# Patient Record
Sex: MALE | Race: WHITE | NOT HISPANIC OR LATINO | Employment: OTHER | URBAN - METROPOLITAN AREA
[De-identification: names, ages, dates, MRNs, and addresses within clinical notes are randomized per-mention and may not be internally consistent; named-entity substitution may affect disease eponyms.]

---

## 2019-07-31 DIAGNOSIS — N18.6 END STAGE RENAL DISEASE: Primary | ICD-10-CM

## 2019-08-07 ENCOUNTER — HOSPITAL ENCOUNTER (EMERGENCY)
Facility: HOSPITAL | Age: 54
Discharge: HOME | End: 2019-08-07
Attending: EMERGENCY MEDICINE
Payer: MEDICARE

## 2019-08-07 VITALS
BODY MASS INDEX: 32.24 KG/M2 | SYSTOLIC BLOOD PRESSURE: 107 MMHG | TEMPERATURE: 98 F | OXYGEN SATURATION: 95 % | HEART RATE: 92 BPM | HEIGHT: 66 IN | DIASTOLIC BLOOD PRESSURE: 72 MMHG | RESPIRATION RATE: 20 BRPM | WEIGHT: 200.63 LBS
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
## 2019-08-07 LAB
ALBUMIN SERPL BCP-MCNC: 3.1 G/DL (ref 3.5–5.2)
ALP SERPL-CCNC: 107 U/L (ref 55–135)
ALT SERPL W/O P-5'-P-CCNC: 9 U/L (ref 10–44)
ANION GAP SERPL CALC-SCNC: 17 MMOL/L (ref 8–16)
AST SERPL-CCNC: 11 U/L (ref 10–40)
BASOPHILS # BLD AUTO: 0.12 K/UL (ref 0–0.2)
BASOPHILS NFR BLD: 2 % (ref 0–1.9)
BILIRUB SERPL-MCNC: 2.4 MG/DL (ref 0.1–1)
BUN SERPL-MCNC: 51 MG/DL (ref 6–20)
CALCIUM SERPL-MCNC: 7.8 MG/DL (ref 8.7–10.5)
CHLORIDE SERPL-SCNC: 93 MMOL/L (ref 95–110)
CO2 SERPL-SCNC: 26 MMOL/L (ref 23–29)
CREAT SERPL-MCNC: 8.7 MG/DL (ref 0.5–1.4)
DIFFERENTIAL METHOD: ABNORMAL
EOSINOPHIL # BLD AUTO: 0.1 K/UL (ref 0–0.5)
EOSINOPHIL NFR BLD: 2 % (ref 0–8)
ERYTHROCYTE [DISTWIDTH] IN BLOOD BY AUTOMATED COUNT: 15.8 % (ref 11.5–14.5)
EST. GFR  (AFRICAN AMERICAN): 7.1 ML/MIN/1.73 M^2
EST. GFR  (NON AFRICAN AMERICAN): 6.1 ML/MIN/1.73 M^2
GLUCOSE SERPL-MCNC: 113 MG/DL (ref 70–110)
HCT VFR BLD AUTO: 36.8 % (ref 40–54)
HGB BLD-MCNC: 11.6 G/DL (ref 14–18)
IMM GRANULOCYTES # BLD AUTO: 0.02 K/UL (ref 0–0.04)
IMM GRANULOCYTES NFR BLD AUTO: 0.3 % (ref 0–0.5)
INR PPP: 1.8
LYMPHOCYTES # BLD AUTO: 0.7 K/UL (ref 1–4.8)
LYMPHOCYTES NFR BLD: 11.4 % (ref 18–48)
MCH RBC QN AUTO: 30 PG (ref 27–31)
MCHC RBC AUTO-ENTMCNC: 31.5 G/DL (ref 32–36)
MCV RBC AUTO: 95 FL (ref 82–98)
MONOCYTES # BLD AUTO: 0.7 K/UL (ref 0.3–1)
MONOCYTES NFR BLD: 12.4 % (ref 4–15)
NEUTROPHILS # BLD AUTO: 4.3 K/UL (ref 1.8–7.7)
NEUTROPHILS NFR BLD: 71.9 % (ref 38–73)
NRBC BLD-RTO: 0 /100 WBC
PLATELET # BLD AUTO: 108 K/UL (ref 150–350)
PMV BLD AUTO: 12.1 FL (ref 9.2–12.9)
POTASSIUM SERPL-SCNC: 3.6 MMOL/L (ref 3.5–5.1)
PROT SERPL-MCNC: 7.3 G/DL (ref 6–8.4)
PROTHROMBIN TIME: 19.8 SEC (ref 11.7–14)
RBC # BLD AUTO: 3.87 M/UL (ref 4.6–6.2)
SODIUM SERPL-SCNC: 136 MMOL/L (ref 136–145)
WBC # BLD AUTO: 5.97 K/UL (ref 3.9–12.7)

## 2019-08-07 PROCEDURE — 99284 EMERGENCY DEPT VISIT MOD MDM: CPT | Mod: 25

## 2019-08-07 PROCEDURE — 99285 EMERGENCY DEPT VISIT HI MDM: CPT

## 2019-08-07 PROCEDURE — 85610 PROTHROMBIN TIME: CPT | Mod: GZ

## 2019-08-07 PROCEDURE — 85025 COMPLETE CBC W/AUTO DIFF WBC: CPT

## 2019-08-07 PROCEDURE — 80053 COMPREHEN METABOLIC PANEL: CPT

## 2019-08-07 NOTE — ED PROVIDER NOTES
"Encounter Date: 8/7/2019       History     Chief Complaint   Patient presents with    Fall     GETTING UP FROM CHAIR AND KNEES "GAVE OUT" AND FELL DOWN TO THE GROUND C/O LEFT HIP PAIN     54-year-old male with a history of cirrhosis, COPD, DM, ESRD (T/Th/Sat) presents to the ER after fall.  Patient states that he was attempting to transfer from a chair to his bed using a walker.  States that when he stood up, he felt like his legs just gave out from under him.  Denies preceding weakness, numbness, tingling, vision changes, lightheadedness, chest pain, shortness of breath. Fell onto his left hip, and hit his left head on the ground.  Denies LOC.  Complaining of left hip pain and bilateral knee pain. Denies fever, nausea vomiting, chest pain, shortness of breath, abdominal pain, or changes in bowel or bladder function.    The history is provided by the patient.     Review of patient's allergies indicates:   Allergen Reactions    Sulfa (sulfonamide antibiotics)      Past Medical History:   Diagnosis Date    Cirrhosis     COPD (chronic obstructive pulmonary disease)     Diabetes mellitus     Renal disorder      Past Surgical History:   Procedure Laterality Date    AV FISTULA PLACEMENT Right     CARDIAC DEFIBRILLATOR PLACEMENT Left      No family history on file.  Social History     Tobacco Use    Smoking status: Not on file   Substance Use Topics    Alcohol use: Not on file    Drug use: Not on file     Review of Systems   Constitutional: Negative for fever.   HENT: Negative for sore throat.    Respiratory: Negative for shortness of breath.    Cardiovascular: Positive for leg swelling. Negative for chest pain.   Gastrointestinal: Negative for abdominal pain, nausea and vomiting.   Genitourinary: Negative for dysuria.   Musculoskeletal: Negative for back pain.   Skin: Negative for rash.   Neurological: Negative for dizziness, syncope, weakness, light-headedness and numbness.   Hematological: Does not " bruise/bleed easily.   All other systems reviewed and are negative.      Physical Exam     Initial Vitals [08/07/19 0307]   BP Pulse Resp Temp SpO2   102/65 84 20 97.8 °F (36.6 °C) 95 %      MAP       --         Physical Exam    Constitutional: He appears well-developed and well-nourished. No distress.   HENT:   Head: Normocephalic and atraumatic.   Mouth/Throat: Oropharynx is clear and moist.   Eyes: Conjunctivae and EOM are normal. Pupils are equal, round, and reactive to light.   Neck: Normal range of motion. No spinous process tenderness and no muscular tenderness present.   Cardiovascular: Normal rate, regular rhythm, normal heart sounds and intact distal pulses.   No murmur heard.  Pulmonary/Chest: Breath sounds normal. No respiratory distress. He has no wheezes. He has no rhonchi. He has no rales. He exhibits no tenderness.   Abdominal: Soft. He exhibits fluid wave and ascites. He exhibits no distension. There is no tenderness. There is no rebound and no guarding.   Musculoskeletal: Normal range of motion. He exhibits edema. He exhibits no tenderness (2+ up to the shins).   Left hip tender to palpation laterally.  Bilateral knees tender to palpation diffusely.  No crepitus or deformity.  Full range of motion.   Neurological: He is alert. He has normal strength. No cranial nerve deficit or sensory deficit. GCS eye subscore is 4. GCS verbal subscore is 5. GCS motor subscore is 6.   AAO. EOMI, PERRL. CN II-XII intact. BUE and BLE 5/5 strength. Normal sensation.    Skin: Skin is warm and dry. No rash noted. No erythema.   Psychiatric: He has a normal mood and affect. Thought content normal.         ED Course   Procedures  Labs Reviewed - No data to display       Imaging Results    None          Medical Decision Making:   Initial Assessment:   54-year-old male with a history of cirrhosis, COPD, DM, ESRD (T/Th/Sat) presents to the ER after fall.  Plan:  Afebrile, vital signs stable. Labs show H&H 11.6/36.8.  BUN  51, creatinine 8.7.  Potassium 3.6.  INR 1.8.  CT head shows no acute abnormality.  Chest x-ray, pelvis and hip x-ray, bilateral knee x-rays show no evidence of fracture or dislocation.  Unclear cause of this patient's fall.  He has no focal deficits.  No symptoms of syncope or cardiopulmonary cause. Orthostatics negative. Plan to discharge. Recommend f/u with PCP. Given return precautions.                      Clinical Impression:       ICD-10-CM ICD-9-CM   1. Fall W19.XXXA E888.9                                Josh Boss MD  08/08/19 0527

## 2019-08-07 NOTE — ED NOTES
Pt presented to er with c/o left hip and left side pain after falling while getting out of chair tonight. No acute distress noted. Pt is a dialysis patient. Had dialysis yesterday on Tuesday. multiple sores noted to patient's body. Laced on cardiac monitor. Will continue to monitor.

## 2019-08-08 ENCOUNTER — HOSPITAL ENCOUNTER (EMERGENCY)
Facility: HOSPITAL | Age: 54
Discharge: HOME | End: 2019-08-08
Attending: EMERGENCY MEDICINE
Payer: MEDICARE

## 2019-08-08 VITALS
TEMPERATURE: 98 F | HEIGHT: 66 IN | OXYGEN SATURATION: 100 % | BODY MASS INDEX: 32.38 KG/M2 | HEART RATE: 86 BPM | SYSTOLIC BLOOD PRESSURE: 103 MMHG | RESPIRATION RATE: 20 BRPM | WEIGHT: 201.5 LBS | DIASTOLIC BLOOD PRESSURE: 61 MMHG

## 2019-08-08 DIAGNOSIS — S00.83XA TRAUMATIC HEMATOMA OF OCCIPUT, INITIAL ENCOUNTER: ICD-10-CM

## 2019-08-08 DIAGNOSIS — S80.212A KNEE ABRASION, LEFT, INITIAL ENCOUNTER: ICD-10-CM
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
## 2019-08-08 DIAGNOSIS — M25.552 LEFT HIP PAIN: ICD-10-CM

## 2019-08-08 LAB
ANION GAP SERPL CALC-SCNC: 17 MMOL/L (ref 8–16)
BUN SERPL-MCNC: 54 MG/DL (ref 6–20)
CALCIUM SERPL-MCNC: 8 MG/DL (ref 8.7–10.5)
CHLORIDE SERPL-SCNC: 96 MMOL/L (ref 95–110)
CO2 SERPL-SCNC: 26 MMOL/L (ref 23–29)
CREAT SERPL-MCNC: 9.5 MG/DL (ref 0.5–1.4)
EST. GFR  (AFRICAN AMERICAN): 6.4 ML/MIN/1.73 M^2
EST. GFR  (NON AFRICAN AMERICAN): 5.5 ML/MIN/1.73 M^2
GLUCOSE SERPL-MCNC: 135 MG/DL (ref 70–110)
POTASSIUM SERPL-SCNC: 4 MMOL/L (ref 3.5–5.1)
SODIUM SERPL-SCNC: 139 MMOL/L (ref 136–145)

## 2019-08-08 PROCEDURE — 80048 BASIC METABOLIC PNL TOTAL CA: CPT

## 2019-08-08 PROCEDURE — 93005 ELECTROCARDIOGRAM TRACING: CPT

## 2019-08-08 PROCEDURE — 25000003 PHARM REV CODE 250: Performed by: STUDENT IN AN ORGANIZED HEALTH CARE EDUCATION/TRAINING PROGRAM

## 2019-08-08 RX ORDER — ACETAMINOPHEN 325 MG/1
650 TABLET ORAL
Status: COMPLETED | OUTPATIENT
Start: 2019-08-08 | End: 2019-08-08

## 2019-08-08 RX ADMIN — ACETAMINOPHEN 650 MG: 325 TABLET ORAL at 03:08

## 2019-08-08 NOTE — ED NOTES
Patient discharge has been delayed due to waiting for ride. Sister (Skye) at [phone] notified, states she will be here to get him shortly.

## 2019-08-08 NOTE — ED PROVIDER NOTES
Encounter Date: 8/7/2019       History     Chief Complaint   Patient presents with    Knee Pain     ROLLED OUT OF BED, HAS LEFT HIP PAIN, KNEES ARE HURTING.          HPI   54-year-old male with past medical history of cirrhosis, COPD, diabetes, ESRD on dialysis TTS, CAD, prior CVA who presents with fall occurred just prior to arrival.  He was seen at this facility yesterday also for fall, at which time his knees gave out.  Today he presents for a fall that occurred while he was in bed.  He reports being asleep, during which he frequently rolls around to get comfortable, and he rolled out of bed falling to the floor.  Does report head trauma, awoke on impact but otherwise denies loss of consciousness.  Notes an abrasion to his nose and a lump to the back of his head.  Also complains of pain in his left hip, left lower back, and bilateral knees.  He uses a walker at home.      Review of patient's allergies indicates:   Allergen Reactions    Sulfa (sulfonamide antibiotics) Rash     Past Medical History:   Diagnosis Date    Cirrhosis     COPD (chronic obstructive pulmonary disease)     Diabetes mellitus     Renal disorder      Past Surgical History:   Procedure Laterality Date    AV FISTULA PLACEMENT Right     CARDIAC DEFIBRILLATOR PLACEMENT Left      No family history on file.  Social History     Tobacco Use    Smoking status: Not on file   Substance Use Topics    Alcohol use: Not on file    Drug use: Not on file     Review of Systems   Constitutional: Negative for chills and fever.   HENT: Negative for congestion and sore throat.    Eyes: Negative for discharge and redness.   Respiratory: Negative for cough, shortness of breath and wheezing.    Cardiovascular: Negative for chest pain and leg swelling.   Gastrointestinal: Positive for abdominal distention. Negative for abdominal pain, constipation, diarrhea, nausea and vomiting.   Genitourinary: Negative for dysuria and hematuria.   Musculoskeletal: Positive  for arthralgias and back pain. Negative for neck pain.   Skin: Positive for wound. Negative for rash.   Neurological: Positive for headaches. Negative for dizziness, syncope, weakness, light-headedness and numbness.   Psychiatric/Behavioral: Negative for behavioral problems and confusion.       Physical Exam     Initial Vitals [08/08/19 0055]   BP Pulse Resp Temp SpO2   101/67 87 16 97.7 °F (36.5 °C) 99 %      MAP       --         Physical Exam    Nursing note and vitals reviewed.  Constitutional: He appears well-developed and well-nourished. He is not diaphoretic.    male who appears stated age, sitting upright in bed.  Appears chronically ill.  Was able to transfer from wheelchair to bed with minimal assistance   HENT:   Head: Normocephalic.   Nose: Nose normal.   Mouth/Throat: Oropharynx is clear and moist. No oropharyngeal exudate.   Small hematoma to the occiput  Small abrasion to the nasal bridge   Eyes: Conjunctivae and EOM are normal. Pupils are equal, round, and reactive to light.   Neck: No tracheal deviation present. No JVD present.   Cardiovascular: Normal rate, regular rhythm, normal heart sounds and intact distal pulses. Exam reveals no gallop and no friction rub.    No murmur heard.  Pulmonary/Chest: Breath sounds normal. No stridor. No respiratory distress. He has no wheezes.   Abdominal: Soft. Bowel sounds are normal. He exhibits distension. There is no tenderness. There is no rebound and no guarding.   Musculoskeletal:   Mildly tender to palpation in the left paraspinal region, no midline lumbar tenderness  Mildly tender to palpation in left hip  Mildly tender to palpation in left distal femur  Mildly tender to palpation in bilateral knees, superficial abrasion left knee  Range of motion full throughout bilateral lower extremities but did recreate mild pain    Right upper extremity fistula with palpable thrill    2+ pitting edema to bilateral lower extremities up to shins   Neurological:  He is alert and oriented to person, place, and time.   Cranial nerves 2-12 intact  Strength 4+ / 5 in all extremities  Sensation to light touch intact in all extremities   Skin: Skin is warm and dry.   Psychiatric: He has a normal mood and affect. His behavior is normal. Thought content normal.         ED Course   Procedures  Labs Reviewed - No data to display       Imaging Results    None                APC / Resident Notes:   MEG Cali is a 54 y.o. male with extensive PMH, who presents with fall from rolling out of bed.  Vital signs notable for soft blood pressure but overall stable. Physical exam remarkable for hematoma occiput, abrasion to the nasal bridge, and tenderness over the left hip/ left distal femur/ bilateral knees.  DDx - contusion, abrasion, fracture, intracranial hemorrhage, electrolyte abnormality.  Will evaluate with workup including BMP, CT head, x-rays, EKG. Initiating treatment with tylenol.  NILA Lang MD  EM - PGY3  2:51 AM      CT head with no acute processes.  X-rays showed multi joint arthritis and bilateral lower extremity atherosclerosis, but were negative for acute fracture per my read.  BMP at baseline.  Patient was able to stand at bedside with minimal assistance, and reports feeling at his baseline.  No indication for further emergency department workup or treatment at this time, nor for admission.  Instructed to use OTC Tylenol for pain, and counseled on presenting falls.  Discussed results and plan with patient, and patient understands and is agreeable with discharge. Will follow up with his gastroenterologist Dr. Rm to set up out patient paracentesis.  Gave return precautions; patient is stable for discharge.  5:20 AM                     Clinical Impression:       ICD-10-CM ICD-9-CM   1. Fall W19.XXXA E888.9   2. Knee abrasion, left, initial encounter S80.212A 916.0   3. Left hip pain M25.552 719.45   4. Traumatic hematoma of occiput, initial encounter  S00.83XA 920                                Stevo Lang MD  Resident  08/08/19 0522

## 2019-08-08 NOTE — ED NOTES
"Pt here with c/o fell out of bed onto floor and "hurt both knees and my L hip and hit the back of my head." states he doesn't remember falling out of bed, "just woke up in a large pool of blood." small abrasions noted to bilat knees and healing abrasions noted to generalized body. abd noted to protuberant and very rounded.  Dialysis fistula noted to R upper arm + bruit and thrill. States he is supposed to get dialysis today. Of note that pt seen here for same thing yesterday. Placed pt on monitors and in gown. Bed locked and in low position. Side rails up x2. Pending further ED workup. Will continue monitor.   "

## 2019-08-13 ENCOUNTER — TELEPHONE (OUTPATIENT)
Dept: FAMILY MEDICINE | Facility: CLINIC | Age: 54
End: 2019-08-13

## 2019-08-13 ENCOUNTER — HOSPITAL ENCOUNTER (INPATIENT)
Facility: HOSPITAL | Age: 54
LOS: 15 days | Discharge: HOME | DRG: 432 | End: 2019-08-30
Attending: EMERGENCY MEDICINE | Admitting: INTERNAL MEDICINE
Payer: MEDICARE

## 2019-08-13 DIAGNOSIS — N18.6 ESRD (END STAGE RENAL DISEASE): Chronic | ICD-10-CM

## 2019-08-13 DIAGNOSIS — I25.10 CAD S/P PERCUTANEOUS CORONARY ANGIOPLASTY: Chronic | ICD-10-CM

## 2019-08-13 DIAGNOSIS — N18.6 END STAGE RENAL DISEASE: Primary | ICD-10-CM

## 2019-08-13 DIAGNOSIS — E87.70 VOLUME OVERLOAD: ICD-10-CM

## 2019-08-13 DIAGNOSIS — R18.8 ASCITES: ICD-10-CM

## 2019-08-13 DIAGNOSIS — Z98.61 CAD S/P PERCUTANEOUS CORONARY ANGIOPLASTY: Chronic | ICD-10-CM

## 2019-08-13 DIAGNOSIS — K70.31 ALCOHOLIC CIRRHOSIS OF LIVER WITH ASCITES: ICD-10-CM

## 2019-08-13 PROBLEM — I69.359 HEMIPLEGIA AS LATE EFFECT OF CEREBROVASCULAR ACCIDENT (CVA): Chronic | Status: ACTIVE | Noted: 2019-08-13

## 2019-08-13 PROBLEM — Z95.810 AICD (AUTOMATIC CARDIOVERTER/DEFIBRILLATOR) PRESENT: Chronic | Status: ACTIVE | Noted: 2019-08-13

## 2019-08-13 PROBLEM — R19.7 DIARRHEA OF PRESUMED INFECTIOUS ORIGIN: Status: ACTIVE | Noted: 2019-08-13

## 2019-08-13 PROBLEM — G93.41 ENCEPHALOPATHY, METABOLIC: Status: ACTIVE | Noted: 2019-08-13

## 2019-08-13 LAB — AMMONIA PLAS-SCNC: 37 UMOL/L (ref 10–50)

## 2019-08-13 PROCEDURE — G0378 HOSPITAL OBSERVATION PER HR: HCPCS

## 2019-08-13 PROCEDURE — 63600175 PHARM REV CODE 636 W HCPCS: Performed by: INTERNAL MEDICINE

## 2019-08-13 PROCEDURE — 90935 HEMODIALYSIS ONE EVALUATION: CPT

## 2019-08-13 PROCEDURE — S0077 INJECTION, CLINDAMYCIN PHOSP: HCPCS | Performed by: INTERNAL MEDICINE

## 2019-08-13 PROCEDURE — 99900035 HC TECH TIME PER 15 MIN (STAT)

## 2019-08-13 PROCEDURE — 82140 ASSAY OF AMMONIA: CPT

## 2019-08-13 PROCEDURE — 25000003 PHARM REV CODE 250: Performed by: INTERNAL MEDICINE

## 2019-08-13 PROCEDURE — 94760 N-INVAS EAR/PLS OXIMETRY 1: CPT

## 2019-08-13 RX ORDER — ALBUTEROL SULFATE 0.83 MG/ML
2.5 SOLUTION RESPIRATORY (INHALATION) EVERY 4 HOURS PRN
Status: DISCONTINUED | OUTPATIENT
Start: 2019-08-13 | End: 2019-08-30 | Stop reason: HOSPADM

## 2019-08-13 RX ORDER — GLUCAGON 1 MG
1 KIT INJECTION
Status: DISCONTINUED | OUTPATIENT
Start: 2019-08-13 | End: 2019-08-30 | Stop reason: HOSPADM

## 2019-08-13 RX ORDER — AMIODARONE HYDROCHLORIDE 200 MG/1
200 TABLET ORAL DAILY
Status: DISCONTINUED | OUTPATIENT
Start: 2019-08-14 | End: 2019-08-30 | Stop reason: HOSPADM

## 2019-08-13 RX ORDER — IBUPROFEN 200 MG
16 TABLET ORAL
Status: DISCONTINUED | OUTPATIENT
Start: 2019-08-13 | End: 2019-08-30 | Stop reason: HOSPADM

## 2019-08-13 RX ORDER — MIDODRINE HYDROCHLORIDE 2.5 MG/1
5 TABLET ORAL 3 TIMES DAILY
Status: DISCONTINUED | OUTPATIENT
Start: 2019-08-14 | End: 2019-08-30 | Stop reason: HOSPADM

## 2019-08-13 RX ORDER — TORSEMIDE 20 MG/1
20 TABLET ORAL DAILY
Status: DISCONTINUED | OUTPATIENT
Start: 2019-08-14 | End: 2019-08-14

## 2019-08-13 RX ORDER — FLUTICASONE PROPIONATE 110 UG/1
1 AEROSOL, METERED RESPIRATORY (INHALATION) EVERY 12 HOURS
Status: DISCONTINUED | OUTPATIENT
Start: 2019-08-14 | End: 2019-08-13

## 2019-08-13 RX ORDER — SPIRONOLACTONE 50 MG/1
100 TABLET, FILM COATED ORAL DAILY
Status: DISCONTINUED | OUTPATIENT
Start: 2019-08-14 | End: 2019-08-14

## 2019-08-13 RX ORDER — ALBUTEROL SULFATE 90 UG/1
2 AEROSOL, METERED RESPIRATORY (INHALATION) EVERY 6 HOURS PRN
Status: DISCONTINUED | OUTPATIENT
Start: 2019-08-13 | End: 2019-08-13

## 2019-08-13 RX ORDER — CLINDAMYCIN PHOSPHATE 600 MG/50ML
600 INJECTION, SOLUTION INTRAVENOUS
Status: DISCONTINUED | OUTPATIENT
Start: 2019-08-13 | End: 2019-08-14

## 2019-08-13 RX ORDER — ALLOPURINOL 100 MG/1
100 TABLET ORAL DAILY
Status: DISCONTINUED | OUTPATIENT
Start: 2019-08-14 | End: 2019-08-30 | Stop reason: HOSPADM

## 2019-08-13 RX ORDER — CALCIUM ACETATE 667 MG/1
667 CAPSULE ORAL
Status: DISCONTINUED | OUTPATIENT
Start: 2019-08-14 | End: 2019-08-30 | Stop reason: HOSPADM

## 2019-08-13 RX ORDER — SODIUM CHLORIDE 0.9 % (FLUSH) 0.9 %
10 SYRINGE (ML) INJECTION
Status: DISCONTINUED | OUTPATIENT
Start: 2019-08-13 | End: 2019-08-30 | Stop reason: HOSPADM

## 2019-08-13 RX ORDER — ASPIRIN 81 MG/1
81 TABLET ORAL DAILY
Status: DISCONTINUED | OUTPATIENT
Start: 2019-08-14 | End: 2019-08-30 | Stop reason: HOSPADM

## 2019-08-13 RX ORDER — IBUPROFEN 200 MG
24 TABLET ORAL
Status: DISCONTINUED | OUTPATIENT
Start: 2019-08-13 | End: 2019-08-30 | Stop reason: HOSPADM

## 2019-08-13 RX ADMIN — RIFAXIMIN 550 MG: 550 TABLET ORAL at 10:08

## 2019-08-13 RX ADMIN — CLINDAMYCIN IN 5 PERCENT DEXTROSE 600 MG: 12 INJECTION, SOLUTION INTRAVENOUS at 10:08

## 2019-08-13 RX ADMIN — CEFTRIAXONE SODIUM 1 G: 1 INJECTION, POWDER, FOR SOLUTION INTRAMUSCULAR; INTRAVENOUS at 10:08

## 2019-08-13 NOTE — ED NOTES
Attempted to call [facility] for clarification on why patient was sent back to ED, answering service was unable to get ahold of the nurse and then reported that the clinic was closed. There is no way to access the chart.

## 2019-08-13 NOTE — ED PROVIDER NOTES
Encounter Date: 8/13/2019       History     Chief Complaint   Patient presents with    Shortness of Breath     Dialysis Facility Problem     54-year-old male with history of cirrhotic liver disease secondary to alcoholism, end-stage renal disease followed by Dr. Howell patient was seen in the emergency department earlier today after he was found to have suffered a mechanical fall at home.  Patient was discharged to dialysis unit after workup in emergency department proved to be nondiagnostic.  Upon arrival to the dialysis unit this afternoon patient was unable to undergo dialysis secondary to mechanical problems with the dialysis machine.  Patient was transferred back to the emergency department secondary to volume overload, ascites, and generalized anasarca.  Current the patient is without new complaints.        Review of patient's allergies indicates:   Allergen Reactions    Sulfa (sulfonamide antibiotics) Rash     Past Medical History:   Diagnosis Date    Cirrhosis     COPD (chronic obstructive pulmonary disease)     Diabetes mellitus     Renal disorder      Past Surgical History:   Procedure Laterality Date    AV FISTULA PLACEMENT Right     CARDIAC DEFIBRILLATOR PLACEMENT Left      No family history on file.  Social History     Tobacco Use    Smoking status: Former Smoker   Substance Use Topics    Alcohol use: Not Currently     Comment: no longer drinking    Drug use: Not on file     Review of Systems   Constitutional: Negative for fever.   HENT: Negative for congestion, rhinorrhea and sore throat.    Eyes: Negative for visual disturbance.   Respiratory: Positive for shortness of breath. Negative for cough and chest tightness.    Cardiovascular: Positive for leg swelling. Negative for chest pain and palpitations.   Gastrointestinal: Positive for abdominal distention. Negative for abdominal pain, diarrhea, nausea and vomiting.   Genitourinary: Negative for difficulty urinating, discharge, flank pain,  penile pain and testicular pain.   Musculoskeletal: Positive for arthralgias. Negative for myalgias.   Skin: Negative for rash.   Neurological: Negative for dizziness, seizures and headaches.   Hematological: Negative for adenopathy. Does not bruise/bleed easily.   Psychiatric/Behavioral: Negative for behavioral problems and confusion. The patient is not nervous/anxious.        Physical Exam     Initial Vitals [08/13/19 1418]   BP Pulse Resp Temp SpO2   102/70 82 18 97.6 °F (36.4 °C) 100 %      MAP       --         Physical Exam    Nursing note and vitals reviewed.  Constitutional: He appears well-developed and well-nourished.   HENT:   Head: Normocephalic and atraumatic.   Nose: Nose normal.   Mouth/Throat: Oropharynx is clear and moist.   Eyes: Conjunctivae and EOM are normal. Pupils are equal, round, and reactive to light. No scleral icterus.   Neck: Normal range of motion. Neck supple.   Cardiovascular: Normal rate, regular rhythm, normal heart sounds and intact distal pulses. Exam reveals no gallop and no friction rub.    No murmur heard.  Pulmonary/Chest: Breath sounds normal. No stridor. No respiratory distress.   Mild conversational dyspnea no acute respiratory distress   Abdominal: Soft. Bowel sounds are normal. He exhibits distension and ascites. He exhibits no mass. There is hepatomegaly. There is no tenderness. There is no rebound and no guarding.   Musculoskeletal: Normal range of motion. He exhibits no edema.   Lymphadenopathy:     He has no cervical adenopathy.   Neurological: He is alert and oriented to person, place, and time. He has normal strength and normal reflexes. No cranial nerve deficit or sensory deficit. GCS score is 15. GCS eye subscore is 4. GCS verbal subscore is 5. GCS motor subscore is 6.   Skin: Skin is warm and dry. Capillary refill takes less than 2 seconds. No rash noted.   Psychiatric: He has a normal mood and affect. His behavior is normal. Judgment and thought content normal.          ED Course   Procedures  Labs Reviewed - No data to display       Imaging Results          US Abdomen Limited (Final result)  Result time 08/13/19 15:32:51    Final result by Law Arriaga MD (08/13/19 15:32:51)                 Impression:      Moderate to large volume ascites.      Electronically signed by: Law Arriaga MD  Date:    08/13/2019  Time:    15:32             Narrative:    EXAMINATION:  US ABDOMEN LIMITED    CLINICAL HISTORY:  Abdominal ascites;    COMPARISON:  None    FINDINGS:  Targeted sonography of the abdomen was performed to assess for ascites.  Ascitic fluid is present within all 4 quadrants, overall moderate to large volume.                                 Medical Decision Making:   Initial Assessment:   54-year-old male with alcoholic cirrhotic liver disease, end-stage renal disease, transferred from dialysis unit secondary to increasing shortness of breath dyspnea on exertion and generalized anasarca.  Differential Diagnosis:   Volume overload, ascites, pulmonary edema  Clinical Tests:   Radiological Study: Ordered and Reviewed  ED Management:  Patient emergency department was seen and evaluated earlier today within the emergency department.  At this time patient has had difficulty with obtaining dialysis as outpatient earlier today and now has worsening symptoms consistent with volume overload, ascites, anasarca.  At this time patient will be admitted to hospitalist service and will undergo evaluations by both Gastroenterology and Nephrology.  Patient did have a abdominal ultrasound performed in emergency department which showed moderate ascites.  Gastroenterology this consult to evaluate for possible paracentesis during this admission to help relieve with volume overload status.  Patient also will be dialyzed as well. Patient case was discussed with the hospitalist service will be admitted further evaluation and treatment.  Patient remained hemodynamically stable in the  emergency department.  Patient's chances for SBP was considered however was low suspicion secondary to no abdominal pain and a benign abdominal exam.                      Clinical Impression:       ICD-10-CM ICD-9-CM   1. End stage renal disease N18.6 585.6   2. Ascites R18.8 789.59   3. Alcoholic cirrhosis of liver with ascites K70.31 571.2                                Paul Walden MD  08/13/19 1603       Paul Walden MD  08/13/19 1717

## 2019-08-13 NOTE — H&P
[facility]  History & Physical    Patient Name: Tre Cali  MRN: [id number]  Admission Date: 8/13/2019  Attending Physician: Marisel Waldron MD   Primary Care Provider: No primary care provider on file.         Patient information was obtained from patient and ER records.     Subjective:     Principal Problem:Volume overload    Chief Complaint:   Chief Complaint   Patient presents with    Shortness of Breath     Dialysis Facility Problem        HPI:   Mr. Cali is a 54-year-old male with past medical history of CAD status post stent several years ago, COPD, liver cirrhosis, ESRD on hemodialysis presents with generalized weakness.  The patient presented emergency room earlier today in the morning for generalized weakness and anasarca, patient was discharged to dialysis center for hemodialysis.  Patient came back from hemodialysis for unclear reasons.  Patient reports history of generalized weakness, reports knees gave out and fell.  History of shortness of breath since last few days.  History of worsening abdominal swelling and abdominal pain since 2-3 weeks.  History of diarrhea since 3 weeks, about 4 episodes every day, nonbloody.  Patient reports sleepiness and intermittent confusion since 1-2 days.  Patient moved from [place] to [place] a week ago and yet to set up doctors.  Patient reports last hemodialysis was last Saturday.  Patient denies any history of fevers, chills or nausea, vomiting.  Patient reports history of stents several years ago.  Reports history of stroke several years ago with resultant right upper extremity and lower extremity weakness.    Patient quit drinking about 20 years ago.  Patient has longstanding history of smoking, reports no smoking intermittently few cigarettes per day.  Denies history of drug abuse.  Past surgical history - right arm AV fistula, AICD placement, cardiac stents    Past Medical History:   Diagnosis Date    Cirrhosis      COPD (chronic obstructive pulmonary disease)     Diabetes mellitus     Renal disorder        Past Surgical History:   Procedure Laterality Date    AV FISTULA PLACEMENT Right     CARDIAC DEFIBRILLATOR PLACEMENT Left        Review of patient's allergies indicates:   Allergen Reactions    Sulfa (sulfonamide antibiotics) Rash       No current facility-administered medications on file prior to encounter.      Current Outpatient Medications on File Prior to Encounter   Medication Sig    albuterol (PROVENTIL HFA) 90 mcg/actuation inhaler Proventil HFA 90 mcg/actuation aerosol inhaler   Inhale 2 puffs every 4 hours by inhalation route.    allopurinol (ZYLOPRIM) 100 MG tablet 100 mg.    amiodarone (PACERONE) 200 MG Tab Take 1 tablet by mouth.    aspirin (ECOTRIN) 81 MG EC tablet Take 1 tablet by mouth.    bumetanide (BUMEX) 2 MG tablet bumetanide 2 mg tablet   Take 1 tablet every day by oral route.    calcium acetate (PHOSLO) 667 mg tablet calcium acetate 667 mg tablet   Take 4 tablets 3 times a day with each meal    fluticasone propionate (FLOVENT HFA) 110 mcg/actuation inhaler Flovent  mcg/actuation aerosol inhaler   Inhale 1 puff twice a day by inhalation route.    gabapentin (NEURONTIN) 100 MG capsule gabapentin 100 mg capsule   Take 1 capsule 3 times a day by oral route.    insulin aspart U-100 (NOVOLOG FLEXPEN U-100 INSULIN) 100 unit/mL (3 mL) InPn pen Novolog Flexpen U-100 Insulin    midodrine (PROAMATINE) 5 MG Tab midodrine 5 mg tablet   Take 2 tablets 3 times a day by oral route.    rifAXIMin (XIFAXAN) 550 mg Tab Xifaxan 550 mg tablet   Take 1 tablet 3 times a day by oral route for 14 days.    spironolactone (ALDACTONE) 100 MG tablet spironolactone 100 mg tablet   Take 1 tablet every day by oral route.    torsemide (DEMADEX) 20 MG Tab Demadex 20 mg tablet   Take 1 tablet every day by oral route.     Family History     None        Tobacco Use    Smoking status: Former Smoker   Substance and  Sexual Activity    Alcohol use: Not Currently     Comment: no longer drinking    Drug use: Not on file    Sexual activity: Not on file     Review of Systems  Objective:     Vital Signs (Most Recent):  Temp: 97.6 °F (36.4 °C) (08/13/19 1418)  Pulse: 85 (08/13/19 1900)  Resp: 20 (08/13/19 1900)  BP: 104/72 (08/13/19 1900)  SpO2: 100 % (08/13/19 1900) Vital Signs (24h Range):  Temp:  [97.6 °F (36.4 °C)-98 °F (36.7 °C)] 97.6 °F (36.4 °C)  Pulse:  [82-90] 85  Resp:  [12-32] 20  SpO2:  [90 %-100 %] 100 %  BP: ()/(52-78) 104/72     Weight: 91 kg (200 lb 9.9 oz)  Body mass index is 32.38 kg/m².    Physical Exam       General: Patient is drowsy. Dishelved appearance. Obese buitl  Eyes: PERRLA. No conjunctivae pallor. No scleral icterus.  ENT: OMM. No pharyngeal erythema. Thick neck  Neck: Supple. No adenopathy.  Lungs:  Clear to auscultation without wheezing or crackles anteriorly  Cor: Regular rate and rhythm. No murmurs. No pedal edema.  Abd: Soft.  Distended.  Dullness to percussion.  Mild generalized tenderness without signs of acute abdomen.  Musculoskeletal: No arthropathy, deformity.  Skin:  Bruises noted on chest and upper extremities.  Neuro: A&O x3.  Right-sided wendy paresis noted  Ext:  4+ bilateral pitting pedal edema.     Significant Labs:   CBC:   Recent Labs   Lab 08/13/19  0810   WBC 6.55   HGB 11.5*   HCT 37.0*   PLT 99*     CMP:   Recent Labs   Lab 08/13/19  0810      K 3.6   CL 93*   CO2 26   *   BUN 55*   CREATININE 8.0*   CALCIUM 8.4*   PROT 7.9   ALBUMIN 3.2*   BILITOT 1.8*   ALKPHOS 108   AST 13   ALT 9*   ANIONGAP 18*   EGFRNONAA 6.8*       Significant Imaging:   Abdominal US - moderate to large ascites    Assessment/Plan:     Generalized weakness     Acute mild encephalopathy - r/o hepatic encephalopathy    Suspect right lower lung pneumonia?  Aspiration    Anasarca    Large ascites - r/o SBP    Sub-acute diarrhea - r/o infectious, inflammatory etc    Liver cirrhosis    ESRD on  HD via right arm AVF on TTS    Chronic hypertension from liver cirrhosis on midodrine at home    CAD s/p stents    AICD in situ    H/o CVAs with right sided hemiparesis    COPD - stable    Poor long-term prognosis    PLAN:  Check serum ammonia levels  Consult GI for paracentesis in the morning  Consult Nephro for hemodialysis  Start empiric IV ceftriaxone (for sbp) and IV clindamycin (for aspiration) after blood cultures  Check stool studies including C diff  NPO from midnight  SCDs for DVT prophylaxis, no anticoagulation given liver cirrhosis  PT OT    Active Diagnoses:    Diagnosis Date Noted POA    PRINCIPAL PROBLEM:  Volume overload [E87.70] 08/13/2019 Yes    Ascites due to alcoholic cirrhosis [K70.31] 08/13/2019 Yes     Chronic    Encephalopathy, metabolic [G93.41] 08/13/2019 Yes    Diarrhea of presumed infectious origin [R19.7] 08/13/2019 Yes    Alcoholic cirrhosis of liver with ascites [K70.31] 08/13/2019 Unknown     Chronic    ESRD (end stage renal disease) [N18.6] 08/13/2019 Yes     Chronic    AICD (automatic cardioverter/defibrillator) present [Z95.810] 08/13/2019 Yes     Chronic    CAD S/P percutaneous coronary angioplasty [I25.10, Z98.61] 08/13/2019 Not Applicable     Chronic    Hemiplegia as late effect of cerebrovascular accident (CVA) [I69.359] 08/13/2019 Not Applicable     Chronic    End stage renal disease [N18.6] 08/13/2019 Yes      Problems Resolved During this Admission:     VTE Risk Mitigation (From admission, onward)        Ordered     IP VTE HIGH RISK PATIENT  Once      08/13/19 1909            Marisel Waldron MD  Department of Hospital Medicine   [facility]

## 2019-08-13 NOTE — TELEPHONE ENCOUNTER
----- Message from Angeles Saba sent at 8/13/2019  9:32 AM CDT -----  FU er, needs new patient appt for follow up  # [phone]

## 2019-08-14 LAB
ALBUMIN SERPL BCP-MCNC: 3.1 G/DL (ref 3.5–5.2)
ALP SERPL-CCNC: 99 U/L (ref 55–135)
ALT SERPL W/O P-5'-P-CCNC: 8 U/L (ref 10–44)
ANION GAP SERPL CALC-SCNC: 20 MMOL/L (ref 8–16)
AST SERPL-CCNC: 11 U/L (ref 10–40)
BASOPHILS # BLD AUTO: 0.07 K/UL (ref 0–0.2)
BASOPHILS NFR BLD: 1.4 % (ref 0–1.9)
BILIRUB SERPL-MCNC: 2.1 MG/DL (ref 0.1–1)
BUN SERPL-MCNC: 57 MG/DL (ref 6–20)
CALCIUM SERPL-MCNC: 8.4 MG/DL (ref 8.7–10.5)
CHLORIDE SERPL-SCNC: 98 MMOL/L (ref 95–110)
CO2 SERPL-SCNC: 23 MMOL/L (ref 23–29)
CREAT SERPL-MCNC: 8.9 MG/DL (ref 0.5–1.4)
DIFFERENTIAL METHOD: ABNORMAL
EOSINOPHIL # BLD AUTO: 0.2 K/UL (ref 0–0.5)
EOSINOPHIL NFR BLD: 3.6 % (ref 0–8)
ERYTHROCYTE [DISTWIDTH] IN BLOOD BY AUTOMATED COUNT: 16 % (ref 11.5–14.5)
EST. GFR  (AFRICAN AMERICAN): 6.9 ML/MIN/1.73 M^2
EST. GFR  (NON AFRICAN AMERICAN): 6 ML/MIN/1.73 M^2
GLUCOSE SERPL-MCNC: 108 MG/DL (ref 70–110)
GLUCOSE SERPL-MCNC: 151 MG/DL (ref 70–110)
GLUCOSE SERPL-MCNC: 94 MG/DL (ref 70–110)
GLUCOSE SERPL-MCNC: 97 MG/DL (ref 70–110)
HCT VFR BLD AUTO: 34.5 % (ref 40–54)
HGB BLD-MCNC: 10.9 G/DL (ref 14–18)
IMM GRANULOCYTES # BLD AUTO: 0.03 K/UL (ref 0–0.04)
IMM GRANULOCYTES NFR BLD AUTO: 0.6 % (ref 0–0.5)
LYMPHOCYTES # BLD AUTO: 0.5 K/UL (ref 1–4.8)
LYMPHOCYTES NFR BLD: 10.1 % (ref 18–48)
MCH RBC QN AUTO: 30.4 PG (ref 27–31)
MCHC RBC AUTO-ENTMCNC: 31.6 G/DL (ref 32–36)
MCV RBC AUTO: 96 FL (ref 82–98)
MONOCYTES # BLD AUTO: 0.6 K/UL (ref 0.3–1)
MONOCYTES NFR BLD: 11.5 % (ref 4–15)
NEUTROPHILS # BLD AUTO: 3.7 K/UL (ref 1.8–7.7)
NEUTROPHILS NFR BLD: 72.8 % (ref 38–73)
NRBC BLD-RTO: 0 /100 WBC
PLATELET # BLD AUTO: 95 K/UL (ref 150–350)
PMV BLD AUTO: 11.5 FL (ref 9.2–12.9)
POTASSIUM SERPL-SCNC: 4 MMOL/L (ref 3.5–5.1)
PROT SERPL-MCNC: 7 G/DL (ref 6–8.4)
RBC # BLD AUTO: 3.58 M/UL (ref 4.6–6.2)
SODIUM SERPL-SCNC: 141 MMOL/L (ref 136–145)
WBC # BLD AUTO: 5.03 K/UL (ref 3.9–12.7)

## 2019-08-14 PROCEDURE — P9047 ALBUMIN (HUMAN), 25%, 50ML: HCPCS | Mod: JG

## 2019-08-14 PROCEDURE — 99900035 HC TECH TIME PER 15 MIN (STAT)

## 2019-08-14 PROCEDURE — 97163 PT EVAL HIGH COMPLEX 45 MIN: CPT

## 2019-08-14 PROCEDURE — 25000003 PHARM REV CODE 250: Performed by: INTERNAL MEDICINE

## 2019-08-14 PROCEDURE — 87040 BLOOD CULTURE FOR BACTERIA: CPT | Mod: 59

## 2019-08-14 PROCEDURE — 97530 THERAPEUTIC ACTIVITIES: CPT

## 2019-08-14 PROCEDURE — 94761 N-INVAS EAR/PLS OXIMETRY MLT: CPT

## 2019-08-14 PROCEDURE — 63600175 PHARM REV CODE 636 W HCPCS: Mod: JG | Performed by: INTERNAL MEDICINE

## 2019-08-14 PROCEDURE — 80053 COMPREHEN METABOLIC PANEL: CPT

## 2019-08-14 PROCEDURE — P9047 ALBUMIN (HUMAN), 25%, 50ML: HCPCS | Mod: JG | Performed by: INTERNAL MEDICINE

## 2019-08-14 PROCEDURE — 85025 COMPLETE CBC W/AUTO DIFF WBC: CPT

## 2019-08-14 PROCEDURE — 36415 COLL VENOUS BLD VENIPUNCTURE: CPT

## 2019-08-14 PROCEDURE — 63600175 PHARM REV CODE 636 W HCPCS: Mod: JG

## 2019-08-14 PROCEDURE — 90935 HEMODIALYSIS ONE EVALUATION: CPT

## 2019-08-14 PROCEDURE — G0378 HOSPITAL OBSERVATION PER HR: HCPCS

## 2019-08-14 PROCEDURE — 63600175 PHARM REV CODE 636 W HCPCS: Performed by: INTERNAL MEDICINE

## 2019-08-14 PROCEDURE — S0077 INJECTION, CLINDAMYCIN PHOSP: HCPCS | Performed by: INTERNAL MEDICINE

## 2019-08-14 RX ORDER — ALBUMIN HUMAN 250 G/1000ML
25 SOLUTION INTRAVENOUS ONCE
Status: COMPLETED | OUTPATIENT
Start: 2019-08-14 | End: 2019-08-14

## 2019-08-14 RX ORDER — ALBUMIN HUMAN 250 G/1000ML
50 SOLUTION INTRAVENOUS ONCE
Status: DISCONTINUED | OUTPATIENT
Start: 2019-08-14 | End: 2019-08-14

## 2019-08-14 RX ORDER — ALBUMIN HUMAN 250 G/1000ML
50 SOLUTION INTRAVENOUS ONCE
Status: DISCONTINUED | OUTPATIENT
Start: 2019-08-15 | End: 2019-08-30 | Stop reason: HOSPADM

## 2019-08-14 RX ORDER — ALBUMIN HUMAN 250 G/1000ML
SOLUTION INTRAVENOUS
Status: COMPLETED
Start: 2019-08-14 | End: 2019-08-14

## 2019-08-14 RX ADMIN — ALBUMIN (HUMAN): 0.25 INJECTION, SOLUTION INTRAVENOUS at 01:08

## 2019-08-14 RX ADMIN — CEFTRIAXONE SODIUM 1 G: 1 INJECTION, POWDER, FOR SOLUTION INTRAMUSCULAR; INTRAVENOUS at 08:08

## 2019-08-14 RX ADMIN — RIFAXIMIN 550 MG: 550 TABLET ORAL at 08:08

## 2019-08-14 RX ADMIN — CLINDAMYCIN IN 5 PERCENT DEXTROSE 600 MG: 12 INJECTION, SOLUTION INTRAVENOUS at 11:08

## 2019-08-14 RX ADMIN — CLINDAMYCIN IN 5 PERCENT DEXTROSE 600 MG: 12 INJECTION, SOLUTION INTRAVENOUS at 04:08

## 2019-08-14 RX ADMIN — ALBUMIN (HUMAN) 25 G: 12.5 SOLUTION INTRAVENOUS at 01:08

## 2019-08-14 RX ADMIN — CALCIUM ACETATE 667 MG: 667 CAPSULE ORAL at 05:08

## 2019-08-14 NOTE — PLAN OF CARE
08/14/19 1528   Discharge Assessment   Assessment Type Discharge Planning Assessment   Confirmed/corrected address and phone number on facesheet? Yes   Assessment information obtained from? Patient   Prior to hospitilization cognitive status: Alert/Oriented   Prior to hospitalization functional status: Independent   Current cognitive status: Alert/Oriented   Current Functional Status: Independent   Facility Arrived From: HOME   Lives With sibling(s);other (see comments)  (NIECE)   Able to Return to Prior Arrangements yes   Is patient able to care for self after discharge? Yes   Patient's perception of discharge disposition home or selfcare   Readmission Within the Last 30 Days other (see comments)  (YES, AT [facility])   Patient currently being followed by outpatient case management? No   Patient currently receives any other outside agency services? No   Equipment Currently Used at Home cane, straight;walker, rolling   Do you have any problems affording any of your prescribed medications? No   Is the patient taking medications as prescribed? yes   Does the patient have transportation home? Yes   Transportation Anticipated family or friend will provide  ( - RUBEN RUSS)   Dialysis Name and Scheduled days [facility]   Does the patient receive services at the [facility]mad[facility] Clinic? No   Discharge Plan A Home   DME Needed Upon Discharge  other (see comments)  (PATIENT STATED THAT [facility] THERAPY IS GOING TO ASSESS IF HE NEEDS ANY ADDITIONAL DME)   Patient/Family in Agreement with Plan yes   THE PATIENT DOES NOT HAVE A PRIMARY CARE PHYSICIAN; JUST MOVED TO [place] FROM [place] IN THE PAST COUPLE OF WEEKS.  PATIENT WAS SEEING HIS DOCTOR'S WHILE IN [place]; NEEDS TO GET ESTABLISHED WITH A PRIMARY CARE MD.  WILL PROVIDE THE PATIENT WITH [facility] PCP LISTING.

## 2019-08-14 NOTE — PLAN OF CARE
Problem: Fall Injury Risk  Goal: Absence of Fall and Fall-Related Injury    Intervention: Identify and Manage Contributors to Fall Injury Risk  Assess pt's needs and use bed alarm.

## 2019-08-14 NOTE — PT/OT/SLP PROGRESS
Occupational Therapy      Patient Name:  Tre Cali   MRN:  [id number]    Patient not seen today secondary to Patient unwilling to participate in 1st attempt @ 1120; 2nd attempt made to find pt receiving dialysis @ 3972.    Evie Arita OT  8/14/2019

## 2019-08-14 NOTE — PT/OT/SLP EVAL
"Physical Therapy Evaluation    Patient Name:  Tre Cali   MRN:  [id number]    Recommendations:     Discharge Recommendations:  nursing facility, skilled   Discharge Equipment Recommendations: walker, rolling   Barriers to discharge: Decreased caregiver support and frequent falls at home and need for increased physical assistance    Assessment:     Tre Cali is a 54 y.o. male admitted with a medical diagnosis of Volume overload.  He presents with the following impairments/functional limitations:  weakness, impaired functional mobilty, decreased safety awareness, impaired coordination, gait instability, pain. Pt to benefit from PT in order to address aforementioned impairments, decrease fall risk, and return to PLOF.    Rehab Prognosis: Fair; patient would benefit from acute skilled PT services to address these deficits and reach maximum level of function.    Recent Surgery: * No surgery found *      Plan:     During this hospitalization, patient to be seen 5 x/week to address the identified rehab impairments via gait training, therapeutic activities, therapeutic exercises and progress toward the following goals:    · Plan of Care Expires:  09/14/19    Subjective     Chief Complaint: Pt states he feels like he slept on barbwire and motivated for OOB to chair with PT; states frequent falls at home (4 in past week)  Patient/Family Comments/goals: unsure  Pain/Comfort:  · Pain Rating 1: 5/10  · Location - Side 1: Bilateral  · Location 1: leg  · Pain Addressed 1: Reposition, Distraction, Cessation of Activity  · Pain Rating Post-Intervention 1: 5/10    Patients cultural, spiritual, Sikh conflicts given the current situation:      Living Environment:  Pt living with sister in one story home. States one "substantial" step to enter home.  Prior to admission, patients level of function was ambulatory with rollator but states multiple falls (4 in past week) States his legs "give out on him without warning." "  Equipment used at home: wheelchair, rollator, bedside commode.  DME owned (not currently used): none.  Upon discharge, patient will have assistance from sister?.    Objective:     Communicated with RN prior to session.  Patient found supine with bed alarm  upon PT entry to room.    General Precautions: Standard, fall   Orthopedic Precautions:    Braces:       Exams:  · Cognitive Exam:  Patient is oriented to Person, Place, Time and Situation  · RUE ROM: Deficits: Pt states fell a while back and broke his arm; decreased elbow extension, decreased supination, and dec finger/hand extension; pt guards R UE with mobilization  · LUE ROM: WFL  · RLE ROM: WFL except tight HS  · RLE Strength: WFL except tight HS  · LLE ROM: 3-/5  · LLE Strength: 3-/5    Functional Mobility:  · Bed Mobility:     · Supine to Sit: moderate assistance 2 trials  · Sit to Supine: moderate assistance  · Transfers:     · Sit to Stand:  moderate assistance with RW  · Bed to Chair: moderate assistance with  rolling walker  using  Stand Pivot  · Balance: sitting at EOB in static and dynamic situations: good; standing is poor      Therapeutic Activities and Exercises:   bed mobility, transfer training; edu on role of PT and discharge planning; edu pt on possible SNF placement    AM-PAC 6 CLICK MOBILITY  Total Score:15     Patient left up in chair with call button in reach, chair alarm on and RN notified.    GOALS:   Multidisciplinary Problems     Physical Therapy Goals        Problem: Physical Therapy Goal    Goal Priority Disciplines Outcome Goal Variances Interventions   Physical Therapy Goal     PT, PT/OT      Description:  Goals to be met by: discharge     Patient will increase functional independence with mobility by performin. Supine to sit with Stand-by Assistance  2. Sit to stand transfer with Stand-by Assistance  3. Bed to chair transfer with Stand-by Assistance using Rolling Walker  4. Gait  x 150 feet with Stand-by Assistance using  Juventino Hillman.                       History:     Past Medical History:   Diagnosis Date    Cirrhosis     COPD (chronic obstructive pulmonary disease)     Diabetes mellitus     Renal disorder        Past Surgical History:   Procedure Laterality Date    AV FISTULA PLACEMENT Right     CARDIAC DEFIBRILLATOR PLACEMENT Left        Time Tracking:     PT Received On: 08/14/19  PT Start Time: 1109     PT Stop Time: 1140  PT Total Time (min): 31 min     Billable Minutes: Evaluation 8 and Therapeutic Activity 23      Marlin Kramer, PT  08/14/2019

## 2019-08-14 NOTE — PLAN OF CARE
08/14/19 1250   HUMPHREY Message   Medicare Outpatient and Observation Notification regarding financial responsibility Given to patient/caregiver;Explained to patient/caregiver;Signed/date by patient/caregiver   Date HUMPHREY was signed 08/14/19   Time HUMPHREY was signed 3003

## 2019-08-14 NOTE — CARE UPDATE
08/13/19 5025   Patient Assessment/Suction   Level of Consciousness (AVPU) alert   All Lung Fields Breath Sounds clear   Rhythm/Pattern, Respiratory pattern regular;unlabored   PRE-TX-O2   O2 Device (Oxygen Therapy) nasal cannula   Flow (L/min) 2   SpO2 99 %   Pulse Oximetry Type Intermittent   $ Pulse Oximetry - Single Charge Pulse Oximetry - Single   Pulse 77   Resp 16   Aerosol Therapy   $ Aerosol Therapy Charges PRN treatment not required   Pt will achieve and maintain optimal cardiopulmonary status.

## 2019-08-14 NOTE — CARE UPDATE
08/14/19 1028   Patient Assessment/Suction   Level of Consciousness (AVPU) alert   All Lung Fields Breath Sounds clear   Cough Type good;nonproductive   PRE-TX-O2   O2 Device (Oxygen Therapy) nasal cannula   $ Is the patient on Low Flow Oxygen? Yes   Flow (L/min) 2   SpO2 98 %   Pulse Oximetry Type Intermittent   $ Pulse Oximetry - Multiple Charge Pulse Oximetry - Multiple   Aerosol Therapy   $ Aerosol Therapy Charges PRN treatment not required

## 2019-08-14 NOTE — PROGRESS NOTES
[facility]  Progress Note    Patient Name: Tre Cali  MRN: [id number]  Patient Class: OP- Observation   Admission Date: 8/13/2019  Length of Stay: 0 days  Attending Physician: Marisel Waldron MD  Primary Care Provider: Primary Doctor No        Subjective:       Interval History:    Patient denies any new complaints since admission.  Anasarca, ascites, lower abdominal pain unchanged.  Patient is waiting to have hemodialysis and possible paracentesis later today.    Review of Systems  Objective:     Vital Signs (Most Recent):  Temp: 98 °F (36.7 °C) (08/14/19 1325)  Pulse: 77 (08/14/19 1325)  Resp: 20 (08/14/19 1325)  BP: 99/68 (08/14/19 1325)  SpO2: 98 % (08/14/19 1206) Vital Signs (24h Range):  Temp:  [97.4 °F (36.3 °C)-98.2 °F (36.8 °C)] 98 °F (36.7 °C)  Pulse:  [77-87] 77  Resp:  [16-20] 20  SpO2:  [98 %-100 %] 98 %  BP: ()/(50-80) 99/68     Weight: 91 kg (200 lb 9.9 oz)  Body mass index is 32.38 kg/m².    Intake/Output Summary (Last 24 hours) at 8/14/2019 1434  Last data filed at 8/14/2019 0500  Gross per 24 hour   Intake 390 ml   Output --   Net 390 ml        Physical Exam:  General: Patient is drowsy. Dishelved appearance. Obese buitl  Eyes: PERRLA. No conjunctivae pallor. No scleral icterus.  ENT: OMM. No pharyngeal erythema. Thick neck  Neck: Supple. No adenopathy.  Lungs:  Clear to auscultation without wheezing or crackles anteriorly  Cor: Regular rate and rhythm. No murmurs. No pedal edema.  Abd: Soft.  Distended.  Dullness to percussion.  Mild generalized tenderness without signs of acute abdomen.  Musculoskeletal: No arthropathy, deformity.  Skin:  Bruises noted on chest and upper extremities.  Neuro: A&O x3.  Right-sided wendy paresis noted  Ext:  4+ bilateral pitting pedal edema.     Significant Labs:   CBC:   Recent Labs   Lab 08/13/19  0810 08/14/19  0521   WBC 6.55 5.03   HGB 11.5* 10.9*   HCT 37.0* 34.5*   PLT 99* 95*     CMP:   Recent Labs   Lab  08/13/19  0810 08/14/19  0521    141   K 3.6 4.0   CL 93* 98   CO2 26 23   * 108   BUN 55* 57*   CREATININE 8.0* 8.9*   CALCIUM 8.4* 8.4*   PROT 7.9 7.0   ALBUMIN 3.2* 3.1*   BILITOT 1.8* 2.1*   ALKPHOS 108 99   AST 13 11   ALT 9* 8*   ANIONGAP 18* 20*   EGFRNONAA 6.8* 6.0*       Significant Imaging: None today    Assessment/Plan:      Generalized weakness/debility. No evidence of encephalopathy today.     Suspect right lower lung pneumonia?  Aspiration     Anasarca from combination of ESRD and liver cirrhosis     Large ascites - r/o SBP     Sub-acute diarrhea - r/o infectious, inflammatory etc     Alcoholic liver cirrhosis     ESRD on HD via right arm AVF on TTS     Chronic hypotension from liver cirrhosis on midodrine at home     CAD s/p stents    AICD in situ     H/o CVAs with right sided hemiparesis     COPD - stable     Poor long-term prognosis    PLAN:  HD per renal  GI ordered paracentesis per radiologist  Albumin after paracentesis as ordered by GI  D/c diuretics  D/c clindamycin as clinically no evidence of pneumonia  Continue IV rocephin until SBP is ruled out  F/u stool studies, c.diff  Increase midodrine if needed  Anticipate d.c home tomorrow      Active Diagnoses:    Diagnosis Date Noted POA    PRINCIPAL PROBLEM:  Volume overload [E87.70] 08/13/2019 Yes    Ascites due to alcoholic cirrhosis [K70.31] 08/13/2019 Yes     Chronic    Encephalopathy, metabolic [G93.41] 08/13/2019 Yes    Diarrhea of presumed infectious origin [R19.7] 08/13/2019 Yes    Alcoholic cirrhosis of liver with ascites [K70.31] 08/13/2019 Unknown     Chronic    ESRD (end stage renal disease) [N18.6] 08/13/2019 Yes     Chronic    AICD (automatic cardioverter/defibrillator) present [Z95.810] 08/13/2019 Yes     Chronic    CAD S/P percutaneous coronary angioplasty [I25.10, Z98.61] 08/13/2019 Not Applicable     Chronic    Hemiplegia as late effect of cerebrovascular accident (CVA) [I69.359] 08/13/2019 Not Applicable      Chronic    End stage renal disease [N18.6] 08/13/2019 Yes      Problems Resolved During this Admission:     VTE Risk Mitigation (From admission, onward)        Ordered     IP VTE HIGH RISK PATIENT  Once      08/13/19 1909             Marisel Waldron MD  Department of Hospital Medicine   [facility]

## 2019-08-14 NOTE — PLAN OF CARE
Problem: Adult Inpatient Plan of Care  Goal: Plan of Care Review  Outcome: Ongoing (interventions implemented as appropriate)  Pt's free of accidental injury during shift. No s/s of distress noted. Currently receiving dialysis at bedside. Diet resumed. NPO at midnight for paracentesis. Remains on contact isolation to r/o cdiff. Pending stool sample. Safety measures maintained. Call bell within reach. Frequent rounds made. Will continue to monitor

## 2019-08-14 NOTE — CONSULTS
GASTROENTEROLOGY INPATIENT CONSULT NOTE  Patient Name: Tre Cali  Patient MRN: [id number]  Patient : 1965    Admit Date: 2019  Service date: 2019    Reason for Consult: ascites    PCP: Primary Doctor No    Chief Complaint   Patient presents with    Shortness of Breath     Dialysis Facility Problem       HPI: Patient is a 54 y.o. male with PMHx cirrhosis (Sujey), ESRD on HD, COPD, DM, chronic anemia, defibrillator presents for abd distension. Acute / chronic, intermittent, progressive over past weeks. Has had paracentesis in recent  Past weeks for ascites and recently established care w/ Dr. Rm in [place]. Has been on HD x 1 year and not making any urine. No signs of bleeding. Did not fill rifaximin as prescribed in clinic by Dr. Rm. Yecenias 2-3 semiformed to loose BM daily    CHART REVIEW:   Hg 11; Plts 100; TB 2.1  RUQ u/s  - large volume ascites    Past Medical History:  Past Medical History:   Diagnosis Date    Cirrhosis     COPD (chronic obstructive pulmonary disease)     Diabetes mellitus     Renal disorder         Past Surgical History:  Past Surgical History:   Procedure Laterality Date    AV FISTULA PLACEMENT Right     CARDIAC DEFIBRILLATOR PLACEMENT Left         Home Medications:  Medications Prior to Admission   Medication Sig Dispense Refill Last Dose    albuterol (PROVENTIL HFA) 90 mcg/actuation inhaler Proventil HFA 90 mcg/actuation aerosol inhaler   Inhale 2 puffs every 4 hours by inhalation route.   Unknown at Unknown time    allopurinol (ZYLOPRIM) 100 MG tablet 100 mg.   Unknown at Unknown time    amiodarone (PACERONE) 200 MG Tab Take 1 tablet by mouth.   Unknown at Unknown time    aspirin (ECOTRIN) 81 MG EC tablet Take 1 tablet by mouth.   2019 at 07:00    bumetanide (BUMEX) 2 MG tablet bumetanide 2 mg tablet   Take 1 tablet every day by oral route.   Unknown at Unknown time    calcium acetate (PHOSLO) 667 mg tablet calcium acetate 667 mg  tablet   Take 4 tablets 3 times a day with each meal   8/12/2019 at 21:00    fluticasone propionate (FLOVENT HFA) 110 mcg/actuation inhaler Flovent  mcg/actuation aerosol inhaler   Inhale 1 puff twice a day by inhalation route.   Unknown at Unknown time    gabapentin (NEURONTIN) 100 MG capsule gabapentin 100 mg capsule   Take 1 capsule 3 times a day by oral route.   Unknown at Unknown time    insulin aspart U-100 (NOVOLOG FLEXPEN U-100 INSULIN) 100 unit/mL (3 mL) InPn pen Novolog Flexpen U-100 Insulin   Unknown at Unknown time    midodrine (PROAMATINE) 5 MG Tab midodrine 5 mg tablet   Take 2 tablets 3 times a day by oral route.   Unknown at Unknown time    rifAXIMin (XIFAXAN) 550 mg Tab Xifaxan 550 mg tablet   Take 1 tablet 3 times a day by oral route for 14 days.   Unknown at Unknown time    spironolactone (ALDACTONE) 100 MG tablet spironolactone 100 mg tablet   Take 1 tablet every day by oral route.   Unknown at Unknown time    torsemide (DEMADEX) 20 MG Tab Demadex 20 mg tablet   Take 1 tablet every day by oral route.   Unknown at Unknown time       Inpatient Medications:   albumin human 25%        allopurinol  100 mg Oral Daily    amiodarone  200 mg Oral Daily    aspirin  81 mg Oral Daily    calcium acetate  667 mg Oral TID WM    cefTRIAXone (ROCEPHIN) IVPB  1 g Intravenous Q24H    clindamycin (CLEOCIN) IVPB  600 mg Intravenous Q8H    midodrine  5 mg Oral TID    rifAXIMin  550 mg Oral BID    spironolactone  100 mg Oral Daily    torsemide  20 mg Oral Daily     albuterol sulfate, dextrose 50%, dextrose 50%, glucagon (human recombinant), glucose, glucose, sodium chloride 0.9%    Review of patient's allergies indicates:   Allergen Reactions    Sulfa (sulfonamide antibiotics) Rash       Social History:   Social History     Occupational History    Not on file   Tobacco Use    Smoking status: Former Smoker   Substance and Sexual Activity    Alcohol use: Not Currently     Comment: no longer  "drinking    Drug use: Not on file    Sexual activity: Not on file       Family History:   History reviewed. No pertinent family history.    Review of Systems:  A 10 point review of systems was performed and was normal, except as mentioned in the HPI, including constitutional, HEENT, heme, lymph, cardiovascular, respiratory, gastrointestinal, genitourinary, neurologic, endocrine, psychiatric and musculoskeletal.      OBJECTIVE:    Physical Exam:  24 Hour Vital Sign Ranges: Temp:  [97.4 °F (36.3 °C)-98.2 °F (36.8 °C)] 97.6 °F (36.4 °C)  Pulse:  [77-87] 85  Resp:  [16-20] 18  SpO2:  [98 %-100 %] 98 %  BP: ()/(50-80) 95/68  Most recent vitals: BP 95/68   Pulse 85   Temp 97.6 °F (36.4 °C) (Oral)   Resp 18   Ht 5' 6" (1.676 m)   Wt 91 kg (200 lb 9.9 oz)   SpO2 98%   BMI 32.38 kg/m²    GEN: well-developed, well-nourished, awake and alert, non-toxic appearing adult  HEENT: PERRL, sclera anicteric, oral mucosa pink and moist without lesion  NECK: trachea midline; Good ROM  CV: regular rate and rhythm, no murmurs or gallops  RESP: clear to auscultation bilaterally, no wheezes, rhonci or rales  ABD: soft, non-tender, mod-distended, normal bowel sounds  EXT: no swelling or edema, 1+ pulses distally; AVF  SKIN: no rashes or jaundice  PSYCH: normal affect    Labs:   Recent Labs     08/13/19  0810 08/14/19  0521   WBC 6.55 5.03   MCV 97 96   PLT 99* 95*     Recent Labs     08/13/19  0810 08/14/19  0521    141   K 3.6 4.0   CL 93* 98   CO2 26 23   BUN 55* 57*   * 108     No results for input(s): ALB in the last 72 hours.    Invalid input(s): ALKP, SGOT, SGPT, TBIL, DBIL, TPRO  No results for input(s): PT, INR, PTT in the last 72 hours.      Radiology Review:  US Abdomen Limited   Final Result      Moderate to large volume ascites.         Electronically signed by: Law Arriaga MD   Date:    08/13/2019   Time:    15:32            IMPRESSION / RECOMMENDATIONS:  54 y.o. male with PMHx cirrhosis " (Sujey), ESRD on HD, COPD, DM, chronic anemia, defibrillator presents for abd distension w/ ascites on exam / u/s. Likely multifactorial w/ renal / liver disease.     -Therapeutic paracentesis (order placed)  -F/u stool studies and d/c contact once C. Diff negative  -Consider stopping diuretics as on HD and making no urine  -F/u  Sujey 1-2 weeks  -If stool cx negative, recommend to fill Rifaximin as previously prescribed    Thank you for this consult.    Kalyan FRANCO Dauterive III  8/14/2019  1:52 PM

## 2019-08-14 NOTE — CONSULTS
[facility]  Nephrology  Consult Note    Patient Name: Tre Cali  MRN: [id number]  Admission Date: 8/13/2019  Hospital Length of Stay: 0 days  Attending Provider: Marisel Waldron MD   Primary Care Physician: No primary care provider on file.  Principal Problem:Volume overload    Inpatient consult to Nephrology  Consult performed by: Armando Cruz MD  Consult ordered by: Marisel Waldron MD      : hx ESRD (HD-MWF via L UE AV Fistula); requests maintenance RRT    Subjective:     HPI: 53 y/o M w/ ESRD (HD-MWF via R UE AV Fistula), HTN, T2DM, CVD (s/p CVA w/ hemiplegia), ESLD (EtOH-induced Cirrhosis) w/ Ascites, COPD, HFrEF (s/p AICD implantation), CAD (s/p stent placement) admitted from [facility] ED last evening w/ complaints of progressively worsening generalized weakness, abdominal swelling w/ nonbloody, watery diarrhea concerning for C. difficile colitis; formal Nephrology service consultation is requested to provide maintenance RRT as inpatient.    Past Medical History:   Diagnosis Date    Cirrhosis     COPD (chronic obstructive pulmonary disease)     Diabetes mellitus     Renal disorder        Past Surgical History:   Procedure Laterality Date    AV FISTULA PLACEMENT Right     CARDIAC DEFIBRILLATOR PLACEMENT Left        Review of patient's allergies indicates:   Allergen Reactions    Sulfa (sulfonamide antibiotics) Rash     Current Facility-Administered Medications   Medication Frequency    albuterol nebulizer solution 2.5 mg Q4H PRN    allopurinol tablet 100 mg Daily    amiodarone tablet 200 mg Daily    aspirin EC tablet 81 mg Daily    calcium acetate capsule 667 mg TID WM    cefTRIAXone (ROCEPHIN) 1 g in dextrose 5 % 50 mL IVPB Q24H    clindamycin 600 MG/50 ML D5W 600 mg/50 mL IVPB 600 mg Q8H    dextrose 50% injection 12.5 g PRN    dextrose 50% injection 25 g PRN    glucagon (human recombinant) injection 1 mg PRN    glucose chewable tablet 16 g PRN    glucose  chewable tablet 24 g PRN    midodrine tablet 5 mg TID    rifAXIMin tablet 550 mg BID    sodium chloride 0.9% flush 10 mL PRN    spironolactone tablet 100 mg Daily    torsemide tablet 20 mg Daily     Family History     None        Tobacco Use    Smoking status: Former Smoker   Substance and Sexual Activity    Alcohol use: Not Currently     Comment: no longer drinking    Drug use: Not on file    Sexual activity: Not on file     Review of Systems   Constitutional: Positive for activity change, appetite change and fatigue.   HENT: Negative.    Eyes: Negative.    Respiratory: Positive for shortness of breath.    Gastrointestinal: Positive for abdominal distention, abdominal pain, diarrhea and nausea.   Endocrine: Negative.    Genitourinary: Negative.    Musculoskeletal: Negative.    Skin: Negative.    Allergic/Immunologic: Negative.    Neurological: Positive for light-headedness.   Hematological: Negative.    Psychiatric/Behavioral: Negative.      Objective:     Vital Signs (Most Recent):  Temp: 97.7 °F (36.5 °C) (08/14/19 0741)  Pulse: 83 (08/14/19 0741)  Resp: 20 (08/14/19 0741)  BP: 99/69 (08/14/19 0741)  SpO2: 98 % (08/14/19 1028)  O2 Device (Oxygen Therapy): nasal cannula (08/14/19 1028) Vital Signs (24h Range):  Temp:  [97.4 °F (36.3 °C)-98.2 °F (36.8 °C)] 97.7 °F (36.5 °C)  Pulse:  [77-88] 83  Resp:  [12-32] 20  SpO2:  [95 %-100 %] 98 %  BP: ()/(50-80) 99/69     Weight: 91 kg (200 lb 9.9 oz) (08/13/19 2038)  Body mass index is 32.38 kg/m².  Body surface area is 2.06 meters squared.    I/O last 3 completed shifts:  In: 390 [P.O.:240; IV Piggyback:150]  Out: -     Physical Exam   Constitutional: He is oriented to person, place, and time. He appears distressed.   Chronically ill-appearing, obese   HENT:   Head: Normocephalic and atraumatic.   Mouth/Throat: Oropharynx is clear and moist.   Eyes: Conjunctivae are normal. Right eye exhibits no discharge. Left eye exhibits no discharge. No scleral icterus.    Neck: Normal range of motion. Neck supple. No JVD present.   Cardiovascular: Normal rate, regular rhythm, normal heart sounds and intact distal pulses. Exam reveals no gallop and no friction rub.   No murmur heard.  Pulmonary/Chest: Effort normal and breath sounds normal. No respiratory distress. He has no wheezes. He has no rales.   Abdominal: Soft. He exhibits distension. He exhibits no mass. There is tenderness. There is no rebound and no guarding.   Hypoactive BS in all quadrants   Genitourinary:   Genitourinary Comments: Deferred   Musculoskeletal: Normal range of motion. He exhibits edema. He exhibits no tenderness or deformity.   Neurological: He is alert and oriented to person, place, and time. A cranial nerve deficit is present.   Skin: Skin is warm and dry. Rash noted. There is erythema. No pallor.   Psychiatric: He has a normal mood and affect. His behavior is normal.   Nursing note and vitals reviewed.      Significant Labs:  Cardiac Markers: No results for input(s): CKMB, TROPONINT, MYOGLOBIN in the last 168 hours.  CBC:   Recent Labs   Lab 08/14/19 0521   WBC 5.03   RBC 3.58*   HGB 10.9*   HCT 34.5*   PLT 95*   MCV 96   MCH 30.4   MCHC 31.6*     CMP:   Recent Labs   Lab 08/14/19 0521      CALCIUM 8.4*   ALBUMIN 3.1*   PROT 7.0      K 4.0   CO2 23   CL 98   BUN 57*   CREATININE 8.9*   ALKPHOS 99   ALT 8*   AST 11   BILITOT 2.1*     Coagulation: No results for input(s): PT, INR, APTT in the last 168 hours.  LFTs:   Recent Labs   Lab 08/14/19 0521   ALT 8*   AST 11   ALKPHOS 99   BILITOT 2.1*   PROT 7.0   ALBUMIN 3.1*     Microbiology Results (last 7 days)     Procedure Component Value Units Date/Time    Blood culture [[id number]] Collected:  08/14/19 0522    Order Status:  Sent Specimen:  Blood Updated:  08/14/19 0529    Blood culture [[id number]] Collected:  08/14/19 0522    Order Status:  Sent Specimen:  Blood Updated:  08/14/19 0529    Clostridium difficile EIA [[id number]]     Order  Status:  No result Specimen:  Stool     Culture, Respiratory with Gram Stain [[id number]]     Order Status:  Sent Specimen:  Respiratory     Stool culture [[id number]]     Order Status:  No result Specimen:  Stool         No results for input(s): COLORU, CLARITYU, SPECGRAV, PHUR, PROTEINUA, GLUCOSEU, BILIRUBINCON, BLOODU, WBCU, RBCU, BACTERIA, MUCUS, NITRITE, LEUKOCYTESUR, UROBILINOGEN, HYALINECASTS in the last 168 hours.    Significant Imaging:  X-Ray: Reviewed  CT: Reviewed    Assessment/Plan:     Active Diagnoses:    Diagnosis Date Noted POA    PRINCIPAL PROBLEM:  Volume overload [E87.70] 08/13/2019 Yes    Alcoholic cirrhosis of liver with ascites [K70.31] 08/13/2019 Unknown     Chronic    Ascites due to alcoholic cirrhosis [K70.31] 08/13/2019 Yes     Chronic    ESRD (end stage renal disease) [N18.6] 08/13/2019 Yes     Chronic    AICD (automatic cardioverter/defibrillator) present [Z95.810] 08/13/2019 Yes     Chronic    Encephalopathy, metabolic [G93.41] 08/13/2019 Yes    CAD S/P percutaneous coronary angioplasty [I25.10, Z98.61] 08/13/2019 Not Applicable     Chronic    Hemiplegia as late effect of cerebrovascular accident (CVA) [I69.359] 08/13/2019 Not Applicable     Chronic    Diarrhea of presumed infectious origin [R19.7] 08/13/2019 Yes    End stage renal disease [N18.6] 08/13/2019 Yes      Problems Resolved During this Admission:     1. ESRD (HD-MWF via R UE AV Fistula)- symptomatically stable at present; significant abdominal distention on admission (s/p therapeutic paracentesis this am); no overt volume overload, significant electrolyte perturbations nor acid-base disturbance    -maintenance HD (duration: 3h; UF Goal: 1-2)L as tolerated; STANDARD 'Bath'; Access: AVF; Qb: 400 ml/min, Qd: 2x BFR; give 12.5-25 gm 25% Albumin w/ HD for BP support)    -DAILY renal function panel to document sCr trend, optimize HD electrolyte 'bath' & assess response to therapy    -avoid nephrotoxic agents (NSAIDs, IV  contrast dye)    -renally dose all appropriate medications, including antibiotics    2. HTN- clinically stable at present now w/ relative hypotension (99/69); no active issues    -HOLD anti-HTN medications for now & reassess in am    3. ANEMIA- clinically stable at present; H/H AT GOAL (10.9/34.5); no active issues    0trend daily CBC (H/H) to effect optimal blood-loss surveillance    4. SECONDARY HYPERPARATHYROIDISM (sHPT)- clinically stable at present; no active issues    -continue dietary binder (if tolerating 'po')/Vitamin D +/- HD-associated calcimimetic agent (Cinacalcet vs Etelcalcetide)    5. ESLD (EtOH-induced Cirrhosis) w/ Ascites (awaiting therapeutic paracentesis)- appears ill; GI Medicine service on the case; cautious fluid removal to minimize risk of precipitatiing Hepatorenal Syndrome; GI Medicine service on the case    -management per GI Medicine/Primary Team recommendations    6. C.difficile COLITIS- symptomatically improved on IV Clindamycin; no fever/leukocytosis; no active issues    -management per Primary team    Thank you for your consult. I will follow-up with patient. Please contact us if you have any additional questions.    Armando Cruz III, MD  [facility]  [facility]  [phone] (C)

## 2019-08-15 LAB
ALBUMIN SERPL BCP-MCNC: 3.2 G/DL (ref 3.5–5.2)
ALP SERPL-CCNC: 98 U/L (ref 55–135)
ALT SERPL W/O P-5'-P-CCNC: 7 U/L (ref 10–44)
ANION GAP SERPL CALC-SCNC: 19 MMOL/L (ref 8–16)
APTT PPP: 37.7 SEC (ref 26.2–34.7)
AST SERPL-CCNC: 12 U/L (ref 10–40)
BASOPHILS # BLD AUTO: 0.09 K/UL (ref 0–0.2)
BASOPHILS NFR BLD: 1.8 % (ref 0–1.9)
BILIRUB SERPL-MCNC: 1.8 MG/DL (ref 0.1–1)
BUN SERPL-MCNC: 48 MG/DL (ref 6–20)
CALCIUM SERPL-MCNC: 8.7 MG/DL (ref 8.7–10.5)
CHLORIDE SERPL-SCNC: 96 MMOL/L (ref 95–110)
CO2 SERPL-SCNC: 25 MMOL/L (ref 23–29)
CREAT SERPL-MCNC: 7.7 MG/DL (ref 0.5–1.4)
DIFFERENTIAL METHOD: ABNORMAL
EOSINOPHIL # BLD AUTO: 0.2 K/UL (ref 0–0.5)
EOSINOPHIL NFR BLD: 3.1 % (ref 0–8)
ERYTHROCYTE [DISTWIDTH] IN BLOOD BY AUTOMATED COUNT: 15.9 % (ref 11.5–14.5)
EST. GFR  (AFRICAN AMERICAN): 8.2 ML/MIN/1.73 M^2
EST. GFR  (NON AFRICAN AMERICAN): 7.1 ML/MIN/1.73 M^2
GLUCOSE SERPL-MCNC: 102 MG/DL (ref 70–110)
GLUCOSE SERPL-MCNC: 109 MG/DL (ref 70–110)
GLUCOSE SERPL-MCNC: 141 MG/DL (ref 70–110)
GLUCOSE SERPL-MCNC: 166 MG/DL (ref 70–110)
GLUCOSE SERPL-MCNC: 93 MG/DL (ref 70–110)
HCT VFR BLD AUTO: 34.4 % (ref 40–54)
HGB BLD-MCNC: 10.8 G/DL (ref 14–18)
IMM GRANULOCYTES # BLD AUTO: 0.02 K/UL (ref 0–0.04)
IMM GRANULOCYTES NFR BLD AUTO: 0.4 % (ref 0–0.5)
INR PPP: 2
LYMPHOCYTES # BLD AUTO: 0.6 K/UL (ref 1–4.8)
LYMPHOCYTES NFR BLD: 11.6 % (ref 18–48)
MCH RBC QN AUTO: 29.9 PG (ref 27–31)
MCHC RBC AUTO-ENTMCNC: 31.4 G/DL (ref 32–36)
MCV RBC AUTO: 95 FL (ref 82–98)
MONOCYTES # BLD AUTO: 0.7 K/UL (ref 0.3–1)
MONOCYTES NFR BLD: 14.3 % (ref 4–15)
NEUTROPHILS # BLD AUTO: 3.4 K/UL (ref 1.8–7.7)
NEUTROPHILS NFR BLD: 68.8 % (ref 38–73)
NRBC BLD-RTO: 0 /100 WBC
PLATELET # BLD AUTO: 91 K/UL (ref 150–350)
PMV BLD AUTO: 12.3 FL (ref 9.2–12.9)
POTASSIUM SERPL-SCNC: 4 MMOL/L (ref 3.5–5.1)
PROT SERPL-MCNC: 7.3 G/DL (ref 6–8.4)
PROTHROMBIN TIME: 22.3 SEC (ref 11.7–14)
RBC # BLD AUTO: 3.61 M/UL (ref 4.6–6.2)
SODIUM SERPL-SCNC: 140 MMOL/L (ref 136–145)
WBC # BLD AUTO: 4.91 K/UL (ref 3.9–12.7)

## 2019-08-15 PROCEDURE — 97167 OT EVAL HIGH COMPLEX 60 MIN: CPT

## 2019-08-15 PROCEDURE — 12000002 HC ACUTE/MED SURGE SEMI-PRIVATE ROOM

## 2019-08-15 PROCEDURE — 85730 THROMBOPLASTIN TIME PARTIAL: CPT

## 2019-08-15 PROCEDURE — 85025 COMPLETE CBC W/AUTO DIFF WBC: CPT

## 2019-08-15 PROCEDURE — 30200315 PPD INTRADERMAL TEST REV CODE 302: Performed by: FAMILY MEDICINE

## 2019-08-15 PROCEDURE — 85610 PROTHROMBIN TIME: CPT

## 2019-08-15 PROCEDURE — 63600175 PHARM REV CODE 636 W HCPCS: Performed by: INTERNAL MEDICINE

## 2019-08-15 PROCEDURE — 97110 THERAPEUTIC EXERCISES: CPT

## 2019-08-15 PROCEDURE — 99900035 HC TECH TIME PER 15 MIN (STAT)

## 2019-08-15 PROCEDURE — 86580 TB INTRADERMAL TEST: CPT | Performed by: FAMILY MEDICINE

## 2019-08-15 PROCEDURE — 36415 COLL VENOUS BLD VENIPUNCTURE: CPT

## 2019-08-15 PROCEDURE — 94761 N-INVAS EAR/PLS OXIMETRY MLT: CPT

## 2019-08-15 PROCEDURE — 80053 COMPREHEN METABOLIC PANEL: CPT

## 2019-08-15 PROCEDURE — 97116 GAIT TRAINING THERAPY: CPT

## 2019-08-15 PROCEDURE — 97530 THERAPEUTIC ACTIVITIES: CPT

## 2019-08-15 PROCEDURE — 25000003 PHARM REV CODE 250: Performed by: INTERNAL MEDICINE

## 2019-08-15 PROCEDURE — 97535 SELF CARE MNGMENT TRAINING: CPT

## 2019-08-15 RX ORDER — PHYTONADIONE 10 MG/ML
10 INJECTION, EMULSION INTRAMUSCULAR; INTRAVENOUS; SUBCUTANEOUS ONCE
Status: COMPLETED | OUTPATIENT
Start: 2019-08-15 | End: 2019-08-15

## 2019-08-15 RX ADMIN — PHYTONADIONE 10 MG: 10 INJECTION, EMULSION INTRAMUSCULAR; INTRAVENOUS; SUBCUTANEOUS at 12:08

## 2019-08-15 RX ADMIN — TUBERCULIN PURIFIED PROTEIN DERIVATIVE 5 UNITS: 5 INJECTION, SOLUTION INTRADERMAL at 03:08

## 2019-08-15 RX ADMIN — CALCIUM ACETATE 667 MG: 667 CAPSULE ORAL at 04:08

## 2019-08-15 RX ADMIN — MIDODRINE HYDROCHLORIDE 5 MG: 2.5 TABLET ORAL at 04:08

## 2019-08-15 RX ADMIN — MIDODRINE HYDROCHLORIDE 5 MG: 2.5 TABLET ORAL at 12:08

## 2019-08-15 RX ADMIN — CALCIUM ACETATE 667 MG: 667 CAPSULE ORAL at 12:08

## 2019-08-15 RX ADMIN — CEFTRIAXONE SODIUM 1 G: 1 INJECTION, POWDER, FOR SOLUTION INTRAMUSCULAR; INTRAVENOUS at 08:08

## 2019-08-15 RX ADMIN — RIFAXIMIN 550 MG: 550 TABLET ORAL at 08:08

## 2019-08-15 NOTE — PROGRESS NOTES
[facility]  Nephrology  Progress Note    Patient Name: Tre Cali  MRN: [id number]  Admission Date: 8/13/2019  Hospital Length of Stay: 0 days  Attending Provider: Marisel Waldron MD   Primary Care Physician: Primary Doctor No  Principal Problem:Volume overload      Subjective:     Interval History: endorses vague abdominal pain this am; denies any associated nausea/vomiting; diarrhea improved    Review of patient's allergies indicates:   Allergen Reactions    Sulfa (sulfonamide antibiotics) Rash     Current Facility-Administered Medications   Medication Frequency    albumin human 25% bottle 50 g Once    albuterol nebulizer solution 2.5 mg Q4H PRN    allopurinol tablet 100 mg Daily    amiodarone tablet 200 mg Daily    aspirin EC tablet 81 mg Daily    calcium acetate capsule 667 mg TID WM    cefTRIAXone (ROCEPHIN) 1 g in dextrose 5 % 50 mL IVPB Q24H    dextrose 50% injection 12.5 g PRN    dextrose 50% injection 25 g PRN    glucagon (human recombinant) injection 1 mg PRN    glucose chewable tablet 16 g PRN    glucose chewable tablet 24 g PRN    midodrine tablet 5 mg TID    rifAXIMin tablet 550 mg BID    sodium chloride 0.9% flush 10 mL PRN       Objective:     Vital Signs (Most Recent):  Temp: 97.9 °F (36.6 °C) (08/15/19 0430)  Pulse: 84 (08/15/19 0430)  Resp: 18 (08/15/19 0430)  BP: 93/64 (08/15/19 0430)  SpO2: 97 % (08/15/19 0430)  O2 Device (Oxygen Therapy): room air (08/15/19 0430) Vital Signs (24h Range):  Temp:  [97.6 °F (36.4 °C)-98.2 °F (36.8 °C)] 97.9 °F (36.6 °C)  Pulse:  [71-96] 84  Resp:  [16-20] 18  SpO2:  [94 %-100 %] 97 %  BP: ()/(61-76) 93/64     Weight: 91 kg (200 lb 9.9 oz) (08/13/19 2038)  Body mass index is 32.38 kg/m².  Body surface area is 2.06 meters squared.    I/O last 3 completed shifts:  In: 890 [P.O.:240; Other:500; IV Piggyback:150]  Out: 2300 [Other:2300]    Physical Exam   Constitutional: He is oriented to person, place, and time. No distress.    Obese; chronically ill-appearing   HENT:   Head: Normocephalic and atraumatic.   Mouth/Throat: Oropharynx is clear and moist.   Eyes: Conjunctivae are normal. Right eye exhibits no discharge. Left eye exhibits no discharge. No scleral icterus.   Neck: Normal range of motion. Neck supple. No JVD present.   Cardiovascular: Normal rate, regular rhythm and intact distal pulses. Exam reveals no gallop and no friction rub.   Murmur heard.  Pulmonary/Chest: Effort normal and breath sounds normal. No respiratory distress. He has no wheezes. He has no rales.   Abdominal: Soft. He exhibits distension. He exhibits no mass. There is tenderness. There is no rebound and no guarding.   Hypoactive BS in all quadrants   Genitourinary:   Genitourinary Comments: Deferred   Musculoskeletal: Normal range of motion. He exhibits edema and tenderness. He exhibits no deformity.   Neurological: He is alert and oriented to person, place, and time. A cranial nerve deficit is present.   Skin: Skin is warm and dry. Rash noted. No erythema.   Psychiatric: He has a normal mood and affect. His behavior is normal.   Nursing note and vitals reviewed.      Significant Labs:sure  CBC:   Recent Labs   Lab 08/15/19  0458   WBC 4.91   RBC 3.61*   HGB 10.8*   HCT 34.4*   PLT 91*   MCV 95   MCH 29.9   MCHC 31.4*     CMP:   Recent Labs   Lab 08/15/19  0457      CALCIUM 8.7   ALBUMIN 3.2*   PROT 7.3      K 4.0   CO2 25   CL 96   BUN 48*   CREATININE 7.7*   ALKPHOS 98   ALT 7*   AST 12   BILITOT 1.8*     LFTs:   Recent Labs   Lab 08/15/19  0457   ALT 7*   AST 12   ALKPHOS 98   BILITOT 1.8*   PROT 7.3   ALBUMIN 3.2*       Significant Imaging:  X-Ray: Reviewed  CT: Reviewed    Assessment/Plan:     Active Diagnoses:    Diagnosis Date Noted POA    PRINCIPAL PROBLEM:  Volume overload [E87.70] 08/13/2019 Yes    Alcoholic cirrhosis of liver with ascites [K70.31] 08/13/2019 Unknown     Chronic    Ascites due to alcoholic cirrhosis [K70.31] 08/13/2019  Yes     Chronic    ESRD (end stage renal disease) [N18.6] 08/13/2019 Yes     Chronic    AICD (automatic cardioverter/defibrillator) present [Z95.810] 08/13/2019 Yes     Chronic    Encephalopathy, metabolic [G93.41] 08/13/2019 Yes    CAD S/P percutaneous coronary angioplasty [I25.10, Z98.61] 08/13/2019 Not Applicable     Chronic    Hemiplegia as late effect of cerebrovascular accident (CVA) [I69.359] 08/13/2019 Not Applicable     Chronic    Diarrhea of presumed infectious origin [R19.7] 08/13/2019 Yes    End stage renal disease [N18.6] 08/13/2019 Yes      Problems Resolved During this Admission:     1. ESRD (HD-MWF via R UE AV Fistula)- symptomatically stable at present; significant abdominal distention on admission (s/p therapeutic paracentesis this am); no overt volume overload, significant electrolyte perturbations nor acid-base disturbance    -maintenance HD (duration: 3h; UF Goal: 1-2)L as tolerated; STANDARD 'Bath'; Access: AVF; Qb: 400 ml/min, Qd: 2x BFR; give 12.5-25 gm 25% Albumin w/ HD for BP support)    -DAILY renal function panel to document sCr trend, optimize HD electrolyte 'bath' & assess response to therapy    -avoid nephrotoxic agents (NSAIDs, IV contrast dye)    -renally dose all appropriate medications, including antibiotics     2. HTN- clinically stable at present now w/ relative hypotension (99/69); no active issues    -HOLD anti-HTN medications for now & reassess in am     3. ANEMIA- clinically stable at present; H/H AT GOAL (10.8/34.4); no active issues    -trend daily CBC (H/H) to effect optimal blood-loss surveillance     4. SECONDARY HYPERPARATHYROIDISM (sHPT)- clinically stable at present; no active issues    -continue dietary binder (if tolerating 'po')/Vitamin D +/- HD-associated calcimimetic agent (Cinacalcet vs Etelcalcetide)     5. ESLD (EtOH-induced Cirrhosis) w/ Ascites (for therapeutic paracentesis this am)- appears ill; GI Medicine service on the case; cautious fluid removal  to minimize risk of precipitatiing Hepatorenal Syndrome    -management per GI Medicine/Primary Team recommendations     6. C.difficile COLITIS- symptomatically improved on IV Clindamycin; no fever/leukocytosis; no active issues    -management per Primary team    Thank you for your consult. I will follow-up with patient. Please contact us if you have any additional questions.    Armando Cruz III, MD  [facility]  [facility]  [phone] (C)

## 2019-08-15 NOTE — PT/OT/SLP PROGRESS
Physical Therapy Treatment    Patient Name:  Tre Cali   MRN:  [id number]    Recommendations:     Discharge Recommendations:  nursing facility, skilled   Discharge Equipment Recommendations: walker, rolling   Barriers to discharge: Decreased caregiver support    Assessment:     Tre Cali is a 54 y.o. male admitted with a medical diagnosis of Volume overload.  He presents with the following impairments/functional limitations:  weakness, impaired endurance, impaired functional mobilty, gait instability, impaired balance, impaired cognition, decreased safety awareness, pain. Pt limited due to pain during PT session today in B knees. Pt pain worse with PROM movement compared to AROM. Pain also worse with weight bearing. Continue with PT and POC.    Rehab Prognosis: Good; patient would benefit from acute skilled PT services to address these deficits and reach maximum level of function.    Recent Surgery: * No surgery found *      Plan:     During this hospitalization, patient to be seen 5 x/week to address the identified rehab impairments via gait training, therapeutic activities, therapeutic exercises and progress toward the following goals:    · Plan of Care Expires:  09/14/19    Subjective     Chief Complaint: Pt c/o pain in B knees  Patient/Family Comments/goals:   Pain/Comfort:  · Pain Rating 1: 10/10  · Location - Side 1: Bilateral  · Location 1: knee(Pt describes pain as being behind both knee caps)  · Pain Addressed 1: Reposition, Distraction, Cessation of Activity  · Pain Rating Post-Intervention 1: 10/10      Objective:     Communicated with RN prior to session.  Patient found up in chair with (chair alarm) upon PT entry to room.     General Precautions: Standard, fall   Orthopedic Precautions:    Braces:       Functional Mobility:  · Transfers:     · Sit to Stand:  moderate assistance and of 2 persons with rolling walker. Pt able to stand ~2 min with CGA. Pt with decreased weight bearing on LLE  due to pain. Pt unable to take step forward due to pain.      AM-PAC 6 CLICK MOBILITY  Sitting down on and standing up from a chair with arms (e.g., wheelchair, bedside commode, etc.): 2       Therapeutic Activities and Exercises:   bed mobility; sitting EOB for trunk control and midline orientation; sit <> stands    Pt performed ankle pumps, LAQs, and marches with BLEs but was unable to perform more than 5 with BLEs due to pain.     Heel cord and hamstring stretch performed to pt in sitting and pt reported pain in L knee and behind R heel with stretch.    Patient left up in chair with chair alarm on..    GOALS:   Multidisciplinary Problems     Physical Therapy Goals        Problem: Physical Therapy Goal    Goal Priority Disciplines Outcome Goal Variances Interventions   Physical Therapy Goal     PT, PT/OT Ongoing (interventions implemented as appropriate)     Description:  Goals to be met by: discharge     Patient will increase functional independence with mobility by performin. Supine to sit with Stand-by Assistance  2. Sit to stand transfer with Stand-by Assistance  3. Bed to chair transfer with Stand-by Assistance using Rolling Walker  4. Gait  x 150 feet with Stand-by Assistance using Rolling Walker.                       Time Tracking:     PT Received On: 08/15/19  PT Start Time: 1114     PT Stop Time: 1139  PT Total Time (min): 25 min     Billable Minutes: Therapeutic Activity 15 minutes and Therapeutic Exercise 10 minutes    Treatment Type: Treatment  PT/PTA: PTA     PTA Visit Number: Maya Flores PTA  08/15/2019

## 2019-08-15 NOTE — PLAN OF CARE
08/15/19 1554   Discharge Reassessment   Assessment Type Discharge Planning Reassessment   Anticipated Discharge Disposition SNF   THIS CM SPOKE TO THE PATIENT REGARDING THE RECOMMENDATION FOR SNF PLACEMENT.  THE PATIENT IS IN AGREEMENT WITH GOING TO A SNF FOR REHAB.  PROVIDED THE PATIENT WITH THE [facility] SNF PROVIDER LISTING AND EXPLAINED IT TO HIM.  AFTER REVIEW AND SOME DISCUSSION, THE PATIENT CHOSE [facility], [facility], AND [facility].  THE PATIENT CHOICE FORM WAS COMPLETED AND SIGNED.

## 2019-08-15 NOTE — CARE UPDATE
SENT SECURE CHAT TO DR. CEVALLOS AND LAURA WIGGINS LETTING THEM KNOW PT NO LONGER MEETS CRITERIA TO BE IN HOSPITAL

## 2019-08-15 NOTE — NURSING
Spoke to Lisa LIGHT. She will  Notify Dr. Olvera that INR is too high for procedure. Needs to be below 2.0.

## 2019-08-15 NOTE — PT/OT/SLP EVAL
Occupational Therapy   Evaluation    Name: Tre Cali  MRN: [id number]  Admitting Diagnosis:  Volume overload      Recommendations:     Discharge Recommendations: nursing facility, skilled  Discharge Equipment Recommendations:  walker, rolling, tub bench(will continue to assess)  Barriers to discharge:  Decreased caregiver support    Assessment:     Tre Cali is a 54 y.o. male with a medical diagnosis of Volume overload. Patient agreeable to therapy session this AM.  Patient presents with performance deficits affecting function: weakness, impaired endurance, impaired self care skills, impaired balance, gait instability, impaired functional mobilty, decreased upper extremity function.  Pt with history of multiple falls - high fall risk.    Rehab Prognosis: Good; patient would benefit from acute skilled OT services to address these deficits and reach maximum level of function.       Plan:     Patient to be seen 5 x/week to address the above listed problems via self-care/home management, therapeutic activities, therapeutic exercises  · Plan of Care Expires: 09/15/19  · Plan of Care Reviewed with: patient    Subjective     Chief Complaint: Pain in B knees  Patient/Family Comments/goals: to return home    Occupational Profile:  Living Environment: Pt lives in a 1 story house with his sister and niece. Pt states there is one big step to enter home with no railings. Home has a tub/shower combo and a raised toilet seat.   Previous level of function: Min A from sister  Equipment Used at Home:  bedside commode, cane, straight, rollator(BSC over toilet)  Assistance upon Discharge: sister present in home    Pain/Comfort:  · Pain Rating 1: 6/10  · Location - Side 1: Bilateral  · Location 1: knee  · Pain Addressed 1: Reposition, Distraction      Objective:     Communicated with: Nursing prior to session.  Patient found supine with peripheral IV, bed alarm upon OT entry to room.    General Precautions: Standard, fall  "  Orthopedic Precautions:N/A   Braces: N/A     Occupational Performance:    Bed Mobility:    · Patient completed Supine to Sit with contact guard assistance getting up on L side and minimal assist getting up on R side      Functional Mobility/Transfers:  · Patient completed Sit <> Stand Transfer with minimum assistance  with  rolling walker   · Patient completed Bed <> Chair Transfer using Stand Pivot technique with minimum assistance with rolling walker    Activities of Daily Living:  · Grooming: setup assist sitting up in chair with bedside table    Cognitive/Visual Perceptual:  Cognitive/Psychosocial Skills:     -       Oriented to: Person, Place, Time and Situation   -       Follows Commands/attention:Easily distracted and Follows one-step commands  -       Safety awareness/insight to disability: impaired   -       Mood/Affect/Coping skills/emotional control: Cooperative and Guarded    Physical Exam:  Balance:    -       static sitting balance good, static/dynamic standing balance poor  Dominant hand:    -       right  Upper Extremity Range of Motion:     -       Right Upper Extremity: ~95 degrees shoulder flexion; decreasd supination/pronation and elbow extension; Right hand appears to have 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  -       Left Upper Extremity: WFL  Upper Extremity Strength:    -       Right Upper Extremity: 2+/5 shoulder flexion  -       Left Upper Extremity: 4+/5    AMPAC 6 Click ADL:  AMPAC Total Score: 16    Treatment & Education  Patient completed bed mobility with OT education on using RUE to help push self up from a lying position. Patient completed a bed to chair transfer with RW with OT education on proper hand placement on walker and to not pull on walker when rising from a sitting position. Patient also educated on reaching back for surface when sitting and to lower self slowly into sitting, to not let self "plop" down.  Patient " completed grooming activity (brushing teeth/washing face) while sitting in chair with setup assist. Patient reminded of NPO status and to not swallow any water in mouth. Pt agreed and understood.  To assess patient's use of R hand, therapist had patient write on piece of paper with forearm supported, with therapist observing most motion coming from forearm and shoulder, not wrist and fingers. Therapist tried tubigrip on pen and noticed improved movement from patient coming more from wrist and fingers.  Patient given blue tubigrip for use on writing utensils, toothbrush, and eating utensils (when no longer NPO) for use in right hand due to limited . Patient educated on practicing translation movement with utensil (with tubigrip) in R hand to increase ROM in digits.   Education:    Patient left up in chair with all lines intact, call button in reach and chair alarm on    GOALS:   Multidisciplinary Problems     Occupational Therapy Goals        Problem: Occupational Therapy Goal    Goal Priority Disciplines Outcome Interventions   Occupational Therapy Goal     OT, PT/OT     Description:  Goals to be met by: discharge     Patient will increase functional independence with ADLs by performing:    LE Dressing with Minimal Assistance.  Grooming while seated at sink with [place].  Toileting from toilet with Minimal Assistance for hygiene and clothing management.   Toilet transfer to toilet with Stand-by Assistance.                      History:     Past Medical History:   Diagnosis Date    Cirrhosis     COPD (chronic obstructive pulmonary disease)     Diabetes mellitus     Renal disorder        Past Surgical History:   Procedure Laterality Date    AV FISTULA PLACEMENT Right     CARDIAC DEFIBRILLATOR PLACEMENT Left        Time Tracking:     OT Date of Treatment: 08/15/19  OT Start Time: 1005  OT Stop Time: 1100  OT Total Time (min): 55 min    Billable Minutes:Evaluation 20  Self Care/Home Management  25  Therapeutic Activity 10    Rody Goel OT  8/15/2019

## 2019-08-15 NOTE — PLAN OF CARE
Problem: Physical Therapy Goal  Goal: Physical Therapy Goal  Goals to be met by: discharge     Patient will increase functional independence with mobility by performin. Supine to sit with Stand-by Assistance  2. Sit to stand transfer with Stand-by Assistance  3. Bed to chair transfer with Stand-by Assistance using Rolling Walker  4. Gait  x 150 feet with Stand-by Assistance using Rolling Walker.      Outcome: Ongoing (interventions implemented as appropriate)  Pt continues to progress towards goals. Pt limited by pain today.

## 2019-08-15 NOTE — PLAN OF CARE
Problem: Fall Injury Risk  Goal: Absence of Fall and Fall-Related Injury    Intervention: Identify and Manage Contributors to Fall Injury Risk  Assess pt's needs and educate on the use of the call light.

## 2019-08-15 NOTE — PLAN OF CARE
08/14/19 2030   Patient Assessment/Suction   Level of Consciousness (AVPU) alert   Respiratory Effort Unlabored   Expansion/Accessory Muscles/Retractions no retractions   All Lung Fields Breath Sounds clear   Rhythm/Pattern, Respiratory unlabored   Cough Frequency infrequent   Cough Type no productive sputum   PRE-TX-O2   O2 Device (Oxygen Therapy) room air   SpO2 96 %   Pulse 96   Resp 16   Aerosol Therapy   $ Aerosol Therapy Charges PRN treatment not required

## 2019-08-15 NOTE — NURSING
Informed by radiology that they're unable to perform paracentesis today on pt due to his INR being too high. Stated it has to be below 2. Called Dr Olvera's office and left message with . Awaiting return call back

## 2019-08-15 NOTE — NURSING
Pt is complaining of severe pain behind his left knee. He states that this is the reason he reported to the ED. Pt states that he has a history of Baker's Cyst. He would like to speak with the hospital physician on his case tomorrow. I will inform the oncoming nurse of situation and continue to assess pt as the night progresses.

## 2019-08-15 NOTE — PLAN OF CARE
Spoke with Dr. Osorio, by phone, to inform him of patient seen in ED on 08/13/2019 and had order for referral to hepatology clinic.  Informed him that the referral faxed and notification made to hepatology clinic. Also informed him that received response from staff member at Hepatology clinic at [facility] stating that patient has no evidence provided of Hepatitis C and should be referred to Gastroenterology.  Informed Dr. Osorio that the patient is already established with Dr. Rm and also seen by nephrology. Dr. Osorio stated okay to disregard this referral to Hepatology. Informed Jana Ponce RN,  on [address], of this information. Also called [phone] to Maria Isabel Valerio, Hep C navigator at [facility], and left vm on secure voicemail to disregard this referral per CHATO ESPINAL as patient is already established with Gastroenterology on the [facility].

## 2019-08-15 NOTE — PLAN OF CARE
08/15/19 1609   Discharge Reassessment   Assessment Type Discharge Planning Reassessment   Anticipated Discharge Disposition SNF   SNF REFERRAL ELECTRONICALLY SUBMITTED THROUGH [employer] TO [facility], [facility], AND [facility].  AWAIT RESPONSE.

## 2019-08-16 LAB
ALBUMIN SERPL BCP-MCNC: 3.1 G/DL (ref 3.5–5.2)
ALP SERPL-CCNC: 93 U/L (ref 55–135)
ALT SERPL W/O P-5'-P-CCNC: 8 U/L (ref 10–44)
ANION GAP SERPL CALC-SCNC: 17 MMOL/L (ref 8–16)
AST SERPL-CCNC: 11 U/L (ref 10–40)
BASOPHILS # BLD AUTO: 0.09 K/UL (ref 0–0.2)
BASOPHILS NFR BLD: 1.7 % (ref 0–1.9)
BILIRUB SERPL-MCNC: 1.8 MG/DL (ref 0.1–1)
BUN SERPL-MCNC: 55 MG/DL (ref 6–20)
CALCIUM SERPL-MCNC: 8.2 MG/DL (ref 8.7–10.5)
CHLORIDE SERPL-SCNC: 94 MMOL/L (ref 95–110)
CO2 SERPL-SCNC: 26 MMOL/L (ref 23–29)
CREAT SERPL-MCNC: 8.8 MG/DL (ref 0.5–1.4)
DIFFERENTIAL METHOD: ABNORMAL
EOSINOPHIL # BLD AUTO: 0.1 K/UL (ref 0–0.5)
EOSINOPHIL NFR BLD: 2.4 % (ref 0–8)
ERYTHROCYTE [DISTWIDTH] IN BLOOD BY AUTOMATED COUNT: 16 % (ref 11.5–14.5)
EST. GFR  (AFRICAN AMERICAN): 7 ML/MIN/1.73 M^2
EST. GFR  (NON AFRICAN AMERICAN): 6 ML/MIN/1.73 M^2
GLUCOSE SERPL-MCNC: 113 MG/DL (ref 70–110)
GLUCOSE SERPL-MCNC: 98 MG/DL (ref 70–110)
HCT VFR BLD AUTO: 35.5 % (ref 40–54)
HGB BLD-MCNC: 11.3 G/DL (ref 14–18)
IMM GRANULOCYTES # BLD AUTO: 0.01 K/UL (ref 0–0.04)
IMM GRANULOCYTES NFR BLD AUTO: 0.2 % (ref 0–0.5)
INR PPP: 1.7
INR PPP: 1.7
LYMPHOCYTES # BLD AUTO: 0.6 K/UL (ref 1–4.8)
LYMPHOCYTES NFR BLD: 10.4 % (ref 18–48)
MAGNESIUM SERPL-MCNC: 2.3 MG/DL (ref 1.6–2.6)
MCH RBC QN AUTO: 30.6 PG (ref 27–31)
MCHC RBC AUTO-ENTMCNC: 31.8 G/DL (ref 32–36)
MCV RBC AUTO: 96 FL (ref 82–98)
MONOCYTES # BLD AUTO: 0.9 K/UL (ref 0.3–1)
MONOCYTES NFR BLD: 17.1 % (ref 4–15)
NEUTROPHILS # BLD AUTO: 3.7 K/UL (ref 1.8–7.7)
NEUTROPHILS NFR BLD: 68.2 % (ref 38–73)
NRBC BLD-RTO: 0 /100 WBC
PHOSPHATE SERPL-MCNC: 10.7 MG/DL (ref 2.7–4.5)
PLATELET # BLD AUTO: 104 K/UL (ref 150–350)
PMV BLD AUTO: 11.8 FL (ref 9.2–12.9)
POTASSIUM SERPL-SCNC: 4.3 MMOL/L (ref 3.5–5.1)
PROT SERPL-MCNC: 7 G/DL (ref 6–8.4)
PROTHROMBIN TIME: 19.2 SEC (ref 11.7–14)
PROTHROMBIN TIME: 19.2 SEC (ref 11.7–14)
RBC # BLD AUTO: 3.69 M/UL (ref 4.6–6.2)
SODIUM SERPL-SCNC: 137 MMOL/L (ref 136–145)
WBC # BLD AUTO: 5.38 K/UL (ref 3.9–12.7)

## 2019-08-16 PROCEDURE — 25000003 PHARM REV CODE 250: Performed by: INTERNAL MEDICINE

## 2019-08-16 PROCEDURE — 85610 PROTHROMBIN TIME: CPT

## 2019-08-16 PROCEDURE — 90935 HEMODIALYSIS ONE EVALUATION: CPT

## 2019-08-16 PROCEDURE — 86706 HEP B SURFACE ANTIBODY: CPT

## 2019-08-16 PROCEDURE — 80053 COMPREHEN METABOLIC PANEL: CPT

## 2019-08-16 PROCEDURE — 99900035 HC TECH TIME PER 15 MIN (STAT)

## 2019-08-16 PROCEDURE — 36415 COLL VENOUS BLD VENIPUNCTURE: CPT

## 2019-08-16 PROCEDURE — 87340 HEPATITIS B SURFACE AG IA: CPT

## 2019-08-16 PROCEDURE — 82962 GLUCOSE BLOOD TEST: CPT

## 2019-08-16 PROCEDURE — 63600175 PHARM REV CODE 636 W HCPCS: Performed by: INTERNAL MEDICINE

## 2019-08-16 PROCEDURE — 83735 ASSAY OF MAGNESIUM: CPT

## 2019-08-16 PROCEDURE — 86704 HEP B CORE ANTIBODY TOTAL: CPT

## 2019-08-16 PROCEDURE — 27202445

## 2019-08-16 PROCEDURE — 85025 COMPLETE CBC W/AUTO DIFF WBC: CPT

## 2019-08-16 PROCEDURE — 84100 ASSAY OF PHOSPHORUS: CPT

## 2019-08-16 PROCEDURE — 94761 N-INVAS EAR/PLS OXIMETRY MLT: CPT

## 2019-08-16 PROCEDURE — 12000002 HC ACUTE/MED SURGE SEMI-PRIVATE ROOM

## 2019-08-16 RX ADMIN — RIFAXIMIN 550 MG: 550 TABLET ORAL at 08:08

## 2019-08-16 RX ADMIN — MIDODRINE HYDROCHLORIDE 5 MG: 2.5 TABLET ORAL at 06:08

## 2019-08-16 RX ADMIN — CEFTRIAXONE SODIUM 1 G: 1 INJECTION, POWDER, FOR SOLUTION INTRAMUSCULAR; INTRAVENOUS at 08:08

## 2019-08-16 RX ADMIN — AMIODARONE HYDROCHLORIDE 200 MG: 200 TABLET ORAL at 08:08

## 2019-08-16 RX ADMIN — ASPIRIN 81 MG: 81 TABLET, COATED ORAL at 08:08

## 2019-08-16 RX ADMIN — ALLOPURINOL 100 MG: 100 TABLET ORAL at 08:08

## 2019-08-16 RX ADMIN — CALCIUM ACETATE 667 MG: 667 CAPSULE ORAL at 08:08

## 2019-08-16 NOTE — PROGRESS NOTES
[facility] Medicine  Progress Note    Patient Name: Tre Cali  MRN: [id number]  Patient Class: IP- Inpatient   Admission Date: 8/13/2019  Length of Stay: 1 days  Attending Physician: Marisel Waldron MD  Primary Care Provider: Primary Doctor No        Subjective:       Interval History:    Patient denies any new complaints since admission.  Anasarca, ascites, lower abdominal pain unchanged.  Patient  Playing of abdominal distention. This is unchanged.unable to do paracentesis secondary to elevated INR.    Review of Systems  Objective:     Vital Signs (Most Recent):  Temp: 98.3 °F (36.8 °C) (08/16/19 1300)  Pulse: 74 (08/16/19 1300)  Resp: 18 (08/16/19 1300)  BP: 101/70 (08/16/19 1300)  SpO2: 99 % (08/16/19 1300) Vital Signs (24h Range):  Temp:  [97.5 °F (36.4 °C)-98.3 °F (36.8 °C)] 98.3 °F (36.8 °C)  Pulse:  [73-89] 74  Resp:  [16-18] 18  SpO2:  [94 %-99 %] 99 %  BP: ()/(59-80) 101/70     Weight: 91 kg (200 lb 9.9 oz)  Body mass index is 32.38 kg/m².    Intake/Output Summary (Last 24 hours) at 8/16/2019 1514  Last data filed at 8/16/2019 1230  Gross per 24 hour   Intake 1100 ml   Output 5000 ml   Net -3900 ml        Physical Exam:  General:  Sitting active in a chair at bedside.  Eyes: PERRLA. No conjunctivae pallor. No scleral icterus.  ENT: OMM. No pharyngeal erythema. Thick neck  Neck: Supple. No adenopathy.  Lungs:  Clear to auscultation without wheezing or crackles anteriorly  Cor: Regular rate and rhythm. No murmurs. No pedal edema.  Abd: Soft.  Distended.  Dullness to percussion.  Mild generalized tenderness without signs of acute abdomen.  Musculoskeletal: No arthropathy, deformity.  Skin:  Bruises noted on chest and upper extremities.  Neuro: A&O x3.  Right-sided wendy paresis noted  Ext:  4+ bilateral pitting pedal edema.     Significant Labs:   CBC:   Recent Labs   Lab 08/15/19  0458 08/16/19  0425   WBC 4.91 5.38   HGB 10.8* 11.3*   HCT 34.4* 35.5*   PLT 91* 104*      CMP:   Recent Labs   Lab 08/15/19  0457 08/16/19  0424    137   K 4.0 4.3   CL 96 94*   CO2 25 26    113*   BUN 48* 55*   CREATININE 7.7* 8.8*   CALCIUM 8.7 8.2*   PROT 7.3 7.0   ALBUMIN 3.2* 3.1*   BILITOT 1.8* 1.8*   ALKPHOS 98 93   AST 12 11   ALT 7* 8*   ANIONGAP 19* 17*   EGFRNONAA 7.1* 6.0*       Significant Imaging: None today    Assessment/Plan:      Generalized weakness/debility. No evidence of encephalopathy today.     Suspect right lower lung pneumonia?  Aspiration     Anasarca from combination of ESRD and liver cirrhosis     Large ascites - r/o SBP     Sub-acute diarrhea - r/o infectious, inflammatory etc     Alcoholic liver cirrhosis     ESRD on HD via right arm AVF on TTS     Chronic hypotension from liver cirrhosis on midodrine at home     CAD s/p stents    AICD in situ     H/o CVAs with right sided hemiparesis     COPD - stable     Poor long-term prognosis    PLAN:  HD per renal  GI ordered paracentesis per radiologist, unable to perform today secondary to elevated INR. Given vitamin K today.  Albumin after paracentesis as ordered by GI  D/c diuretics  D/c clindamycin as clinically no evidence of pneumonia  Continue IV rocephin until SBP is ruled out  F/u stool studies, c.diff  Increase midodrine if needed  Anticipate d.c home tomorrow      Active Diagnoses:    Diagnosis Date Noted POA    PRINCIPAL PROBLEM:  Volume overload [E87.70] 08/13/2019 Yes    Alcoholic cirrhosis of liver with ascites [K70.31] 08/13/2019 Unknown     Chronic    Ascites due to alcoholic cirrhosis [K70.31] 08/13/2019 Yes     Chronic    ESRD (end stage renal disease) [N18.6] 08/13/2019 Yes     Chronic    AICD (automatic cardioverter/defibrillator) present [Z95.810] 08/13/2019 Yes     Chronic    Encephalopathy, metabolic [G93.41] 08/13/2019 Yes    CAD S/P percutaneous coronary angioplasty [I25.10, Z98.61] 08/13/2019 Not Applicable     Chronic    Hemiplegia as late effect of cerebrovascular accident (CVA)  [I69.359] 08/13/2019 Not Applicable     Chronic    Diarrhea of presumed infectious origin [R19.7] 08/13/2019 Yes    End stage renal disease [N18.6] 08/13/2019 Yes      Problems Resolved During this Admission:     VTE Risk Mitigation (From admission, onward)        Ordered     IP VTE HIGH RISK PATIENT  Once      08/13/19 1909             VICKY Osorio MD  Department of Hospital Medicine   [facility]

## 2019-08-16 NOTE — PLAN OF CARE
Problem: Fall Injury Risk  Goal: Absence of Fall and Fall-Related Injury  Outcome: Ongoing (interventions implemented as appropriate)  Pt remained free from falls or injury throughout the shift.

## 2019-08-16 NOTE — PLAN OF CARE
08/16/19 1648   Discharge Reassessment   Assessment Type Discharge Planning Reassessment   Anticipated Discharge Disposition SNF     SW attempted to call [facility], [facility], and [facility] to follow up on placement status. Sadaf at [facility] confirmed referral was received and will call Monday morning with updates. Admitting staff at  and [facility] had already left for the day.

## 2019-08-16 NOTE — PROGRESS NOTES
Progress Note  Department of Hospital Medicine    SUBJECTIVE:     Follow-up For:  Volume overload    HPI/Interval history: Patient voices no complaints. Had paracentesis. Currently in hemodialysis.    PMH/FH/SH/Review of Systems: See H and P/Consult dated 8/13/2019    OBJECTIVE:     Vital Signs Range (Last 24H):  Temp:  [97.5 °F (36.4 °C)-98.3 °F (36.8 °C)]   Pulse:  [73-89]   Resp:  [16-18]   BP: ()/(59-80)   SpO2:  [94 %-99 %]     I & O (Last 24H):    Intake/Output Summary (Last 24 hours) at 8/16/2019 1518  Last data filed at 8/16/2019 1230  Gross per 24 hour   Intake 1100 ml   Output 5000 ml   Net -3900 ml       Physical Exam:  General- Resting in bed, NAD  HEENT- PERRLA, EOMI, OP clear, MMM  Neck- No JVD, no LAD, no Thyromegaly  CV- Regular rate and rhythm, No Murmur, rubs, or gallops.  Resp- coarse breath sounds bilaterally. No wheeze. No rhonchi.  GI- Soft, minimal abdominal distention post paracentesis. BS normoactive x4 quads, no HSM  Extrem- No cyanosis, clubbing, edema. 2+ DPs bilaterally.  Neuro- CN II-XII grossly intact, no sensory or motor deficits noted.  PSYC: alert and oriented times four.  Euthymic mood      Lab/X-ray Results:  CBC:   Recent Labs   Lab 08/16/19 0425   WBC 5.38   RBC 3.69*   HGB 11.3*   HCT 35.5*   *   MCV 96   MCH 30.6   MCHC 31.8*     BMP:   Recent Labs   Lab 08/16/19 0424   *      K 4.3   CL 94*   CO2 26   BUN 55*   CREATININE 8.8*   CALCIUM 8.2*   MG 2.3     CMP:   Recent Labs   Lab 08/16/19 0424   *   CALCIUM 8.2*   ALBUMIN 3.1*   PROT 7.0      K 4.3   CO2 26   CL 94*   BUN 55*   CREATININE 8.8*   ALKPHOS 93   ALT 8*   AST 11   BILITOT 1.8*     LFTs:   Recent Labs   Lab 08/16/19 0424   ALT 8*   AST 11   ALKPHOS 93   BILITOT 1.8*   PROT 7.0   ALBUMIN 3.1*     Coagulation:   Recent Labs   Lab 08/15/19  0458 08/16/19  0424   INR 2.0 1.7  1.7   APTT 37.7*  --      Cardiac markers: No results for input(s): CKMB, CPKMB, TROPONINT, TROPONINI,  MYOGLOBIN in the last 168 hours.          ASSESSMENT/PLAN:     Active Hospital Problems    Diagnosis  POA    *Volume overload [E87.70]  Yes    Alcoholic cirrhosis of liver with ascites [K70.31]  Unknown     Chronic    Ascites due to alcoholic cirrhosis [K70.31]  Yes     Chronic    ESRD (end stage renal disease) [N18.6]  Yes     Chronic    AICD (automatic cardioverter/defibrillator) present [Z95.810]  Yes     Chronic    Encephalopathy, metabolic [G93.41]  Yes    CAD S/P percutaneous coronary angioplasty [I25.10, Z98.61]  Not Applicable     Chronic    Hemiplegia as late effect of cerebrovascular accident (CVA) [I69.359]  Not Applicable     Chronic    Diarrhea of presumed infectious origin [R19.7]  Yes    End stage renal disease [N18.6]  Yes      Resolved Hospital Problems   No resolved problems to display.       See orders placed today for the complete plan.     End-stage renal disease on hemodialysis-patient due for dialysis today. Orders per nephrology.    Ascites secondary to alcoholic cirrhotic liver disease, chronic-patient for paracentesis today. 5 L taken off. Will receive aliment post paracentesis. Monitor closely during hemodialysis for end drops in blood pressure    Acute volume overload- slowly resolving.    AICD in situ- no discharges of AICD while in hospital. No arrhythmia.    Acute metabolic encephalopathy- encephalopathy resolved.    Hemiplegia as late effect of surgery were vascular accident, chronic- patient not wanting to pertussis lenard with physical therapy today due to abdominal paracentesis.\    C. Difficile colitis-no evidence of C. Difficile colitis.    Diarrhea of infectious origin, ruled out    Severe chronic deconditioning- awaiting skilled nursing facility placement.

## 2019-08-16 NOTE — PLAN OF CARE
08/16/19 1002   Discharge Assessment   Assessment Type Discharge Planning Reassessment   Discharge Plan A Skilled Nursing Facility     SW called in [facility] and received permission from [facility] to follow agency protocol. SW interviewed pt to complete PASRR and faxed to [phone]. Awaiting 142.    12:03 142 was received and copies of PASRR and 142 were placed in pt's chart.

## 2019-08-16 NOTE — PHYSICIAN QUERY
PT Name: Tre Cali  MR #: [id number]     Physician Query Form - Documentation Clarification      CDS/: Paulette Parsons               Contact information:[phone] or visit HIM dept    This form is a permanent document in the medical record.     Query Date: August 16, 2019    By submitting this query, we are merely seeking further clarification of documentation. Please utilize your independent clinical judgment when addressing the question(s) below.    The Medical record reflects the following:   Metabolic Encephalopathy documented throughout the pt's medical record.  Please document the clinical finding in your progress notes/dc summary that will support this diagnosis.  If you believe that the pt DID NOT have metabolic encephalopathy, please document that the condition did not exist and drop it from further progress notes/dc summary.                                                                                                       Provider Use Only    Will document hepatic encephalopathy                                                                                                                             [  ] Clinically Undetermined

## 2019-08-16 NOTE — PLAN OF CARE
08/16/19 0750   Patient Assessment/Suction   Level of Consciousness (AVPU) alert   Respiratory Effort Normal;Unlabored   All Lung Fields Breath Sounds clear;equal bilaterally   PRE-TX-O2   O2 Device (Oxygen Therapy) room air   SpO2 97 %   Pulse Oximetry Type Intermittent   $ Pulse Oximetry - Multiple Charge Pulse Oximetry - Multiple   Pulse 89   Resp 17   Aerosol Therapy   $ Aerosol Therapy Charges PRN treatment not required

## 2019-08-16 NOTE — PLAN OF CARE
Problem: Adult Inpatient Plan of Care  Goal: Plan of Care Review  Outcome: Ongoing (interventions implemented as appropriate)  Pt free of accidental injury during shift. No s/s of distress noted. Denies pain at present. Able to make needs known. VSS. Safety measures maintained. Call bell within reach. Frequent rounds made. Will continue to monitor

## 2019-08-16 NOTE — PLAN OF CARE
5 liters obtained from paracentesis. Pt tolerated well. Bandaid applied to abd para site. Pt tolerated well.

## 2019-08-16 NOTE — PT/OT/SLP PROGRESS
Physical Therapy      Patient Name:  Tre Cali   MRN:  [id number]    Patient not seen today secondary to pt not feeling well due to fluid buildup and does not feel up to participating in PT until fluid has lessened. Will follow-up next scheduled date.    Jaylene Flores, PTA

## 2019-08-16 NOTE — NURSING
Tx complete, with no complications noted throughout tx. UF goal met, with a total of 2L removed. No complications with RUE AVF.    Tx duration: 3 hrs  Total UF: 2500 mL  Net UF: 2000 mL (2L removed). Goal of 2-3L met.    Report given to KULDEEP Alan

## 2019-08-16 NOTE — PLAN OF CARE
Pt return to room via wheelchair. Pt placed in chair and velcro safety belt applied. Pt tolerated well. Pt awake and alert. Call light given to pt.

## 2019-08-16 NOTE — PROGRESS NOTES
[facility]  Nephrology  Progress Note    Patient Name: Tre Cali  MRN: [id number]  Admission Date: 8/13/2019  Hospital Length of Stay: 1 days  Attending Provider: Marisel Waldron MD   Primary Care Physician: Primary Doctor No  Principal Problem:Volume overload      Subjective:     Interval History: endorses worsening abdominal pain this am (paracentesis postponed 2/2 elevated INR; now < 2.0 so ok to proceed); denies any associated nausea/vomiting; diarrhea improved    Review of patient's allergies indicates:   Allergen Reactions    Sulfa (sulfonamide antibiotics) Rash     Current Facility-Administered Medications   Medication Frequency    albumin human 25% bottle 50 g Once    albuterol nebulizer solution 2.5 mg Q4H PRN    allopurinol tablet 100 mg Daily    amiodarone tablet 200 mg Daily    aspirin EC tablet 81 mg Daily    calcium acetate capsule 667 mg TID WM    cefTRIAXone (ROCEPHIN) 1 g in dextrose 5 % 50 mL IVPB Q24H    dextrose 50% injection 12.5 g PRN    dextrose 50% injection 25 g PRN    glucagon (human recombinant) injection 1 mg PRN    glucose chewable tablet 16 g PRN    glucose chewable tablet 24 g PRN    midodrine tablet 5 mg TID    rifAXIMin tablet 550 mg BID    sodium chloride 0.9% flush 10 mL PRN       Objective:     Vital Signs (Most Recent):  Temp: 98 °F (36.7 °C) (08/16/19 0358)  Pulse: 74 (08/16/19 0358)  Resp: 16 (08/16/19 0358)  BP: 109/76 (08/16/19 0358)  SpO2: (!) 94 % (08/16/19 0358)  O2 Device (Oxygen Therapy): room air (08/16/19 0358) Vital Signs (24h Range):  Temp:  [97.5 °F (36.4 °C)-98.3 °F (36.8 °C)] 98 °F (36.7 °C)  Pulse:  [74-98] 74  Resp:  [16-18] 16  SpO2:  [94 %-98 %] 94 %  BP: ()/(59-80) 109/76     Weight: 91 kg (200 lb 9.9 oz) (08/13/19 2038)  Body mass index is 32.38 kg/m².  Body surface area is 2.06 meters squared.    I/O last 3 completed shifts:  In: 1500 [P.O.:1000; Other:500]  Out: 2300 [Other:2300]    Physical Exam    Constitutional: He is oriented to person, place, and time. No distress.   Obese; chronically ill-appearing   HENT:   Head: Normocephalic and atraumatic.   Mouth/Throat: Oropharynx is clear and moist.   Eyes: Conjunctivae are normal. Right eye exhibits no discharge. Left eye exhibits no discharge. No scleral icterus.   Neck: Normal range of motion. Neck supple. No JVD present.   Cardiovascular: Normal rate, regular rhythm and intact distal pulses. Exam reveals no gallop and no friction rub.   Murmur heard.  Pulmonary/Chest: Effort normal and breath sounds normal. No respiratory distress. He has no wheezes. He has no rales.   Abdominal: Soft. He exhibits distension. He exhibits no mass. There is tenderness. There is no rebound and no guarding.   Hypoactive BS in all quadrants   Genitourinary:   Genitourinary Comments: Deferred   Musculoskeletal: Normal range of motion. He exhibits edema and tenderness. He exhibits no deformity.   R UE AV Fistula w/ palpable thrill   Neurological: He is alert and oriented to person, place, and time. A cranial nerve deficit is present.   Skin: Skin is warm and dry. Rash noted. No erythema.   Psychiatric: He has a normal mood and affect. His behavior is normal.   Nursing note and vitals reviewed.      Significant Labs:sure  CBC:   Recent Labs   Lab 08/16/19 0425   WBC 5.38   RBC 3.69*   HGB 11.3*   HCT 35.5*   *   MCV 96   MCH 30.6   MCHC 31.8*     CMP:   Recent Labs   Lab 08/16/19 0424   *   CALCIUM 8.2*   ALBUMIN 3.1*   PROT 7.0      K 4.3   CO2 26   CL 94*   BUN 55*   CREATININE 8.8*   ALKPHOS 93   ALT 8*   AST 11   BILITOT 1.8*     LFTs:   Recent Labs   Lab 08/16/19  0424   ALT 8*   AST 11   ALKPHOS 93   BILITOT 1.8*   PROT 7.0   ALBUMIN 3.1*       Significant Imaging:  X-Ray: Reviewed  CT: Reviewed    Assessment/Plan:     Active Diagnoses:    Diagnosis Date Noted POA    PRINCIPAL PROBLEM:  Volume overload [E87.70] 08/13/2019 Yes    Alcoholic cirrhosis of  liver with ascites [K70.31] 08/13/2019 Unknown     Chronic    Ascites due to alcoholic cirrhosis [K70.31] 08/13/2019 Yes     Chronic    ESRD (end stage renal disease) [N18.6] 08/13/2019 Yes     Chronic    AICD (automatic cardioverter/defibrillator) present [Z95.810] 08/13/2019 Yes     Chronic    Encephalopathy, metabolic [G93.41] 08/13/2019 Yes    CAD S/P percutaneous coronary angioplasty [I25.10, Z98.61] 08/13/2019 Not Applicable     Chronic    Hemiplegia as late effect of cerebrovascular accident (CVA) [I69.359] 08/13/2019 Not Applicable     Chronic    Diarrhea of presumed infectious origin [R19.7] 08/13/2019 Yes    End stage renal disease [N18.6] 08/13/2019 Yes      Problems Resolved During this Admission:     1. ESRD (HD-MWF via R UE AV Fistula)- symptomatically stable at present; significant abdominal distention on admission (s/p therapeutic paracentesis this am); no overt volume overload, significant electrolyte perturbations nor acid-base disturbance    -maintenance HD (duration: 3h; UF Goal: 1-2)L as tolerated; STANDARD 'Bath'; Access: AVF; Qb: 400 ml/min, Qd: 2x BFR; give 12.5-25 gm 25% Albumin w/ HD for BP support)    -DAILY renal function panel to document sCr trend, optimize HD electrolyte 'bath' & assess response to therapy    -avoid nephrotoxic agents (NSAIDs, IV contrast dye)    -renally dose all appropriate medications, including antibiotics     2. HTN- clinically stable at present now w/ relative hypotension (109/76); no active issues    -HOLD anti-HTN medications & diuretics for now in anticipation of abdominal paracentesis w/ fluid removal & HD today & reassess in am     3. ANEMIA- clinically stable at present; H/H AT GOAL (11.3/35.5); no active issues    -trend daily CBC (H/H) to effect optimal blood-loss surveillance     4. SECONDARY HYPERPARATHYROIDISM (sHPT)- clinically stable at present; no active issues    -continue dietary binder (if tolerating 'po')/Vitamin D +/- HD-associated  calcimimetic agent (Cinacalcet vs Etelcalcetide)     5. ESLD (hx of EtOH-induced Cirrhosis) w/ Ascites (for therapeutic paracentesis this am now that INR < 2.0)- appears ill; GI Medicine service on the case; cautious fluid removal to minimize risk of precipitatiing Hepatorenal Syndrome    -management per GI Medicine/Primary Team recommendations    Thank you for your consult. I will follow-up with patient. Please contact us if you have any additional questions.    Armando Cruz III, MD  [facility]  [facility]  [phone] (C)

## 2019-08-16 NOTE — PT/OT/SLP PROGRESS
Occupational Therapy      Patient Name:  Tre Cali   MRN:  [id number]    Patient not seen today secondary to Dialysis. Will follow-up.     Rody Goel OT  8/16/2019

## 2019-08-17 LAB
ALBUMIN SERPL BCP-MCNC: 3.5 G/DL (ref 3.5–5.2)
ALP SERPL-CCNC: 90 U/L (ref 55–135)
ALT SERPL W/O P-5'-P-CCNC: 8 U/L (ref 10–44)
ANION GAP SERPL CALC-SCNC: 18 MMOL/L (ref 8–16)
AST SERPL-CCNC: 11 U/L (ref 10–40)
BASOPHILS # BLD AUTO: 0.09 K/UL (ref 0–0.2)
BASOPHILS NFR BLD: 1.8 % (ref 0–1.9)
BILIRUB SERPL-MCNC: 1.5 MG/DL (ref 0.1–1)
BUN SERPL-MCNC: 41 MG/DL (ref 6–20)
CALCIUM SERPL-MCNC: 8.6 MG/DL (ref 8.7–10.5)
CHLORIDE SERPL-SCNC: 98 MMOL/L (ref 95–110)
CO2 SERPL-SCNC: 24 MMOL/L (ref 23–29)
CREAT SERPL-MCNC: 7.2 MG/DL (ref 0.5–1.4)
DIFFERENTIAL METHOD: ABNORMAL
EOSINOPHIL # BLD AUTO: 0.2 K/UL (ref 0–0.5)
EOSINOPHIL NFR BLD: 3.1 % (ref 0–8)
ERYTHROCYTE [DISTWIDTH] IN BLOOD BY AUTOMATED COUNT: 16.1 % (ref 11.5–14.5)
EST. GFR  (AFRICAN AMERICAN): 8.9 ML/MIN/1.73 M^2
EST. GFR  (NON AFRICAN AMERICAN): 7.7 ML/MIN/1.73 M^2
GLUCOSE SERPL-MCNC: 105 MG/DL (ref 70–110)
GLUCOSE SERPL-MCNC: 106 MG/DL (ref 70–110)
GLUCOSE SERPL-MCNC: 121 MG/DL (ref 70–110)
GLUCOSE SERPL-MCNC: 126 MG/DL (ref 70–110)
GLUCOSE SERPL-MCNC: 151 MG/DL (ref 70–110)
HBV CORE AB SERPL QL IA: NEGATIVE
HBV SURFACE AB SER QL: REACTIVE
HBV SURFACE AG SERPL QL IA: NEGATIVE
HCT VFR BLD AUTO: 35.5 % (ref 40–54)
HGB BLD-MCNC: 11.2 G/DL (ref 14–18)
IMM GRANULOCYTES # BLD AUTO: 0.02 K/UL (ref 0–0.04)
IMM GRANULOCYTES NFR BLD AUTO: 0.4 % (ref 0–0.5)
INR PPP: 1.7
LYMPHOCYTES # BLD AUTO: 0.6 K/UL (ref 1–4.8)
LYMPHOCYTES NFR BLD: 11.7 % (ref 18–48)
MAGNESIUM SERPL-MCNC: 2.4 MG/DL (ref 1.6–2.6)
MCH RBC QN AUTO: 30.3 PG (ref 27–31)
MCHC RBC AUTO-ENTMCNC: 31.5 G/DL (ref 32–36)
MCV RBC AUTO: 96 FL (ref 82–98)
MONOCYTES # BLD AUTO: 0.6 K/UL (ref 0.3–1)
MONOCYTES NFR BLD: 12.9 % (ref 4–15)
NEUTROPHILS # BLD AUTO: 3.4 K/UL (ref 1.8–7.7)
NEUTROPHILS NFR BLD: 70.1 % (ref 38–73)
NRBC BLD-RTO: 0 /100 WBC
PHOSPHATE SERPL-MCNC: 8.5 MG/DL (ref 2.7–4.5)
PLATELET # BLD AUTO: 92 K/UL (ref 150–350)
PMV BLD AUTO: 12 FL (ref 9.2–12.9)
POTASSIUM SERPL-SCNC: 4.2 MMOL/L (ref 3.5–5.1)
PROT SERPL-MCNC: 6.9 G/DL (ref 6–8.4)
PROTHROMBIN TIME: 19.6 SEC (ref 11.7–14)
RBC # BLD AUTO: 3.7 M/UL (ref 4.6–6.2)
SODIUM SERPL-SCNC: 140 MMOL/L (ref 136–145)
WBC # BLD AUTO: 4.87 K/UL (ref 3.9–12.7)

## 2019-08-17 PROCEDURE — 94761 N-INVAS EAR/PLS OXIMETRY MLT: CPT

## 2019-08-17 PROCEDURE — 85025 COMPLETE CBC W/AUTO DIFF WBC: CPT

## 2019-08-17 PROCEDURE — 63600175 PHARM REV CODE 636 W HCPCS: Performed by: INTERNAL MEDICINE

## 2019-08-17 PROCEDURE — P9047 ALBUMIN (HUMAN), 25%, 50ML: HCPCS | Mod: JG | Performed by: INTERNAL MEDICINE

## 2019-08-17 PROCEDURE — 97110 THERAPEUTIC EXERCISES: CPT

## 2019-08-17 PROCEDURE — 99900035 HC TECH TIME PER 15 MIN (STAT)

## 2019-08-17 PROCEDURE — 36415 COLL VENOUS BLD VENIPUNCTURE: CPT

## 2019-08-17 PROCEDURE — 63600175 PHARM REV CODE 636 W HCPCS: Mod: JG | Performed by: INTERNAL MEDICINE

## 2019-08-17 PROCEDURE — 94640 AIRWAY INHALATION TREATMENT: CPT

## 2019-08-17 PROCEDURE — 83735 ASSAY OF MAGNESIUM: CPT

## 2019-08-17 PROCEDURE — 80053 COMPREHEN METABOLIC PANEL: CPT

## 2019-08-17 PROCEDURE — 12000002 HC ACUTE/MED SURGE SEMI-PRIVATE ROOM

## 2019-08-17 PROCEDURE — 85610 PROTHROMBIN TIME: CPT

## 2019-08-17 PROCEDURE — 25000003 PHARM REV CODE 250: Performed by: INTERNAL MEDICINE

## 2019-08-17 PROCEDURE — 84100 ASSAY OF PHOSPHORUS: CPT

## 2019-08-17 PROCEDURE — 97530 THERAPEUTIC ACTIVITIES: CPT

## 2019-08-17 RX ORDER — ALBUMIN HUMAN 250 G/1000ML
50 SOLUTION INTRAVENOUS ONCE
Status: COMPLETED | OUTPATIENT
Start: 2019-08-17 | End: 2019-08-17

## 2019-08-17 RX ORDER — CALCITRIOL 0.25 UG/1
0.25 CAPSULE ORAL DAILY
Status: DISCONTINUED | OUTPATIENT
Start: 2019-08-18 | End: 2019-08-30 | Stop reason: HOSPADM

## 2019-08-17 RX ADMIN — MIDODRINE HYDROCHLORIDE 5 MG: 2.5 TABLET ORAL at 06:08

## 2019-08-17 RX ADMIN — MIDODRINE HYDROCHLORIDE 5 MG: 2.5 TABLET ORAL at 04:08

## 2019-08-17 RX ADMIN — RIFAXIMIN 550 MG: 550 TABLET ORAL at 09:08

## 2019-08-17 RX ADMIN — ASPIRIN 81 MG: 81 TABLET, COATED ORAL at 09:08

## 2019-08-17 RX ADMIN — CALCIUM ACETATE 667 MG: 667 CAPSULE ORAL at 09:08

## 2019-08-17 RX ADMIN — ALLOPURINOL 100 MG: 100 TABLET ORAL at 09:08

## 2019-08-17 RX ADMIN — RIFAXIMIN 550 MG: 550 TABLET ORAL at 08:08

## 2019-08-17 RX ADMIN — MIDODRINE HYDROCHLORIDE 5 MG: 2.5 TABLET ORAL at 12:08

## 2019-08-17 RX ADMIN — CALCIUM ACETATE 667 MG: 667 CAPSULE ORAL at 04:08

## 2019-08-17 RX ADMIN — ALBUMIN (HUMAN) 50 G: 12.5 SOLUTION INTRAVENOUS at 01:08

## 2019-08-17 RX ADMIN — CALCIUM ACETATE 667 MG: 667 CAPSULE ORAL at 12:08

## 2019-08-17 RX ADMIN — AMIODARONE HYDROCHLORIDE 200 MG: 200 TABLET ORAL at 09:08

## 2019-08-17 RX ADMIN — CEFTRIAXONE SODIUM 1 G: 1 INJECTION, POWDER, FOR SOLUTION INTRAMUSCULAR; INTRAVENOUS at 08:08

## 2019-08-17 NOTE — PT/OT/SLP PROGRESS
Physical Therapy Treatment    Patient Name:  Tre Cali   MRN:  [id number]    Recommendations:     Discharge Recommendations:  nursing facility, skilled   Discharge Equipment Recommendations: walker, rolling   Barriers to discharge: None    Assessment:     Tre Cali is a 54 y.o. male admitted with a medical diagnosis of Volume overload.  He presents with the following impairments/functional limitations:  weakness, impaired endurance, gait instability .    Rehab Prognosis: Poor; patient would benefit from acute skilled PT services to address these deficits and reach maximum level of function.    Recent Surgery: * No surgery found *      Plan:     During this hospitalization, patient to be seen 5 x/week to address the identified rehab impairments via gait training, therapeutic activities, therapeutic exercises and progress toward the following goals:    · Plan of Care Expires:  09/14/19    Subjective     Chief Complaint:   Patient/Family Comments/goals:   Pain/Comfort:  · Pain Rating 1: 8/10  · Location - Side 1: Left  · Location - Orientation 1: posterior  · Location 1: knee      Objective:     Communicated with RN prior to session.  Patient found up in chair with   upon PT entry to room.     General Precautions: Standard, fall   Orthopedic Precautions:    Braces:       Functional Mobility:  · Balance: max assist  Pt performed sit to stand max assist. Pt was unable to come to full stand and lockout left knee due to painful bakers cyst to left knee.      AM-PAC 6 CLICK MOBILITY  Moving to and from a bed to a chair (including a wheelchair)?: 2  Need to walk in hospital room?: 1  Climbing 3-5 steps with a railing?: 1       Therapeutic Activities and Exercises:   Pt performed ex in sit 10 x with assist. Pt has difficulty moving LLE due to pain.    Patient left up in chair with call button in reach..    GOALS:   Multidisciplinary Problems     Physical Therapy Goals        Problem: Physical Therapy Goal    Goal  Priority Disciplines Outcome Goal Variances Interventions   Physical Therapy Goal     PT, PT/OT Ongoing (interventions implemented as appropriate)     Description:  Goals to be met by: discharge     Patient will increase functional independence with mobility by performin. Supine to sit with Stand-by Assistance  2. Sit to stand transfer with Stand-by Assistance  3. Bed to chair transfer with Stand-by Assistance using Rolling Walker  4. Gait  x 150 feet with Stand-by Assistance using Rolling Walker.                       Time Tracking:     PT Received On: 19  PT Start Time: 822     PT Stop Time: 845  PT Total Time (min): 23 min     Billable Minutes: Therapeutic Activity 10 min and Therapeutic Exercise 13             PTA Visit Number: 1     Myron Espinoza, DANIS  2019

## 2019-08-17 NOTE — HPI
Mr. Cali is a 54-year-old male with past medical history of CAD status post stent several years ago, COPD, liver cirrhosis, ESRD on hemodialysis presents with generalized weakness.  The patient presented emergency room earlier today in the morning for generalized weakness and anasarca, patient was discharged to dialysis center for hemodialysis.  Patient came back from hemodialysis for unclear reasons.  Patient reports history of generalized weakness, reports knees gave out and fell.  History of shortness of breath since last few days.  History of worsening abdominal swelling and abdominal pain since 2-3 weeks.  History of diarrhea since 3 weeks, about 4 episodes every day, nonbloody.  Patient reports sleepiness and intermittent confusion since 1-2 days.  Patient moved from [place] to [place] a week ago and yet to set up doctors.  Patient reports last hemodialysis was last Saturday.  Patient denies any history of fevers, chills or nausea, vomiting.  Patient reports history of stents several years ago.  Reports history of stroke several years ago with resultant right upper extremity and lower extremity weakness.     Patient quit drinking about 20 years ago.  Patient has longstanding history of smoking, reports no smoking intermittently few cigarettes per day.  Denies history of drug abuse.  Past surgical history - right arm AV fistula, AICD placement, cardiac stents

## 2019-08-17 NOTE — NURSING
Dr Weaver notified of pt 17 beat run of Vtach on telemetry. Pt with no distress or complaints currently. MD states she will order albumin for pt.

## 2019-08-17 NOTE — PROGRESS NOTES
[facility] Medicine  Progress Note    Patient Name: Tre Cali  MRN: [id number]  Patient Class: IP- Inpatient   Admission Date: 8/13/2019  Length of Stay: 2 days  Attending Physician: Yanely Garcia MD  Primary Care Provider: Primary Doctor No        Subjective:     Principal Problem:Volume overload        HPI:  Mr. Cali is a 54-year-old male with past medical history of CAD status post stent several years ago, COPD, liver cirrhosis, ESRD on hemodialysis presents with generalized weakness.  The patient presented emergency room earlier today in the morning for generalized weakness and anasarca, patient was discharged to dialysis center for hemodialysis.  Patient came back from hemodialysis for unclear reasons.  Patient reports history of generalized weakness, reports knees gave out and fell.  History of shortness of breath since last few days.  History of worsening abdominal swelling and abdominal pain since 2-3 weeks.  History of diarrhea since 3 weeks, about 4 episodes every day, nonbloody.  Patient reports sleepiness and intermittent confusion since 1-2 days.  Patient moved from [place] to [place] a week ago and yet to set up doctors.  Patient reports last hemodialysis was last Saturday.  Patient denies any history of fevers, chills or nausea, vomiting.  Patient reports history of stents several years ago.  Reports history of stroke several years ago with resultant right upper extremity and lower extremity weakness.     Patient quit drinking about 20 years ago.  Patient has longstanding history of smoking, reports no smoking intermittently few cigarettes per day.  Denies history of drug abuse.  Past surgical history - right arm AV fistula, AICD placement, cardiac stents    Overview/Hospital Course:  No notes on file    Interval History: Pt is examined at bedside and is stable.Denies any new complaints.    Review of Systems   Constitutional: Negative.    HENT: Negative.     Respiratory: Negative.    Cardiovascular: Negative.    Gastrointestinal: Negative.    Musculoskeletal: Negative.    Neurological: Negative.      Objective:     Vital Signs (Most Recent):  Temp: 97.8 °F (36.6 °C) (08/17/19 1230)  Pulse: 76 (08/17/19 1230)  Resp: 18 (08/17/19 1230)  BP: 99/65 (08/17/19 1230)  SpO2: 96 % (08/17/19 1230) Vital Signs (24h Range):  Temp:  [97.8 °F (36.6 °C)-98 °F (36.7 °C)] 97.8 °F (36.6 °C)  Pulse:  [70-80] 76  Resp:  [18] 18  SpO2:  [96 %-97 %] 96 %  BP: (88-99)/(57-67) 99/65     Weight: 91 kg (200 lb 9.9 oz)  Body mass index is 32.38 kg/m².    Intake/Output Summary (Last 24 hours) at 8/17/2019 1747  Last data filed at 8/17/2019 0500  Gross per 24 hour   Intake 250 ml   Output --   Net 250 ml      Physical Exam   Constitutional: He is oriented to person, place, and time. He appears well-developed and well-nourished.   HENT:   Head: Normocephalic and atraumatic.   Eyes: Pupils are equal, round, and reactive to light. EOM are normal.   Neck: Neck supple.   Cardiovascular: Normal rate, regular rhythm and normal heart sounds.   Pulmonary/Chest: Effort normal and breath sounds normal.   Abdominal: Soft. Bowel sounds are normal.   Musculoskeletal: He exhibits edema.   Neurological: He is alert and oriented to person, place, and time.   Skin: Skin is warm and dry.   Has eczematous changes on legs        Significant Labs:   BMP:   Recent Labs   Lab 08/17/19  0512   *      K 4.2   CL 98   CO2 24   BUN 41*   CREATININE 7.2*   CALCIUM 8.6*   MG 2.4     CBC:   Recent Labs   Lab 08/16/19  0425 08/17/19  0512   WBC 5.38 4.87   HGB 11.3* 11.2*   HCT 35.5* 35.5*   * 92*       Significant Imaging: I have reviewed all pertinent imaging results/findings within the past 24 hours.      Assessment/Plan:      End stage renal disease       End-stage renal disease on hemodialysis-patient due for dialysis today. Orders per nephrology.     Ascites secondary to alcoholic cirrhotic liver  disease, chronic-patient for paracentesis today. 5 L taken off. Will receive aliment post paracentesis. Monitor closely during hemodialysis for end drops in blood pressure     Acute volume overload- slowly resolving.     AICD in situ- no discharges of AICD while in hospital. No arrhythmia.     Acute metabolic encephalopathy- encephalopathy resolved.     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 Difficile colitis-no evidence of C. Difficile colitis.     Diarrhea of infectious origin, ruled out     Severe chronic deconditioning- awaiting skilled nursing facility placement    CAD S/P percutaneous coronary angioplasty        ESRD (end stage renal disease)        Ascites due to alcoholic cirrhosis        Alcoholic cirrhosis of liver with ascites          VTE Risk Mitigation (From admission, onward)        Ordered     IP VTE HIGH RISK PATIENT  Once      08/13/19 1909                Yanely Garcia MD  Department of Hospital Medicine   [facility]

## 2019-08-17 NOTE — PLAN OF CARE
08/16/19 2030   Patient Assessment/Suction   Level of Consciousness (AVPU) alert   Respiratory Effort Unlabored   Expansion/Accessory Muscles/Retractions no use of accessory muscles   All Lung Fields Breath Sounds clear   Rhythm/Pattern, Respiratory unlabored   Cough Frequency infrequent   Cough Type dry   Aerosol Therapy   $ Aerosol Therapy Charges PRN treatment not required

## 2019-08-17 NOTE — ASSESSMENT & PLAN NOTE
End-stage renal disease on hemodialysis-patient due for dialysis today. Orders per nephrology.     Ascites secondary to alcoholic cirrhotic liver disease, chronic-patient for paracentesis today. 5 L taken off. Will receive aliment post paracentesis. Monitor closely during hemodialysis for end drops in blood pressure     Acute volume overload- slowly resolving.     AICD in situ- no discharges of AICD while in hospital. No arrhythmia.     Acute metabolic encephalopathy- encephalopathy resolved.     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 Difficile colitis-no evidence of C. Difficile colitis.     Diarrhea of infectious origin, ruled out     Severe chronic deconditioning- awaiting skilled nursing facility placement

## 2019-08-17 NOTE — PROGRESS NOTES
Overnight had NSVT but BP remained stable  No angina  Tolerated HD             AA  NAD  No JVD upright  RRR, no rub. 4+ LE edema  rales B no overt crackles  abd s/nt but distended with fluid wave            Vital Signs    Report   View Graph     07 0659  0700   1618  Most Recent     Temp (°F) 97.6-98.6 97.8-97.9  97.8 (36.6)   1230   Pulse 67-89 76-80  76   1230   Resp 16-18 18  18   1230   BP 88/57 -114/77 96/67-99/65  99/65   1230   SpO2 (%) 95-99 96-97  96   1230   Intake/Output    Report   View Table   [id number]  7,500  50  500  200  (2.2)  -6,750  --  750  (8.2)  7,500  --  1 x  1 x  1 x  I.V.  Other  IV Piggyback  In  Out  Other  Net  Urine Occurrence  Stool Occurrence  Report   Scheduled     Medication Dose/Rate, Route, Frequency Last Action   albumin human 25% bottle 50 g 0 g, IV, Once Hold: 08/15 1000   allopurinol tablet 100 mg 100 mg, Oral, Daily Given: 917   amiodarone tablet 200 mg 200 mg, Oral, Daily Given: 917   aspirin EC tablet 81 mg 81 mg, Oral, Daily Given: 917   calcium acetate capsule 667 mg 667 mg, Oral, TID WM Given: 1229   cefTRIAXone (ROCEPHIN) 1 g in dextrose 5 % 50 mL IVPB 1 g, IV, Q24H [place]2015   midodrine tablet 5 mg 5 mg, Oral, TID Given: 1229   rifAXIMin tablet 550 mg 550 mg, Oral, BID Given: 917      PRN     Medication Dose/Rate, Route, Frequency Last Action   albuterol nebulizer solution 2.5 mg 2.5 mg, NEBULIZATION, Q4H PRN Ordered   dextrose 50% injection 12.5 g 12.5 g, IV, PRN Ordered   dextrose 50% injection 25 g 25 g, IV, PRN Ordered   glucagon (human recombinant) injection 1 mg 1 mg, IM, PRN Ordered   glucose chewable tablet 16 g 16 g, Oral, PRN Ordered   glucose chewable tablet 24 g 24 g, Oral, PRN Ordered   sodium chloride 0.9% flush 10 mL 10 mL, IV, PRN Order        Selected Labs    Report   (Up to last 2 results from the past 72 hours)   Switch View     0424   0425 08/17 0512   Sodium 137     --     140       Potassium 4.3     --     4.2       Chloride 94Low      --     98       CO2 26     --     24       BUN, Bld 55High      --     41High        Creatinine 8.8High      --     7.2High        Glucose 113High      --     126High        Magnesium 2.3     --     2.4       WBC --     5.38     4.87       Hemoglobin --     11.3Low      11.2Low        Hematocrit --     35.5Low      35.5Low        Platelets --     104Low      92Low        Coumadin Monitoring INR 1.7     --     1.7        1.7     --     --       Phosphorus 10.7High Panic                    A/P:    1. ESRD (HD-MWF via R UE AV Fistula)  Stable on MWF HD  Will add albumin again to next week's HD for increased UF       2. HTN-   None at this time  UF with albumin as noted above     3. ANEMIA- clinically stable at present; H/H AT GOAL (10.8/34.4); no active issues    -trend daily CBC (H/H) to effect optimal blood-loss surveillance     4. SECONDARY HYPERPARATHYROIDISM (sHPT)-   Calcium lowish, albumin OK  On D PO     5. ESLD (EtOH-induced Cirrhosis) w/ Ascites   S/p paracentesis     6. C.difficile COLITIS- symptomatically improved on IV Clindamycin; no fever/leukocytosis; no active issues    -management per Primary team     7. NSVT  Check ECHO  Mg++ OK; f/u iCa++

## 2019-08-17 NOTE — PROGRESS NOTES
I was notified by RN about two things:  17 beat run of Vtach as well as patient with hypotension.  Patient was asymptomatic during Vtach.  Labs reviewed and K and Mg 8/16 are appropriate.  Continuing tele monitoring.  I discussed with RN and it does not appear patient got any albumin today after paracentesis.  I have reviewed MAR and again it does not appear albumin was given. Will order now. Blood pressure monitoring.

## 2019-08-17 NOTE — SUBJECTIVE & OBJECTIVE
Interval History: Pt is examined at bedside and is stable.Denies any new complaints.    Review of Systems   Constitutional: Negative.    HENT: Negative.    Respiratory: Negative.    Cardiovascular: Negative.    Gastrointestinal: Negative.    Musculoskeletal: Negative.    Neurological: Negative.      Objective:     Vital Signs (Most Recent):  Temp: 97.8 °F (36.6 °C) (08/17/19 1230)  Pulse: 76 (08/17/19 1230)  Resp: 18 (08/17/19 1230)  BP: 99/65 (08/17/19 1230)  SpO2: 96 % (08/17/19 1230) Vital Signs (24h Range):  Temp:  [97.8 °F (36.6 °C)-98 °F (36.7 °C)] 97.8 °F (36.6 °C)  Pulse:  [70-80] 76  Resp:  [18] 18  SpO2:  [96 %-97 %] 96 %  BP: (88-99)/(57-67) 99/65     Weight: 91 kg (200 lb 9.9 oz)  Body mass index is 32.38 kg/m².    Intake/Output Summary (Last 24 hours) at 8/17/2019 1747  Last data filed at 8/17/2019 0500  Gross per 24 hour   Intake 250 ml   Output --   Net 250 ml      Physical Exam   Constitutional: He is oriented to person, place, and time. He appears well-developed and well-nourished.   HENT:   Head: Normocephalic and atraumatic.   Eyes: Pupils are equal, round, and reactive to light. EOM are normal.   Neck: Neck supple.   Cardiovascular: Normal rate, regular rhythm and normal heart sounds.   Pulmonary/Chest: Effort normal and breath sounds normal.   Abdominal: Soft. Bowel sounds are normal.   Musculoskeletal: He exhibits edema.   Neurological: He is alert and oriented to person, place, and time.   Skin: Skin is warm and dry.   Has eczematous changes on legs        Significant Labs:   BMP:   Recent Labs   Lab 08/17/19  0512   *      K 4.2   CL 98   CO2 24   BUN 41*   CREATININE 7.2*   CALCIUM 8.6*   MG 2.4     CBC:   Recent Labs   Lab 08/16/19  0425 08/17/19  0512   WBC 5.38 4.87   HGB 11.3* 11.2*   HCT 35.5* 35.5*   * 92*       Significant Imaging: I have reviewed all pertinent imaging results/findings within the past 24 hours.

## 2019-08-18 LAB
ALBUMIN SERPL BCP-MCNC: 3.1 G/DL (ref 3.5–5.2)
ALP SERPL-CCNC: 95 U/L (ref 55–135)
ALT SERPL W/O P-5'-P-CCNC: 9 U/L (ref 10–44)
ANION GAP SERPL CALC-SCNC: 17 MMOL/L (ref 8–16)
AST SERPL-CCNC: 13 U/L (ref 10–40)
BASOPHILS # BLD AUTO: 0.1 K/UL (ref 0–0.2)
BASOPHILS NFR BLD: 1.7 % (ref 0–1.9)
BILIRUB SERPL-MCNC: 1.5 MG/DL (ref 0.1–1)
BUN SERPL-MCNC: 52 MG/DL (ref 6–20)
CA-I BLDV-SCNC: 0.98 MMOL/L (ref 1.06–1.42)
CALCIUM SERPL-MCNC: 8.4 MG/DL (ref 8.7–10.5)
CHLORIDE SERPL-SCNC: 98 MMOL/L (ref 95–110)
CO2 SERPL-SCNC: 23 MMOL/L (ref 23–29)
CREAT SERPL-MCNC: 8.4 MG/DL (ref 0.5–1.4)
DIFFERENTIAL METHOD: ABNORMAL
EOSINOPHIL # BLD AUTO: 0.2 K/UL (ref 0–0.5)
EOSINOPHIL NFR BLD: 2.8 % (ref 0–8)
ERYTHROCYTE [DISTWIDTH] IN BLOOD BY AUTOMATED COUNT: 16.3 % (ref 11.5–14.5)
EST. GFR  (AFRICAN AMERICAN): 7.4 ML/MIN/1.73 M^2
EST. GFR  (NON AFRICAN AMERICAN): 6.4 ML/MIN/1.73 M^2
GLUCOSE SERPL-MCNC: 107 MG/DL (ref 70–110)
GLUCOSE SERPL-MCNC: 117 MG/DL (ref 70–110)
GLUCOSE SERPL-MCNC: 156 MG/DL (ref 70–110)
GLUCOSE SERPL-MCNC: 95 MG/DL (ref 70–110)
GLUCOSE SERPL-MCNC: 96 MG/DL (ref 70–110)
HCT VFR BLD AUTO: 37.2 % (ref 40–54)
HGB BLD-MCNC: 11.8 G/DL (ref 14–18)
IMM GRANULOCYTES # BLD AUTO: 0.03 K/UL (ref 0–0.04)
IMM GRANULOCYTES NFR BLD AUTO: 0.5 % (ref 0–0.5)
INR PPP: 1.7
LYMPHOCYTES # BLD AUTO: 0.6 K/UL (ref 1–4.8)
LYMPHOCYTES NFR BLD: 10.1 % (ref 18–48)
Lab: NEGATIVE
MAGNESIUM SERPL-MCNC: 2.4 MG/DL (ref 1.6–2.6)
MCH RBC QN AUTO: 30.3 PG (ref 27–31)
MCHC RBC AUTO-ENTMCNC: 31.7 G/DL (ref 32–36)
MCV RBC AUTO: 95 FL (ref 82–98)
MONOCYTES # BLD AUTO: 0.8 K/UL (ref 0.3–1)
MONOCYTES NFR BLD: 13.4 % (ref 4–15)
NEUTROPHILS # BLD AUTO: 4.1 K/UL (ref 1.8–7.7)
NEUTROPHILS NFR BLD: 71.5 % (ref 38–73)
NRBC BLD-RTO: 0 /100 WBC
PHOSPHATE SERPL-MCNC: 9.8 MG/DL (ref 2.7–4.5)
PLATELET # BLD AUTO: 103 K/UL (ref 150–350)
PMV BLD AUTO: 12.2 FL (ref 9.2–12.9)
POTASSIUM SERPL-SCNC: 4.7 MMOL/L (ref 3.5–5.1)
PROT SERPL-MCNC: 6.8 G/DL (ref 6–8.4)
PROTHROMBIN TIME: 19.2 SEC (ref 11.7–14)
RBC # BLD AUTO: 3.9 M/UL (ref 4.6–6.2)
SODIUM SERPL-SCNC: 138 MMOL/L (ref 136–145)
TB INDURATION 48 - 72 HR READ: 0 MM
WBC # BLD AUTO: 5.73 K/UL (ref 3.9–12.7)

## 2019-08-18 PROCEDURE — 83735 ASSAY OF MAGNESIUM: CPT

## 2019-08-18 PROCEDURE — 82330 ASSAY OF CALCIUM: CPT

## 2019-08-18 PROCEDURE — 36415 COLL VENOUS BLD VENIPUNCTURE: CPT

## 2019-08-18 PROCEDURE — 25000003 PHARM REV CODE 250: Performed by: INTERNAL MEDICINE

## 2019-08-18 PROCEDURE — 84100 ASSAY OF PHOSPHORUS: CPT

## 2019-08-18 PROCEDURE — 99900035 HC TECH TIME PER 15 MIN (STAT)

## 2019-08-18 PROCEDURE — 94761 N-INVAS EAR/PLS OXIMETRY MLT: CPT

## 2019-08-18 PROCEDURE — 85610 PROTHROMBIN TIME: CPT

## 2019-08-18 PROCEDURE — 12000002 HC ACUTE/MED SURGE SEMI-PRIVATE ROOM

## 2019-08-18 PROCEDURE — 94640 AIRWAY INHALATION TREATMENT: CPT

## 2019-08-18 PROCEDURE — 80053 COMPREHEN METABOLIC PANEL: CPT

## 2019-08-18 PROCEDURE — 85025 COMPLETE CBC W/AUTO DIFF WBC: CPT

## 2019-08-18 RX ADMIN — MIDODRINE HYDROCHLORIDE 5 MG: 2.5 TABLET ORAL at 05:08

## 2019-08-18 RX ADMIN — ALLOPURINOL 100 MG: 100 TABLET ORAL at 09:08

## 2019-08-18 RX ADMIN — CALCIUM ACETATE 667 MG: 667 CAPSULE ORAL at 04:08

## 2019-08-18 RX ADMIN — RIFAXIMIN 550 MG: 550 TABLET ORAL at 09:08

## 2019-08-18 RX ADMIN — ASPIRIN 81 MG: 81 TABLET, COATED ORAL at 09:08

## 2019-08-18 RX ADMIN — MIDODRINE HYDROCHLORIDE 5 MG: 2.5 TABLET ORAL at 12:08

## 2019-08-18 RX ADMIN — CALCIUM ACETATE 667 MG: 667 CAPSULE ORAL at 09:08

## 2019-08-18 RX ADMIN — RIFAXIMIN 550 MG: 550 TABLET ORAL at 08:08

## 2019-08-18 RX ADMIN — CALCITRIOL CAPSULES 0.25 MCG 0.25 MCG: 0.25 CAPSULE ORAL at 09:08

## 2019-08-18 RX ADMIN — AMIODARONE HYDROCHLORIDE 200 MG: 200 TABLET ORAL at 09:08

## 2019-08-18 RX ADMIN — MIDODRINE HYDROCHLORIDE 5 MG: 2.5 TABLET ORAL at 04:08

## 2019-08-18 RX ADMIN — CALCIUM ACETATE 667 MG: 667 CAPSULE ORAL at 12:08

## 2019-08-18 NOTE — PLAN OF CARE
Problem: Adult Inpatient Plan of Care  Goal: Plan of Care Review  Outcome: Ongoing (interventions implemented as appropriate)  Pt free from injury and in no acute distress. Denies pain. Wheels locked, bed in lowest position, call light in reach, side rails up x2. Will continue to monitor.

## 2019-08-18 NOTE — PLAN OF CARE
Problem: Fall Injury Risk  Goal: Absence of Fall and Fall-Related Injury  Pt remains free of injury

## 2019-08-18 NOTE — NURSING
Pt had 6 beat run of vtach. Pt in bed sleeping. MD Owen notified. No new orders at this time. Will continue to monitor.

## 2019-08-18 NOTE — SUBJECTIVE & OBJECTIVE
"Interval History: Feeling "better"    Review of Systems   Constitutional: Negative.    HENT: Negative.    Eyes: Negative.    Respiratory: Negative.    Cardiovascular: Negative.    Gastrointestinal: Negative.    Endocrine: Negative.    Genitourinary: Negative.    Musculoskeletal: Negative.    Skin: Negative.    Allergic/Immunologic: Negative.    Neurological: Negative.    Hematological: Negative.    All other systems reviewed and are negative.    Objective:     Vital Signs (Most Recent):  Temp: 97.5 °F (36.4 °C) (08/18/19 0743)  Pulse: 78 (08/18/19 0842)  Resp: 18 (08/18/19 0842)  BP: 105/73 (08/18/19 0743)  SpO2: 98 % (08/18/19 0842) Vital Signs (24h Range):  Temp:  [97.4 °F (36.3 °C)-98.1 °F (36.7 °C)] 97.5 °F (36.4 °C)  Pulse:  [73-78] 78  Resp:  [18-20] 18  SpO2:  [95 %-99 %] 98 %  BP: ()/(58-73) 105/73     Weight: 91 kg (200 lb 9.9 oz)  Body mass index is 32.38 kg/m².    Intake/Output Summary (Last 24 hours) at 8/18/2019 0927  Last data filed at 8/18/2019 0600  Gross per 24 hour   Intake 170 ml   Output 500 ml   Net -330 ml      Physical Exam   Constitutional: He is oriented to person, place, and time. He appears well-developed and well-nourished.   HENT:   Head: Normocephalic and atraumatic.   Eyes: Pupils are equal, round, and reactive to light. Conjunctivae and EOM are normal.   Neck: Normal range of motion. Neck supple.   Cardiovascular: Normal rate, regular rhythm, normal heart sounds and intact distal pulses.   Pulmonary/Chest: Effort normal and breath sounds normal.   Abdominal: Soft. Bowel sounds are normal. He exhibits distension.   Musculoskeletal: Normal range of motion.   Neurological: He is alert and oriented to person, place, and time.   Skin: Skin is warm and dry. Capillary refill takes less than 2 seconds.   Psychiatric: He has a normal mood and affect. His behavior is normal. Judgment and thought content normal.   Nursing note and vitals reviewed.      Significant Labs:   BMP:   Recent " Labs   Lab 08/18/19  0550         K 4.7   CL 98   CO2 23   BUN 52*   CREATININE 8.4*   CALCIUM 8.4*   MG 2.4     CBC:   Recent Labs   Lab 08/17/19  0512 08/18/19  0550   WBC 4.87 5.73   HGB 11.2* 11.8*   HCT 35.5* 37.2*   PLT 92* 103*       Significant Imaging: I have reviewed all pertinent imaging results/findings within the past 24 hours.

## 2019-08-18 NOTE — HOSPITAL COURSE
"Therapeutic paracentesis performed 8/16  Had HD today. To overnight and follow-up labs in AM. Hopefully discharge in AM. Patient unable to perform ADLs secondary to severe deconditioning. SNF will not take because of need for intermittent paracentesis and HD. Therefore LTAC or inpatient rehabilitation is being considered, and discharge held.  8/21 Awaiting placement on med/surg. Receiving dialysis MWF. Seen today at dialysis unit  8/22: has been tentatively accepted at in-patient rehab  8/23 Awaiting authorization from [facility].  8/24 Awaiting authorization from [facility]  8/25 Awaiting authorization from [facility]  8/26 Denied inpatient rehab. Will try for SNF placement as his ascites and need for intermittent paraceentesis has improved greatly; not needed since his last on 8/16 8/27 Patient vomiting today and reports vomiting some yesterday.  Reports abdominal fullness but no more than yesterday than the day prior.  Reports chronic abdominal pain with no change in this chronic pain. Having BMs with laxatives.   8/28 Abdomen feels "tight". Still nauseous, but no vomiting today. Is for HD today. No CP/SOB  8/29 Waiting placement. Did not have paracentesis yesterday. Abdomen feels "tight". NO CP/SOB  8/30 Feels well today. Not able to place the patient will send home with home health, PT, OT Skilled Nurse follow-up   "

## 2019-08-18 NOTE — PROGRESS NOTES
"[facility]  Progress Note    Patient Name: Tre Cali  MRN: [id number]  Patient Class: IP- Inpatient   Admission Date: 8/13/2019  Length of Stay: 3 days  Attending Physician: Luis Antonio Wyatt MD  Primary Care Provider: Primary Doctor No        Subjective:     Principal Problem:Volume overload        HPI:  Mr. Cali is a 54-year-old male with past medical history of CAD status post stent several years ago, COPD, liver cirrhosis, ESRD on hemodialysis presents with generalized weakness.  The patient presented emergency room earlier today in the morning for generalized weakness and anasarca, patient was discharged to dialysis center for hemodialysis.  Patient came back from hemodialysis for unclear reasons.  Patient reports history of generalized weakness, reports knees gave out and fell.  History of shortness of breath since last few days.  History of worsening abdominal swelling and abdominal pain since 2-3 weeks.  History of diarrhea since 3 weeks, about 4 episodes every day, nonbloody.  Patient reports sleepiness and intermittent confusion since 1-2 days.  Patient moved from [place] to [place] a week ago and yet to set up doctors.  Patient reports last hemodialysis was last Saturday.  Patient denies any history of fevers, chills or nausea, vomiting.  Patient reports history of stents several years ago.  Reports history of stroke several years ago with resultant right upper extremity and lower extremity weakness.     Patient quit drinking about 20 years ago.  Patient has longstanding history of smoking, reports no smoking intermittently few cigarettes per day.  Denies history of drug abuse.  Past surgical history - right arm AV fistula, AICD placement, cardiac stents    Overview/Hospital Course:  Therapeutic paracentesis performed 8/16    Interval History: Feeling "better"    Review of Systems   Constitutional: Negative.    HENT: Negative.    Eyes: Negative.    Respiratory: " Negative.    Cardiovascular: Negative.    Gastrointestinal: Negative.    Endocrine: Negative.    Genitourinary: Negative.    Musculoskeletal: Negative.    Skin: Negative.    Allergic/Immunologic: Negative.    Neurological: Negative.    Hematological: Negative.    All other systems reviewed and are negative.    Objective:     Vital Signs (Most Recent):  Temp: 97.5 °F (36.4 °C) (08/18/19 0743)  Pulse: 78 (08/18/19 0842)  Resp: 18 (08/18/19 0842)  BP: 105/73 (08/18/19 0743)  SpO2: 98 % (08/18/19 0842) Vital Signs (24h Range):  Temp:  [97.4 °F (36.3 °C)-98.1 °F (36.7 °C)] 97.5 °F (36.4 °C)  Pulse:  [73-78] 78  Resp:  [18-20] 18  SpO2:  [95 %-99 %] 98 %  BP: ()/(58-73) 105/73     Weight: 91 kg (200 lb 9.9 oz)  Body mass index is 32.38 kg/m².    Intake/Output Summary (Last 24 hours) at 8/18/2019 0927  Last data filed at 8/18/2019 0600  Gross per 24 hour   Intake 170 ml   Output 500 ml   Net -330 ml      Physical Exam   Constitutional: He is oriented to person, place, and time. He appears well-developed and well-nourished.   HENT:   Head: Normocephalic and atraumatic.   Eyes: Pupils are equal, round, and reactive to light. Conjunctivae and EOM are normal.   Neck: Normal range of motion. Neck supple.   Cardiovascular: Normal rate, regular rhythm, normal heart sounds and intact distal pulses.   Pulmonary/Chest: Effort normal and breath sounds normal.   Abdominal: Soft. Bowel sounds are normal. He exhibits distension.   Musculoskeletal: Normal range of motion.   Neurological: He is alert and oriented to person, place, and time.   Skin: Skin is warm and dry. Capillary refill takes less than 2 seconds.   Psychiatric: He has a normal mood and affect. His behavior is normal. Judgment and thought content normal.   Nursing note and vitals reviewed.      Significant Labs:   BMP:   Recent Labs   Lab 08/18/19  0550         K 4.7   CL 98   CO2 23   BUN 52*   CREATININE 8.4*   CALCIUM 8.4*   MG 2.4     CBC:   Recent  Labs   Lab 08/17/19  0512 08/18/19  0550   WBC 4.87 5.73   HGB 11.2* 11.8*   HCT 35.5* 37.2*   PLT 92* 103*       Significant Imaging: I have reviewed all pertinent imaging results/findings within the past 24 hours.      Assessment/Plan:      End stage renal disease       End-stage renal disease on hemodialysis-patient due for dialysis today. Orders per nephrology.     Ascites secondary to alcoholic cirrhotic liver disease, chronic-patient for paracentesis today. 5 L taken off. Will receive aliment post paracentesis. Monitor closely during hemodialysis for end drops in blood pressure     Acute volume overload- slowly resolving.     AICD in situ- no discharges of AICD while in hospital. No arrhythmia.     Acute metabolic encephalopathy- encephalopathy resolved.     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 Difficile colitis-no evidence of C. Difficile colitis.     Diarrhea of infectious origin, ruled out     Severe chronic deconditioning- awaiting skilled nursing facility placement    CAD S/P percutaneous coronary angioplasty        ESRD (end stage renal disease)  HD on MWF      Ascites due to alcoholic cirrhosis        Alcoholic cirrhosis of liver with ascites  S/p paracenetesis      VTE Risk Mitigation (From admission, onward)        Ordered     IP VTE HIGH RISK PATIENT  Once      08/13/19 1909                Luis Antonio Wyatt MD  Department of Hospital Medicine   [facility]

## 2019-08-18 NOTE — PLAN OF CARE
08/17/19 2100   Patient Assessment/Suction   Level of Consciousness (AVPU) alert   Respiratory Effort Unlabored   Expansion/Accessory Muscles/Retractions no use of accessory muscles   All Lung Fields Breath Sounds clear   Rhythm/Pattern, Respiratory unlabored   Cough Frequency infrequent   Cough Type dry   Positioning   Body Position positioned/repositioned independently   Aerosol Therapy   $ Aerosol Therapy Charges PRN treatment not required   Respiratory Treatment Status (SVN) PRN treatment not required

## 2019-08-19 LAB
ALBUMIN SERPL BCP-MCNC: 3.1 G/DL (ref 3.5–5.2)
ALBUMIN SERPL BCP-MCNC: 3.2 G/DL (ref 3.5–5.2)
ALP SERPL-CCNC: 105 U/L (ref 55–135)
ALT SERPL W/O P-5'-P-CCNC: 10 U/L (ref 10–44)
ANION GAP SERPL CALC-SCNC: 16 MMOL/L (ref 8–16)
ANION GAP SERPL CALC-SCNC: 19 MMOL/L (ref 8–16)
AST SERPL-CCNC: 13 U/L (ref 10–40)
BACTERIA BLD CULT: NORMAL
BACTERIA BLD CULT: NORMAL
BASOPHILS # BLD AUTO: 0.15 K/UL (ref 0–0.2)
BASOPHILS NFR BLD: 2.4 % (ref 0–1.9)
BILIRUB SERPL-MCNC: 1.4 MG/DL (ref 0.1–1)
BUN SERPL-MCNC: 39 MG/DL (ref 6–20)
BUN SERPL-MCNC: 59 MG/DL (ref 6–20)
CALCIUM SERPL-MCNC: 8.3 MG/DL (ref 8.7–10.5)
CALCIUM SERPL-MCNC: 8.6 MG/DL (ref 8.7–10.5)
CHLORIDE SERPL-SCNC: 93 MMOL/L (ref 95–110)
CHLORIDE SERPL-SCNC: 95 MMOL/L (ref 95–110)
CO2 SERPL-SCNC: 20 MMOL/L (ref 23–29)
CO2 SERPL-SCNC: 26 MMOL/L (ref 23–29)
CREAT SERPL-MCNC: 7.3 MG/DL (ref 0.5–1.4)
CREAT SERPL-MCNC: 9.1 MG/DL (ref 0.5–1.4)
DIFFERENTIAL METHOD: ABNORMAL
EOSINOPHIL # BLD AUTO: 0.2 K/UL (ref 0–0.5)
EOSINOPHIL NFR BLD: 2.9 % (ref 0–8)
ERYTHROCYTE [DISTWIDTH] IN BLOOD BY AUTOMATED COUNT: 16.1 % (ref 11.5–14.5)
EST. GFR  (AFRICAN AMERICAN): 6.7 ML/MIN/1.73 M^2
EST. GFR  (AFRICAN AMERICAN): 8.8 ML/MIN/1.73 M^2
EST. GFR  (NON AFRICAN AMERICAN): 5.8 ML/MIN/1.73 M^2
EST. GFR  (NON AFRICAN AMERICAN): 7.6 ML/MIN/1.73 M^2
GLUCOSE SERPL-MCNC: 110 MG/DL (ref 70–110)
GLUCOSE SERPL-MCNC: 117 MG/DL (ref 70–110)
GLUCOSE SERPL-MCNC: 135 MG/DL (ref 70–110)
GLUCOSE SERPL-MCNC: 149 MG/DL (ref 70–110)
GLUCOSE SERPL-MCNC: 167 MG/DL (ref 70–110)
HCT VFR BLD AUTO: 38.5 % (ref 40–54)
HGB BLD-MCNC: 12.2 G/DL (ref 14–18)
IMM GRANULOCYTES # BLD AUTO: 0.02 K/UL (ref 0–0.04)
IMM GRANULOCYTES NFR BLD AUTO: 0.3 % (ref 0–0.5)
INR PPP: 1.6
LYMPHOCYTES # BLD AUTO: 0.7 K/UL (ref 1–4.8)
LYMPHOCYTES NFR BLD: 11 % (ref 18–48)
MAGNESIUM SERPL-MCNC: 2.6 MG/DL (ref 1.6–2.6)
MCH RBC QN AUTO: 30 PG (ref 27–31)
MCHC RBC AUTO-ENTMCNC: 31.7 G/DL (ref 32–36)
MCV RBC AUTO: 95 FL (ref 82–98)
MONOCYTES # BLD AUTO: 0.8 K/UL (ref 0.3–1)
MONOCYTES NFR BLD: 12.8 % (ref 4–15)
NEUTROPHILS # BLD AUTO: 4.4 K/UL (ref 1.8–7.7)
NEUTROPHILS NFR BLD: 70.6 % (ref 38–73)
NRBC BLD-RTO: 0 /100 WBC
PHOSPHATE SERPL-MCNC: 10.1 MG/DL (ref 2.7–4.5)
PHOSPHATE SERPL-MCNC: 7.7 MG/DL (ref 2.7–4.5)
PLATELET # BLD AUTO: 117 K/UL (ref 150–350)
PMV BLD AUTO: 11.9 FL (ref 9.2–12.9)
POTASSIUM SERPL-SCNC: 4 MMOL/L (ref 3.5–5.1)
POTASSIUM SERPL-SCNC: 4.8 MMOL/L (ref 3.5–5.1)
PROT SERPL-MCNC: 7 G/DL (ref 6–8.4)
PROTHROMBIN TIME: 18.8 SEC (ref 11.7–14)
RBC # BLD AUTO: 4.07 M/UL (ref 4.6–6.2)
SODIUM SERPL-SCNC: 134 MMOL/L (ref 136–145)
SODIUM SERPL-SCNC: 135 MMOL/L (ref 136–145)
WBC # BLD AUTO: 6.25 K/UL (ref 3.9–12.7)
WBC #/AREA STL HPF: NORMAL /[HPF]

## 2019-08-19 PROCEDURE — 99900035 HC TECH TIME PER 15 MIN (STAT)

## 2019-08-19 PROCEDURE — 85025 COMPLETE CBC W/AUTO DIFF WBC: CPT

## 2019-08-19 PROCEDURE — 87209 SMEAR COMPLEX STAIN: CPT

## 2019-08-19 PROCEDURE — 97530 THERAPEUTIC ACTIVITIES: CPT

## 2019-08-19 PROCEDURE — 25000003 PHARM REV CODE 250: Performed by: INTERNAL MEDICINE

## 2019-08-19 PROCEDURE — 36415 COLL VENOUS BLD VENIPUNCTURE: CPT

## 2019-08-19 PROCEDURE — 82962 GLUCOSE BLOOD TEST: CPT

## 2019-08-19 PROCEDURE — 87045 FECES CULTURE AEROBIC BACT: CPT

## 2019-08-19 PROCEDURE — 87046 STOOL CULTR AEROBIC BACT EA: CPT

## 2019-08-19 PROCEDURE — 90935 HEMODIALYSIS ONE EVALUATION: CPT

## 2019-08-19 PROCEDURE — 12000002 HC ACUTE/MED SURGE SEMI-PRIVATE ROOM

## 2019-08-19 PROCEDURE — 80069 RENAL FUNCTION PANEL: CPT

## 2019-08-19 PROCEDURE — 83735 ASSAY OF MAGNESIUM: CPT

## 2019-08-19 PROCEDURE — 80053 COMPREHEN METABOLIC PANEL: CPT

## 2019-08-19 PROCEDURE — 84100 ASSAY OF PHOSPHORUS: CPT

## 2019-08-19 PROCEDURE — 87015 SPECIMEN INFECT AGNT CONCNTJ: CPT

## 2019-08-19 PROCEDURE — 94761 N-INVAS EAR/PLS OXIMETRY MLT: CPT

## 2019-08-19 PROCEDURE — 85610 PROTHROMBIN TIME: CPT

## 2019-08-19 PROCEDURE — 89055 LEUKOCYTE ASSESSMENT FECAL: CPT

## 2019-08-19 PROCEDURE — 97110 THERAPEUTIC EXERCISES: CPT

## 2019-08-19 RX ORDER — SODIUM CHLORIDE 9 MG/ML
INJECTION, SOLUTION INTRAVENOUS
Status: DISCONTINUED | OUTPATIENT
Start: 2019-08-19 | End: 2019-08-21

## 2019-08-19 RX ORDER — SODIUM CHLORIDE 9 MG/ML
INJECTION, SOLUTION INTRAVENOUS ONCE
Status: DISCONTINUED | OUTPATIENT
Start: 2019-08-19 | End: 2019-08-21

## 2019-08-19 RX ORDER — MUPIROCIN 20 MG/G
OINTMENT TOPICAL 2 TIMES DAILY
Status: DISPENSED | OUTPATIENT
Start: 2019-08-19 | End: 2019-08-24

## 2019-08-19 RX ADMIN — ALLOPURINOL 100 MG: 100 TABLET ORAL at 03:08

## 2019-08-19 RX ADMIN — MUPIROCIN: 20 OINTMENT TOPICAL at 03:08

## 2019-08-19 RX ADMIN — CALCIUM ACETATE 667 MG: 667 CAPSULE ORAL at 03:08

## 2019-08-19 RX ADMIN — CALCITRIOL CAPSULES 0.25 MCG 0.25 MCG: 0.25 CAPSULE ORAL at 03:08

## 2019-08-19 RX ADMIN — Medication: at 05:08

## 2019-08-19 RX ADMIN — RIFAXIMIN 550 MG: 550 TABLET ORAL at 09:08

## 2019-08-19 RX ADMIN — AMIODARONE HYDROCHLORIDE 200 MG: 200 TABLET ORAL at 03:08

## 2019-08-19 RX ADMIN — MIDODRINE HYDROCHLORIDE 5 MG: 2.5 TABLET ORAL at 03:08

## 2019-08-19 RX ADMIN — RIFAXIMIN 550 MG: 550 TABLET ORAL at 03:08

## 2019-08-19 RX ADMIN — MIDODRINE HYDROCHLORIDE 5 MG: 2.5 TABLET ORAL at 05:08

## 2019-08-19 RX ADMIN — CALCIUM ACETATE 667 MG: 667 CAPSULE ORAL at 05:08

## 2019-08-19 RX ADMIN — ASPIRIN 81 MG: 81 TABLET, COATED ORAL at 03:08

## 2019-08-19 NOTE — PROGRESS NOTES
[facility]  Nephrology  Progress Note    Patient Name: Tre Cali  MRN: [id number]  Admission Date: 8/13/2019  Hospital Length of Stay: 4 days  Attending Provider: Luis Antonio Wyatt MD   Primary Care Physician: Primary Doctor No  Principal Problem:Volume overload      Subjective:     Interval History: endorses improved sense of well-being this am; still has abdominal fullness though improved (may need repeat paracentesis)    Review of patient's allergies indicates:   Allergen Reactions    Sulfa (sulfonamide antibiotics) Rash     Current Facility-Administered Medications   Medication Frequency    albumin human 25% bottle 50 g Once    albuterol nebulizer solution 2.5 mg Q4H PRN    allopurinol tablet 100 mg Daily    amiodarone tablet 200 mg Daily    aspirin EC tablet 81 mg Daily    calcitRIOL capsule 0.25 mcg Daily    calcium acetate capsule 667 mg TID WM    dextrose 50% injection 12.5 g PRN    dextrose 50% injection 25 g PRN    glucagon (human recombinant) injection 1 mg PRN    glucose chewable tablet 16 g PRN    glucose chewable tablet 24 g PRN    midodrine tablet 5 mg TID    rifAXIMin tablet 550 mg BID    sodium chloride 0.9% flush 10 mL PRN       Objective:     Vital Signs (Most Recent):  Temp: 97.5 °F (36.4 °C) (08/19/19 0401)  Pulse: 69 (08/19/19 0401)  Resp: 20 (08/19/19 0401)  BP: (!) 91/59 (08/19/19 0401)  SpO2: 99 % (08/19/19 0401)  O2 Device (Oxygen Therapy): room air (08/19/19 0401) Vital Signs (24h Range):  Temp:  [96.2 °F (35.7 °C)-97.9 °F (36.6 °C)] 97.5 °F (36.4 °C)  Pulse:  [60-78] 69  Resp:  [18-20] 20  SpO2:  [94 %-99 %] 99 %  BP: ()/(59-73) 91/59     Weight: 91 kg (200 lb 9.9 oz) (08/13/19 2038)  Body mass index is 32.38 kg/m².  Body surface area is 2.06 meters squared.    I/O last 3 completed shifts:  In: 170 [P.O.:120; IV Piggyback:50]  Out: 500 [Urine:500]    Physical Exam   Constitutional: He is oriented to person, place, and time. No distress.   Obese;  chronically ill-appearing   HENT:   Head: Normocephalic and atraumatic.   Mouth/Throat: Oropharynx is clear and moist.   Eyes: Conjunctivae are normal. Right eye exhibits no discharge. Left eye exhibits no discharge. No scleral icterus.   Neck: Normal range of motion. Neck supple. No JVD present.   Cardiovascular: Normal rate, regular rhythm and intact distal pulses. Exam reveals no gallop and no friction rub.   Murmur heard.  Pulmonary/Chest: Effort normal and breath sounds normal. No respiratory distress. He has no wheezes. He has no rales.   Abdominal: Soft. He exhibits distension. He exhibits no mass. There is tenderness. There is no rebound and no guarding.   Hypoactive BS in all quadrants   Genitourinary:   Genitourinary Comments: Deferred   Musculoskeletal: Normal range of motion. He exhibits edema and tenderness. He exhibits no deformity.   R UE AV Fistula w/ palpable thrill   Neurological: He is alert and oriented to person, place, and time. A cranial nerve deficit is present.   Skin: Skin is warm and dry. Rash noted. No erythema.   Psychiatric: He has a normal mood and affect. His behavior is normal.   Nursing note and vitals reviewed.      Significant Labs:sure  CBC:   Recent Labs   Lab 08/19/19  0504   WBC 6.25   RBC 4.07*   HGB 12.2*   HCT 38.5*   *   MCV 95   MCH 30.0   MCHC 31.7*     CMP:   Recent Labs   Lab 08/18/19  0550 08/19/19  0504    110   CALCIUM 8.4* 8.6*   ALBUMIN 3.1*  --    PROT 6.8  --     134*   K 4.7 4.8   CO2 23 20*   CL 98 95   BUN 52*  --    CREATININE 8.4*  --    ALKPHOS 95  --    ALT 9*  --    AST 13  --    BILITOT 1.5*  --      LFTs:   Recent Labs   Lab 08/18/19  0550   ALT 9*   AST 13   ALKPHOS 95   BILITOT 1.5*   PROT 6.8   ALBUMIN 3.1*       Significant Imaging:  X-Ray: Reviewed  CT: Reviewed    Assessment/Plan:     Active Diagnoses:    Diagnosis Date Noted POA    PRINCIPAL PROBLEM:  Volume overload [E87.70] 08/13/2019 Yes    Alcoholic cirrhosis of liver  with ascites [K70.31] 08/13/2019 Unknown     Chronic    Ascites due to alcoholic cirrhosis [K70.31] 08/13/2019 Yes     Chronic    ESRD (end stage renal disease) [N18.6] 08/13/2019 Yes     Chronic    AICD (automatic cardioverter/defibrillator) present [Z95.810] 08/13/2019 Yes     Chronic    Encephalopathy, metabolic [G93.41] 08/13/2019 Yes    CAD S/P percutaneous coronary angioplasty [I25.10, Z98.61] 08/13/2019 Not Applicable     Chronic    Hemiplegia as late effect of cerebrovascular accident (CVA) [I69.359] 08/13/2019 Not Applicable     Chronic    Diarrhea of presumed infectious origin [R19.7] 08/13/2019 Yes    End stage renal disease [N18.6] 08/13/2019 Yes      Problems Resolved During this Admission:     1. ESRD (HD-MWF via R UE AV Fistula)- symptomatically stable at present; significant abdominal distention on admission (s/p therapeutic paracentesis this am); no overt volume overload, significant electrolyte perturbations nor acid-base disturbance    -maintenance HD (duration: 3h; UF Goal: 1-2L as tolerated; STANDARD 'Bath'; Access: AVF; Qb: 400 ml/min, Qd: 2x BFR; give 12.5-25 gm 25% Albumin w/ HD for BP support)    -DAILY renal function panel to document sCr trend, optimize HD electrolyte 'bath' & assess response to therapy    -avoid nephrotoxic agents (NSAIDs, IV contrast dye)    -renally dose all appropriate medications, including antibiotics     2. HTN- clinically stable at present now w/ relative hypotension (91/59); no active issues    -HOLD anti-HTN medications & diuretics for now & reassess in am     3. ANEMIA- clinically stable at present; H/H AT GOAL (12.2/38.5); no active issues    -trend daily CBC (H/H) to effect optimal blood-loss surveillance     4. SECONDARY HYPERPARATHYROIDISM (sHPT)- clinically stable at present; no active issues    -continue dietary binder (if tolerating 'po')/Vitamin D +/- HD-associated calcimimetic agent (Cinacalcet vs Etelcalcetide)     5. ESLD (hx of EtOH-induced  Cirrhosis) w/ Ascites (s/p therapeutic paracentesis)- appears ill; GI Medicine service on the case; cautious fluid removal to minimize risk of precipitatiing Hepatorenal Syndrome    -management per GI Medicine/Primary Team recommendations    Thank you for your consult. I will follow-up with patient. Please contact us if you have any additional questions.    Armando Cruz III, MD  [facility]  [facility]  [phone] (C)

## 2019-08-19 NOTE — PLAN OF CARE
Problem: Adult Inpatient Plan of Care  Goal: Plan of Care Review  Outcome: Ongoing (interventions implemented as appropriate)  Patient plan of care reviewed with patient, demonstrates understanding. States he's ready to get out of here. Patient remains safe and free from falls. VSS. No signs of distress or any complaints of pain. Will continue to monitor.

## 2019-08-19 NOTE — SUBJECTIVE & OBJECTIVE
"Interval History: feels "Better".    Review of Systems   Constitutional: Negative.    HENT: Negative.    Eyes: Negative.    Respiratory: Negative.    Cardiovascular: Negative.    Gastrointestinal: Negative.    Endocrine: Negative.    Genitourinary: Negative.    Musculoskeletal: Negative.    Skin: Negative.    Allergic/Immunologic: Negative.    Neurological: Negative.    Hematological: Negative.    All other systems reviewed and are negative.    Objective:     Vital Signs (Most Recent):  Temp: 97.6 °F (36.4 °C) (08/19/19 0832)  Pulse: 69 (08/19/19 1130)  Resp: 16 (08/19/19 0832)  BP: (!) 88/47 (08/19/19 1130)  SpO2: 99 % (08/19/19 0809) Vital Signs (24h Range):  Temp:  [96.2 °F (35.7 °C)-97.9 °F (36.6 °C)] 97.6 °F (36.4 °C)  Pulse:  [60-78] 69  Resp:  [16-20] 16  SpO2:  [95 %-99 %] 99 %  BP: ()/(47-79) 88/47     Weight: 91 kg (200 lb 9.9 oz)  Body mass index is 32.38 kg/m².    Intake/Output Summary (Last 24 hours) at 8/19/2019 1451  Last data filed at 8/19/2019 0500  Gross per 24 hour   Intake 120 ml   Output --   Net 120 ml      Physical Exam   Constitutional: He is oriented to person, place, and time. He appears well-developed and well-nourished.   HENT:   Head: Normocephalic and atraumatic.   Eyes: Pupils are equal, round, and reactive to light. Conjunctivae and EOM are normal.   Neck: Normal range of motion. Neck supple.   Cardiovascular: Normal rate, regular rhythm, normal heart sounds and intact distal pulses.   Pulmonary/Chest:   Very decreased entry bases without adventitious sounds   Abdominal: Soft. Bowel sounds are normal. He exhibits distension.   Musculoskeletal: Normal range of motion.   Neurological: He is alert and oriented to person, place, and time.   Skin: Capillary refill takes less than 2 seconds.   bruising   Psychiatric: He has a normal mood and affect. His behavior is normal. Judgment and thought content normal.   Nursing note and vitals reviewed.      Significant Labs:   BMP:   Recent " Labs   Lab 08/19/19  0504      *   K 4.8   CL 95   CO2 20*   BUN 59*   CREATININE 9.1*   CALCIUM 8.6*   MG 2.6     CBC:   Recent Labs   Lab 08/18/19  0550 08/19/19  0504   WBC 5.73 6.25   HGB 11.8* 12.2*   HCT 37.2* 38.5*   * 117*       Significant Imaging: I have reviewed all pertinent imaging results/findings within the past 24 hours.

## 2019-08-19 NOTE — PROGRESS NOTES
"[facility] Medicine  Progress Note    Patient Name: Tre Cali  MRN: [id number]  Patient Class: IP- Inpatient   Admission Date: 8/13/2019  Length of Stay: 4 days  Attending Physician: Luis Antonio Wyatt MD  Primary Care Provider: Primary Doctor No        Subjective:     Principal Problem:Volume overload        HPI:  Mr. Cali is a 54-year-old male with past medical history of CAD status post stent several years ago, COPD, liver cirrhosis, ESRD on hemodialysis presents with generalized weakness.  The patient presented emergency room earlier today in the morning for generalized weakness and anasarca, patient was discharged to dialysis center for hemodialysis.  Patient came back from hemodialysis for unclear reasons.  Patient reports history of generalized weakness, reports knees gave out and fell.  History of shortness of breath since last few days.  History of worsening abdominal swelling and abdominal pain since 2-3 weeks.  History of diarrhea since 3 weeks, about 4 episodes every day, nonbloody.  Patient reports sleepiness and intermittent confusion since 1-2 days.  Patient moved from [place] to [place] a week ago and yet to set up doctors.  Patient reports last hemodialysis was last Saturday.  Patient denies any history of fevers, chills or nausea, vomiting.  Patient reports history of stents several years ago.  Reports history of stroke several years ago with resultant right upper extremity and lower extremity weakness.     Patient quit drinking about 20 years ago.  Patient has longstanding history of smoking, reports no smoking intermittently few cigarettes per day.  Denies history of drug abuse.  Past surgical history - right arm AV fistula, AICD placement, cardiac stents    Overview/Hospital Course:  Therapeutic paracentesis performed 8/16  Had HD today. To overnight and follow-up labs in AM. Hopefully discharge in AM    Interval History: feels "Better".    Review of Systems "   Constitutional: Negative.    HENT: Negative.    Eyes: Negative.    Respiratory: Negative.    Cardiovascular: Negative.    Gastrointestinal: Negative.    Endocrine: Negative.    Genitourinary: Negative.    Musculoskeletal: Negative.    Skin: Negative.    Allergic/Immunologic: Negative.    Neurological: Negative.    Hematological: Negative.    All other systems reviewed and are negative.    Objective:     Vital Signs (Most Recent):  Temp: 97.6 °F (36.4 °C) (08/19/19 0832)  Pulse: 69 (08/19/19 1130)  Resp: 16 (08/19/19 0832)  BP: (!) 88/47 (08/19/19 1130)  SpO2: 99 % (08/19/19 0809) Vital Signs (24h Range):  Temp:  [96.2 °F (35.7 °C)-97.9 °F (36.6 °C)] 97.6 °F (36.4 °C)  Pulse:  [60-78] 69  Resp:  [16-20] 16  SpO2:  [95 %-99 %] 99 %  BP: ()/(47-79) 88/47     Weight: 91 kg (200 lb 9.9 oz)  Body mass index is 32.38 kg/m².    Intake/Output Summary (Last 24 hours) at 8/19/2019 1451  Last data filed at 8/19/2019 0500  Gross per 24 hour   Intake 120 ml   Output --   Net 120 ml      Physical Exam   Constitutional: He is oriented to person, place, and time. He appears well-developed and well-nourished.   HENT:   Head: Normocephalic and atraumatic.   Eyes: Pupils are equal, round, and reactive to light. Conjunctivae and EOM are normal.   Neck: Normal range of motion. Neck supple.   Cardiovascular: Normal rate, regular rhythm, normal heart sounds and intact distal pulses.   Pulmonary/Chest:   Very decreased entry bases without adventitious sounds   Abdominal: Soft. Bowel sounds are normal. He exhibits distension.   Musculoskeletal: Normal range of motion.   Neurological: He is alert and oriented to person, place, and time.   Skin: Capillary refill takes less than 2 seconds.   bruising   Psychiatric: He has a normal mood and affect. His behavior is normal. Judgment and thought content normal.   Nursing note and vitals reviewed.      Significant Labs:   BMP:   Recent Labs   Lab 08/19/19  0504      *   K 4.8    CL 95   CO2 20*   BUN 59*   CREATININE 9.1*   CALCIUM 8.6*   MG 2.6     CBC:   Recent Labs   Lab 08/18/19  0550 08/19/19  0504   WBC 5.73 6.25   HGB 11.8* 12.2*   HCT 37.2* 38.5*   * 117*       Significant Imaging: I have reviewed all pertinent imaging results/findings within the past 24 hours.      Assessment/Plan:      End stage renal disease       End-stage renal disease on hemodialysis-patient due for dialysis today. Orders per nephrology.     Ascites secondary to alcoholic cirrhotic liver disease, chronic-patient for paracentesis today. 5 L taken off. Will receive aliment post paracentesis. Monitor closely during hemodialysis for end drops in blood pressure     Acute volume overload- slowly resolving.     AICD in situ- no discharges of AICD while in hospital. No arrhythmia.     Acute metabolic encephalopathy- encephalopathy resolved.     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 Difficile colitis-no evidence of C. Difficile colitis.     Diarrhea of infectious origin, ruled out     Severe chronic deconditioning- awaiting skilled nursing facility placement    CAD S/P percutaneous coronary angioplasty        ESRD (end stage renal disease)  HD on MWF      Ascites due to alcoholic cirrhosis        Alcoholic cirrhosis of liver with ascites  S/p paracenetesis      VTE Risk Mitigation (From admission, onward)        Ordered     IP VTE HIGH RISK PATIENT  Once      08/13/19 1909                Luis Antonio Wyatt MD  Department of Hospital Medicine   [facility]

## 2019-08-19 NOTE — PT/OT/SLP PROGRESS
"Occupational Therapy   Treatment    Name: Tre Cali  MRN: [id number]  Admitting Diagnosis:  Volume overload       Recommendations:     Discharge Recommendations: nursing facility, skilled  Discharge Equipment Recommendations:  walker, rolling, tub bench(will continue to assess)  Barriers to discharge:       Assessment:     Tre Cali is a 54 y.o. male with a medical diagnosis of Volume overload.  He presents with continued decline in function and moderate burden of care.    Rehab Prognosis:  Fair; patient would benefit from acute skilled OT services to address these deficits and reach maximum level of function.       Plan:     Patient to be seen 5 x/week to address the above listed problems via self-care/home management, therapeutic activities, therapeutic exercises  · Plan of Care Expires: 09/15/19  · Plan of Care Reviewed with: patient    Subjective     Pain/Comfort:  · Pain Rating 1: 5/10  · Location - Side 1: Right  · Location 1: knee  · Pain Addressed 1: Distraction, Cessation of Activity  · Pain Rating Post-Intervention 1: 5/10  · Pain Rating 2: 8/10  · Location - Side 2: Left  · Location 2: knee  · Pain Addressed 2: Distraction, Cessation of Activity    Objective:     Communicated with: Nursing prior to session.  Patient found up in chair with telemetry, peripheral IV upon OT entry to room.    General Precautions: Standard, fall   Orthopedic Precautions:N/A   Braces:       Occupational Performance:     Functional Mobility/Transfers:  · Patient completed Sit <> Stand Transfer with contact guard assistance  with  rolling walker   · Functional Mobility: standing tolerance trial x 2 min with max fatigue and L LE tolerance met due to pain and feeling the knee was going "to buckle"    Activities of Daily Living:  Lower Body Dressing: began education on use of hip kit tools, with focus on sock aid, dressing stick for socks mgt, reacher to pick sock off the floor; burden of care reduction; card x issued " for tools purchase      Dowel exercises of 3 x 10 reps for shoulder flexion, horz ab/Ad x 10 reps then deferred due to tenderness of upper arm after am dialysis     [facility] 6 Click ADL: 16    Treatment & Education:  ADL training, adaptive equipment, therapeutic activity standing tolerance, falls prevention with use of laz belt with his family     Patient left up in chair with all lines intact, call button in reach and chair alarm onEducation:      GOALS:   Multidisciplinary Problems     Occupational Therapy Goals        Problem: Occupational Therapy Goal    Goal Priority Disciplines Outcome Interventions   Occupational Therapy Goal     OT, PT/OT Ongoing (interventions implemented as appropriate)    Description:  Goals to be met by: discharge     Patient will increase functional independence with ADLs by performing:    LE Dressing with Minimal Assistance.  Grooming while seated at sink with [place].  Toileting from toilet with Minimal Assistance for hygiene and clothing management.   Toilet transfer to toilet with Stand-by Assistance.                      Time Tracking:     OT Date of Treatment: 08/19/19  OT Start Time: 1406  OT Stop Time: 1431  OT Total Time (min): 25 min    Billable Minutes:Therapeutic Activity 15  Therapeutic Exercise 10    Evie Arita OT  8/19/2019

## 2019-08-19 NOTE — CARE UPDATE
08/19/19 0809   Patient Assessment/Suction   Level of Consciousness (AVPU) alert   Respiratory Effort Normal;Unlabored   Expansion/Accessory Muscles/Retractions no use of accessory muscles   All Lung Fields Breath Sounds clear;diminished   PRE-TX-O2   O2 Device (Oxygen Therapy) room air   SpO2 99 %   $ Pulse Oximetry - Multiple Charge Pulse Oximetry - Multiple   Pulse 68   Resp 20   Aerosol Therapy   $ Aerosol Therapy Charges PRN treatment not required

## 2019-08-19 NOTE — PT/OT/SLP PROGRESS
Physical Therapy      Patient Name:  Tre Cali   MRN:  [id number]    Patient not seen today secondary to Dialysis. Will follow-up tomorrow.    Jaylene Flores, PTA

## 2019-08-19 NOTE — PROGRESS NOTES
HD complete, per orders. No complications throughout.   Net UF: 3000 mL  AVF intact, with no complications noted; pressure dsg applied, following tx.        08/19/19 1240   Vital Signs   Temp 98 °F (36.7 °C)   Temp src Oral   Pulse 85   Heart Rate Source Monitor   Resp 16   BP 97/68   Post-Hemodialysis Assessment   Rinseback Volume (mL) 250 mL   Blood Volume Processed (Liters) 49.3 L   Dialyzer Clearance Lightly streaked   Duration of Treatment (minutes) 180 minutes   Hemodialysis Intake (mL) 500 mL   Total UF (mL) 3500 mL   Net Fluid Removal 3000   Patient Response to Treatment Tolerated well   Post-Treatment Weight 88.1 kg (194 lb 3.6 oz)   Treatment Weight Change -2.9   Arterial bleeding stop time (min) 2 min   Venous bleeding stop time (min) 2 min   Post-Hemodialysis Comments Tx complete, per orders. Pt stable, with no further needs at this time.

## 2019-08-19 NOTE — PLAN OF CARE
08/18/19 2300   Patient Assessment/Suction   Level of Consciousness (AVPU) alert   Respiratory Effort Unlabored   Expansion/Accessory Muscles/Retractions no use of accessory muscles   All Lung Fields Breath Sounds clear   PRE-TX-O2   O2 Device (Oxygen Therapy) room air   SpO2 95 %   Pulse 60   Resp 18   Aerosol Therapy   $ Aerosol Therapy Charges PRN treatment not required   Respiratory Treatment Status (SVN) PRN treatment not required

## 2019-08-20 LAB
ALBUMIN SERPL BCP-MCNC: 3.3 G/DL (ref 3.5–5.2)
ANION GAP SERPL CALC-SCNC: 19 MMOL/L (ref 8–16)
BASOPHILS # BLD AUTO: 0.11 K/UL (ref 0–0.2)
BASOPHILS NFR BLD: 1.7 % (ref 0–1.9)
BUN SERPL-MCNC: 50 MG/DL (ref 6–20)
CALCIUM SERPL-MCNC: 8.7 MG/DL (ref 8.7–10.5)
CHLORIDE SERPL-SCNC: 92 MMOL/L (ref 95–110)
CO2 SERPL-SCNC: 25 MMOL/L (ref 23–29)
CREAT SERPL-MCNC: 8.1 MG/DL (ref 0.5–1.4)
DIFFERENTIAL METHOD: ABNORMAL
EOSINOPHIL # BLD AUTO: 0.2 K/UL (ref 0–0.5)
EOSINOPHIL NFR BLD: 3 % (ref 0–8)
ERYTHROCYTE [DISTWIDTH] IN BLOOD BY AUTOMATED COUNT: 16.1 % (ref 11.5–14.5)
ERYTHROCYTE [DISTWIDTH] IN BLOOD BY AUTOMATED COUNT: 16.1 % (ref 11.5–14.5)
EST. GFR  (AFRICAN AMERICAN): 7.7 ML/MIN/1.73 M^2
EST. GFR  (NON AFRICAN AMERICAN): 6.7 ML/MIN/1.73 M^2
GLUCOSE SERPL-MCNC: 111 MG/DL (ref 70–110)
GLUCOSE SERPL-MCNC: 139 MG/DL (ref 70–110)
GLUCOSE SERPL-MCNC: 140 MG/DL (ref 70–110)
GLUCOSE SERPL-MCNC: 149 MG/DL (ref 70–110)
GLUCOSE SERPL-MCNC: 154 MG/DL (ref 70–110)
HCT VFR BLD AUTO: 35.9 % (ref 40–54)
HCT VFR BLD AUTO: 35.9 % (ref 40–54)
HGB BLD-MCNC: 11.4 G/DL (ref 14–18)
HGB BLD-MCNC: 11.4 G/DL (ref 14–18)
IMM GRANULOCYTES # BLD AUTO: 0.02 K/UL (ref 0–0.04)
IMM GRANULOCYTES NFR BLD AUTO: 0.3 % (ref 0–0.5)
INR PPP: 1.6
LYMPHOCYTES # BLD AUTO: 0.7 K/UL (ref 1–4.8)
LYMPHOCYTES NFR BLD: 10.9 % (ref 18–48)
MAGNESIUM SERPL-MCNC: 2.6 MG/DL (ref 1.6–2.6)
MCH RBC QN AUTO: 30.3 PG (ref 27–31)
MCH RBC QN AUTO: 30.3 PG (ref 27–31)
MCHC RBC AUTO-ENTMCNC: 31.8 G/DL (ref 32–36)
MCHC RBC AUTO-ENTMCNC: 31.8 G/DL (ref 32–36)
MCV RBC AUTO: 96 FL (ref 82–98)
MCV RBC AUTO: 96 FL (ref 82–98)
MONOCYTES # BLD AUTO: 0.8 K/UL (ref 0.3–1)
MONOCYTES NFR BLD: 13 % (ref 4–15)
NEUTROPHILS # BLD AUTO: 4.6 K/UL (ref 1.8–7.7)
NEUTROPHILS NFR BLD: 71.1 % (ref 38–73)
NRBC BLD-RTO: 0 /100 WBC
PHOSPHATE SERPL-MCNC: 8.7 MG/DL (ref 2.7–4.5)
PHOSPHATE SERPL-MCNC: 9.1 MG/DL (ref 2.7–4.5)
PLATELET # BLD AUTO: 100 K/UL (ref 150–350)
PLATELET # BLD AUTO: 100 K/UL (ref 150–350)
PMV BLD AUTO: 12.1 FL (ref 9.2–12.9)
PMV BLD AUTO: 12.1 FL (ref 9.2–12.9)
POTASSIUM SERPL-SCNC: 4.6 MMOL/L (ref 3.5–5.1)
PROTHROMBIN TIME: 18.7 SEC (ref 11.7–14)
RBC # BLD AUTO: 3.76 M/UL (ref 4.6–6.2)
RBC # BLD AUTO: 3.76 M/UL (ref 4.6–6.2)
SODIUM SERPL-SCNC: 136 MMOL/L (ref 136–145)
WBC # BLD AUTO: 6.44 K/UL (ref 3.9–12.7)
WBC # BLD AUTO: 6.44 K/UL (ref 3.9–12.7)

## 2019-08-20 PROCEDURE — 36415 COLL VENOUS BLD VENIPUNCTURE: CPT

## 2019-08-20 PROCEDURE — 25000003 PHARM REV CODE 250: Performed by: INTERNAL MEDICINE

## 2019-08-20 PROCEDURE — 85025 COMPLETE CBC W/AUTO DIFF WBC: CPT

## 2019-08-20 PROCEDURE — 83735 ASSAY OF MAGNESIUM: CPT

## 2019-08-20 PROCEDURE — 94761 N-INVAS EAR/PLS OXIMETRY MLT: CPT

## 2019-08-20 PROCEDURE — 85610 PROTHROMBIN TIME: CPT

## 2019-08-20 PROCEDURE — 99900035 HC TECH TIME PER 15 MIN (STAT)

## 2019-08-20 PROCEDURE — 97530 THERAPEUTIC ACTIVITIES: CPT

## 2019-08-20 PROCEDURE — 12000002 HC ACUTE/MED SURGE SEMI-PRIVATE ROOM

## 2019-08-20 PROCEDURE — 82962 GLUCOSE BLOOD TEST: CPT

## 2019-08-20 PROCEDURE — 97110 THERAPEUTIC EXERCISES: CPT

## 2019-08-20 PROCEDURE — 84100 ASSAY OF PHOSPHORUS: CPT

## 2019-08-20 PROCEDURE — 80069 RENAL FUNCTION PANEL: CPT

## 2019-08-20 RX ADMIN — RIFAXIMIN 550 MG: 550 TABLET ORAL at 09:08

## 2019-08-20 RX ADMIN — CALCITRIOL CAPSULES 0.25 MCG 0.25 MCG: 0.25 CAPSULE ORAL at 09:08

## 2019-08-20 RX ADMIN — AMIODARONE HYDROCHLORIDE 200 MG: 200 TABLET ORAL at 09:08

## 2019-08-20 RX ADMIN — CALCIUM ACETATE 667 MG: 667 CAPSULE ORAL at 05:08

## 2019-08-20 RX ADMIN — MIDODRINE HYDROCHLORIDE 5 MG: 2.5 TABLET ORAL at 05:08

## 2019-08-20 RX ADMIN — ASPIRIN 81 MG: 81 TABLET, COATED ORAL at 09:08

## 2019-08-20 RX ADMIN — Medication: at 09:08

## 2019-08-20 RX ADMIN — MIDODRINE HYDROCHLORIDE 5 MG: 2.5 TABLET ORAL at 12:08

## 2019-08-20 RX ADMIN — CALCIUM ACETATE 667 MG: 667 CAPSULE ORAL at 12:08

## 2019-08-20 RX ADMIN — CALCIUM ACETATE 667 MG: 667 CAPSULE ORAL at 07:08

## 2019-08-20 RX ADMIN — ALLOPURINOL 100 MG: 100 TABLET ORAL at 09:08

## 2019-08-20 RX ADMIN — RIFAXIMIN 550 MG: 550 TABLET ORAL at 08:08

## 2019-08-20 RX ADMIN — MUPIROCIN: 20 OINTMENT TOPICAL at 09:08

## 2019-08-20 RX ADMIN — MUPIROCIN: 20 OINTMENT TOPICAL at 08:08

## 2019-08-20 NOTE — PLAN OF CARE
08/20/19 1254   Discharge Reassessment   Assessment Type Discharge Planning Reassessment   Anticipated Discharge Disposition SNF   PER SOCO WITH [facility], PATIENT IS DENIED FOR SNF PLACEMENT.  PER DM LEHMAN [facility], PATIENT IS DENIED FOR SNF PLACEMENT BECAUSE THEY DO NOT ACCEPT DIALYSIS PATIENT'S.

## 2019-08-20 NOTE — SUBJECTIVE & OBJECTIVE
Interval History: debilitated    Review of Systems   Constitutional: Positive for fatigue.   HENT: Negative.    Eyes: Negative.    Respiratory: Negative.    Cardiovascular: Negative.    Gastrointestinal: Negative.    Endocrine: Negative.    Genitourinary: Negative.    Musculoskeletal:        Generalized weakness, Requires assist   Skin: Negative.    Allergic/Immunologic: Negative.    Neurological: Negative.    Hematological: Negative.    All other systems reviewed and are negative.    Objective:     Vital Signs (Most Recent):  Temp: 97.9 °F (36.6 °C) (08/20/19 1214)  Pulse: 71 (08/20/19 1214)  Resp: 18 (08/20/19 1214)  BP: 99/68 (08/20/19 1214)  SpO2: 98 % (08/20/19 1214) Vital Signs (24h Range):  Temp:  [97.7 °F (36.5 °C)-98.2 °F (36.8 °C)] 97.9 °F (36.6 °C)  Pulse:  [69-78] 71  Resp:  [18-20] 18  SpO2:  [91 %-99 %] 98 %  BP: ()/(56-71) 99/68     Weight: 91 kg (200 lb 9.9 oz)  Body mass index is 32.38 kg/m².    Intake/Output Summary (Last 24 hours) at 8/20/2019 1451  Last data filed at 8/20/2019 0500  Gross per 24 hour   Intake 240 ml   Output --   Net 240 ml      Physical Exam    Significant Labs:   BMP:   Recent Labs   Lab 08/19/19  1621 08/20/19  0453   *  --    *  --    K 4.0  --    CL 93*  --    CO2 26  --    BUN 39*  --    CREATININE 7.3*  --    CALCIUM 8.3*  --    MG  --  2.6     CBC:   Recent Labs   Lab 08/19/19  0504 08/20/19  0453   WBC 6.25 6.44  6.44   HGB 12.2* 11.4*  11.4*   HCT 38.5* 35.9*  35.9*   * 100*  100*       Significant Imaging: I have reviewed all pertinent imaging results/findings within the past 24 hours.

## 2019-08-20 NOTE — PLAN OF CARE
Problem: Adult Inpatient Plan of Care  Goal: Patient-Specific Goal (Individualization)  Outcome: Ongoing (interventions implemented as appropriate)     08/20/19 0599   OTHER   Anxieties, Fears or Concerns none   Individualized Care Needs assistance with adls   Patient-Specific Goal (Individualization)   Patient-Specific Goals (Include Timeframe) remain free from falls til discharge

## 2019-08-20 NOTE — PLAN OF CARE
08/20/19 1437   Discharge Reassessment   Assessment Type Discharge Planning Reassessment   THIS CM SPOKE TO THE HOSPITALIST, DR. SHARMA, ABOUT THE STATUS OF THE CURRENT DISCHARGE PLAN.  I EXPLAINED THAT THE PATIENT HAS BEEN DENIED BY TWO NH'S DUE TO HIS NEED FOR AS NEEDED PARACENTESIS; AND THE PATIENT WAS DENIED BY A THIRD NH BECAUSE THEY DO NOT ACCEPT DIALYSIS PATIENT'S.  THIS CM SUGGESTED TO THE HOSPITALIST SHORT TERM REHAB AT [facility] DUE TO THE DENIALS FROM THE NURSING HOMES.  THE HOSPITALIST WAS IN AGREEMENT TO TRY THIS DISCHARGE PLAN; THIS CM AWAITING MD ORDERS FOR LTAC.

## 2019-08-20 NOTE — PLAN OF CARE
Problem: Adult Inpatient Plan of Care  Goal: Plan of Care Review  Outcome: Ongoing (interventions implemented as appropriate)  Plan of care reviewed with patient, demonstrates understanding. No complaints or concerns this shift. VSS. Safety precautions maintained. Continuous cardiac monitoring without arrhythmias. Will continue to monitor.

## 2019-08-20 NOTE — PT/OT/SLP PROGRESS
Physical Therapy Treatment    Patient Name:  Tre Cali   MRN:  [id number]    Recommendations:     Discharge Recommendations:  nursing facility, skilled   Discharge Equipment Recommendations: (will continue to assess needs)   Barriers to discharge: pt with impaired cognition and functional mobility increasing burden of care    Assessment:     Tre Cali is a 54 y.o. male admitted with a medical diagnosis of Volume overload.  He presents with the following impairments/functional limitations:  weakness, impaired endurance, gait instability, impaired functional mobilty, impaired cognition, decreased safety awareness, pain, decreased lower extremity function, impaired balance, impaired self care skills. Pt limited today due to significant B knee pain with mobility. Pt tolerated seated therex well but was unable to perform some exercises with LLE due to pain. Continue with PT and POC.    Rehab Prognosis: Fair; patient would benefit from acute skilled PT services to address these deficits and reach maximum level of function.    Recent Surgery: * No surgery found *      Plan:     During this hospitalization, patient to be seen 5 x/week to address the identified rehab impairments via gait training, therapeutic activities, therapeutic exercises and progress toward the following goals:    · Plan of Care Expires:  09/14/19    Subjective     Chief Complaint: Pt with c/o B knee pain especially with mobility  Patient/Family Comments/goals: decrease pain  Pain/Comfort:  · Pain Rating 1: 7/10  · Location - Side 1: Bilateral  · Location 1: knee  · Pain Addressed 1: Reposition, Distraction, Cessation of Activity  · Pain Rating Post-Intervention 1: 7/10      Objective:     Communicated with RN prior to session.  Patient found up in chair with telemetry upon PT entry to room.     General Precautions: Standard, fall   Orthopedic Precautions:    Braces:         AM-PAC 6 CLICK MOBILITY          Therapeutic Activities and  Exercises:   Pt performed the following exercises with BLEs x 10 reps each:  -ankle pumps  -LAQs  -marches  -hip abduction/adduction  -adductor squeezes    Pt unable to perform LAQs with LLE due to pain. Pt unable to fully extend LLE at the knee.     Patient left up in chair with all lines intact, call button in reach and chair alarm on..    GOALS:   Multidisciplinary Problems     Physical Therapy Goals        Problem: Physical Therapy Goal    Goal Priority Disciplines Outcome Goal Variances Interventions   Physical Therapy Goal     PT, PT/OT Ongoing (interventions implemented as appropriate)     Description:  Goals to be met by: discharge     Patient will increase functional independence with mobility by performin. Supine to sit with Stand-by Assistance  2. Sit to stand transfer with Stand-by Assistance  3. Bed to chair transfer with Stand-by Assistance using Rolling Walker  4. Gait  x 150 feet with Stand-by Assistance using Rolling Walker.                       Time Tracking:     PT Received On: 19  PT Start Time: 1405     PT Stop Time: 1416  PT Total Time (min): 11 min     Billable Minutes: Therapeutic Exercise 11    Treatment Type: Treatment  PT/PTA: PTA     PTA Visit Number: 3     Jaylene Flores PTA  2019

## 2019-08-20 NOTE — PLAN OF CARE
08/20/19 1226   Discharge Reassessment   Assessment Type Discharge Planning Reassessment   Anticipated Discharge Disposition SNF   PER VITALY WEAVER WITH [facility], PATIENT HAS BEEN DENIED FOR SNF SERVICES.

## 2019-08-20 NOTE — PROGRESS NOTES
[facility] Medicine  Progress Note    Patient Name: Tre Cali  MRN: [id number]  Patient Class: IP- Inpatient   Admission Date: 8/13/2019  Length of Stay: 5 days  Attending Physician: Luis Antonio Wyatt MD  Primary Care Provider: Primary Doctor No        Subjective:     Principal Problem:Volume overload        HPI:  Mr. Cali is a 54-year-old male with past medical history of CAD status post stent several years ago, COPD, liver cirrhosis, ESRD on hemodialysis presents with generalized weakness.  The patient presented emergency room earlier today in the morning for generalized weakness and anasarca, patient was discharged to dialysis center for hemodialysis.  Patient came back from hemodialysis for unclear reasons.  Patient reports history of generalized weakness, reports knees gave out and fell.  History of shortness of breath since last few days.  History of worsening abdominal swelling and abdominal pain since 2-3 weeks.  History of diarrhea since 3 weeks, about 4 episodes every day, nonbloody.  Patient reports sleepiness and intermittent confusion since 1-2 days.  Patient moved from [place] to [place] a week ago and yet to set up doctors.  Patient reports last hemodialysis was last Saturday.  Patient denies any history of fevers, chills or nausea, vomiting.  Patient reports history of stents several years ago.  Reports history of stroke several years ago with resultant right upper extremity and lower extremity weakness.     Patient quit drinking about 20 years ago.  Patient has longstanding history of smoking, reports no smoking intermittently few cigarettes per day.  Denies history of drug abuse.  Past surgical history - right arm AV fistula, AICD placement, cardiac stents    Overview/Hospital Course:  Therapeutic paracentesis performed 8/16  Had HD today. To overnight and follow-up labs in AM. Hopefully discharge in AM. Patient unable to perform ADLs secondary to severe  deconditioning. SNF will not take because of need for intermittent paracentesis and HD. Therefore LTAC or inpatient rehabilitation is being considered, and discharge held    Interval History: debilitated    Review of Systems   Constitutional: Positive for fatigue.   HENT: Negative.    Eyes: Negative.    Respiratory: Negative.    Cardiovascular: Negative.    Gastrointestinal: Negative.    Endocrine: Negative.    Genitourinary: Negative.    Musculoskeletal:        Generalized weakness, Requires assist   Skin: Negative.    Allergic/Immunologic: Negative.    Neurological: Negative.    Hematological: Negative.    All other systems reviewed and are negative.    Objective:     Vital Signs (Most Recent):  Temp: 97.9 °F (36.6 °C) (08/20/19 1214)  Pulse: 71 (08/20/19 1214)  Resp: 18 (08/20/19 1214)  BP: 99/68 (08/20/19 1214)  SpO2: 98 % (08/20/19 1214) Vital Signs (24h Range):  Temp:  [97.7 °F (36.5 °C)-98.2 °F (36.8 °C)] 97.9 °F (36.6 °C)  Pulse:  [69-78] 71  Resp:  [18-20] 18  SpO2:  [91 %-99 %] 98 %  BP: ()/(56-71) 99/68     Weight: 91 kg (200 lb 9.9 oz)  Body mass index is 32.38 kg/m².    Intake/Output Summary (Last 24 hours) at 8/20/2019 1451  Last data filed at 8/20/2019 0500  Gross per 24 hour   Intake 240 ml   Output --   Net 240 ml      Physical Exam    Significant Labs:   BMP:   Recent Labs   Lab 08/19/19  1621 08/20/19  0453   *  --    *  --    K 4.0  --    CL 93*  --    CO2 26  --    BUN 39*  --    CREATININE 7.3*  --    CALCIUM 8.3*  --    MG  --  2.6     CBC:   Recent Labs   Lab 08/19/19  0504 08/20/19  0453   WBC 6.25 6.44  6.44   HGB 12.2* 11.4*  11.4*   HCT 38.5* 35.9*  35.9*   * 100*  100*       Significant Imaging: I have reviewed all pertinent imaging results/findings within the past 24 hours.      Assessment/Plan:      End stage renal disease       End-stage renal disease on hemodialysis-patient due for dialysis today. Orders per nephrology.     Ascites secondary to alcoholic  cirrhotic liver disease, chronic-patient for paracentesis today. 5 L taken off. Will receive aliment post paracentesis. Monitor closely during hemodialysis for end drops in blood pressure     Acute volume overload- slowly resolving.     AICD in situ- no discharges of AICD while in hospital. No arrhythmia.     Acute metabolic encephalopathy- encephalopathy resolved.     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 Difficile colitis-no evidence of C. Difficile colitis.     Diarrhea of infectious origin, ruled out     Severe chronic deconditioning- awaiting skilled nursing facility placement    CAD S/P percutaneous coronary angioplasty        ESRD (end stage renal disease)  HD on MWF      Ascites due to alcoholic cirrhosis        Alcoholic cirrhosis of liver with ascites  S/p paracenetesis; will require repeat paracentesis periodically      VTE Risk Mitigation (From admission, onward)        Ordered     IP VTE HIGH RISK PATIENT  Once      08/13/19 1909                Luis Antonio Wyatt MD  Department of Hospital Medicine   [facility]

## 2019-08-20 NOTE — PROGRESS NOTES
[facility]  Nephrology  Progress Note    Patient Name: Tre Cali  MRN: [id number]  Admission Date: 8/13/2019  Hospital Length of Stay: 5 days  Attending Provider: Luis Antonio Wyatt MD   Primary Care Physician: Primary Doctor No  Principal Problem:Volume overload      Subjective:     Interval History: feels 'ok' this am; still has abdominal fullness though improved (may need repeat paracentesis)    Review of patient's allergies indicates:   Allergen Reactions    Sulfa (sulfonamide antibiotics) Rash     Current Facility-Administered Medications   Medication Frequency    0.9%  NaCl infusion PRN    0.9%  NaCl infusion Once    albumin human 25% bottle 50 g Once    albuterol nebulizer solution 2.5 mg Q4H PRN    allopurinol tablet 100 mg Daily    amiodarone tablet 200 mg Daily    aspirin EC tablet 81 mg Daily    calcitRIOL capsule 0.25 mcg Daily    calcium acetate capsule 667 mg TID WM    dextrose 50% injection 12.5 g PRN    dextrose 50% injection 25 g PRN    glucagon (human recombinant) injection 1 mg PRN    glucose chewable tablet 16 g PRN    glucose chewable tablet 24 g PRN    midodrine tablet 5 mg TID    mupirocin 2 % ointment BID    rifAXIMin tablet 550 mg BID    sodium chloride 0.9% flush 10 mL PRN    white petrolatum 41 % ointment Daily       Objective:     Vital Signs (Most Recent):  Temp: 98 °F (36.7 °C) (08/20/19 0741)  Pulse: 72 (08/20/19 0741)  Resp: 18 (08/20/19 0741)  BP: 92/60 (08/20/19 0741)  SpO2: 98 % (08/20/19 0741)  O2 Device (Oxygen Therapy): room air (08/20/19 0741) Vital Signs (24h Range):  Temp:  [97.6 °F (36.4 °C)-98.2 °F (36.8 °C)] 98 °F (36.7 °C)  Pulse:  [66-85] 72  Resp:  [16-20] 18  SpO2:  [91 %-99 %] 98 %  BP: ()/(47-71) 92/60     Weight: 91 kg (200 lb 9.9 oz) (08/13/19 2038)  Body mass index is 32.38 kg/m².  Body surface area is 2.06 meters squared.    I/O last 3 completed shifts:  In: 860 [P.O.:360; Other:500]  Out: 3500 [Other:3500]    Physical  Exam   Constitutional: He is oriented to person, place, and time. No distress.   Obese; chronically ill-appearing   HENT:   Head: Normocephalic and atraumatic.   Mouth/Throat: Oropharynx is clear and moist.   Eyes: Conjunctivae are normal. Right eye exhibits no discharge. Left eye exhibits no discharge. No scleral icterus.   Neck: Normal range of motion. Neck supple. No JVD present.   Cardiovascular: Normal rate, regular rhythm and intact distal pulses. Exam reveals no gallop and no friction rub.   Murmur heard.  Pulmonary/Chest: Effort normal and breath sounds normal. No respiratory distress. He has no wheezes. He has no rales.   Abdominal: Soft. He exhibits distension. He exhibits no mass. There is tenderness. There is no rebound and no guarding.   Hypoactive BS in all quadrants   Genitourinary:   Genitourinary Comments: Deferred   Musculoskeletal: Normal range of motion. He exhibits edema and tenderness. He exhibits no deformity.   R UE AV Fistula w/ palpable thrill   Neurological: He is alert and oriented to person, place, and time. A cranial nerve deficit is present.   Skin: Skin is warm and dry. Rash noted. No erythema.   Psychiatric: He has a normal mood and affect. His behavior is normal.   Nursing note and vitals reviewed.      Significant Labs:sure  CBC:   Recent Labs   Lab 08/20/19  0453   WBC 6.44  6.44   RBC 3.76*  3.76*   HGB 11.4*  11.4*   HCT 35.9*  35.9*   *  100*   MCV 96  96   MCH 30.3  30.3   MCHC 31.8*  31.8*     CMP:   Recent Labs   Lab 08/19/19  0504 08/19/19  1621    135*   CALCIUM 8.6* 8.3*   ALBUMIN 3.1* 3.2*   PROT 7.0  --    * 135*   K 4.8 4.0   CO2 20* 26   CL 95 93*   BUN 59* 39*   CREATININE 9.1* 7.3*   ALKPHOS 105  --    ALT 10  --    AST 13  --    BILITOT 1.4*  --      LFTs:   Recent Labs   Lab 08/19/19  0504 08/19/19  1621   ALT 10  --    AST 13  --    ALKPHOS 105  --    BILITOT 1.4*  --    PROT 7.0  --    ALBUMIN 3.1* 3.2*       Significant  Imaging:  X-Ray: Reviewed  CT: Reviewed    Assessment/Plan:     Active Diagnoses:    Diagnosis Date Noted POA    PRINCIPAL PROBLEM:  Volume overload [E87.70] 08/13/2019 Yes    Alcoholic cirrhosis of liver with ascites [K70.31] 08/13/2019 Unknown     Chronic    Ascites due to alcoholic cirrhosis [K70.31] 08/13/2019 Yes     Chronic    ESRD (end stage renal disease) [N18.6] 08/13/2019 Yes     Chronic    AICD (automatic cardioverter/defibrillator) present [Z95.810] 08/13/2019 Yes     Chronic    Encephalopathy, metabolic [G93.41] 08/13/2019 Yes    CAD S/P percutaneous coronary angioplasty [I25.10, Z98.61] 08/13/2019 Not Applicable     Chronic    Hemiplegia as late effect of cerebrovascular accident (CVA) [I69.359] 08/13/2019 Not Applicable     Chronic    Diarrhea of presumed infectious origin [R19.7] 08/13/2019 Yes    End stage renal disease [N18.6] 08/13/2019 Yes      Problems Resolved During this Admission:     1. ESRD (HD-MWF via R UE AV Fistula)- symptomatically stable at present; significant abdominal distention on admission (s/p therapeutic paracentesis this am); no overt volume overload, significant electrolyte perturbations nor acid-base disturbance    -maintenance HD (duration: 3h; UF Goal: 1-2L as tolerated; STANDARD 'Bath'; Access: AVF; Qb: 400 ml/min, Qd: 2x BFR; give 12.5-25 gm 25% Albumin w/ HD for BP support)    -DAILY renal function panel to document sCr trend, optimize HD electrolyte 'bath' & assess response to therapy    -avoid nephrotoxic agents (NSAIDs, IV contrast dye)    -renally dose all appropriate medications, including antibiotics     2. HTN- clinically stable at present now w/ relative hypotension (92/60); no active issues    -HOLD anti-HTN medications & diuretics for now & reassess in am     3. ANEMIA- clinically stable at present; H/H AT GOAL (11.4/35.9); no active issues    -trend daily CBC (H/H) to effect optimal blood-loss surveillance     4. SECONDARY HYPERPARATHYROIDISM (sHPT)-  clinically stable at present; no active issues    -continue dietary binder (if tolerating 'po')/Vitamin D +/- HD-associated calcimimetic agent (Cinacalcet vs Etelcalcetide)     5. ESLD (hx of EtOH-induced Cirrhosis) w/ Ascites (s/p therapeutic paracentesis)- appears ill; GI Medicine service on the case; cautious fluid removal to minimize risk of precipitatiing Hepatorenal Syndrome    -management per GI Medicine/Primary Team recommendations    Thank you for your consult. I will follow-up with patient. Please contact us if you have any additional questions.    Armando Cruz III, MD  [facility]  [facility]  [phone] (C)

## 2019-08-20 NOTE — PLAN OF CARE
08/19/19 2025   Patient Assessment/Suction   Level of Consciousness (AVPU) alert   Respiratory Effort Normal   All Lung Fields Breath Sounds clear;diminished   Rhythm/Pattern, Respiratory unlabored   Cough Frequency infrequent   Cough Type nonproductive   PRE-TX-O2   O2 Device (Oxygen Therapy) room air   SpO2 99 %   Pulse Oximetry Type Intermittent   Pulse 78   Resp 20   Aerosol Therapy   $ Aerosol Therapy Charges PRN treatment not required   Daily Review of Necessity (SVN) completed   continue tx. As ordered

## 2019-08-20 NOTE — PLAN OF CARE
Problem: Physical Therapy Goal  Goal: Physical Therapy Goal  Goals to be met by: discharge     Patient will increase functional independence with mobility by performin. Supine to sit with Stand-by Assistance  2. Sit to stand transfer with Stand-by Assistance  3. Bed to chair transfer with Stand-by Assistance using Rolling Walker  4. Gait  x 150 feet with Stand-by Assistance using Rolling Walker.      Outcome: Ongoing (interventions implemented as appropriate)  Pt continues to progress towards goals but pt significantly limited due to B knee pain. Pain worse in L knee compared to R.

## 2019-08-21 LAB
ALBUMIN SERPL BCP-MCNC: 3.5 G/DL (ref 3.5–5.2)
ANION GAP SERPL CALC-SCNC: 14 MMOL/L (ref 8–16)
BASOPHILS # BLD AUTO: 0.13 K/UL (ref 0–0.2)
BASOPHILS NFR BLD: 1.7 % (ref 0–1.9)
BUN SERPL-MCNC: 33 MG/DL (ref 6–20)
CALCIUM SERPL-MCNC: 8.7 MG/DL (ref 8.7–10.5)
CHLORIDE SERPL-SCNC: 95 MMOL/L (ref 95–110)
CO2 SERPL-SCNC: 26 MMOL/L (ref 23–29)
CREAT SERPL-MCNC: 6.1 MG/DL (ref 0.5–1.4)
DIFFERENTIAL METHOD: ABNORMAL
EOSINOPHIL # BLD AUTO: 0.1 K/UL (ref 0–0.5)
EOSINOPHIL NFR BLD: 1.7 % (ref 0–8)
ERYTHROCYTE [DISTWIDTH] IN BLOOD BY AUTOMATED COUNT: 16 % (ref 11.5–14.5)
ERYTHROCYTE [DISTWIDTH] IN BLOOD BY AUTOMATED COUNT: 16 % (ref 11.5–14.5)
EST. GFR  (AFRICAN AMERICAN): 10.9 ML/MIN/1.73 M^2
EST. GFR  (NON AFRICAN AMERICAN): 9.4 ML/MIN/1.73 M^2
GLUCOSE SERPL-MCNC: 115 MG/DL (ref 70–110)
GLUCOSE SERPL-MCNC: 128 MG/DL (ref 70–110)
GLUCOSE SERPL-MCNC: 139 MG/DL (ref 70–110)
GLUCOSE SERPL-MCNC: 182 MG/DL (ref 70–110)
GLUCOSE SERPL-MCNC: 183 MG/DL (ref 70–110)
GLUCOSE SERPL-MCNC: 193 MG/DL (ref 70–110)
HCT VFR BLD AUTO: 38.1 % (ref 40–54)
HCT VFR BLD AUTO: 38.1 % (ref 40–54)
HGB BLD-MCNC: 12.2 G/DL (ref 14–18)
HGB BLD-MCNC: 12.2 G/DL (ref 14–18)
IMM GRANULOCYTES # BLD AUTO: 0.02 K/UL (ref 0–0.04)
IMM GRANULOCYTES NFR BLD AUTO: 0.3 % (ref 0–0.5)
INR PPP: 1.6
LYMPHOCYTES # BLD AUTO: 0.7 K/UL (ref 1–4.8)
LYMPHOCYTES NFR BLD: 9.8 % (ref 18–48)
MAGNESIUM SERPL-MCNC: 2.6 MG/DL (ref 1.6–2.6)
MCH RBC QN AUTO: 30.5 PG (ref 27–31)
MCH RBC QN AUTO: 30.5 PG (ref 27–31)
MCHC RBC AUTO-ENTMCNC: 32 G/DL (ref 32–36)
MCHC RBC AUTO-ENTMCNC: 32 G/DL (ref 32–36)
MCV RBC AUTO: 95 FL (ref 82–98)
MCV RBC AUTO: 95 FL (ref 82–98)
MONOCYTES # BLD AUTO: 0.9 K/UL (ref 0.3–1)
MONOCYTES NFR BLD: 12.5 % (ref 4–15)
NEUTROPHILS # BLD AUTO: 5.5 K/UL (ref 1.8–7.7)
NEUTROPHILS NFR BLD: 74 % (ref 38–73)
NRBC BLD-RTO: 0 /100 WBC
PHOSPHATE SERPL-MCNC: 10 MG/DL (ref 2.7–4.5)
PHOSPHATE SERPL-MCNC: 7.1 MG/DL (ref 2.7–4.5)
PLATELET # BLD AUTO: 96 K/UL (ref 150–350)
PLATELET # BLD AUTO: 96 K/UL (ref 150–350)
PMV BLD AUTO: 11.9 FL (ref 9.2–12.9)
PMV BLD AUTO: 11.9 FL (ref 9.2–12.9)
POTASSIUM SERPL-SCNC: 4 MMOL/L (ref 3.5–5.1)
PROTHROMBIN TIME: 18.2 SEC (ref 11.7–14)
RBC # BLD AUTO: 4 M/UL (ref 4.6–6.2)
RBC # BLD AUTO: 4 M/UL (ref 4.6–6.2)
SODIUM SERPL-SCNC: 135 MMOL/L (ref 136–145)
STOOL CULTURE: NORMAL
STOOL CULTURE: NORMAL
WBC # BLD AUTO: 7.44 K/UL (ref 3.9–12.7)
WBC # BLD AUTO: 7.44 K/UL (ref 3.9–12.7)

## 2019-08-21 PROCEDURE — 25000003 PHARM REV CODE 250: Performed by: INTERNAL MEDICINE

## 2019-08-21 PROCEDURE — 97535 SELF CARE MNGMENT TRAINING: CPT

## 2019-08-21 PROCEDURE — 82962 GLUCOSE BLOOD TEST: CPT

## 2019-08-21 PROCEDURE — P9047 ALBUMIN (HUMAN), 25%, 50ML: HCPCS | Mod: JG | Performed by: INTERNAL MEDICINE

## 2019-08-21 PROCEDURE — 83735 ASSAY OF MAGNESIUM: CPT

## 2019-08-21 PROCEDURE — 36415 COLL VENOUS BLD VENIPUNCTURE: CPT

## 2019-08-21 PROCEDURE — 97530 THERAPEUTIC ACTIVITIES: CPT

## 2019-08-21 PROCEDURE — 63600175 PHARM REV CODE 636 W HCPCS: Mod: JG | Performed by: INTERNAL MEDICINE

## 2019-08-21 PROCEDURE — 90935 HEMODIALYSIS ONE EVALUATION: CPT

## 2019-08-21 PROCEDURE — 85025 COMPLETE CBC W/AUTO DIFF WBC: CPT

## 2019-08-21 PROCEDURE — 84100 ASSAY OF PHOSPHORUS: CPT

## 2019-08-21 PROCEDURE — 85610 PROTHROMBIN TIME: CPT

## 2019-08-21 PROCEDURE — 12000002 HC ACUTE/MED SURGE SEMI-PRIVATE ROOM

## 2019-08-21 PROCEDURE — 80069 RENAL FUNCTION PANEL: CPT

## 2019-08-21 PROCEDURE — 99900035 HC TECH TIME PER 15 MIN (STAT)

## 2019-08-21 RX ORDER — HYDROCODONE BITARTRATE AND ACETAMINOPHEN 10; 325 MG/1; MG/1
1 TABLET ORAL EVERY 4 HOURS PRN
Status: DISCONTINUED | OUTPATIENT
Start: 2019-08-21 | End: 2019-08-30 | Stop reason: HOSPADM

## 2019-08-21 RX ORDER — ALBUMIN HUMAN 250 G/1000ML
25 SOLUTION INTRAVENOUS
Status: COMPLETED | OUTPATIENT
Start: 2019-08-21 | End: 2019-08-21

## 2019-08-21 RX ORDER — SODIUM CHLORIDE 9 MG/ML
INJECTION, SOLUTION INTRAVENOUS ONCE
Status: DISCONTINUED | OUTPATIENT
Start: 2019-08-21 | End: 2019-08-30 | Stop reason: HOSPADM

## 2019-08-21 RX ORDER — SODIUM CHLORIDE 9 MG/ML
INJECTION, SOLUTION INTRAVENOUS
Status: DISCONTINUED | OUTPATIENT
Start: 2019-08-21 | End: 2019-08-30 | Stop reason: HOSPADM

## 2019-08-21 RX ADMIN — ASPIRIN 81 MG: 81 TABLET, COATED ORAL at 09:08

## 2019-08-21 RX ADMIN — Medication: at 04:08

## 2019-08-21 RX ADMIN — MIDODRINE HYDROCHLORIDE 5 MG: 2.5 TABLET ORAL at 07:08

## 2019-08-21 RX ADMIN — ALLOPURINOL 100 MG: 100 TABLET ORAL at 01:08

## 2019-08-21 RX ADMIN — HYDROCODONE BITARTRATE AND ACETAMINOPHEN 1 TABLET: 10; 325 TABLET ORAL at 07:08

## 2019-08-21 RX ADMIN — AMIODARONE HYDROCHLORIDE 200 MG: 200 TABLET ORAL at 01:08

## 2019-08-21 RX ADMIN — HYDROCODONE BITARTRATE AND ACETAMINOPHEN 1 TABLET: 10; 325 TABLET ORAL at 11:08

## 2019-08-21 RX ADMIN — CALCITRIOL CAPSULES 0.25 MCG 0.25 MCG: 0.25 CAPSULE ORAL at 01:08

## 2019-08-21 RX ADMIN — RIFAXIMIN 550 MG: 550 TABLET ORAL at 07:08

## 2019-08-21 RX ADMIN — ALBUMIN (HUMAN) 25 G: 12.5 SOLUTION INTRAVENOUS at 10:08

## 2019-08-21 RX ADMIN — MUPIROCIN: 20 OINTMENT TOPICAL at 04:08

## 2019-08-21 RX ADMIN — MIDODRINE HYDROCHLORIDE 5 MG: 2.5 TABLET ORAL at 04:08

## 2019-08-21 NOTE — PROGRESS NOTES
[facility] Medicine  Progress Note    Patient Name: Tre Cali  MRN: [id number]  Patient Class: IP- Inpatient   Admission Date: 8/13/2019  Length of Stay: 6 days  Attending Physician: Luis Antonio Wyatt MD  Primary Care Provider: Primary Doctor No        Subjective:     Principal Problem:Volume overload        HPI:  Mr. Cali is a 54-year-old male with past medical history of CAD status post stent several years ago, COPD, liver cirrhosis, ESRD on hemodialysis presents with generalized weakness.  The patient presented emergency room earlier today in the morning for generalized weakness and anasarca, patient was discharged to dialysis center for hemodialysis.  Patient came back from hemodialysis for unclear reasons.  Patient reports history of generalized weakness, reports knees gave out and fell.  History of shortness of breath since last few days.  History of worsening abdominal swelling and abdominal pain since 2-3 weeks.  History of diarrhea since 3 weeks, about 4 episodes every day, nonbloody.  Patient reports sleepiness and intermittent confusion since 1-2 days.  Patient moved from [place] to [place] a week ago and yet to set up doctors.  Patient reports last hemodialysis was last Saturday.  Patient denies any history of fevers, chills or nausea, vomiting.  Patient reports history of stents several years ago.  Reports history of stroke several years ago with resultant right upper extremity and lower extremity weakness.     Patient quit drinking about 20 years ago.  Patient has longstanding history of smoking, reports no smoking intermittently few cigarettes per day.  Denies history of drug abuse.  Past surgical history - right arm AV fistula, AICD placement, cardiac stents    Overview/Hospital Course:  Therapeutic paracentesis performed 8/16  Had HD today. To overnight and follow-up labs in AM. Hopefully discharge in AM. Patient unable to perform ADLs secondary to severe  deconditioning. SNF will not take because of need for intermittent paracentesis and HD. Therefore LTAC or inpatient rehabilitation is being considered, and discharge held.  8/21 Awaiting placement on med/surg. Receiving dialysis MWF. Seen today at dialysis unit    Interval History: no change    Review of Systems   Constitutional:        Generalized weakness   HENT: Negative.    Eyes: Negative.    Respiratory: Negative.    Cardiovascular: Negative.    Gastrointestinal: Negative.    Endocrine: Negative.    Genitourinary: Negative.    Musculoskeletal:        Baker Cyst pain 7-8/10   Skin: Negative.    Allergic/Immunologic: Negative.    Neurological: Negative.    Hematological: Negative.    All other systems reviewed and are negative.    Objective:     Vital Signs (Most Recent):  Temp: 97.1 °F (36.2 °C) (08/21/19 1240)  Pulse: 61 (08/21/19 1240)  Resp: 18 (08/21/19 1240)  BP: 95/63 (08/21/19 1240)  SpO2: 98 % (08/21/19 0805) Vital Signs (24h Range):  Temp:  [97.1 °F (36.2 °C)-98.9 °F (37.2 °C)] 97.1 °F (36.2 °C)  Pulse:  [61-88] 61  Resp:  [16-20] 18  SpO2:  [97 %-100 %] 98 %  BP: ()/(60-88) 95/63     Weight: 91 kg (200 lb 9.9 oz)  Body mass index is 32.38 kg/m².    Intake/Output Summary (Last 24 hours) at 8/21/2019 1359  Last data filed at 8/21/2019 1234  Gross per 24 hour   Intake 1530 ml   Output 3500 ml   Net -1970 ml      Physical Exam   Constitutional: He is oriented to person, place, and time.   Chronic ill appearing    HENT:   Head: Normocephalic and atraumatic.   Eyes: Pupils are equal, round, and reactive to light. Conjunctivae and EOM are normal.   Neck: Normal range of motion. Neck supple.   Cardiovascular: Normal rate, regular rhythm, normal heart sounds and intact distal pulses.   Pulmonary/Chest:   Decreased entry bases without adventitious sounds   Abdominal: Soft. Bowel sounds are normal.   Musculoskeletal: Normal range of motion.   Neurological: He is alert and oriented to person, place, and  time.   Skin: Skin is warm and dry. Capillary refill takes less than 2 seconds.   Psychiatric: He has a normal mood and affect. His behavior is normal. Judgment and thought content normal.   Nursing note and vitals reviewed.      Significant Labs:   BMP:   Recent Labs   Lab 08/20/19  1629 08/21/19  0446   *  --      --    K 4.6  --    CL 92*  --    CO2 25  --    BUN 50*  --    CREATININE 8.1*  --    CALCIUM 8.7  --    MG  --  2.6     CBC:   Recent Labs   Lab 08/20/19  0453 08/21/19  0446   WBC 6.44  6.44 7.44  7.44   HGB 11.4*  11.4* 12.2*  12.2*   HCT 35.9*  35.9* 38.1*  38.1*   *  100* 96*  96*       Significant Imaging: none      Assessment/Plan:      End stage renal disease       End-stage renal disease on hemodialysis-patient due for dialysis today. Orders per nephrology.     Ascites secondary to alcoholic cirrhotic liver disease, chronic-patient for paracentesis today. 5 L taken off. Will receive aliment post paracentesis. Monitor closely during hemodialysis for end drops in blood pressure     Acute volume overload- slowly resolving.     AICD in situ- no discharges of AICD while in hospital. No arrhythmia.     Acute metabolic encephalopathy- encephalopathy resolved.     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 Difficile colitis-no evidence of C. Difficile colitis.     Diarrhea of infectious origin, ruled out     Severe chronic deconditioning- awaiting skilled nursing facility placement    CAD S/P percutaneous coronary angioplasty        ESRD (end stage renal disease)  HD on MWF      Ascites due to alcoholic cirrhosis        Alcoholic cirrhosis of liver with ascites  S/p paracenetesis; will require repeat paracentesis periodically      VTE Risk Mitigation (From admission, onward)        Ordered     IP VTE HIGH RISK PATIENT  Once      08/13/19 0045                 Luis Antonio Wyatt MD  Department of Hospital Medicine   [facility]

## 2019-08-21 NOTE — SUBJECTIVE & OBJECTIVE
Interval History: no change    Review of Systems   Constitutional:        Generalized weakness   HENT: Negative.    Eyes: Negative.    Respiratory: Negative.    Cardiovascular: Negative.    Gastrointestinal: Negative.    Endocrine: Negative.    Genitourinary: Negative.    Musculoskeletal:        Baker Cyst pain 7-8/10   Skin: Negative.    Allergic/Immunologic: Negative.    Neurological: Negative.    Hematological: Negative.    All other systems reviewed and are negative.    Objective:     Vital Signs (Most Recent):  Temp: 97.1 °F (36.2 °C) (08/21/19 1240)  Pulse: 61 (08/21/19 1240)  Resp: 18 (08/21/19 1240)  BP: 95/63 (08/21/19 1240)  SpO2: 98 % (08/21/19 0805) Vital Signs (24h Range):  Temp:  [97.1 °F (36.2 °C)-98.9 °F (37.2 °C)] 97.1 °F (36.2 °C)  Pulse:  [61-88] 61  Resp:  [16-20] 18  SpO2:  [97 %-100 %] 98 %  BP: ()/(60-88) 95/63     Weight: 91 kg (200 lb 9.9 oz)  Body mass index is 32.38 kg/m².    Intake/Output Summary (Last 24 hours) at 8/21/2019 1359  Last data filed at 8/21/2019 1234  Gross per 24 hour   Intake 1530 ml   Output 3500 ml   Net -1970 ml      Physical Exam   Constitutional: He is oriented to person, place, and time.   Chronic ill appearing    HENT:   Head: Normocephalic and atraumatic.   Eyes: Pupils are equal, round, and reactive to light. Conjunctivae and EOM are normal.   Neck: Normal range of motion. Neck supple.   Cardiovascular: Normal rate, regular rhythm, normal heart sounds and intact distal pulses.   Pulmonary/Chest:   Decreased entry bases without adventitious sounds   Abdominal: Soft. Bowel sounds are normal.   Musculoskeletal: Normal range of motion.   Neurological: He is alert and oriented to person, place, and time.   Skin: Skin is warm and dry. Capillary refill takes less than 2 seconds.   Psychiatric: He has a normal mood and affect. His behavior is normal. Judgment and thought content normal.   Nursing note and vitals reviewed.      Significant Labs:   BMP:   Recent Labs    Lab 08/20/19  1629 08/21/19  0446   *  --      --    K 4.6  --    CL 92*  --    CO2 25  --    BUN 50*  --    CREATININE 8.1*  --    CALCIUM 8.7  --    MG  --  2.6     CBC:   Recent Labs   Lab 08/20/19  0453 08/21/19  0446   WBC 6.44  6.44 7.44  7.44   HGB 11.4*  11.4* 12.2*  12.2*   HCT 35.9*  35.9* 38.1*  38.1*   *  100* 96*  96*       Significant Imaging: none

## 2019-08-21 NOTE — PLAN OF CARE
08/21/19 1726   Discharge Reassessment   Assessment Type Discharge Planning Reassessment   PATIENT EVALUATED BY [facility] TODAY.  AWAIT DETERMINATION.   PATIENT'S MEDICAL RECORD INFORMATION WAS ALSO REVIEWED BY [facility]; AWAITING DETERMINATION.

## 2019-08-21 NOTE — PLAN OF CARE
Problem: Fall Injury Risk  Goal: Absence of Fall and Fall-Related Injury  Outcome: Ongoing (interventions implemented as appropriate)  Remains free from fall injury at this time.

## 2019-08-21 NOTE — PT/OT/SLP PROGRESS
Physical Therapy      Patient Name:  Tre Cali   MRN:  [id number]    Patient not seen today secondary to Dialysis. Will follow-up 08/22/19.    Marlin Kramer, PT

## 2019-08-21 NOTE — PT/OT/SLP PROGRESS
Occupational Therapy      Patient Name:  Tre Cali   MRN:  [id number]    Patient not seen today secondary to Dialysis. Will follow-up in PM if time allows.    Spring Johnson OT  8/21/2019

## 2019-08-21 NOTE — CARE UPDATE
08/20/19 2027   Patient Assessment/Suction   Level of Consciousness (AVPU) alert   Respiratory Effort Normal;Unlabored   Expansion/Accessory Muscles/Retractions no use of accessory muscles   All Lung Fields Breath Sounds clear;equal bilaterally   Rhythm/Pattern, Respiratory unlabored   Cough Frequency infrequent   PRE-TX-O2   O2 Device (Oxygen Therapy) room air   SpO2 97 %   Pulse Oximetry Type Intermittent   $ Pulse Oximetry - Multiple Charge Pulse Oximetry - Multiple   Pulse 70   Resp 16   Aerosol Therapy   $ Aerosol Therapy Charges PRN treatment not required   Respiratory Treatment Status (SVN) PRN treatment not required

## 2019-08-21 NOTE — PT/OT/SLP PROGRESS
Occupational Therapy   Treatment    Name: Tre Cali  MRN: [id number]  Admitting Diagnosis:  Volume overload       Recommendations:     Discharge Recommendations: nursing facility, skilled  Discharge Equipment Recommendations:  walker, rolling, tub bench(will continue to assess)  Barriers to discharge:       Assessment:     Tre Cali is a 54 y.o. male with a medical diagnosis of Volume overload. Pt tolerated session fair, was motivated to participate in therapy. However, mobility limited 2/2 bilateral knee pain. Nurse extender entered room to bathe pt, therapist assisted while pt in standing. Pt progressing, continue POC.    Rehab Prognosis:  Fair; patient would benefit from acute skilled OT services to address these deficits and reach maximum level of function.       Plan:     Patient to be seen 5 x/week to address the above listed problems via self-care/home management, therapeutic activities, therapeutic exercises  · Plan of Care Expires: 09/15/19  · Plan of Care Reviewed with: patient    Subjective     Pain/Comfort:  · Pain Rating 1: 9/10  · Location - Side 1: Bilateral  · Location 1: knee  · Pain Addressed 1: Reposition, Cessation of Activity    Objective:     Communicated with: nurse prior to session.  Patient found up in chair eating lunch with telemetry upon OT entry to room.    General Precautions: Standard, fall   Orthopedic Precautions:N/A   Braces: N/A     Occupational Performance:     Functional Mobility/Transfers:  · Patient completed Sit <> Stand Transfer x2 with moderate assistance with rolling walker   · Functional Mobility: Pt ambulated approx 10 ft with CGA and RW.    Activities of Daily Living:  · Feeding:  independence   · Grooming: supervision seated in chair at bedside  · Bathing: maximal assistance in standing to clean perianal area      AMPAC 6 Click ADL: 14    Treatment & Education:  Role of OT, POC, ADL training, mobility training, RW managment    Patient left up in chair with  call button in reachEducation:      GOALS:   Multidisciplinary Problems     Occupational Therapy Goals        Problem: Occupational Therapy Goal    Goal Priority Disciplines Outcome Interventions   Occupational Therapy Goal     OT, PT/OT Ongoing (interventions implemented as appropriate)    Description:  Goals to be met by: discharge     Patient will increase functional independence with ADLs by performing:    LE Dressing with Minimal Assistance.  Grooming while seated at sink with [place].  Toileting from toilet with Minimal Assistance for hygiene and clothing management.   Toilet transfer to toilet with Stand-by Assistance.                      Time Tracking:     OT Date of Treatment: 08/21/19  OT Start Time: 1400  OT Stop Time: 1430  OT Total Time (min): 30 min    Billable Minutes:Self Care/Home Management 20  Therapeutic Activity 10    Spring Johnson OT  8/21/2019

## 2019-08-21 NOTE — NURSING
HD complete, pt tolerated well  Report given to KULDEEP Uriarte  Net UF 3000mL  Pt back to unit via wheelchair

## 2019-08-21 NOTE — PROGRESS NOTES
[facility]  Nephrology  Progress Note    Patient Name: Tre Cali  MRN: [id number]  Admission Date: 8/13/2019  Hospital Length of Stay: 6 days  Attending Provider: Luis Antonio Wyatt MD   Primary Care Physician: Primary Doctor No  Principal Problem:Volume overload      Subjective:     Interval History: feels 'ok' this am; still has abdominal fullness though improved (may need repeat paracentesis)    Review of patient's allergies indicates:   Allergen Reactions    Sulfa (sulfonamide antibiotics) Rash     Current Facility-Administered Medications   Medication Frequency    0.9%  NaCl infusion PRN    0.9%  NaCl infusion Once    albumin human 25% bottle 50 g Once    albuterol nebulizer solution 2.5 mg Q4H PRN    allopurinol tablet 100 mg Daily    amiodarone tablet 200 mg Daily    aspirin EC tablet 81 mg Daily    calcitRIOL capsule 0.25 mcg Daily    calcium acetate capsule 667 mg TID WM    dextrose 50% injection 12.5 g PRN    dextrose 50% injection 25 g PRN    glucagon (human recombinant) injection 1 mg PRN    glucose chewable tablet 16 g PRN    glucose chewable tablet 24 g PRN    midodrine tablet 5 mg TID    mupirocin 2 % ointment BID    rifAXIMin tablet 550 mg BID    sodium chloride 0.9% flush 10 mL PRN    white petrolatum 41 % ointment Daily       Objective:     Vital Signs (Most Recent):  Temp: 98.8 °F (37.1 °C) (08/21/19 0418)  Pulse: 88 (08/21/19 0418)  Resp: 16 (08/21/19 0418)  BP: 125/88 (08/21/19 0418)  SpO2: 98 % (08/21/19 0418)  O2 Device (Oxygen Therapy): room air (08/20/19 2027) Vital Signs (24h Range):  Temp:  [97.9 °F (36.6 °C)-98.9 °F (37.2 °C)] 98.8 °F (37.1 °C)  Pulse:  [66-88] 88  Resp:  [16-18] 16  SpO2:  [97 %-98 %] 98 %  BP: ()/(60-88) 125/88     Weight: 91 kg (200 lb 9.9 oz) (08/13/19 2038)  Body mass index is 32.38 kg/m².  Body surface area is 2.06 meters squared.    I/O last 3 completed shifts:  In: 1270 [P.O.:1270]  Out: -     Physical Exam    Constitutional: He is oriented to person, place, and time. No distress.   Obese; chronically ill-appearing   HENT:   Head: Normocephalic and atraumatic.   Mouth/Throat: Oropharynx is clear and moist.   Eyes: Conjunctivae are normal. Right eye exhibits no discharge. Left eye exhibits no discharge. No scleral icterus.   Neck: Normal range of motion. Neck supple. No JVD present.   Cardiovascular: Normal rate, regular rhythm and intact distal pulses. Exam reveals no gallop and no friction rub.   Murmur heard.  Pulmonary/Chest: Effort normal and breath sounds normal. No respiratory distress. He has no wheezes. He has no rales.   Abdominal: Soft. He exhibits distension. He exhibits no mass. There is tenderness. There is no rebound and no guarding.   Hypoactive BS in all quadrants   Genitourinary:   Genitourinary Comments: Deferred   Musculoskeletal: Normal range of motion. He exhibits edema and tenderness. He exhibits no deformity.   R UE AV Fistula w/ palpable thrill   Neurological: He is alert and oriented to person, place, and time. A cranial nerve deficit is present.   Skin: Skin is warm and dry. Rash noted. No erythema.   Psychiatric: He has a normal mood and affect. His behavior is normal.   Nursing note and vitals reviewed.      Significant Labs:sure  CBC:   Recent Labs   Lab 08/21/19  0446   WBC 7.44  7.44   RBC 4.00*  4.00*   HGB 12.2*  12.2*   HCT 38.1*  38.1*   PLT 96*  96*   MCV 95  95   MCH 30.5  30.5   MCHC 32.0  32.0     CMP:   Recent Labs   Lab 08/19/19  0504  08/20/19  1629      < > 111*   CALCIUM 8.6*   < > 8.7   ALBUMIN 3.1*   < > 3.3*   PROT 7.0  --   --    *   < > 136   K 4.8   < > 4.6   CO2 20*   < > 25   CL 95   < > 92*   BUN 59*   < > 50*   CREATININE 9.1*   < > 8.1*   ALKPHOS 105  --   --    ALT 10  --   --    AST 13  --   --    BILITOT 1.4*  --   --     < > = values in this interval not displayed.     LFTs:   Recent Labs   Lab 08/19/19  0504  08/20/19  1629   ALT 10  --    --    AST 13  --   --    ALKPHOS 105  --   --    BILITOT 1.4*  --   --    PROT 7.0  --   --    ALBUMIN 3.1*   < > 3.3*    < > = values in this interval not displayed.       Significant Imaging:  X-Ray: Reviewed  CT: Reviewed    Assessment/Plan:     Active Diagnoses:    Diagnosis Date Noted POA    PRINCIPAL PROBLEM:  Volume overload [E87.70] 08/13/2019 Yes    Alcoholic cirrhosis of liver with ascites [K70.31] 08/13/2019 Unknown     Chronic    Ascites due to alcoholic cirrhosis [K70.31] 08/13/2019 Yes     Chronic    ESRD (end stage renal disease) [N18.6] 08/13/2019 Yes     Chronic    AICD (automatic cardioverter/defibrillator) present [Z95.810] 08/13/2019 Yes     Chronic    Encephalopathy, metabolic [G93.41] 08/13/2019 Yes    CAD S/P percutaneous coronary angioplasty [I25.10, Z98.61] 08/13/2019 Not Applicable     Chronic    Hemiplegia as late effect of cerebrovascular accident (CVA) [I69.359] 08/13/2019 Not Applicable     Chronic    Diarrhea of presumed infectious origin [R19.7] 08/13/2019 Yes    End stage renal disease [N18.6] 08/13/2019 Yes      Problems Resolved During this Admission:     1. ESRD (HD-MWF via R UE AV Fistula)- symptomatically stable at present; significant abdominal distention on admission (s/p therapeutic paracentesis this am); no overt volume overload, significant electrolyte perturbations nor acid-base disturbance    -maintenance HD (duration: 3h; UF Goal: 1-2L as tolerated; STANDARD 'Bath'; Access: AVF; Qb: 400 ml/min, Qd: 2x BFR; give 12.5-25 gm 25% Albumin w/ HD for BP support)    -DAILY renal function panel to document sCr trend, optimize HD electrolyte 'bath' & assess response to therapy    -avoid nephrotoxic agents (NSAIDs, IV contrast dye)    -renally dose all appropriate medications, including antibiotics     2. HTN- clinically stable at present now w/ relative hypotension (92/60); no active issues    -HOLD anti-HTN medications & diuretics for now & reassess in am     3. ANEMIA-  clinically stable at present; H/H AT GOAL (12.2/38.1); no active issues    -trend daily CBC (H/H) to effect optimal blood-loss surveillance     4. SECONDARY HYPERPARATHYROIDISM (sHPT)- clinically stable at present; no active issues    -continue dietary binder (if tolerating 'po')/Vitamin D +/- HD-associated calcimimetic agent (Cinacalcet vs Etelcalcetide)     5. ESLD (hx of EtOH-induced Cirrhosis) w/ Ascites (s/p therapeutic paracentesis)- appears ill; GI Medicine service on the case; cautious fluid removal to minimize risk of precipitatiing Hepatorenal Syndrome    -management per GI Medicine/Primary Team recommendations    Thank you for your consult. I will follow-up with patient. Please contact us if you have any additional questions.    Armando Cruz III, MD  [facility]  [facility]  [phone] (C)

## 2019-08-21 NOTE — PLAN OF CARE
08/21/19 0805   Patient Assessment/Suction   Level of Consciousness (AVPU) alert   Respiratory Effort Normal;Unlabored   Expansion/Accessory Muscles/Retractions expansion symmetric;no use of accessory muscles   All Lung Fields Breath Sounds clear;equal bilaterally   Rhythm/Pattern, Respiratory pattern regular   PRE-TX-O2   O2 Device (Oxygen Therapy) room air   SpO2 98 %   Pulse 83   Resp 18   Positioning Sitting in chair   Aerosol Therapy   $ Aerosol Therapy Charges PRN treatment not required

## 2019-08-22 LAB
BASOPHILS # BLD AUTO: 0.15 K/UL (ref 0–0.2)
BASOPHILS NFR BLD: 2.1 % (ref 0–1.9)
DIFFERENTIAL METHOD: ABNORMAL
EOSINOPHIL # BLD AUTO: 0.2 K/UL (ref 0–0.5)
EOSINOPHIL NFR BLD: 2.6 % (ref 0–8)
ERYTHROCYTE [DISTWIDTH] IN BLOOD BY AUTOMATED COUNT: 16.3 % (ref 11.5–14.5)
GLUCOSE SERPL-MCNC: 107 MG/DL (ref 70–110)
GLUCOSE SERPL-MCNC: 118 MG/DL (ref 70–110)
GLUCOSE SERPL-MCNC: 119 MG/DL (ref 70–110)
GLUCOSE SERPL-MCNC: 120 MG/DL (ref 70–110)
HCT VFR BLD AUTO: 38.8 % (ref 40–54)
HGB BLD-MCNC: 11.9 G/DL (ref 14–18)
IMM GRANULOCYTES # BLD AUTO: 0.03 K/UL (ref 0–0.04)
IMM GRANULOCYTES NFR BLD AUTO: 0.4 % (ref 0–0.5)
INR PPP: 1.5
LYMPHOCYTES # BLD AUTO: 0.8 K/UL (ref 1–4.8)
LYMPHOCYTES NFR BLD: 11.6 % (ref 18–48)
MAGNESIUM SERPL-MCNC: 2.5 MG/DL (ref 1.6–2.6)
MCH RBC QN AUTO: 30.4 PG (ref 27–31)
MCHC RBC AUTO-ENTMCNC: 30.7 G/DL (ref 32–36)
MCV RBC AUTO: 99 FL (ref 82–98)
MONOCYTES # BLD AUTO: 0.8 K/UL (ref 0.3–1)
MONOCYTES NFR BLD: 11.8 % (ref 4–15)
NEUTROPHILS # BLD AUTO: 5 K/UL (ref 1.8–7.7)
NEUTROPHILS NFR BLD: 71.5 % (ref 38–73)
NRBC BLD-RTO: 0 /100 WBC
O+P STL TRI STN: NORMAL
PHOSPHATE SERPL-MCNC: 8.5 MG/DL (ref 2.7–4.5)
PLATELET # BLD AUTO: 90 K/UL (ref 150–350)
PMV BLD AUTO: 12.1 FL (ref 9.2–12.9)
PROTHROMBIN TIME: 17.9 SEC (ref 11.7–14)
RBC # BLD AUTO: 3.91 M/UL (ref 4.6–6.2)
WBC # BLD AUTO: 7.05 K/UL (ref 3.9–12.7)

## 2019-08-22 PROCEDURE — 85025 COMPLETE CBC W/AUTO DIFF WBC: CPT

## 2019-08-22 PROCEDURE — 12000002 HC ACUTE/MED SURGE SEMI-PRIVATE ROOM

## 2019-08-22 PROCEDURE — 85610 PROTHROMBIN TIME: CPT

## 2019-08-22 PROCEDURE — 99900035 HC TECH TIME PER 15 MIN (STAT)

## 2019-08-22 PROCEDURE — 36415 COLL VENOUS BLD VENIPUNCTURE: CPT

## 2019-08-22 PROCEDURE — 25000003 PHARM REV CODE 250: Performed by: INTERNAL MEDICINE

## 2019-08-22 PROCEDURE — 97530 THERAPEUTIC ACTIVITIES: CPT

## 2019-08-22 PROCEDURE — 83735 ASSAY OF MAGNESIUM: CPT

## 2019-08-22 PROCEDURE — 84100 ASSAY OF PHOSPHORUS: CPT

## 2019-08-22 PROCEDURE — 97116 GAIT TRAINING THERAPY: CPT

## 2019-08-22 PROCEDURE — 97110 THERAPEUTIC EXERCISES: CPT

## 2019-08-22 RX ADMIN — Medication: at 09:08

## 2019-08-22 RX ADMIN — MUPIROCIN: 20 OINTMENT TOPICAL at 09:08

## 2019-08-22 RX ADMIN — ASPIRIN 81 MG: 81 TABLET, COATED ORAL at 09:08

## 2019-08-22 RX ADMIN — HYDROCODONE BITARTRATE AND ACETAMINOPHEN 1 TABLET: 10; 325 TABLET ORAL at 01:08

## 2019-08-22 RX ADMIN — ALLOPURINOL 100 MG: 100 TABLET ORAL at 09:08

## 2019-08-22 RX ADMIN — RIFAXIMIN 550 MG: 550 TABLET ORAL at 09:08

## 2019-08-22 RX ADMIN — MIDODRINE HYDROCHLORIDE 5 MG: 2.5 TABLET ORAL at 05:08

## 2019-08-22 RX ADMIN — MIDODRINE HYDROCHLORIDE 5 MG: 2.5 TABLET ORAL at 10:08

## 2019-08-22 RX ADMIN — AMIODARONE HYDROCHLORIDE 200 MG: 200 TABLET ORAL at 09:08

## 2019-08-22 RX ADMIN — CALCITRIOL CAPSULES 0.25 MCG 0.25 MCG: 0.25 CAPSULE ORAL at 09:08

## 2019-08-22 RX ADMIN — RIFAXIMIN 550 MG: 550 TABLET ORAL at 08:08

## 2019-08-22 RX ADMIN — HYDROCODONE BITARTRATE AND ACETAMINOPHEN 1 TABLET: 10; 325 TABLET ORAL at 10:08

## 2019-08-22 RX ADMIN — HYDROCODONE BITARTRATE AND ACETAMINOPHEN 1 TABLET: 10; 325 TABLET ORAL at 05:08

## 2019-08-22 RX ADMIN — HYDROCODONE BITARTRATE AND ACETAMINOPHEN 1 TABLET: 10; 325 TABLET ORAL at 08:08

## 2019-08-22 NOTE — PROGRESS NOTES
[facility]  Nephrology  Progress Note    Patient Name: Tre Cali  MRN: [id number]  Admission Date: 8/13/2019  Hospital Length of Stay: 7 days  Attending Provider: Luis Antonio Wyatt MD   Primary Care Physician: Primary Doctor No  Principal Problem:Volume overload      Subjective:     Interval History: feels 'ok' this am; denies any abdominal discomfort or any additional complaints; awaiting outpatient LTAC placement (recommend [place], [place])    Review of patient's allergies indicates:   Allergen Reactions    Sulfa (sulfonamide antibiotics) Rash     Current Facility-Administered Medications   Medication Frequency    0.9%  NaCl infusion PRN    0.9%  NaCl infusion Once    albumin human 25% bottle 50 g Once    albuterol nebulizer solution 2.5 mg Q4H PRN    allopurinol tablet 100 mg Daily    amiodarone tablet 200 mg Daily    aspirin EC tablet 81 mg Daily    calcitRIOL capsule 0.25 mcg Daily    calcium acetate capsule 667 mg TID WM    dextrose 50% injection 12.5 g PRN    dextrose 50% injection 25 g PRN    glucagon (human recombinant) injection 1 mg PRN    glucose chewable tablet 16 g PRN    glucose chewable tablet 24 g PRN    HYDROcodone-acetaminophen  mg per tablet 1 tablet Q4H PRN    midodrine tablet 5 mg TID    mupirocin 2 % ointment BID    rifAXIMin tablet 550 mg BID    sodium chloride 0.9% flush 10 mL PRN    white petrolatum 41 % ointment Daily       Objective:     Vital Signs (Most Recent):  Temp: 97.4 °F (36.3 °C) (08/22/19 0418)  Pulse: 67 (08/22/19 0418)  Resp: 18 (08/22/19 0418)  BP: 108/72 (08/22/19 0418)  SpO2: 98 % (08/22/19 0040)  O2 Device (Oxygen Therapy): room air (08/21/19 0805) Vital Signs (24h Range):  Temp:  [97.1 °F (36.2 °C)-98.1 °F (36.7 °C)] 97.4 °F (36.3 °C)  Pulse:  [61-83] 67  Resp:  [18-20] 18  SpO2:  [98 %-100 %] 98 %  BP: ()/(60-75) 108/72     Weight: 91 kg (200 lb 9.9 oz) (08/13/19 2038)  Body mass  index is 32.38 kg/m².  Body surface area is 2.06 meters squared.    I/O last 3 completed shifts:  In: 1730 [P.O.:1230; Other:500]  Out: 3500 [Other:3500]    Physical Exam   Constitutional: He is oriented to person, place, and time. No distress.   Obese; chronically ill-appearing   HENT:   Head: Normocephalic and atraumatic.   Mouth/Throat: Oropharynx is clear and moist.   Eyes: Conjunctivae are normal. Right eye exhibits no discharge. Left eye exhibits no discharge. No scleral icterus.   Neck: Normal range of motion. Neck supple. No JVD present.   Cardiovascular: Normal rate, regular rhythm and intact distal pulses. Exam reveals no gallop and no friction rub.   Murmur heard.  Pulmonary/Chest: Effort normal and breath sounds normal. No respiratory distress. He has no wheezes. He has no rales.   Abdominal: Soft. He exhibits distension. He exhibits no mass. There is tenderness. There is no rebound and no guarding.   Hypoactive BS in all quadrants   Genitourinary:   Genitourinary Comments: Deferred   Musculoskeletal: Normal range of motion. He exhibits edema and tenderness. He exhibits no deformity.   R UE AV Fistula w/ palpable thrill   Neurological: He is alert and oriented to person, place, and time. A cranial nerve deficit is present.   Skin: Skin is warm and dry. Rash noted. No erythema.   Psychiatric: He has a normal mood and affect. His behavior is normal.   Nursing note and vitals reviewed.      Significant Labs:sure  CBC:   Recent Labs   Lab 08/22/19  0451   WBC 7.05   RBC 3.91*   HGB 11.9*   HCT 38.8*   PLT 90*   MCV 99*   MCH 30.4   MCHC 30.7*     CMP:   Recent Labs   Lab 08/19/19  0504  08/21/19  1610      < > 139*   CALCIUM 8.6*   < > 8.7   ALBUMIN 3.1*   < > 3.5   PROT 7.0  --   --    *   < > 135*   K 4.8   < > 4.0   CO2 20*   < > 26   CL 95   < > 95   BUN 59*   < > 33*   CREATININE 9.1*   < > 6.1*   ALKPHOS 105  --   --    ALT 10  --   --    AST 13  --   --    BILITOT 1.4*  --   --     < > =  values in this interval not displayed.     LFTs:   Recent Labs   Lab 08/19/19  0504  08/21/19  1610   ALT 10  --   --    AST 13  --   --    ALKPHOS 105  --   --    BILITOT 1.4*  --   --    PROT 7.0  --   --    ALBUMIN 3.1*   < > 3.5    < > = values in this interval not displayed.       Significant Imaging:  X-Ray: Reviewed  CT: Reviewed    Assessment/Plan:     Active Diagnoses:    Diagnosis Date Noted POA    PRINCIPAL PROBLEM:  Volume overload [E87.70] 08/13/2019 Yes    Alcoholic cirrhosis of liver with ascites [K70.31] 08/13/2019 Unknown     Chronic    Ascites due to alcoholic cirrhosis [K70.31] 08/13/2019 Yes     Chronic    ESRD (end stage renal disease) [N18.6] 08/13/2019 Yes     Chronic    AICD (automatic cardioverter/defibrillator) present [Z95.810] 08/13/2019 Yes     Chronic    Encephalopathy, metabolic [G93.41] 08/13/2019 Yes    CAD S/P percutaneous coronary angioplasty [I25.10, Z98.61] 08/13/2019 Not Applicable     Chronic    Hemiplegia as late effect of cerebrovascular accident (CVA) [I69.359] 08/13/2019 Not Applicable     Chronic    Diarrhea of presumed infectious origin [R19.7] 08/13/2019 Yes    End stage renal disease [N18.6] 08/13/2019 Yes      Problems Resolved During this Admission:     1. ESRD (HD-MWF via R UE AV Fistula)- symptomatically stable at present; significant abdominal distention on admission (s/p therapeutic paracentesis this am); no overt volume overload, significant electrolyte perturbations nor acid-base disturbance    -maintenance HD (duration: 3h; UF Goal: 1-2L as tolerated; STANDARD 'Bath'; Access: AVF; Qb: 400 ml/min, Qd: 2x BFR; give 12.5-25 gm 25% Albumin w/ HD for BP support)    -DAILY renal function panel to document sCr trend, optimize HD electrolyte 'bath' & assess response to therapy    -avoid nephrotoxic agents (NSAIDs, IV contrast dye)    -renally dose all appropriate medications, including antibiotics     2. HTN- clinically stable at present now w/ relative  hypotension (92/60); no active issues    -HOLD anti-HTN medications & diuretics for now & reassess in am     3. ANEMIA- clinically stable at present; H/H AT GOAL (11.9/38.8); no active issues    -trend daily CBC (H/H) to effect optimal blood-loss surveillance     4. SECONDARY HYPERPARATHYROIDISM (sHPT)- clinically stable at present; no active issues    -continue dietary binder (if tolerating 'po')/Vitamin D +/- HD-associated calcimimetic agent (Cinacalcet vs Etelcalcetide)     5. ESLD (hx of EtOH-induced Cirrhosis) w/ Ascites (s/p therapeutic paracentesis)- appears ill; GI Medicine service on the case; cautious fluid removal to minimize risk of precipitatiing Hepatorenal Syndrome    -management per GI Medicine/Primary Team recommendations    Thank you for your consult. I will follow-up with patient. Please contact us if you have any additional questions.    Armando Cruz III, MD  [facility]  [facility]  [phone] (C)

## 2019-08-22 NOTE — PT/OT/SLP PROGRESS
"Occupational Therapy   Treatment    Name: Tre Cali  MRN: [id number]  Admitting Diagnosis:  Volume overload       Recommendations:     Discharge Recommendations: nursing facility, skilled  Discharge Equipment Recommendations:  walker, rolling, tub bench(will continue to assess)  Barriers to discharge:  Decreased caregiver support    Assessment:     Tre Cali is a 54 y.o. male with a medical diagnosis of Volume overload.  He presents with continued decline in overall function and high burden of care. Performance deficits affecting function are  impiared mobility, impaired ADL's, impaired B LE function, impaired UE function.     Rehab Prognosis:  Fair; patient would benefit from acute skilled OT services to address these deficits and reach maximum level of function.       Plan:     Patient to be seen 5 x/week to address the above listed problems via self-care/home management, therapeutic activities, therapeutic exercises  · Plan of Care Expires: 09/15/19  · Plan of Care Reviewed with: patient    Subjective     Pain/Comfort:  · Pain Rating 1: (pt with extensive pain in B knees with L > R to the point of pt not being able to bear wt on his L LE during standing activity )  · Pain Addressed 1: Pre-medicate for activity, Nurse notified, Reposition, Cessation of Activity(pt's R knee begins to "buckle" in standing due to extreme support only on her R LE in standing )    Objective:     Communicated with: Nursing prior to session.  Patient found up in chair with telemetry, peripheral IV upon OT entry to room.    General Precautions: Standard, fall   Orthopedic Precautions:N/A   Braces:       Occupational Performance is greatly impacted by pt's B Knee pain with L > R; pt unable to stand > 1 1/2 minutes w/ experience of R knee buckling and L LE not able to bear weight in standing      Functional Mobility/Transfers:  · Patient completed Sit <> Stand Transfer with minimum assistance  with  rolling walker " "  · Functional Mobility: NT due to pt's feeling of "knee's giving out"      Treatment & Education: Therapeutic exercises sitting in chair with cues for upright posture and feet flat on the floor needed: dowel ex of horz AB/AD and shoulder flex 2 x 10 reps with good ROM; Red T bar 2 x 10 reps for supination; 8 reps for pronation with assist to complete range ~ 50% for pt to then hold contraction at end range and slowly release; pt with self expression with OT providing a listening ear w/ pt expressing his time in the  as a [employer]; pt did become tearful therefore OT provided pt w/ a gentle hug     Patient left up in chair with all lines intact, chair alarm on and nursing  notifiedEducation:      GOALS:   Multidisciplinary Problems     Occupational Therapy Goals        Problem: Occupational Therapy Goal    Goal Priority Disciplines Outcome Interventions   Occupational Therapy Goal     OT, PT/OT Revised    Description:  Goals to be met by: discharge     Patient will increase functional independence with ADLs by performing:    LE Dressing with Minimal Assistance.  Grooming while seated at sink with [place].  Toileting from toilet with Minimal Assistance for hygiene and clothing management.   Toilet transfer to toilet with Stand-by Assistance.     Adding goal:   Pt to be independent with UE exercises program.   Evie Arita OT  8/22/2019  .                    Time Tracking:     OT Date of Treatment: 08/22/19  OT Start Time: 1503  OT Stop Time: 1541  OT Total Time (min): 38 min    Billable Minutes:Therapeutic Activity 13  Therapeutic Exercise 25    Evie Arita OT  8/22/2019    "

## 2019-08-22 NOTE — PLAN OF CARE
Problem: Occupational Therapy Goal  Goal: Occupational Therapy Goal  Goals to be met by: discharge     Patient will increase functional independence with ADLs by performing:    LE Dressing with Minimal Assistance.  Grooming while seated at sink with [place].  Toileting from toilet with Minimal Assistance for hygiene and clothing management.   Toilet transfer to toilet with Stand-by Assistance.     Outcome: Revised  Adding goal:   Pt to be independent with UE exercises program.   Evie Arita OT  8/22/2019  .

## 2019-08-22 NOTE — PLAN OF CARE
08/22/19 0040   PRE-TX-O2   SpO2 98 %   Pulse Oximetry Type Intermittent   Pulse 73   Resp 18   Aerosol Therapy   $ Aerosol Therapy Charges PRN treatment not required  (pt. resting quietly with nadn)   Daily Review of Necessity (SVN) completed   continue tx.as ordered

## 2019-08-22 NOTE — PROGRESS NOTES
"[facility]  Adult Nutrition  Progress Note    SUMMARY       Recommendations    Recommendation/Intervention: 1.) Add ADA diet restriction to diet order for hx DM 2.)  to assist with meal selections daily  Goals: 1.) Continued intake >75% of meals in order to meet estimated energy and protein needs   Nutrition Goal Status: new  Communication of RD Recs: reviewed with RN    Reason for Assessment    Reason For Assessment: length of stay  Diagnosis: other (see comments)(volume overload)  Relevant Medical History: hx: ESRD on HD, cirrhosis, DM, COPD  Interdisciplinary Rounds: attended    Nutrition Risk Screen    Nutrition Risk Screen: no indicators present    Nutrition/Diet History    Food Preferences: none voiced  Spiritual, Cultural Beliefs, Rastafari Practices, Values that Affect Care: yes  Factors Affecting Nutritional Intake: None identified at this time    Anthropometrics    Temp: 97.6 °F (36.4 °C)  Height Method: Stated  Height: 5' 6" (167.6 cm)  Height (inches): 66 in  Weight Method: Bed Scale  Weight: 91 kg (200 lb 9.9 oz)  Weight (lb): 200.62 lb  Ideal Body Weight (IBW), Male: 142 lb  % Ideal Body Weight, Male (lb): 141.28 lb  BMI (Calculated): 32.4  BMI Grade: 30 - 34.9- obesity - grade I  Weight Loss: (pt denies any wt changes)       Lab/Procedures/Meds    Pertinent Labs Reviewed: reviewed  BMP  Lab Results   Component Value Date     (L) 08/21/2019    K 4.0 08/21/2019    CL 95 08/21/2019    CO2 26 08/21/2019    BUN 33 (H) 08/21/2019    CREATININE 6.1 (H) 08/21/2019    CALCIUM 8.7 08/21/2019    ANIONGAP 14 08/21/2019    ESTGFRAFRICA 10.9 (A) 08/21/2019    EGFRNONAA 9.4 (A) 08/21/2019     Lab Results   Component Value Date    CALCIUM 8.7 08/21/2019    PHOS 8.5 (H) 08/22/2019     Lab Results   Component Value Date    WBC 7.05 08/22/2019    HGB 11.9 (L) 08/22/2019    HCT 38.8 (L) 08/22/2019    MCV 99 (H) 08/22/2019    PLT 90 (L) 08/22/2019       Pertinent Medications Reviewed: " reviewed  Scheduled Meds:   sodium chloride 0.9%   Intravenous Once    albumin human 25%  50 g Intravenous Once    allopurinol  100 mg Oral Daily    amiodarone  200 mg Oral Daily    aspirin  81 mg Oral Daily    calcitRIOL  0.25 mcg Oral Daily    calcium acetate  667 mg Oral TID WM    midodrine  5 mg Oral TID    mupirocin   Nasal BID    rifAXIMin  550 mg Oral BID    white petrolatum   Topical (Top) Daily       Estimated/Assessed Needs    Weight Used For Calorie Calculations: 91 kg (200 lb 9.9 oz)  Energy Calorie Requirements (kcal): 2730 (30 kcal/kg)  Energy Need Method: Kcal/kg  Protein Requirements: 109-136 g (1.2-1.5 g/kg)  Weight Used For Protein Calculations: 91 kg (200 lb 9.9 oz)     Estimated Fluid Requirement Method: other (see comments)(1000 ml + UOP)  RDA Method (mL): 2730         Nutrition Prescription Ordered    Current Diet Order: renal     Evaluation of Received Nutrient/Fluid Intake    Energy Calories Required: meeting needs  Protein Required: meeting needs  Fluid Required: meeting needs  Tolerance: tolerating  % Meal Intake: 75 - 100 %    Nutrition Risk    Level of Risk/Frequency of Follow-up: moderate     Monitor and Evaluation    Food and Nutrient Intake: energy intake, food and beverage intake  Food and Nutrient Adminstration: diet order  Physical Activity and Function: nutrition-related ADLs and IADLs  Anthropometric Measurements: weight change  Biochemical Data, Medical Tests and Procedures: gastrointestinal profile, glucose/endocrine profile, electrolyte and renal panel  Nutrition-Focused Physical Findings: overall appearance       Nutrition Follow-Up    RD Follow-up?: Yes    Lois Flores  08/22/2019  9:36 AM

## 2019-08-22 NOTE — PROGRESS NOTES
[facility]  Progress Note    Patient Name: Tre Cali  MRN: [id number]  Patient Class: IP- Inpatient   Admission Date: 8/13/2019  Length of Stay: 7 days  Attending Physician: Luis Antonio Wyatt MD  Primary Care Provider: Primary Doctor No        Subjective:     Principal Problem:Volume overload        HPI:  Mr. Cali is a 54-year-old male with past medical history of CAD status post stent several years ago, COPD, liver cirrhosis, ESRD on hemodialysis presents with generalized weakness.  The patient presented emergency room earlier today in the morning for generalized weakness and anasarca, patient was discharged to dialysis center for hemodialysis.  Patient came back from hemodialysis for unclear reasons.  Patient reports history of generalized weakness, reports knees gave out and fell.  History of shortness of breath since last few days.  History of worsening abdominal swelling and abdominal pain since 2-3 weeks.  History of diarrhea since 3 weeks, about 4 episodes every day, nonbloody.  Patient reports sleepiness and intermittent confusion since 1-2 days.  Patient moved from [place] to [place] a week ago and yet to set up doctors.  Patient reports last hemodialysis was last Saturday.  Patient denies any history of fevers, chills or nausea, vomiting.  Patient reports history of stents several years ago.  Reports history of stroke several years ago with resultant right upper extremity and lower extremity weakness.     Patient quit drinking about 20 years ago.  Patient has longstanding history of smoking, reports no smoking intermittently few cigarettes per day.  Denies history of drug abuse.  Past surgical history - right arm AV fistula, AICD placement, cardiac stents    Overview/Hospital Course:  Therapeutic paracentesis performed 8/16  Had HD today. To overnight and follow-up labs in AM. Hopefully discharge in AM. Patient unable to perform ADLs secondary to severe  deconditioning. SNF will not take because of need for intermittent paracentesis and HD. Therefore LTAC or inpatient rehabilitation is being considered, and discharge held.  8/21 Awaiting placement on med/surg. Receiving dialysis MWF. Seen today at dialysis unit  8/22: has been tentatively accepted at in-patient rehab    Interval History: stable    Review of Systems   Constitutional: Positive for fatigue.   HENT: Negative.    Eyes: Negative.    Respiratory: Negative.    Cardiovascular: Negative.    Gastrointestinal: Positive for abdominal distention.   Endocrine: Negative.    Genitourinary: Negative.    Musculoskeletal: Negative.    Skin: Negative.    Allergic/Immunologic: Negative.    Neurological: Negative.    Hematological: Negative.    Psychiatric/Behavioral:        Good spirits   All other systems reviewed and are negative.    Objective:     Vital Signs (Most Recent):  Temp: 97.7 °F (36.5 °C) (08/22/19 1200)  Pulse: 68 (08/22/19 1200)  Resp: 18 (08/22/19 1200)  BP: 93/63 (08/22/19 1200)  SpO2: 99 % (08/22/19 1200) Vital Signs (24h Range):  Temp:  [97.4 °F (36.3 °C)-98.1 °F (36.7 °C)] 97.7 °F (36.5 °C)  Pulse:  [67-73] 68  Resp:  [18] 18  SpO2:  [98 %-100 %] 99 %  BP: ()/(61-72) 93/63     Weight: 91 kg (200 lb 9.9 oz)  Body mass index is 32.38 kg/m².    Intake/Output Summary (Last 24 hours) at 8/22/2019 1441  Last data filed at 8/21/2019 1700  Gross per 24 hour   Intake 680 ml   Output --   Net 680 ml      Physical Exam   Constitutional: He is oriented to person, place, and time. He appears well-developed and well-nourished.   HENT:   Head: Normocephalic and atraumatic.   Eyes: Pupils are equal, round, and reactive to light. Conjunctivae and EOM are normal.   Neck: Normal range of motion. Neck supple.   Cardiovascular: Normal rate, regular rhythm, normal heart sounds and intact distal pulses.   Pulmonary/Chest: Effort normal and breath sounds normal.   Very decreased entry bases without adventitious sounds    Abdominal: Soft. Bowel sounds are normal. He exhibits distension.   Musculoskeletal: Normal range of motion.   Neurological: He is alert and oriented to person, place, and time.   Skin: Skin is warm and dry. Capillary refill takes less than 2 seconds.   Psychiatric: He has a normal mood and affect. His behavior is normal. Judgment and thought content normal.   Nursing note and vitals reviewed.      Significant Labs:   BMP:   Recent Labs   Lab 08/21/19  1610 08/22/19  0451   *  --    *  --    K 4.0  --    CL 95  --    CO2 26  --    BUN 33*  --    CREATININE 6.1*  --    CALCIUM 8.7  --    MG  --  2.5     CBC:   Recent Labs   Lab 08/21/19  0446 08/22/19  0451   WBC 7.44  7.44 7.05   HGB 12.2*  12.2* 11.9*   HCT 38.1*  38.1* 38.8*   PLT 96*  96* 90*       Significant Imaging: I have reviewed all pertinent imaging results/findings within the past 24 hours.      Assessment/Plan:      End stage renal disease       End-stage renal disease on hemodialysis-patient due for dialysis today. Orders per nephrology.     Ascites secondary to alcoholic cirrhotic liver disease, chronic-patient for paracentesis today. 5 L taken off. Will receive aliment post paracentesis. Monitor closely during hemodialysis for end drops in blood pressure     Acute volume overload- slowly resolving.     AICD in situ- no discharges of AICD while in hospital. No arrhythmia.     Acute metabolic encephalopathy- encephalopathy resolved.     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 Difficile colitis-no evidence of C. Difficile colitis.     Diarrhea of infectious origin, ruled out     Severe chronic deconditioning- awaiting skilled nursing facility placement    CAD S/P percutaneous coronary angioplasty        ESRD (end stage renal disease)  HD on MWF      Ascites due to alcoholic cirrhosis        Alcoholic  cirrhosis of liver with ascites  S/p paracenetesis; will require repeat paracentesis periodically      VTE Risk Mitigation (From admission, onward)        Ordered     IP VTE HIGH RISK PATIENT  Once      08/13/19 1909                Luis Antonio Wyatt MD  Department of Hospital Medicine   [facility]

## 2019-08-22 NOTE — PT/OT/SLP PROGRESS
Physical Therapy Treatment    Patient Name:  Tre Cali   MRN:  [id number]    Recommendations:     Discharge Recommendations:  nursing facility, skilled   Discharge Equipment Recommendations: (will continue to assess needs)   Barriers to discharge: None    Assessment:     Tre Cali is a 54 y.o. male admitted with a medical diagnosis of Volume overload.  He presents with the following impairments/functional limitations:  weakness, impaired endurance, impaired functional mobilty, gait instability .    Rehab Prognosis: Poor; patient would benefit from acute skilled PT services to address these deficits and reach maximum level of function.    Recent Surgery: * No surgery found *      Plan:     During this hospitalization, patient to be seen 5 x/week to address the identified rehab impairments via gait training, therapeutic activities, therapeutic exercises and progress toward the following goals:    · Plan of Care Expires:  09/14/19    Subjective     Chief Complaint: Pain to posterior side of left knee  Patient/Family Comments/goals: home  Pain/Comfort:  ·        Objective:     Communicated with RN prior to session.  Patient found up in chair with   upon PT entry to room.     General Precautions: Standard, fall   Orthopedic Precautions:    Braces:       Functional Mobility:  · Gait: 8 ftPt performed sit to stand mod assist. Pt amb with r/w unable to lock left knee out due to pain. Painful cyst limiting pt progression.      AM-PAC 6 CLICK MOBILITY          Therapeutic Activities and Exercises:       Patient left up in chair with call button in reach..    GOALS:   Multidisciplinary Problems     Physical Therapy Goals        Problem: Physical Therapy Goal    Goal Priority Disciplines Outcome Goal Variances Interventions   Physical Therapy Goal     PT, PT/OT Ongoing (interventions implemented as appropriate)     Description:  Goals to be met by: discharge     Patient will increase functional independence with  mobility by performin. Supine to sit with Stand-by Assistance  2. Sit to stand transfer with Stand-by Assistance  3. Bed to chair transfer with Stand-by Assistance using Rolling Walker  4. Gait  x 150 feet with Stand-by Assistance using Rolling Walker.                       Time Tracking:     PT Received On: 19  PT Start Time: 1110     PT Stop Time: 1130  PT Total Time (min): 20 min     Billable Minutes: Gait Training 20 min    Treatment Type: Treatment  PT/PTA: PTA     PTA Visit Number: 4     Myron Espinoza PTA  2019

## 2019-08-22 NOTE — SUBJECTIVE & OBJECTIVE
Interval History: stable    Review of Systems   Constitutional: Positive for fatigue.   HENT: Negative.    Eyes: Negative.    Respiratory: Negative.    Cardiovascular: Negative.    Gastrointestinal: Positive for abdominal distention.   Endocrine: Negative.    Genitourinary: Negative.    Musculoskeletal: Negative.    Skin: Negative.    Allergic/Immunologic: Negative.    Neurological: Negative.    Hematological: Negative.    Psychiatric/Behavioral:        Good spirits   All other systems reviewed and are negative.    Objective:     Vital Signs (Most Recent):  Temp: 97.7 °F (36.5 °C) (08/22/19 1200)  Pulse: 68 (08/22/19 1200)  Resp: 18 (08/22/19 1200)  BP: 93/63 (08/22/19 1200)  SpO2: 99 % (08/22/19 1200) Vital Signs (24h Range):  Temp:  [97.4 °F (36.3 °C)-98.1 °F (36.7 °C)] 97.7 °F (36.5 °C)  Pulse:  [67-73] 68  Resp:  [18] 18  SpO2:  [98 %-100 %] 99 %  BP: ()/(61-72) 93/63     Weight: 91 kg (200 lb 9.9 oz)  Body mass index is 32.38 kg/m².    Intake/Output Summary (Last 24 hours) at 8/22/2019 1441  Last data filed at 8/21/2019 1700  Gross per 24 hour   Intake 680 ml   Output --   Net 680 ml      Physical Exam   Constitutional: He is oriented to person, place, and time. He appears well-developed and well-nourished.   HENT:   Head: Normocephalic and atraumatic.   Eyes: Pupils are equal, round, and reactive to light. Conjunctivae and EOM are normal.   Neck: Normal range of motion. Neck supple.   Cardiovascular: Normal rate, regular rhythm, normal heart sounds and intact distal pulses.   Pulmonary/Chest: Effort normal and breath sounds normal.   Very decreased entry bases without adventitious sounds   Abdominal: Soft. Bowel sounds are normal. He exhibits distension.   Musculoskeletal: Normal range of motion.   Neurological: He is alert and oriented to person, place, and time.   Skin: Skin is warm and dry. Capillary refill takes less than 2 seconds.   Psychiatric: He has a normal mood and affect. His behavior is  normal. Judgment and thought content normal.   Nursing note and vitals reviewed.      Significant Labs:   BMP:   Recent Labs   Lab 08/21/19  1610 08/22/19  0451   *  --    *  --    K 4.0  --    CL 95  --    CO2 26  --    BUN 33*  --    CREATININE 6.1*  --    CALCIUM 8.7  --    MG  --  2.5     CBC:   Recent Labs   Lab 08/21/19  0446 08/22/19  0451   WBC 7.44  7.44 7.05   HGB 12.2*  12.2* 11.9*   HCT 38.1*  38.1* 38.8*   PLT 96*  96* 90*       Significant Imaging: I have reviewed all pertinent imaging results/findings within the past 24 hours.

## 2019-08-22 NOTE — PLAN OF CARE
Problem: Physical Therapy Goal  Goal: Physical Therapy Goal  Goals to be met by: discharge     Patient will increase functional independence with mobility by performin. Supine to sit with Stand-by Assistance  2. Sit to stand transfer with Stand-by Assistance  3. Bed to chair transfer with Stand-by Assistance using Rolling Walker  4. Gait  x 150 feet with Stand-by Assistance using Rolling Walker.      Outcome: Ongoing (interventions implemented as appropriate)  Progressing to reach goals

## 2019-08-22 NOTE — PLAN OF CARE
08/22/19 1216   Discharge Reassessment   Assessment Type Discharge Planning Reassessment   Anticipated Discharge Disposition Rehab   RECEIVED A CALL FROM VON ANDERSON WITH [facility]; SHE STATED THAT SHE CAME TO [facility] LATE YESTERDAY EVENING AND ASSESSED THIS PATIENT FOR INPATIENT REHAB; SHE STATED HE LOOKS LIKE A GOOD CANDIDATE FOR [facility]; VON STATED SHE HAS SUBMITTED A REQUEST FOR AUTHORIZATION TO THE PATIENT'S HEALTH INSURANCE COMPANY - [employer].  VON STATED SHE WILL CONTACT ME WHEN SHE RECEIVES A RESPONSE TO HER REQUEST.  THIS CM ACKNOWLEDGED UNDERSTANDING.

## 2019-08-23 LAB
ALBUMIN SERPL BCP-MCNC: 3.3 G/DL (ref 3.5–5.2)
ALP SERPL-CCNC: 129 U/L (ref 55–135)
ALT SERPL W/O P-5'-P-CCNC: 12 U/L (ref 10–44)
ANION GAP SERPL CALC-SCNC: 22 MMOL/L (ref 8–16)
AST SERPL-CCNC: 15 U/L (ref 10–40)
BASOPHILS # BLD AUTO: 0.13 K/UL (ref 0–0.2)
BASOPHILS NFR BLD: 1.9 % (ref 0–1.9)
BILIRUB SERPL-MCNC: 2 MG/DL (ref 0.1–1)
BUN SERPL-MCNC: 49 MG/DL (ref 6–20)
CALCIUM SERPL-MCNC: 8.6 MG/DL (ref 8.7–10.5)
CHLORIDE SERPL-SCNC: 88 MMOL/L (ref 95–110)
CO2 SERPL-SCNC: 20 MMOL/L (ref 23–29)
CREAT SERPL-MCNC: 8.6 MG/DL (ref 0.5–1.4)
DIFFERENTIAL METHOD: ABNORMAL
EOSINOPHIL # BLD AUTO: 0.1 K/UL (ref 0–0.5)
EOSINOPHIL NFR BLD: 1.9 % (ref 0–8)
ERYTHROCYTE [DISTWIDTH] IN BLOOD BY AUTOMATED COUNT: 16.2 % (ref 11.5–14.5)
EST. GFR  (AFRICAN AMERICAN): 7.2 ML/MIN/1.73 M^2
EST. GFR  (NON AFRICAN AMERICAN): 6.2 ML/MIN/1.73 M^2
GLUCOSE SERPL-MCNC: 106 MG/DL (ref 70–110)
GLUCOSE SERPL-MCNC: 114 MG/DL (ref 70–110)
GLUCOSE SERPL-MCNC: 133 MG/DL (ref 70–110)
GLUCOSE SERPL-MCNC: 139 MG/DL (ref 70–110)
HCT VFR BLD AUTO: 42.1 % (ref 40–54)
HGB BLD-MCNC: 12.8 G/DL (ref 14–18)
IMM GRANULOCYTES # BLD AUTO: 0.04 K/UL (ref 0–0.04)
IMM GRANULOCYTES NFR BLD AUTO: 0.6 % (ref 0–0.5)
LYMPHOCYTES # BLD AUTO: 0.8 K/UL (ref 1–4.8)
LYMPHOCYTES NFR BLD: 10.8 % (ref 18–48)
MCH RBC QN AUTO: 29.3 PG (ref 27–31)
MCHC RBC AUTO-ENTMCNC: 30.4 G/DL (ref 32–36)
MCV RBC AUTO: 96 FL (ref 82–98)
MONOCYTES # BLD AUTO: 0.8 K/UL (ref 0.3–1)
MONOCYTES NFR BLD: 12.1 % (ref 4–15)
NEUTROPHILS # BLD AUTO: 5.1 K/UL (ref 1.8–7.7)
NEUTROPHILS NFR BLD: 72.7 % (ref 38–73)
NRBC BLD-RTO: 0 /100 WBC
PLATELET # BLD AUTO: 87 K/UL (ref 150–350)
PMV BLD AUTO: 12.4 FL (ref 9.2–12.9)
POTASSIUM SERPL-SCNC: 5.4 MMOL/L (ref 3.5–5.1)
PROT SERPL-MCNC: 7.6 G/DL (ref 6–8.4)
RBC # BLD AUTO: 4.37 M/UL (ref 4.6–6.2)
SODIUM SERPL-SCNC: 130 MMOL/L (ref 136–145)
WBC # BLD AUTO: 6.95 K/UL (ref 3.9–12.7)

## 2019-08-23 PROCEDURE — 25000003 PHARM REV CODE 250: Performed by: INTERNAL MEDICINE

## 2019-08-23 PROCEDURE — 94761 N-INVAS EAR/PLS OXIMETRY MLT: CPT

## 2019-08-23 PROCEDURE — 90935 HEMODIALYSIS ONE EVALUATION: CPT

## 2019-08-23 PROCEDURE — 85025 COMPLETE CBC W/AUTO DIFF WBC: CPT

## 2019-08-23 PROCEDURE — 12000002 HC ACUTE/MED SURGE SEMI-PRIVATE ROOM

## 2019-08-23 PROCEDURE — 80053 COMPREHEN METABOLIC PANEL: CPT

## 2019-08-23 PROCEDURE — 99900035 HC TECH TIME PER 15 MIN (STAT)

## 2019-08-23 PROCEDURE — 36415 COLL VENOUS BLD VENIPUNCTURE: CPT

## 2019-08-23 PROCEDURE — 97530 THERAPEUTIC ACTIVITIES: CPT

## 2019-08-23 RX ADMIN — AMIODARONE HYDROCHLORIDE 200 MG: 200 TABLET ORAL at 08:08

## 2019-08-23 RX ADMIN — ASPIRIN 81 MG: 81 TABLET, COATED ORAL at 09:08

## 2019-08-23 RX ADMIN — MIDODRINE HYDROCHLORIDE 5 MG: 2.5 TABLET ORAL at 05:08

## 2019-08-23 RX ADMIN — CALCITRIOL CAPSULES 0.25 MCG 0.25 MCG: 0.25 CAPSULE ORAL at 08:08

## 2019-08-23 RX ADMIN — RIFAXIMIN 550 MG: 550 TABLET ORAL at 09:08

## 2019-08-23 RX ADMIN — Medication: at 09:08

## 2019-08-23 RX ADMIN — RIFAXIMIN 550 MG: 550 TABLET ORAL at 08:08

## 2019-08-23 RX ADMIN — CALCIUM ACETATE 667 MG: 667 CAPSULE ORAL at 08:08

## 2019-08-23 RX ADMIN — MIDODRINE HYDROCHLORIDE 5 MG: 2.5 TABLET ORAL at 04:08

## 2019-08-23 RX ADMIN — ALLOPURINOL 100 MG: 100 TABLET ORAL at 08:08

## 2019-08-23 RX ADMIN — CALCIUM ACETATE 667 MG: 667 CAPSULE ORAL at 04:08

## 2019-08-23 NOTE — PLAN OF CARE
08/23/19 1358   Discharge Reassessment   Assessment Type Discharge Planning Reassessment   THIS CM MADE THE HOSPITALIST, DR. SHARMA, AWARE THAT THIS PATIENT HAS BEEN DENIED FOR INPATIENT REHAB.

## 2019-08-23 NOTE — PLAN OF CARE
Problem: Physical Therapy Goal  Goal: Physical Therapy Goal  Goals to be met by: discharge     Patient will increase functional independence with mobility by performin. Supine to sit with Stand-by Assistance  2. Sit to stand transfer with Stand-by Assistance  3. Bed to chair transfer with Stand-by Assistance using Rolling Walker  4. Gait  x 150 feet with Stand-by Assistance using Rolling Walker.      Outcome: Ongoing (interventions implemented as appropriate)  Patient participated in gait training, therapeutic exercise and therapeutic activities to improve and strength functional mobility and improve ADL's to reduce fall risk.

## 2019-08-23 NOTE — PROGRESS NOTES
[facility]  Nephrology  Progress Note    Patient Name: Tre Cali  MRN: [id number]  Admission Date: 8/13/2019  Hospital Length of Stay: 8 days  Attending Provider: Luis Antonio Wyatt MD   Primary Care Physician: Primary Doctor No  Principal Problem:Volume overload      Subjective:     Interval History: endorses some mild nausea this am; denies any abdominal discomfort or any additional complaints but may need repeat paracentesis prior to discharge; awaiting outpatient LTAC placement (recommend [facility] [place], [place] as they can accommodate his HD needs))    Review of patient's allergies indicates:   Allergen Reactions    Sulfa (sulfonamide antibiotics) Rash     Current Facility-Administered Medications   Medication Frequency    0.9%  NaCl infusion PRN    0.9%  NaCl infusion Once    albumin human 25% bottle 50 g Once    albuterol nebulizer solution 2.5 mg Q4H PRN    allopurinol tablet 100 mg Daily    amiodarone tablet 200 mg Daily    aspirin EC tablet 81 mg Daily    calcitRIOL capsule 0.25 mcg Daily    calcium acetate capsule 667 mg TID WM    dextrose 50% injection 12.5 g PRN    dextrose 50% injection 25 g PRN    glucagon (human recombinant) injection 1 mg PRN    glucose chewable tablet 16 g PRN    glucose chewable tablet 24 g PRN    HYDROcodone-acetaminophen  mg per tablet 1 tablet Q4H PRN    midodrine tablet 5 mg TID    mupirocin 2 % ointment BID    rifAXIMin tablet 550 mg BID    sodium chloride 0.9% flush 10 mL PRN    white petrolatum 41 % ointment Daily       Objective:     Vital Signs (Most Recent):  Temp: 98 °F (36.7 °C) (08/23/19 0339)  Pulse: 74 (08/23/19 0339)  Resp: 18 (08/23/19 0339)  BP: (!) 84/59 (08/23/19 0339)  SpO2: 97 % (08/23/19 0339)  O2 Device (Oxygen Therapy): room air (08/23/19 0339) Vital Signs (24h Range):  Temp:  [97.6 °F (36.4 °C)-98.2 °F (36.8 °C)] 98 °F (36.7 °C)  Pulse:  [57-74] 74  Resp:  [12-18] 18  SpO2:  [95  %-100 %] 97 %  BP: ()/(58-67) 84/59     Weight: 91 kg (200 lb 9.9 oz) (08/22/19 0930)  Body mass index is 32.38 kg/m².  Body surface area is 2.06 meters squared.    No intake/output data recorded.    Physical Exam   Constitutional: He is oriented to person, place, and time. No distress.   Obese; chronically ill-appearing   HENT:   Head: Normocephalic and atraumatic.   Mouth/Throat: Oropharynx is clear and moist.   Eyes: Conjunctivae are normal. Right eye exhibits no discharge. Left eye exhibits no discharge. No scleral icterus.   Neck: Normal range of motion. Neck supple. No JVD present.   Cardiovascular: Normal rate, regular rhythm and intact distal pulses. Exam reveals no gallop and no friction rub.   Murmur heard.  Pulmonary/Chest: Effort normal and breath sounds normal. No respiratory distress. He has no wheezes. He has no rales.   Abdominal: Soft. He exhibits distension. He exhibits no mass. There is tenderness. There is no rebound and no guarding.   Hypoactive BS in all quadrants   Genitourinary:   Genitourinary Comments: Deferred   Musculoskeletal: Normal range of motion. He exhibits edema and tenderness. He exhibits no deformity.   R UE AV Fistula w/ palpable thrill   Neurological: He is alert and oriented to person, place, and time. A cranial nerve deficit is present.   Skin: Skin is warm and dry. Rash noted. No erythema.   Psychiatric: He has a normal mood and affect. His behavior is normal.   Nursing note and vitals reviewed.      Significant Labs:sure  CBC:   Recent Labs   Lab 08/22/19  0451   WBC 7.05   RBC 3.91*   HGB 11.9*   HCT 38.8*   PLT 90*   MCV 99*   MCH 30.4   MCHC 30.7*     CMP:   Recent Labs   Lab 08/19/19  0504  08/21/19  1610      < > 139*   CALCIUM 8.6*   < > 8.7   ALBUMIN 3.1*   < > 3.5   PROT 7.0  --   --    *   < > 135*   K 4.8   < > 4.0   CO2 20*   < > 26   CL 95   < > 95   BUN 59*   < > 33*   CREATININE 9.1*   < > 6.1*   ALKPHOS 105  --   --    ALT 10  --   --     AST 13  --   --    BILITOT 1.4*  --   --     < > = values in this interval not displayed.     LFTs:   Recent Labs   Lab 08/19/19  0504  08/21/19  1610   ALT 10  --   --    AST 13  --   --    ALKPHOS 105  --   --    BILITOT 1.4*  --   --    PROT 7.0  --   --    ALBUMIN 3.1*   < > 3.5    < > = values in this interval not displayed.       Significant Imaging:  X-Ray: Reviewed  CT: Reviewed    Assessment/Plan:     Active Diagnoses:    Diagnosis Date Noted POA    PRINCIPAL PROBLEM:  Volume overload [E87.70] 08/13/2019 Yes    Alcoholic cirrhosis of liver with ascites [K70.31] 08/13/2019 Unknown     Chronic    Ascites due to alcoholic cirrhosis [K70.31] 08/13/2019 Yes     Chronic    ESRD (end stage renal disease) [N18.6] 08/13/2019 Yes     Chronic    AICD (automatic cardioverter/defibrillator) present [Z95.810] 08/13/2019 Yes     Chronic    Encephalopathy, metabolic [G93.41] 08/13/2019 Yes    CAD S/P percutaneous coronary angioplasty [I25.10, Z98.61] 08/13/2019 Not Applicable     Chronic    Hemiplegia as late effect of cerebrovascular accident (CVA) [I69.359] 08/13/2019 Not Applicable     Chronic    Diarrhea of presumed infectious origin [R19.7] 08/13/2019 Yes    End stage renal disease [N18.6] 08/13/2019 Yes      Problems Resolved During this Admission:     1. ESRD (HD-MWF via R UE AV Fistula)- symptomatically stable at present; significant abdominal distention on admission (s/p therapeutic paracentesis this am); no overt volume overload, significant electrolyte perturbations nor acid-base disturbance    -maintenance HD (duration: 3h; UF Goal: 1-2L as tolerated; STANDARD 'Bath'; Access: AVF; Qb: 400 ml/min, Qd: 2x BFR; give 12.5-25 gm 25% Albumin w/ HD for BP support)    -DAILY renal function panel to document sCr trend, optimize HD electrolyte 'bath' & assess response to therapy    -avoid nephrotoxic agents (NSAIDs, IV contrast dye)    -renally dose all appropriate medications, including antibiotics     2.  HTN- clinically stable at present now w/ relative hypotension (84/59); no active issues    -HOLD anti-HTN medications & diuretics for now & reassess in am     3. ANEMIA- clinically stable at present; H/H AT GOAL (11.9/38.8 on 8/21/19); no active issues    -trend daily CBC (H/H) to effect optimal blood-loss surveillance     4. SECONDARY HYPERPARATHYROIDISM (sHPT)- clinically stable at present; no active issues    -continue dietary binder (if tolerating 'po')/Vitamin D +/- HD-associated calcimimetic agent (Cinacalcet vs Etelcalcetide)     5. ESLD (hx of EtOH-induced Cirrhosis) w/ Ascites (s/p therapeutic paracentesis)- appears ill; GI Medicine service on the case; cautious fluid removal to minimize risk of precipitatiing Hepatorenal Syndrome    -management per GI Medicine/Primary Team recommendations    Thank you for your consult. I will follow-up with patient. Please contact us if you have any additional questions.    Armando Cruz III, MD  [facility]  [facility]  [phone] (C)

## 2019-08-23 NOTE — PLAN OF CARE
08/22/19 1548   Patient Assessment/Suction   Level of Consciousness (AVPU) alert   Respiratory Effort Normal;Unlabored   Expansion/Accessory Muscles/Retractions expansion symmetric;no use of accessory muscles   All Lung Fields Breath Sounds clear;equal bilaterally   Rhythm/Pattern, Respiratory pattern regular   PRE-TX-O2   O2 Device (Oxygen Therapy) room air   SpO2 99 %   Pulse 68   Resp 18   Positioning Sitting in chair   Aerosol Therapy   $ Aerosol Therapy Charges PRN treatment not required

## 2019-08-23 NOTE — SUBJECTIVE & OBJECTIVE
Interval History: stable    Review of Systems   Constitutional: Positive for fatigue.        Generalized weakness   HENT: Negative.    Eyes: Negative.    Respiratory: Negative.    Cardiovascular: Negative.    Gastrointestinal: Negative.    Endocrine: Negative.    Genitourinary: Negative.    Musculoskeletal: Positive for myalgias.   Skin: Negative.    Allergic/Immunologic: Negative.    Neurological: Negative.    Hematological: Negative.    All other systems reviewed and are negative.    Objective:     Vital Signs (Most Recent):  Temp: 97.6 °F (36.4 °C) (08/23/19 1140)  Pulse: 67 (08/23/19 1145)  Resp: 18 (08/23/19 1140)  BP: 98/67 (08/23/19 1145)  SpO2: 100 % (08/23/19 0727) Vital Signs (24h Range):  Temp:  [97.6 °F (36.4 °C)-98.2 °F (36.8 °C)] 97.6 °F (36.4 °C)  Pulse:  [57-74] 67  Resp:  [12-18] 18  SpO2:  [95 %-100 %] 100 %  BP: ()/(58-67) 98/67     Weight: 91 kg (200 lb 9.9 oz)  Body mass index is 32.38 kg/m².  No intake or output data in the 24 hours ending 08/23/19 1400   Physical Exam   Constitutional: He is oriented to person, place, and time. He appears well-developed and well-nourished.   Chronic ill appearing   HENT:   Head: Normocephalic and atraumatic.   Right Ear: External ear normal.   Left Ear: External ear normal.   Nose: Nose normal.   Mouth/Throat: Oropharynx is clear and moist.   Eyes: Pupils are equal, round, and reactive to light. Conjunctivae and EOM are normal.   Neck: Normal range of motion. Neck supple.   Cardiovascular: Normal rate, regular rhythm, normal heart sounds and intact distal pulses.   Pulmonary/Chest:   Decreased entry bases without adventitious sounds   Abdominal: Soft. Bowel sounds are normal.   Musculoskeletal: Normal range of motion.   Neurological: He is alert and oriented to person, place, and time.   Skin: Skin is warm and dry. Capillary refill takes less than 2 seconds.   Psychiatric: He has a normal mood and affect. His behavior is normal. Judgment and thought  content normal.   Nursing note and vitals reviewed.      Significant Labs:   BMP:   Recent Labs   Lab 08/22/19  0451 08/23/19  0850   GLU  --  133*   NA  --  130*   K  --  5.4*   CL  --  88*   CO2  --  20*   BUN  --  49*   CREATININE  --  8.6*   CALCIUM  --  8.6*   MG 2.5  --      CBC:   Recent Labs   Lab 08/22/19 0451 08/23/19  0850   WBC 7.05 6.95   HGB 11.9* 12.8*   HCT 38.8* 42.1   PLT 90* 87*       Significant Imaging: I have reviewed all pertinent imaging results/findings within the past 24 hours.

## 2019-08-23 NOTE — PLAN OF CARE
08/23/19 1222   Discharge Reassessment   Assessment Type Discharge Planning Reassessment   RECEIVED A CALL FROM VON WITH [facility] STATING SHE HAD RECEIVED A CALL FROM JUDY WITH [facility] WHO STATED THE PATIENT HAD BEEN DENIED FOR IP REHAB; SHE SAID THAT JUDY STATED THAT WE DID NOT COMPLETE A REQUESTED PEER TO PEER; I TOLD VON THAT I HAD NOT RECEIVED ANY RETURN CALLS FROM JUDY WITH [facility] AND NO ONE HAD REQUESTED A PEER TO PEER ARRANGED THROUGH ME.

## 2019-08-23 NOTE — PLAN OF CARE
08/22/19 1547   Discharge Reassessment   Assessment Type Discharge Planning Reassessment   THIS CM ATTEMPTED TO CALL JUDY WITH [facility], [phone], EXTENSION [id number], BUT HAD TO LEAVE A VOICEMAIL MESSAGE.

## 2019-08-23 NOTE — PROGRESS NOTES
[facility] Medicine  Progress Note    Patient Name: Tre Cali  MRN: [id number]  Patient Class: IP- Inpatient   Admission Date: 8/13/2019  Length of Stay: 8 days  Attending Physician: Luis Antonio Wyatt MD  Primary Care Provider: Primary Doctor No        Subjective:     Principal Problem:Volume overload        HPI:  Mr. Cali is a 54-year-old male with past medical history of CAD status post stent several years ago, COPD, liver cirrhosis, ESRD on hemodialysis presents with generalized weakness.  The patient presented emergency room earlier today in the morning for generalized weakness and anasarca, patient was discharged to dialysis center for hemodialysis.  Patient came back from hemodialysis for unclear reasons.  Patient reports history of generalized weakness, reports knees gave out and fell.  History of shortness of breath since last few days.  History of worsening abdominal swelling and abdominal pain since 2-3 weeks.  History of diarrhea since 3 weeks, about 4 episodes every day, nonbloody.  Patient reports sleepiness and intermittent confusion since 1-2 days.  Patient moved from [place] to [place] a week ago and yet to set up doctors.  Patient reports last hemodialysis was last Saturday.  Patient denies any history of fevers, chills or nausea, vomiting.  Patient reports history of stents several years ago.  Reports history of stroke several years ago with resultant right upper extremity and lower extremity weakness.     Patient quit drinking about 20 years ago.  Patient has longstanding history of smoking, reports no smoking intermittently few cigarettes per day.  Denies history of drug abuse.  Past surgical history - right arm AV fistula, AICD placement, cardiac stents    Overview/Hospital Course:  Therapeutic paracentesis performed 8/16  Had HD today. To overnight and follow-up labs in AM. Hopefully discharge in AM. Patient unable to perform ADLs secondary to severe  deconditioning. SNF will not take because of need for intermittent paracentesis and HD. Therefore LTAC or inpatient rehabilitation is being considered, and discharge held.  8/21 Awaiting placement on med/surg. Receiving dialysis MWF. Seen today at dialysis unit  8/22: has been tentatively accepted at in-patient rehab  8/23 Awaiting authorization from [facility].    Interval History: stable    Review of Systems   Constitutional: Positive for fatigue.        Generalized weakness   HENT: Negative.    Eyes: Negative.    Respiratory: Negative.    Cardiovascular: Negative.    Gastrointestinal: Negative.    Endocrine: Negative.    Genitourinary: Negative.    Musculoskeletal: Positive for myalgias.   Skin: Negative.    Allergic/Immunologic: Negative.    Neurological: Negative.    Hematological: Negative.    All other systems reviewed and are negative.    Objective:     Vital Signs (Most Recent):  Temp: 97.6 °F (36.4 °C) (08/23/19 1140)  Pulse: 67 (08/23/19 1145)  Resp: 18 (08/23/19 1140)  BP: 98/67 (08/23/19 1145)  SpO2: 100 % (08/23/19 0727) Vital Signs (24h Range):  Temp:  [97.6 °F (36.4 °C)-98.2 °F (36.8 °C)] 97.6 °F (36.4 °C)  Pulse:  [57-74] 67  Resp:  [12-18] 18  SpO2:  [95 %-100 %] 100 %  BP: ()/(58-67) 98/67     Weight: 91 kg (200 lb 9.9 oz)  Body mass index is 32.38 kg/m².  No intake or output data in the 24 hours ending 08/23/19 1400   Physical Exam   Constitutional: He is oriented to person, place, and time. He appears well-developed and well-nourished.   Chronic ill appearing   HENT:   Head: Normocephalic and atraumatic.   Right Ear: External ear normal.   Left Ear: External ear normal.   Nose: Nose normal.   Mouth/Throat: Oropharynx is clear and moist.   Eyes: Pupils are equal, round, and reactive to light. Conjunctivae and EOM are normal.   Neck: Normal range of motion. Neck supple.   Cardiovascular: Normal rate, regular rhythm, normal heart sounds and intact distal pulses.   Pulmonary/Chest:    Decreased entry bases without adventitious sounds   Abdominal: Soft. Bowel sounds are normal.   Musculoskeletal: Normal range of motion.   Neurological: He is alert and oriented to person, place, and time.   Skin: Skin is warm and dry. Capillary refill takes less than 2 seconds.   Psychiatric: He has a normal mood and affect. His behavior is normal. Judgment and thought content normal.   Nursing note and vitals reviewed.      Significant Labs:   BMP:   Recent Labs   Lab 08/22/19 0451 08/23/19  0850   GLU  --  133*   NA  --  130*   K  --  5.4*   CL  --  88*   CO2  --  20*   BUN  --  49*   CREATININE  --  8.6*   CALCIUM  --  8.6*   MG 2.5  --      CBC:   Recent Labs   Lab 08/22/19 0451 08/23/19  0850   WBC 7.05 6.95   HGB 11.9* 12.8*   HCT 38.8* 42.1   PLT 90* 87*       Significant Imaging: I have reviewed all pertinent imaging results/findings within the past 24 hours.      Assessment/Plan:      End stage renal disease       End-stage renal disease on hemodialysis-patient due for dialysis today. Orders per nephrology.     Ascites secondary to alcoholic cirrhotic liver disease, chronic-patient for paracentesis today. 5 L taken off. Will receive aliment post paracentesis. Monitor closely during hemodialysis for end drops in blood pressure     Acute volume overload- slowly resolving.     AICD in situ- no discharges of AICD while in hospital. No arrhythmia.     Acute metabolic encephalopathy- encephalopathy resolved.     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 Difficile colitis-no evidence of C. Difficile colitis.     Diarrhea of infectious origin, ruled out     Severe chronic deconditioning- awaiting skilled nursing facility placement    CAD S/P percutaneous coronary angioplasty        ESRD (end stage renal disease)  HD on MWF      Ascites due to alcoholic cirrhosis        Alcoholic  cirrhosis of liver with ascites  S/p paracenetesis; will require repeat paracentesis periodically      VTE Risk Mitigation (From admission, onward)        Ordered     IP VTE HIGH RISK PATIENT  Once      08/13/19 1909                Luis Antonio Wyatt MD  Department of Hospital Medicine   [facility]

## 2019-08-23 NOTE — PT/OT/SLP PROGRESS
Physical Therapy Treatment    Patient Name:  Tre Cali   MRN:  [id number]    Recommendations:     Discharge Recommendations:  nursing facility, skilled   Discharge Equipment Recommendations: (will continue to assess needs)   Barriers to discharge: None    Assessment:     Tre Cali is a 54 y.o. male admitted with a medical diagnosis of Volume overload.  He presents with the following impairments/functional limitations:  weakness, impaired endurance, impaired self care skills, impaired functional mobilty, gait instability, impaired balance, other (comment)(pt nauseous and vomited after PT treatment today) Patient was not able to perform well and could not ambulate farther than the 15' due to nausea and eventually vomiting after sitting up in chair.    Rehab Prognosis: Fair; patient would benefit from acute skilled PT services to address these deficits and reach maximum level of function.    Recent Surgery: * No surgery found *      Plan:     During this hospitalization, patient to be seen 5 x/week to address the identified rehab impairments via gait training, therapeutic activities, therapeutic exercises and progress toward the following goals:    · Plan of Care Expires:  09/14/19    Subjective     Chief Complaint: nauseous  Patient/Family Comments/goals: none stated  Pain/Comfort:  · Pain Rating 1: 0/10  · Pain Addressed 1: Nurse notified  · Pain Rating Post-Intervention 1: 0/10      Objective:     Communicated with KULDEEP Najera prior to session.  Patient found HOB elevated with telemetry, peripheral IV upon PT entry to room.     General Precautions: Standard, fall   Orthopedic Precautions:    Braces: N/A     Functional Mobility:  · Bed Mobility:     · Rolling Right: stand by assistance  · Supine to Sit: contact guard assistance  · Transfers:     · Sit to Stand:  contact guard assistance with rolling walker  · Bed to Chair: contact guard assistance with  rolling walker  using  Step Transfer  · Gait: 15' with  RW. Pt fatigued and became naseous      AM-PAC 6 CLICK MOBILITY          Therapeutic Activities and Exercises:   Patient sat on EOB for spinal orientation and to increase proprioception in sitting.    Patient left up in chair with all lines intact, call button in reach, chair alarm on, RN Gene notified and RN on floor present due to pt vomiting and needed assistance.    GOALS:   Multidisciplinary Problems     Physical Therapy Goals        Problem: Physical Therapy Goal    Goal Priority Disciplines Outcome Goal Variances Interventions   Physical Therapy Goal     PT, PT/OT Ongoing (interventions implemented as appropriate)     Description:  Goals to be met by: discharge     Patient will increase functional independence with mobility by performin. Supine to sit with Stand-by Assistance  2. Sit to stand transfer with Stand-by Assistance  3. Bed to chair transfer with Stand-by Assistance using Rolling Walker  4. Gait  x 150 feet with Stand-by Assistance using Rolling Walker.                       Time Tracking:     PT Received On: 19  PT Start Time: 956     PT Stop Time: 1013  PT Total Time (min): 17 min     Billable Minutes: Therapeutic Activity 17    Treatment Type: Treatment  PT/PTA: PTA     PTA Visit Number: 5     Karen Arriaga PTA  2019

## 2019-08-23 NOTE — NURSING
"Patient vomiting on the floor. Offered to call hospitalist for nausea medication, patient denied stating "that it will pass".    "

## 2019-08-23 NOTE — PLAN OF CARE
08/23/19 1214   Discharge Reassessment   Assessment Type Discharge Planning Reassessment   ATTEMPTED TO CALL JUDY WITH [facility], BUT STILL HAD TO LEAVE A VOICEMAIL MESSAGE; I HAVE NOT RECEIVED A RETURN CALL TO ANY OF MY MESSAGES AS YET.

## 2019-08-23 NOTE — PLAN OF CARE
08/23/19 0904   Discharge Reassessment   Assessment Type Discharge Planning Reassessment   THIS CM ATTEMPTED TO CALL JUDY WITH [facility], [phone], EXTENSION [phone], BUT HAD TO LEAVE A VOICEMAIL MESSAGE.

## 2019-08-23 NOTE — PT/OT/SLP PROGRESS
Occupational Therapy      Patient Name:  Tre Cali   MRN:  [id number]    Patient not seen today secondary to Unavailable (Comment)(1st attempt at 1143 w/ pt in dialysis; 2nd attempt 1325 pt in dialysis ). Will follow-up at a later date.    Evie Arita OT  8/23/2019

## 2019-08-23 NOTE — PLAN OF CARE
08/22/19 1433   Discharge Reassessment   Assessment Type Discharge Planning Reassessment   RECEIVED A VOICEMAIL MESSAGE FROM JUDY THOMPSON IN [place] ASKING ME WHICH REFERRAL WAS SHE SUPPOSED TO WORK ON, INPATIENT REHAB OR LTAC; JUDY'S CALL BACK INFORMATION - [phone], EXTENSION [phone].

## 2019-08-23 NOTE — PLAN OF CARE
08/22/19 1436   Discharge Reassessment   Assessment Type Discharge Planning Reassessment   RECEIVED A MESSAGE FROM  NURSE CAT CASTILLO STATING JUDY THOMPSON WANTED ME TO CALL HER, [phone], EXTENSION [phone].

## 2019-08-24 LAB
ALBUMIN SERPL BCP-MCNC: 3.5 G/DL (ref 3.5–5.2)
ANION GAP SERPL CALC-SCNC: 17 MMOL/L (ref 8–16)
BASOPHILS # BLD AUTO: 0.13 K/UL (ref 0–0.2)
BASOPHILS NFR BLD: 1.8 % (ref 0–1.9)
BUN SERPL-MCNC: 39 MG/DL (ref 6–20)
CALCIUM SERPL-MCNC: 8.6 MG/DL (ref 8.7–10.5)
CHLORIDE SERPL-SCNC: 92 MMOL/L (ref 95–110)
CO2 SERPL-SCNC: 24 MMOL/L (ref 23–29)
CREAT SERPL-MCNC: 6.8 MG/DL (ref 0.5–1.4)
DIFFERENTIAL METHOD: ABNORMAL
EOSINOPHIL # BLD AUTO: 0.1 K/UL (ref 0–0.5)
EOSINOPHIL NFR BLD: 2 % (ref 0–8)
ERYTHROCYTE [DISTWIDTH] IN BLOOD BY AUTOMATED COUNT: 16.5 % (ref 11.5–14.5)
EST. GFR  (AFRICAN AMERICAN): 9.5 ML/MIN/1.73 M^2
EST. GFR  (NON AFRICAN AMERICAN): 8.3 ML/MIN/1.73 M^2
GLUCOSE SERPL-MCNC: 121 MG/DL (ref 70–110)
GLUCOSE SERPL-MCNC: 160 MG/DL (ref 70–110)
GLUCOSE SERPL-MCNC: 164 MG/DL (ref 70–110)
GLUCOSE SERPL-MCNC: 165 MG/DL (ref 70–110)
GLUCOSE SERPL-MCNC: 167 MG/DL (ref 70–110)
HCT VFR BLD AUTO: 40.1 % (ref 40–54)
HGB BLD-MCNC: 12.4 G/DL (ref 14–18)
IMM GRANULOCYTES # BLD AUTO: 0.03 K/UL (ref 0–0.04)
IMM GRANULOCYTES NFR BLD AUTO: 0.4 % (ref 0–0.5)
LYMPHOCYTES # BLD AUTO: 0.7 K/UL (ref 1–4.8)
LYMPHOCYTES NFR BLD: 9.4 % (ref 18–48)
MCH RBC QN AUTO: 30 PG (ref 27–31)
MCHC RBC AUTO-ENTMCNC: 30.9 G/DL (ref 32–36)
MCV RBC AUTO: 97 FL (ref 82–98)
MONOCYTES # BLD AUTO: 0.7 K/UL (ref 0.3–1)
MONOCYTES NFR BLD: 10.1 % (ref 4–15)
NEUTROPHILS # BLD AUTO: 5.5 K/UL (ref 1.8–7.7)
NEUTROPHILS NFR BLD: 76.3 % (ref 38–73)
NRBC BLD-RTO: 0 /100 WBC
PHOSPHATE SERPL-MCNC: 8.2 MG/DL (ref 2.7–4.5)
PLATELET # BLD AUTO: 92 K/UL (ref 150–350)
PMV BLD AUTO: 12.1 FL (ref 9.2–12.9)
POTASSIUM SERPL-SCNC: 4.7 MMOL/L (ref 3.5–5.1)
RBC # BLD AUTO: 4.13 M/UL (ref 4.6–6.2)
SODIUM SERPL-SCNC: 133 MMOL/L (ref 136–145)
WBC # BLD AUTO: 7.14 K/UL (ref 3.9–12.7)

## 2019-08-24 PROCEDURE — 80069 RENAL FUNCTION PANEL: CPT

## 2019-08-24 PROCEDURE — 12000002 HC ACUTE/MED SURGE SEMI-PRIVATE ROOM

## 2019-08-24 PROCEDURE — 82962 GLUCOSE BLOOD TEST: CPT

## 2019-08-24 PROCEDURE — 85025 COMPLETE CBC W/AUTO DIFF WBC: CPT

## 2019-08-24 PROCEDURE — 36415 COLL VENOUS BLD VENIPUNCTURE: CPT

## 2019-08-24 PROCEDURE — 25000003 PHARM REV CODE 250: Performed by: INTERNAL MEDICINE

## 2019-08-24 RX ADMIN — ALLOPURINOL 100 MG: 100 TABLET ORAL at 08:08

## 2019-08-24 RX ADMIN — CALCIUM ACETATE 667 MG: 667 CAPSULE ORAL at 12:08

## 2019-08-24 RX ADMIN — CALCITRIOL CAPSULES 0.25 MCG 0.25 MCG: 0.25 CAPSULE ORAL at 08:08

## 2019-08-24 RX ADMIN — AMIODARONE HYDROCHLORIDE 200 MG: 200 TABLET ORAL at 08:08

## 2019-08-24 RX ADMIN — Medication 1 APPLICATION: at 08:08

## 2019-08-24 RX ADMIN — CALCIUM ACETATE 667 MG: 667 CAPSULE ORAL at 08:08

## 2019-08-24 RX ADMIN — ASPIRIN 81 MG: 81 TABLET, COATED ORAL at 09:08

## 2019-08-24 RX ADMIN — RIFAXIMIN 550 MG: 550 TABLET ORAL at 08:08

## 2019-08-24 RX ADMIN — RIFAXIMIN 550 MG: 550 TABLET ORAL at 09:08

## 2019-08-24 RX ADMIN — MIDODRINE HYDROCHLORIDE 5 MG: 2.5 TABLET ORAL at 05:08

## 2019-08-24 RX ADMIN — CALCIUM ACETATE 667 MG: 667 CAPSULE ORAL at 03:08

## 2019-08-24 RX ADMIN — MIDODRINE HYDROCHLORIDE 5 MG: 2.5 TABLET ORAL at 12:08

## 2019-08-24 RX ADMIN — MIDODRINE HYDROCHLORIDE 5 MG: 2.5 TABLET ORAL at 03:08

## 2019-08-24 NOTE — PROGRESS NOTES
[facility] Medicine  Progress Note    Patient Name: Tre Cali  MRN: [id number]  Patient Class: IP- Inpatient   Admission Date: 8/13/2019  Length of Stay: 9 days  Attending Physician: Luis Antonio Wyatt MD  Primary Care Provider: Primary Doctor No        Subjective:     Principal Problem:Volume overload        HPI:  Mr. Cali is a 54-year-old male with past medical history of CAD status post stent several years ago, COPD, liver cirrhosis, ESRD on hemodialysis presents with generalized weakness.  The patient presented emergency room earlier today in the morning for generalized weakness and anasarca, patient was discharged to dialysis center for hemodialysis.  Patient came back from hemodialysis for unclear reasons.  Patient reports history of generalized weakness, reports knees gave out and fell.  History of shortness of breath since last few days.  History of worsening abdominal swelling and abdominal pain since 2-3 weeks.  History of diarrhea since 3 weeks, about 4 episodes every day, nonbloody.  Patient reports sleepiness and intermittent confusion since 1-2 days.  Patient moved from [place] to [place] a week ago and yet to set up doctors.  Patient reports last hemodialysis was last Saturday.  Patient denies any history of fevers, chills or nausea, vomiting.  Patient reports history of stents several years ago.  Reports history of stroke several years ago with resultant right upper extremity and lower extremity weakness.     Patient quit drinking about 20 years ago.  Patient has longstanding history of smoking, reports no smoking intermittently few cigarettes per day.  Denies history of drug abuse.  Past surgical history - right arm AV fistula, AICD placement, cardiac stents    Overview/Hospital Course:  Therapeutic paracentesis performed 8/16  Had HD today. To overnight and follow-up labs in AM. Hopefully discharge in AM. Patient unable to perform ADLs secondary to severe  deconditioning. SNF will not take because of need for intermittent paracentesis and HD. Therefore LTAC or inpatient rehabilitation is being considered, and discharge held.  8/21 Awaiting placement on med/surg. Receiving dialysis MWF. Seen today at dialysis unit  8/22: has been tentatively accepted at in-patient rehab  8/23 Awaiting authorization from [facility].  8/24 Awaiting authorization from [facility]    Interval History: stable    Review of Systems   Constitutional: Positive for fatigue.        Generalized weakness   HENT: Negative.    Eyes: Negative.    Respiratory: Negative.    Cardiovascular: Negative.    Gastrointestinal: Negative.    Endocrine: Negative.    Genitourinary: Negative.    Musculoskeletal: Negative.    Skin: Negative.    Allergic/Immunologic: Negative.    Neurological: Negative.    Hematological: Negative.    All other systems reviewed and are negative.    Objective:     Vital Signs (Most Recent):  Temp: 98 °F (36.7 °C) (08/24/19 1205)  Pulse: 73 (08/24/19 1205)  Resp: 18 (08/24/19 1205)  BP: 93/60 (08/24/19 1205)  SpO2: 96 % (08/24/19 1205) Vital Signs (24h Range):  Temp:  [97.4 °F (36.3 °C)-98.5 °F (36.9 °C)] 98 °F (36.7 °C)  Pulse:  [60-78] 73  Resp:  [16-20] 18  SpO2:  [92 %-98 %] 96 %  BP: ()/(60-74) 93/60     Weight: 91 kg (200 lb 9.9 oz)  Body mass index is 32.38 kg/m².  No intake or output data in the 24 hours ending 08/24/19 1641   Physical Exam   Constitutional: He is oriented to person, place, and time. He appears well-developed and well-nourished.   HENT:   Head: Normocephalic and atraumatic.   Eyes: Pupils are equal, round, and reactive to light. Conjunctivae and EOM are normal.   Neck: Normal range of motion. Neck supple.   Cardiovascular: Normal rate, regular rhythm, normal heart sounds and intact distal pulses.   Pulmonary/Chest:   Decreased entry bases without adventitious sounds   Abdominal: Soft. Bowel sounds are normal.   Musculoskeletal: Normal range of  motion.   Neurological: He is alert and oriented to person, place, and time.   Skin: Skin is warm and dry. Capillary refill takes less than 2 seconds.   Psychiatric: He has a normal mood and affect. His behavior is normal. Judgment and thought content normal.   Nursing note and vitals reviewed.      Significant Labs:   BMP:   Recent Labs   Lab 08/24/19  0556   *   *   K 4.7   CL 92*   CO2 24   BUN 39*   CREATININE 6.8*   CALCIUM 8.6*     CBC:   Recent Labs   Lab 08/23/19  0850 08/24/19  0556   WBC 6.95 7.14   HGB 12.8* 12.4*   HCT 42.1 40.1   PLT 87* 92*     All pertinent labs within the past 24 hours have been reviewed.    Significant Imaging: none      Assessment/Plan:      End stage renal disease       End-stage renal disease on hemodialysis-patient due for dialysis today. Orders per nephrology.     Ascites secondary to alcoholic cirrhotic liver disease, chronic-patient for paracentesis today. 5 L taken off. Will receive aliment post paracentesis. Monitor closely during hemodialysis for end drops in blood pressure     Acute volume overload- slowly resolving.     AICD in situ- no discharges of AICD while in hospital. No arrhythmia.     Acute metabolic encephalopathy- encephalopathy resolved.     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 Difficile colitis-no evidence of C. Difficile colitis.     Diarrhea of infectious origin, ruled out     Severe chronic deconditioning- awaiting skilled nursing facility placement    CAD S/P percutaneous coronary angioplasty        ESRD (end stage renal disease)  HD on MWF      Ascites due to alcoholic cirrhosis        Alcoholic cirrhosis of liver with ascites  S/p paracenetesis; will require repeat paracentesis periodically      VTE Risk Mitigation (From admission, onward)        Ordered     IP VTE HIGH RISK PATIENT  Once      08/13/19  1909                Luis Antonio Wyatt MD  Department of Hospital Medicine   [facility]

## 2019-08-24 NOTE — SUBJECTIVE & OBJECTIVE
Interval History: stable    Review of Systems   Constitutional: Positive for fatigue.        Generalized weakness   HENT: Negative.    Eyes: Negative.    Respiratory: Negative.    Cardiovascular: Negative.    Gastrointestinal: Negative.    Endocrine: Negative.    Genitourinary: Negative.    Musculoskeletal: Negative.    Skin: Negative.    Allergic/Immunologic: Negative.    Neurological: Negative.    Hematological: Negative.    All other systems reviewed and are negative.    Objective:     Vital Signs (Most Recent):  Temp: 98 °F (36.7 °C) (08/24/19 1205)  Pulse: 73 (08/24/19 1205)  Resp: 18 (08/24/19 1205)  BP: 93/60 (08/24/19 1205)  SpO2: 96 % (08/24/19 1205) Vital Signs (24h Range):  Temp:  [97.4 °F (36.3 °C)-98.5 °F (36.9 °C)] 98 °F (36.7 °C)  Pulse:  [60-78] 73  Resp:  [16-20] 18  SpO2:  [92 %-98 %] 96 %  BP: ()/(60-74) 93/60     Weight: 91 kg (200 lb 9.9 oz)  Body mass index is 32.38 kg/m².  No intake or output data in the 24 hours ending 08/24/19 1641   Physical Exam   Constitutional: He is oriented to person, place, and time. He appears well-developed and well-nourished.   HENT:   Head: Normocephalic and atraumatic.   Eyes: Pupils are equal, round, and reactive to light. Conjunctivae and EOM are normal.   Neck: Normal range of motion. Neck supple.   Cardiovascular: Normal rate, regular rhythm, normal heart sounds and intact distal pulses.   Pulmonary/Chest:   Decreased entry bases without adventitious sounds   Abdominal: Soft. Bowel sounds are normal.   Musculoskeletal: Normal range of motion.   Neurological: He is alert and oriented to person, place, and time.   Skin: Skin is warm and dry. Capillary refill takes less than 2 seconds.   Psychiatric: He has a normal mood and affect. His behavior is normal. Judgment and thought content normal.   Nursing note and vitals reviewed.      Significant Labs:   BMP:   Recent Labs   Lab 08/24/19  0556   *   *   K 4.7   CL 92*   CO2 24   BUN 39*    CREATININE 6.8*   CALCIUM 8.6*     CBC:   Recent Labs   Lab 08/23/19  0850 08/24/19  0556   WBC 6.95 7.14   HGB 12.8* 12.4*   HCT 42.1 40.1   PLT 87* 92*     All pertinent labs within the past 24 hours have been reviewed.    Significant Imaging: none

## 2019-08-24 NOTE — CARE UPDATE
08/23/19 1900   Patient Assessment/Suction   Level of Consciousness (AVPU) alert   Respiratory Effort Normal;Unlabored   PRE-TX-O2   O2 Device (Oxygen Therapy) room air   SpO2 98 %   Pulse Oximetry Type Intermittent   $ Pulse Oximetry - Multiple Charge Pulse Oximetry - Multiple   Pulse 66   Resp 16   Aerosol Therapy   $ Aerosol Therapy Charges PRN treatment not required   Respiratory Treatment Status (SVN) PRN treatment not required

## 2019-08-25 LAB
ALBUMIN SERPL BCP-MCNC: 3.2 G/DL (ref 3.5–5.2)
ANION GAP SERPL CALC-SCNC: 15 MMOL/L (ref 8–16)
ANION GAP SERPL CALC-SCNC: 16 MMOL/L (ref 8–16)
BUN SERPL-MCNC: 50 MG/DL (ref 6–20)
BUN SERPL-MCNC: 56 MG/DL (ref 6–20)
CALCIUM SERPL-MCNC: 8 MG/DL (ref 8.7–10.5)
CALCIUM SERPL-MCNC: 8.6 MG/DL (ref 8.7–10.5)
CHLORIDE SERPL-SCNC: 88 MMOL/L (ref 95–110)
CHLORIDE SERPL-SCNC: 93 MMOL/L (ref 95–110)
CO2 SERPL-SCNC: 24 MMOL/L (ref 23–29)
CO2 SERPL-SCNC: 24 MMOL/L (ref 23–29)
CREAT SERPL-MCNC: 7.9 MG/DL (ref 0.5–1.4)
CREAT SERPL-MCNC: 8.7 MG/DL (ref 0.5–1.4)
EST. GFR  (AFRICAN AMERICAN): 7.1 ML/MIN/1.73 M^2
EST. GFR  (AFRICAN AMERICAN): 8 ML/MIN/1.73 M^2
EST. GFR  (NON AFRICAN AMERICAN): 6.1 ML/MIN/1.73 M^2
EST. GFR  (NON AFRICAN AMERICAN): 6.9 ML/MIN/1.73 M^2
GLUCOSE SERPL-MCNC: 110 MG/DL (ref 70–110)
GLUCOSE SERPL-MCNC: 110 MG/DL (ref 70–110)
GLUCOSE SERPL-MCNC: 120 MG/DL (ref 70–110)
GLUCOSE SERPL-MCNC: 126 MG/DL (ref 70–110)
GLUCOSE SERPL-MCNC: 162 MG/DL (ref 70–110)
GLUCOSE SERPL-MCNC: 190 MG/DL (ref 70–110)
GLUCOSE SERPL-MCNC: 99 MG/DL (ref 70–110)
MAGNESIUM SERPL-MCNC: 2.6 MG/DL (ref 1.6–2.6)
PHOSPHATE SERPL-MCNC: 9 MG/DL (ref 2.7–4.5)
POTASSIUM SERPL-SCNC: 4.5 MMOL/L (ref 3.5–5.1)
POTASSIUM SERPL-SCNC: 4.7 MMOL/L (ref 3.5–5.1)
SODIUM SERPL-SCNC: 128 MMOL/L (ref 136–145)
SODIUM SERPL-SCNC: 132 MMOL/L (ref 136–145)

## 2019-08-25 PROCEDURE — 94761 N-INVAS EAR/PLS OXIMETRY MLT: CPT

## 2019-08-25 PROCEDURE — 36415 COLL VENOUS BLD VENIPUNCTURE: CPT

## 2019-08-25 PROCEDURE — 25000003 PHARM REV CODE 250: Performed by: INTERNAL MEDICINE

## 2019-08-25 PROCEDURE — 80048 BASIC METABOLIC PNL TOTAL CA: CPT

## 2019-08-25 PROCEDURE — 83735 ASSAY OF MAGNESIUM: CPT

## 2019-08-25 PROCEDURE — 99900035 HC TECH TIME PER 15 MIN (STAT)

## 2019-08-25 PROCEDURE — 12000002 HC ACUTE/MED SURGE SEMI-PRIVATE ROOM

## 2019-08-25 PROCEDURE — 80069 RENAL FUNCTION PANEL: CPT

## 2019-08-25 RX ADMIN — CALCITRIOL CAPSULES 0.25 MCG 0.25 MCG: 0.25 CAPSULE ORAL at 09:08

## 2019-08-25 RX ADMIN — ASPIRIN 81 MG: 81 TABLET, COATED ORAL at 09:08

## 2019-08-25 RX ADMIN — MIDODRINE HYDROCHLORIDE 5 MG: 2.5 TABLET ORAL at 06:08

## 2019-08-25 RX ADMIN — MIDODRINE HYDROCHLORIDE 5 MG: 2.5 TABLET ORAL at 12:08

## 2019-08-25 RX ADMIN — HYDROCODONE BITARTRATE AND ACETAMINOPHEN 1 TABLET: 10; 325 TABLET ORAL at 11:08

## 2019-08-25 RX ADMIN — ALLOPURINOL 100 MG: 100 TABLET ORAL at 09:08

## 2019-08-25 RX ADMIN — AMIODARONE HYDROCHLORIDE 200 MG: 200 TABLET ORAL at 09:08

## 2019-08-25 RX ADMIN — RIFAXIMIN 550 MG: 550 TABLET ORAL at 09:08

## 2019-08-25 RX ADMIN — CALCIUM ACETATE 667 MG: 667 CAPSULE ORAL at 12:08

## 2019-08-25 RX ADMIN — Medication: at 09:08

## 2019-08-25 RX ADMIN — CALCIUM ACETATE 667 MG: 667 CAPSULE ORAL at 09:08

## 2019-08-25 RX ADMIN — CALCIUM ACETATE 667 MG: 667 CAPSULE ORAL at 04:08

## 2019-08-25 RX ADMIN — RIFAXIMIN 550 MG: 550 TABLET ORAL at 08:08

## 2019-08-25 RX ADMIN — MIDODRINE HYDROCHLORIDE 5 MG: 2.5 TABLET ORAL at 04:08

## 2019-08-25 NOTE — PLAN OF CARE
Problem: Infection  Goal: Infection Symptom Resolution  Patient remains free of infection    Problem: Pain Acute  Goal: Optimal Pain Control  Patients pain controlled with PRN pain medication

## 2019-08-25 NOTE — PLAN OF CARE
08/25/19 1638   Discharge Reassessment   Assessment Type Discharge Planning Reassessment   Anticipated Discharge Disposition SNF     Weekend SW received consult for SNF and reviewed previous RN CM Notes.  Met with Pt at bedside and provided him with SNF list to review and education on Medicare freedom of choice.  Pt requesting that SW send referrals to [facility] and [facility], which will be done via [employer].  Pt Choice form completed with this information and signed by Pt at 1548, and will be scanned to chart.  RN CM / SW to continue to follow.

## 2019-08-25 NOTE — SUBJECTIVE & OBJECTIVE
Interval History: stable    Review of Systems   Constitutional: Positive for fatigue.        Generalized weakness   HENT: Negative.    Eyes: Negative.    Respiratory: Negative.    Cardiovascular: Negative.    Gastrointestinal: Negative.    Endocrine: Negative.    Genitourinary: Negative.    Musculoskeletal: Negative.    Skin: Negative.    Allergic/Immunologic: Negative.    Neurological: Negative.    Hematological: Negative.    All other systems reviewed and are negative.    Objective:     Vital Signs (Most Recent):  Temp: 97.6 °F (36.4 °C) (08/25/19 1200)  Pulse: 75 (08/25/19 1200)  Resp: 20 (08/25/19 1200)  BP: 110/71 (08/25/19 1200)  SpO2: 98 % (08/25/19 1200) Vital Signs (24h Range):  Temp:  [97.4 °F (36.3 °C)-97.9 °F (36.6 °C)] 97.6 °F (36.4 °C)  Pulse:  [69-75] 75  Resp:  [18-20] 20  SpO2:  [96 %-100 %] 98 %  BP: ()/(60-75) 110/71     Weight: 91 kg (200 lb 9.9 oz)  Body mass index is 32.38 kg/m².  No intake or output data in the 24 hours ending 08/25/19 1507   Physical Exam   Constitutional: He is oriented to person, place, and time. He appears well-developed and well-nourished.   HENT:   Head: Normocephalic and atraumatic.   Eyes: Pupils are equal, round, and reactive to light. Conjunctivae and EOM are normal.   Neck: Normal range of motion. Neck supple.   Cardiovascular: Normal rate, regular rhythm, normal heart sounds and intact distal pulses.   Pulmonary/Chest: Effort normal and breath sounds normal.   Abdominal: Soft. Bowel sounds are normal.   Musculoskeletal: Normal range of motion. He exhibits edema.   Neurological: He is alert and oriented to person, place, and time.   Skin: Skin is warm and dry. Capillary refill takes less than 2 seconds.   Psychiatric: He has a normal mood and affect. His behavior is normal. Judgment and thought content normal.   Nursing note and vitals reviewed.      Significant Labs:   BMP:   Recent Labs   Lab 08/25/19  0609      *   K 4.7   CL 88*   CO2 24   BUN  50*   CREATININE 7.9*   CALCIUM 8.6*   MG 2.6     CBC:   Recent Labs   Lab 08/24/19  0556   WBC 7.14   HGB 12.4*   HCT 40.1   PLT 92*       Significant Imaging: none

## 2019-08-25 NOTE — PROGRESS NOTES
[facility] Medicine  Progress Note    Patient Name: Tre Cali  MRN: [id number]  Patient Class: IP- Inpatient   Admission Date: 8/13/2019  Length of Stay: 10 days  Attending Physician: Luis Antonio Wyatt MD  Primary Care Provider: Primary Doctor No        Subjective:     Principal Problem:Volume overload        HPI:  Mr. Cali is a 54-year-old male with past medical history of CAD status post stent several years ago, COPD, liver cirrhosis, ESRD on hemodialysis presents with generalized weakness.  The patient presented emergency room earlier today in the morning for generalized weakness and anasarca, patient was discharged to dialysis center for hemodialysis.  Patient came back from hemodialysis for unclear reasons.  Patient reports history of generalized weakness, reports knees gave out and fell.  History of shortness of breath since last few days.  History of worsening abdominal swelling and abdominal pain since 2-3 weeks.  History of diarrhea since 3 weeks, about 4 episodes every day, nonbloody.  Patient reports sleepiness and intermittent confusion since 1-2 days.  Patient moved from [place] to [place] a week ago and yet to set up doctors.  Patient reports last hemodialysis was last Saturday.  Patient denies any history of fevers, chills or nausea, vomiting.  Patient reports history of stents several years ago.  Reports history of stroke several years ago with resultant right upper extremity and lower extremity weakness.     Patient quit drinking about 20 years ago.  Patient has longstanding history of smoking, reports no smoking intermittently few cigarettes per day.  Denies history of drug abuse.  Past surgical history - right arm AV fistula, AICD placement, cardiac stents    Overview/Hospital Course:  Therapeutic paracentesis performed 8/16  Had HD today. To overnight and follow-up labs in AM. Hopefully discharge in AM. Patient unable to perform ADLs secondary to severe  deconditioning. SNF will not take because of need for intermittent paracentesis and HD. Therefore LTAC or inpatient rehabilitation is being considered, and discharge held.  8/21 Awaiting placement on med/surg. Receiving dialysis MWF. Seen today at dialysis unit  8/22: has been tentatively accepted at in-patient rehab  8/23 Awaiting authorization from [facility].  8/24 Awaiting authorization from [facility]  8/25 Awaiting authorization from [facility]    Interval History: stable    Review of Systems   Constitutional: Positive for fatigue.        Generalized weakness   HENT: Negative.    Eyes: Negative.    Respiratory: Negative.    Cardiovascular: Negative.    Gastrointestinal: Negative.    Endocrine: Negative.    Genitourinary: Negative.    Musculoskeletal: Negative.    Skin: Negative.    Allergic/Immunologic: Negative.    Neurological: Negative.    Hematological: Negative.    All other systems reviewed and are negative.    Objective:     Vital Signs (Most Recent):  Temp: 97.6 °F (36.4 °C) (08/25/19 1200)  Pulse: 75 (08/25/19 1200)  Resp: 20 (08/25/19 1200)  BP: 110/71 (08/25/19 1200)  SpO2: 98 % (08/25/19 1200) Vital Signs (24h Range):  Temp:  [97.4 °F (36.3 °C)-97.9 °F (36.6 °C)] 97.6 °F (36.4 °C)  Pulse:  [69-75] 75  Resp:  [18-20] 20  SpO2:  [96 %-100 %] 98 %  BP: ()/(60-75) 110/71     Weight: 91 kg (200 lb 9.9 oz)  Body mass index is 32.38 kg/m².  No intake or output data in the 24 hours ending 08/25/19 1507   Physical Exam   Constitutional: He is oriented to person, place, and time. He appears well-developed and well-nourished.   HENT:   Head: Normocephalic and atraumatic.   Eyes: Pupils are equal, round, and reactive to light. Conjunctivae and EOM are normal.   Neck: Normal range of motion. Neck supple.   Cardiovascular: Normal rate, regular rhythm, normal heart sounds and intact distal pulses.   Pulmonary/Chest: Effort normal and breath sounds normal.   Abdominal: Soft. Bowel sounds are  normal.   Musculoskeletal: Normal range of motion. He exhibits edema.   Neurological: He is alert and oriented to person, place, and time.   Skin: Skin is warm and dry. Capillary refill takes less than 2 seconds.   Psychiatric: He has a normal mood and affect. His behavior is normal. Judgment and thought content normal.   Nursing note and vitals reviewed.      Significant Labs:   BMP:   Recent Labs   Lab 08/25/19  0609      *   K 4.7   CL 88*   CO2 24   BUN 50*   CREATININE 7.9*   CALCIUM 8.6*   MG 2.6     CBC:   Recent Labs   Lab 08/24/19  0556   WBC 7.14   HGB 12.4*   HCT 40.1   PLT 92*       Significant Imaging: none      Assessment/Plan:      End stage renal disease       End-stage renal disease on hemodialysis-patient due for dialysis today. Orders per nephrology.     Ascites secondary to alcoholic cirrhotic liver disease, chronic-patient for paracentesis today. 5 L taken off. Will receive aliment post paracentesis. Monitor closely during hemodialysis for end drops in blood pressure     Acute volume overload- slowly resolving.     AICD in situ- no discharges of AICD while in hospital. No arrhythmia.     Acute metabolic encephalopathy- encephalopathy resolved.     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 Difficile colitis-no evidence of C. Difficile colitis.     Diarrhea of infectious origin, ruled out     Severe chronic deconditioning- awaiting skilled nursing facility placement    CAD S/P percutaneous coronary angioplasty        ESRD (end stage renal disease)  HD on MWF      Ascites due to alcoholic cirrhosis        Alcoholic cirrhosis of liver with ascites  S/p paracenetesis; will require repeat paracentesis periodically      VTE Risk Mitigation (From admission, onward)        Ordered     IP VTE HIGH RISK PATIENT  Once      08/13/19 1909                Luis Antonio ARREGUIN  MD Edmundo  Department of Hospital Medicine   [facility]

## 2019-08-26 LAB
ALBUMIN SERPL BCP-MCNC: 3.3 G/DL (ref 3.5–5.2)
ANION GAP SERPL CALC-SCNC: 19 MMOL/L (ref 8–16)
BASOPHILS # BLD AUTO: 0.12 K/UL (ref 0–0.2)
BASOPHILS NFR BLD: 1.4 % (ref 0–1.9)
BUN SERPL-MCNC: 59 MG/DL (ref 6–20)
CALCIUM SERPL-MCNC: 8.5 MG/DL (ref 8.7–10.5)
CHLORIDE SERPL-SCNC: 91 MMOL/L (ref 95–110)
CO2 SERPL-SCNC: 22 MMOL/L (ref 23–29)
CREAT SERPL-MCNC: 9.1 MG/DL (ref 0.5–1.4)
DIFFERENTIAL METHOD: ABNORMAL
EOSINOPHIL # BLD AUTO: 0.1 K/UL (ref 0–0.5)
EOSINOPHIL NFR BLD: 1.6 % (ref 0–8)
ERYTHROCYTE [DISTWIDTH] IN BLOOD BY AUTOMATED COUNT: 16.2 % (ref 11.5–14.5)
ERYTHROCYTE [DISTWIDTH] IN BLOOD BY AUTOMATED COUNT: 16.2 % (ref 11.5–14.5)
EST. GFR  (AFRICAN AMERICAN): 6.7 ML/MIN/1.73 M^2
EST. GFR  (NON AFRICAN AMERICAN): 5.8 ML/MIN/1.73 M^2
GLUCOSE SERPL-MCNC: 133 MG/DL (ref 70–110)
GLUCOSE SERPL-MCNC: 134 MG/DL (ref 70–110)
GLUCOSE SERPL-MCNC: 144 MG/DL (ref 70–110)
GLUCOSE SERPL-MCNC: 153 MG/DL (ref 70–110)
HCT VFR BLD AUTO: 37.1 % (ref 40–54)
HCT VFR BLD AUTO: 37.1 % (ref 40–54)
HGB BLD-MCNC: 12 G/DL (ref 14–18)
HGB BLD-MCNC: 12 G/DL (ref 14–18)
IMM GRANULOCYTES # BLD AUTO: 0.05 K/UL (ref 0–0.04)
IMM GRANULOCYTES NFR BLD AUTO: 0.6 % (ref 0–0.5)
LYMPHOCYTES # BLD AUTO: 0.7 K/UL (ref 1–4.8)
LYMPHOCYTES NFR BLD: 8.1 % (ref 18–48)
MCH RBC QN AUTO: 30.4 PG (ref 27–31)
MCH RBC QN AUTO: 30.4 PG (ref 27–31)
MCHC RBC AUTO-ENTMCNC: 32.3 G/DL (ref 32–36)
MCHC RBC AUTO-ENTMCNC: 32.3 G/DL (ref 32–36)
MCV RBC AUTO: 94 FL (ref 82–98)
MCV RBC AUTO: 94 FL (ref 82–98)
MONOCYTES # BLD AUTO: 1.1 K/UL (ref 0.3–1)
MONOCYTES NFR BLD: 12.8 % (ref 4–15)
NEUTROPHILS # BLD AUTO: 6.5 K/UL (ref 1.8–7.7)
NEUTROPHILS NFR BLD: 75.5 % (ref 38–73)
NRBC BLD-RTO: 0 /100 WBC
PHOSPHATE SERPL-MCNC: 9.5 MG/DL (ref 2.7–4.5)
PLATELET # BLD AUTO: 99 K/UL (ref 150–350)
PLATELET # BLD AUTO: 99 K/UL (ref 150–350)
PMV BLD AUTO: 12.7 FL (ref 9.2–12.9)
PMV BLD AUTO: 12.7 FL (ref 9.2–12.9)
POTASSIUM SERPL-SCNC: 5.3 MMOL/L (ref 3.5–5.1)
RBC # BLD AUTO: 3.95 M/UL (ref 4.6–6.2)
RBC # BLD AUTO: 3.95 M/UL (ref 4.6–6.2)
SODIUM SERPL-SCNC: 132 MMOL/L (ref 136–145)
WBC # BLD AUTO: 8.66 K/UL (ref 3.9–12.7)
WBC # BLD AUTO: 8.66 K/UL (ref 3.9–12.7)

## 2019-08-26 PROCEDURE — 94761 N-INVAS EAR/PLS OXIMETRY MLT: CPT

## 2019-08-26 PROCEDURE — 63600175 PHARM REV CODE 636 W HCPCS: Performed by: INTERNAL MEDICINE

## 2019-08-26 PROCEDURE — 25000003 PHARM REV CODE 250: Performed by: INTERNAL MEDICINE

## 2019-08-26 PROCEDURE — 97535 SELF CARE MNGMENT TRAINING: CPT

## 2019-08-26 PROCEDURE — 90935 HEMODIALYSIS ONE EVALUATION: CPT

## 2019-08-26 PROCEDURE — 85025 COMPLETE CBC W/AUTO DIFF WBC: CPT

## 2019-08-26 PROCEDURE — 36415 COLL VENOUS BLD VENIPUNCTURE: CPT

## 2019-08-26 PROCEDURE — 80069 RENAL FUNCTION PANEL: CPT

## 2019-08-26 PROCEDURE — 12000002 HC ACUTE/MED SURGE SEMI-PRIVATE ROOM

## 2019-08-26 RX ORDER — PROCHLORPERAZINE EDISYLATE 5 MG/ML
5 INJECTION INTRAMUSCULAR; INTRAVENOUS EVERY 4 HOURS PRN
Status: DISCONTINUED | OUTPATIENT
Start: 2019-08-26 | End: 2019-08-30 | Stop reason: HOSPADM

## 2019-08-26 RX ORDER — ONDANSETRON 2 MG/ML
4 INJECTION INTRAMUSCULAR; INTRAVENOUS EVERY 6 HOURS PRN
Status: DISCONTINUED | OUTPATIENT
Start: 2019-08-26 | End: 2019-08-30 | Stop reason: HOSPADM

## 2019-08-26 RX ADMIN — ONDANSETRON 4 MG: 2 INJECTION INTRAMUSCULAR; INTRAVENOUS at 07:08

## 2019-08-26 RX ADMIN — CALCIUM ACETATE 667 MG: 667 CAPSULE ORAL at 07:08

## 2019-08-26 RX ADMIN — MIDODRINE HYDROCHLORIDE 5 MG: 2.5 TABLET ORAL at 05:08

## 2019-08-26 RX ADMIN — RIFAXIMIN 550 MG: 550 TABLET ORAL at 09:08

## 2019-08-26 RX ADMIN — PROMETHAZINE HYDROCHLORIDE 12.5 MG: 25 INJECTION INTRAMUSCULAR; INTRAVENOUS at 11:08

## 2019-08-26 RX ADMIN — Medication: at 09:08

## 2019-08-26 NOTE — PROGRESS NOTES
[facility]  Nephrology  Progress Note    Patient Name: Tre Cali  MRN: [id number]  Admission Date: 8/13/2019  Hospital Length of Stay: 11 days  Attending Provider: Luis Antonio Wyatt MD   Primary Care Physician: Primary Doctor No  Principal Problem:Volume overload      Subjective:     Interval History: endorses some mild nausea this am; denies any abdominal discomfort or any additional complaints but may need repeat paracentesis prior to discharge; awaiting outpatient LTAC placement (recommend [facility] [place], [place] as they can accommodate his HD needs))    Review of patient's allergies indicates:   Allergen Reactions    Sulfa (sulfonamide antibiotics) Rash     Current Facility-Administered Medications   Medication Frequency    0.9%  NaCl infusion PRN    0.9%  NaCl infusion Once    albumin human 25% bottle 50 g Once    albuterol nebulizer solution 2.5 mg Q4H PRN    allopurinol tablet 100 mg Daily    amiodarone tablet 200 mg Daily    aspirin EC tablet 81 mg Daily    calcitRIOL capsule 0.25 mcg Daily    calcium acetate capsule 667 mg TID WM    dextrose 50% injection 12.5 g PRN    dextrose 50% injection 25 g PRN    glucagon (human recombinant) injection 1 mg PRN    glucose chewable tablet 16 g PRN    glucose chewable tablet 24 g PRN    HYDROcodone-acetaminophen  mg per tablet 1 tablet Q4H PRN    midodrine tablet 5 mg TID    ondansetron injection 4 mg Q6H PRN    rifAXIMin tablet 550 mg BID    sodium chloride 0.9% flush 10 mL PRN    white petrolatum 41 % ointment Daily       Objective:     Vital Signs (Most Recent):  Temp: 97.7 °F (36.5 °C) (08/26/19 0738)  Pulse: 75 (08/26/19 0738)  Resp: 18 (08/26/19 0738)  BP: 132/76 (08/26/19 0738)  SpO2: 98 % (08/26/19 0738)  O2 Device (Oxygen Therapy): room air (08/26/19 0738) Vital Signs (24h Range):  Temp:  [97.5 °F (36.4 °C)-97.8 °F (36.6 °C)] 97.7 °F (36.5 °C)  Pulse:  [67-80] 75  Resp:  [18-20]  18  SpO2:  [95 %-98 %] 98 %  BP: ()/(56-76) 132/76     Weight: 91 kg (200 lb 9.9 oz) (08/22/19 0930)  Body mass index is 32.38 kg/m².  Body surface area is 2.06 meters squared.    I/O last 3 completed shifts:  In: 740 [P.O.:740]  Out: -     Physical Exam   Constitutional: He is oriented to person, place, and time. No distress.   Obese; chronically ill-appearing   HENT:   Head: Normocephalic and atraumatic.   Mouth/Throat: Oropharynx is clear and moist.   Eyes: Conjunctivae are normal. Right eye exhibits no discharge. Left eye exhibits no discharge. No scleral icterus.   Neck: Normal range of motion. Neck supple. No JVD present.   Cardiovascular: Normal rate, regular rhythm and intact distal pulses. Exam reveals no gallop and no friction rub.   Murmur heard.  Pulmonary/Chest: Effort normal and breath sounds normal. No respiratory distress. He has no wheezes. He has no rales.   Abdominal: Soft. He exhibits distension. He exhibits no mass. There is tenderness. There is no rebound and no guarding.   Hypoactive BS in all quadrants   Genitourinary:   Genitourinary Comments: Deferred   Musculoskeletal: Normal range of motion. He exhibits edema and tenderness. He exhibits no deformity.   R UE AV Fistula w/ palpable thrill   Neurological: He is alert and oriented to person, place, and time. A cranial nerve deficit is present.   Skin: Skin is warm and dry. Rash noted. No erythema.   Psychiatric: He has a normal mood and affect. His behavior is normal.   Nursing note and vitals reviewed.      Significant Labs:sure  CBC:   Recent Labs   Lab 08/26/19  0527   WBC 8.66  8.66   RBC 3.95*  3.95*   HGB 12.0*  12.0*   HCT 37.1*  37.1*   PLT 99*  99*   MCV 94  94   MCH 30.4  30.4   MCHC 32.3  32.3     CMP:   Recent Labs   Lab 08/23/19  0850  08/26/19  0527   *   < > 133*   CALCIUM 8.6*   < > 8.5*   ALBUMIN 3.3*   < > 3.3*   PROT 7.6  --   --    *   < > 132*   K 5.4*   < > 5.3*   CO2 20*   < > 22*   CL 88*   <  > 91*   BUN 49*   < > 59*   CREATININE 8.6*   < > 9.1*   ALKPHOS 129  --   --    ALT 12  --   --    AST 15  --   --    BILITOT 2.0*  --   --     < > = values in this interval not displayed.     LFTs:   Recent Labs   Lab 08/23/19  0850  08/26/19  0527   ALT 12  --   --    AST 15  --   --    ALKPHOS 129  --   --    BILITOT 2.0*  --   --    PROT 7.6  --   --    ALBUMIN 3.3*   < > 3.3*    < > = values in this interval not displayed.       Significant Imaging:  X-Ray: Reviewed  CT: Reviewed    Assessment/Plan:     Active Diagnoses:    Diagnosis Date Noted POA    PRINCIPAL PROBLEM:  Volume overload [E87.70] 08/13/2019 Yes    Alcoholic cirrhosis of liver with ascites [K70.31] 08/13/2019 Unknown     Chronic    Ascites due to alcoholic cirrhosis [K70.31] 08/13/2019 Yes     Chronic    ESRD (end stage renal disease) [N18.6] 08/13/2019 Yes     Chronic    AICD (automatic cardioverter/defibrillator) present [Z95.810] 08/13/2019 Yes     Chronic    Encephalopathy, metabolic [G93.41] 08/13/2019 Yes    CAD S/P percutaneous coronary angioplasty [I25.10, Z98.61] 08/13/2019 Not Applicable     Chronic    Hemiplegia as late effect of cerebrovascular accident (CVA) [I69.359] 08/13/2019 Not Applicable     Chronic    Diarrhea of presumed infectious origin [R19.7] 08/13/2019 Yes    End stage renal disease [N18.6] 08/13/2019 Yes      Problems Resolved During this Admission:     1. ESRD (HD-MWF via R UE AV Fistula)- symptomatically stable at present; significant abdominal distention on admission (s/p therapeutic paracentesis this am); no overt volume overload, significant electrolyte perturbations nor acid-base disturbance    -maintenance HD (duration: 3h; UF Goal: 1-2L as tolerated; STANDARD 'Bath'; Access: AVF; Qb: 400 ml/min, Qd: 2x BFR; give 12.5-25 gm 25% Albumin w/ HD for BP support)    -DAILY renal function panel to document sCr trend, optimize HD electrolyte 'bath' & assess response to therapy    -avoid nephrotoxic agents  (NSAIDs, IV contrast dye)    -renally dose all appropriate medications, including antibiotics     2. HTN- clinically stable at present;  BP AT GOAL (132/76); no active issues    -HOLD anti-HTN medications & diuretics for now & reassess in am     3. ANEMIA- clinically stable at present; H/H AT GOAL (12.0/37.1); no active issues    -trend daily CBC (H/H) to effect optimal blood-loss surveillance     4. SECONDARY HYPERPARATHYROIDISM (sHPT)- clinically stable at present; no active issues    -continue dietary binder (if tolerating 'po')/Vitamin D +/- HD-associated calcimimetic agent (Cinacalcet vs Etelcalcetide)     5. ESLD (hx of EtOH-induced Cirrhosis) w/ Ascites (s/p therapeutic paracentesis)- appears ill; GI Medicine service on the case; cautious fluid removal to minimize risk of precipitatiing Hepatorenal Syndrome    -management per GI Medicine/Primary Team recommendations    Thank you for your consult. I will follow-up with patient. Please contact us if you have any additional questions.    Armando Cruz III, MD  [facility]  [facility]  [phone] (C)

## 2019-08-26 NOTE — PT/OT/SLP PROGRESS
Occupational Therapy   Treatment    Name: Tre Cali  MRN: [id number]  Admitting Diagnosis:  Volume overload       Recommendations:     Discharge Recommendations: nursing facility, skilled  Discharge Equipment Recommendations:  walker, rolling, tub bench(will continue to assess)  Barriers to discharge:  Decreased caregiver support    Assessment:     Tre Cali is a 54 y.o. male with a medical diagnosis of Volume overload.  He presents with dialysis patient with general weakness. Performance deficits affecting function are (nausea and vomiting during session.).     Rehab Prognosis:  Fair; patient would benefit from acute skilled OT services to address these deficits and reach maximum level of function.       Plan:     Patient to be seen 5 x/week to address the above listed problems via self-care/home management, therapeutic activities, therapeutic exercises  · Plan of Care Expires: 09/15/19  · Plan of Care Reviewed with: patient    Subjective     Pain/Comfort:  · Pain Rating 1: 0/10  · Pain Rating Post-Intervention 1: 0/10    Objective:     Communicated with: patient and nurse prior to session.  Patient found sitting upright side of bed with telemetry, peripheral IV upon OT entry to room.    General Precautions: Standard, fall   Orthopedic Precautions:N/A   Braces: N/A     Occupational Performance:     Bed Mobility:    · Patient started and ended session sitting unsupported edge of bed.     Functional Mobility/Transfers:  · Not attempted this session secondary to fatigue, nausea and vomiting.    Activities of Daily Living:  · Grooming: stand by assistance edge of bed.      [facility] 6 Click ADL:      Treatment & Education:  Continued self care independence, UE therex and safety awareness.    Patient left sitting unsupported edge of bed. with all lines intact, call button in reach and bed alarm offEducation:      GOALS:   Multidisciplinary Problems     Occupational Therapy Goals        Problem: Occupational  Therapy Goal    Goal Priority Disciplines Outcome Interventions   Occupational Therapy Goal     OT, PT/OT Ongoing (interventions implemented as appropriate)    Description:  Goals to be met by: discharge     Patient will increase functional independence with ADLs by performing:    LE Dressing with Minimal Assistance.  Grooming while seated at sink with [place].  Toileting from toilet with Minimal Assistance for hygiene and clothing management.   Toilet transfer to toilet with Stand-by Assistance.     Adding goal:   Pt to be independent with UE exercises program.   Evie Arita OT  8/22/2019  .                    Time Tracking:     OT Date of Treatment: 08/26/19  OT Start Time: 1325  OT Stop Time: 1334  OT Total Time (min): 9 min    Billable Minutes:Self Care/Home Management 9    Evie Arita OT  8/26/2019

## 2019-08-26 NOTE — PLAN OF CARE
08/26/19 1221   Discharge Reassessment   Assessment Type Discharge Planning Reassessment   Anticipated Discharge Disposition SNF   [facility] RESUBMITTED SNF REFERRAL'S TO [facility] AND [facility].    THIS PATIENT HAS BEEN DENIED AGAIN FOR SNF SERVICES BY [facility]; RECEIVED THIS INFORMATION FROM [facility] WITH [facility].    [facility] IS STILL RE-REVIEWING THE REFERRAL.  AWAIT RESPONSE.

## 2019-08-26 NOTE — PLAN OF CARE
08/26/19 1607   Discharge Reassessment   Assessment Type Discharge Planning Reassessment   Anticipated Discharge Disposition SNF   AFTER THE SNF REFERRAL WAS RESUBMITTED AND THIS CM SENT UPDATED INFORMATION, I RECEIVED A COMMUNICATION FROM [facility] WITH [facility] STATING THIS PATIENT HAS BEEN CLINICALLY ACCEPTED TO THEIR FACILITY.  NOW AWAITING FINANCIAL CLEARANCE / AUTHORIZATION.

## 2019-08-26 NOTE — CARE UPDATE
TRIED TO CALL SARAH BUCIO AT [facility] FOR AUTH BUT GOT RECORDING AND COULDN'T TALK TO ANYONE. FAXED HER UPDATE ON PT AND ASKED IF INPATIENT IS AUTHORIZED AND FOR WHAT DAYS.

## 2019-08-26 NOTE — PROGRESS NOTES
[facility] Medicine  Progress Note    Patient Name: Tre Cali  MRN: [id number]  Patient Class: IP- Inpatient   Admission Date: 8/13/2019  Length of Stay: 11 days  Attending Physician: Luis Antonio Wyatt MD  Primary Care Provider: Primary Doctor No        Subjective:     Principal Problem:Volume overload        HPI:  Mr. Cali is a 54-year-old male with past medical history of CAD status post stent several years ago, COPD, liver cirrhosis, ESRD on hemodialysis presents with generalized weakness.  The patient presented emergency room earlier today in the morning for generalized weakness and anasarca, patient was discharged to dialysis center for hemodialysis.  Patient came back from hemodialysis for unclear reasons.  Patient reports history of generalized weakness, reports knees gave out and fell.  History of shortness of breath since last few days.  History of worsening abdominal swelling and abdominal pain since 2-3 weeks.  History of diarrhea since 3 weeks, about 4 episodes every day, nonbloody.  Patient reports sleepiness and intermittent confusion since 1-2 days.  Patient moved from [place] to [place] a week ago and yet to set up doctors.  Patient reports last hemodialysis was last Saturday.  Patient denies any history of fevers, chills or nausea, vomiting.  Patient reports history of stents several years ago.  Reports history of stroke several years ago with resultant right upper extremity and lower extremity weakness.     Patient quit drinking about 20 years ago.  Patient has longstanding history of smoking, reports no smoking intermittently few cigarettes per day.  Denies history of drug abuse.  Past surgical history - right arm AV fistula, AICD placement, cardiac stents    Overview/Hospital Course:  Therapeutic paracentesis performed 8/16  Had HD today. To overnight and follow-up labs in AM. Hopefully discharge in AM. Patient unable to perform ADLs secondary to severe  deconditioning. SNF will not take because of need for intermittent paracentesis and HD. Therefore LTAC or inpatient rehabilitation is being considered, and discharge held.  8/21 Awaiting placement on med/surg. Receiving dialysis MWF. Seen today at dialysis unit  8/22: has been tentatively accepted at in-patient rehab  8/23 Awaiting authorization from [facility].  8/24 Awaiting authorization from [facility]  8/25 Awaiting authorization from [facility]  8/26 Denied inpatient rehab. Will try for SNF placement as his ascites and need for intermittent paraceentesis has improved greatly; not needed since his last on 8/16    Interval History: stable    Review of Systems   Constitutional: Positive for fatigue.        Severe weakness, unable to stand and walk   HENT: Negative.    Eyes: Negative.    Respiratory: Negative.    Cardiovascular: Negative.    Gastrointestinal: Positive for nausea.   Endocrine: Negative.    Genitourinary: Negative.    Musculoskeletal: Negative.    Skin: Negative.    Allergic/Immunologic: Negative.    Neurological: Negative.    Hematological: Negative.    All other systems reviewed and are negative.    Objective:     Vital Signs (Most Recent):  Temp: 97.7 °F (36.5 °C) (08/26/19 0738)  Pulse: 75 (08/26/19 0738)  Resp: 18 (08/26/19 0738)  BP: 132/76 (08/26/19 0738)  SpO2: 98 % (08/26/19 0738) Vital Signs (24h Range):  Temp:  [97.5 °F (36.4 °C)-97.8 °F (36.6 °C)] 97.7 °F (36.5 °C)  Pulse:  [67-80] 75  Resp:  [18-20] 18  SpO2:  [95 %-98 %] 98 %  BP: ()/(56-76) 132/76     Weight: 91 kg (200 lb 9.9 oz)  Body mass index is 32.38 kg/m².    Intake/Output Summary (Last 24 hours) at 8/26/2019 0848  Last data filed at 8/25/2019 1658  Gross per 24 hour   Intake 740 ml   Output --   Net 740 ml      Physical Exam   Constitutional: He is oriented to person, place, and time. He appears well-developed and well-nourished.   Chronic ill appearing   HENT:   Head: Normocephalic and atraumatic.   Eyes:  Pupils are equal, round, and reactive to light. Conjunctivae and EOM are normal.   Neck: Normal range of motion. Neck supple.   Cardiovascular: Normal rate, regular rhythm, normal heart sounds and intact distal pulses.   Pulmonary/Chest:   Decreased entry bases without adventitious sounds   Abdominal: Soft. Bowel sounds are normal.   Musculoskeletal: Normal range of motion.   Neurological: He is alert and oriented to person, place, and time.   Skin: Skin is warm and dry. Capillary refill takes less than 2 seconds.   Psychiatric: He has a normal mood and affect. His behavior is normal. Judgment and thought content normal.   Nursing note and vitals reviewed.      Significant Labs:   BMP:   Recent Labs   Lab 08/25/19  0609  08/26/19 0527      < > 133*   *   < > 132*   K 4.7   < > 5.3*   CL 88*   < > 91*   CO2 24   < > 22*   BUN 50*   < > 59*   CREATININE 7.9*   < > 9.1*   CALCIUM 8.6*   < > 8.5*   MG 2.6  --   --     < > = values in this interval not displayed.     CBC:   Recent Labs   Lab 08/26/19 0527   WBC 8.66  8.66   HGB 12.0*  12.0*   HCT 37.1*  37.1*   PLT 99*  99*       Significant Imaging: I have reviewed all pertinent imaging results/findings within the past 24 hours.      Assessment/Plan:      * Volume overload  HD on MWF      End stage renal disease       End-stage renal disease on hemodialysis-patient due for dialysis today. Orders per nephrology.     Ascites secondary to alcoholic cirrhotic liver disease, chronic-patient for paracentesis today. 5 L taken off. Will receive aliment post paracentesis. Monitor closely during hemodialysis for end drops in blood pressure     Acute volume overload- slowly resolving.     AICD in situ- no discharges of AICD while in hospital. No arrhythmia.     Acute metabolic encephalopathy- encephalopathy resolved.     Hemiplegia as late effect of surgery were vascular accident, chronic- patient not wanting to pertussis lenard with physical therapy today due to  abdominal paracentesis.\     C. Difficile colitis-no evidence of C. Difficile colitis.     Diarrhea of infectious origin, ruled out     Severe chronic deconditioning- awaiting skilled nursing facility placement    CAD S/P percutaneous coronary angioplasty        ESRD (end stage renal disease)  HD on MWF      Ascites due to alcoholic cirrhosis    Stable currently; not worsening    Alcoholic cirrhosis of liver with ascites  S/p paracenetesis; stable currently      VTE Risk Mitigation (From admission, onward)        Ordered     IP VTE HIGH RISK PATIENT  Once      08/13/19 1909                Luis Antonio Wyatt MD  Department of Hospital Medicine   [facility]

## 2019-08-26 NOTE — SUBJECTIVE & OBJECTIVE
Interval History: stable    Review of Systems   Constitutional: Positive for fatigue.        Severe weakness, unable to stand and walk   HENT: Negative.    Eyes: Negative.    Respiratory: Negative.    Cardiovascular: Negative.    Gastrointestinal: Positive for nausea.   Endocrine: Negative.    Genitourinary: Negative.    Musculoskeletal: Negative.    Skin: Negative.    Allergic/Immunologic: Negative.    Neurological: Negative.    Hematological: Negative.    All other systems reviewed and are negative.    Objective:     Vital Signs (Most Recent):  Temp: 97.7 °F (36.5 °C) (08/26/19 0738)  Pulse: 75 (08/26/19 0738)  Resp: 18 (08/26/19 0738)  BP: 132/76 (08/26/19 0738)  SpO2: 98 % (08/26/19 0738) Vital Signs (24h Range):  Temp:  [97.5 °F (36.4 °C)-97.8 °F (36.6 °C)] 97.7 °F (36.5 °C)  Pulse:  [67-80] 75  Resp:  [18-20] 18  SpO2:  [95 %-98 %] 98 %  BP: ()/(56-76) 132/76     Weight: 91 kg (200 lb 9.9 oz)  Body mass index is 32.38 kg/m².    Intake/Output Summary (Last 24 hours) at 8/26/2019 0848  Last data filed at 8/25/2019 1658  Gross per 24 hour   Intake 740 ml   Output --   Net 740 ml      Physical Exam   Constitutional: He is oriented to person, place, and time. He appears well-developed and well-nourished.   Chronic ill appearing   HENT:   Head: Normocephalic and atraumatic.   Eyes: Pupils are equal, round, and reactive to light. Conjunctivae and EOM are normal.   Neck: Normal range of motion. Neck supple.   Cardiovascular: Normal rate, regular rhythm, normal heart sounds and intact distal pulses.   Pulmonary/Chest:   Decreased entry bases without adventitious sounds   Abdominal: Soft. Bowel sounds are normal.   Musculoskeletal: Normal range of motion.   Neurological: He is alert and oriented to person, place, and time.   Skin: Skin is warm and dry. Capillary refill takes less than 2 seconds.   Psychiatric: He has a normal mood and affect. His behavior is normal. Judgment and thought content normal.   Nursing  note and vitals reviewed.      Significant Labs:   BMP:   Recent Labs   Lab 08/25/19  0609  08/26/19 0527      < > 133*   *   < > 132*   K 4.7   < > 5.3*   CL 88*   < > 91*   CO2 24   < > 22*   BUN 50*   < > 59*   CREATININE 7.9*   < > 9.1*   CALCIUM 8.6*   < > 8.5*   MG 2.6  --   --     < > = values in this interval not displayed.     CBC:   Recent Labs   Lab 08/26/19 0527   WBC 8.66  8.66   HGB 12.0*  12.0*   HCT 37.1*  37.1*   PLT 99*  99*       Significant Imaging: I have reviewed all pertinent imaging results/findings within the past 24 hours.

## 2019-08-26 NOTE — PT/OT/SLP PROGRESS
Physical Therapy      Patient Name:  Tre Cali   MRN:  [id number]    Patient not seen today secondary to Patient ill (Comment)(Pt nauseated). Will follow-up tomorrow (8/27/19).    Carla Beaulieu, PT

## 2019-08-26 NOTE — PLAN OF CARE
Problem: Infection  Goal: Infection Symptom Resolution  Patient remains free of infection    Problem: Pain Acute  Goal: Optimal Pain Control  Patient has no complaints of pain will continue to monitor and will administer prn pain medication as needed

## 2019-08-26 NOTE — PLAN OF CARE
Problem: Adult Inpatient Plan of Care  Goal: Optimal Comfort and Wellbeing    Intervention: Monitor Pain and Promote Comfort  Patient denies pain, resting comfortably, and remains free of injury.

## 2019-08-27 PROBLEM — R53.81 PHYSICAL DECONDITIONING: Status: ACTIVE | Noted: 2019-08-27

## 2019-08-27 LAB
ANION GAP SERPL CALC-SCNC: 17 MMOL/L (ref 8–16)
BUN SERPL-MCNC: 43 MG/DL (ref 6–20)
CALCIUM SERPL-MCNC: 8.6 MG/DL (ref 8.7–10.5)
CHLORIDE SERPL-SCNC: 95 MMOL/L (ref 95–110)
CO2 SERPL-SCNC: 23 MMOL/L (ref 23–29)
CREAT SERPL-MCNC: 7 MG/DL (ref 0.5–1.4)
ERYTHROCYTE [DISTWIDTH] IN BLOOD BY AUTOMATED COUNT: 16.8 % (ref 11.5–14.5)
EST. GFR  (AFRICAN AMERICAN): 9.2 ML/MIN/1.73 M^2
EST. GFR  (NON AFRICAN AMERICAN): 8 ML/MIN/1.73 M^2
GLUCOSE SERPL-MCNC: 100 MG/DL (ref 70–110)
GLUCOSE SERPL-MCNC: 107 MG/DL (ref 70–110)
GLUCOSE SERPL-MCNC: 107 MG/DL (ref 70–110)
GLUCOSE SERPL-MCNC: 118 MG/DL (ref 70–110)
GLUCOSE SERPL-MCNC: 119 MG/DL (ref 70–110)
HCT VFR BLD AUTO: 36.4 % (ref 40–54)
HGB BLD-MCNC: 11.8 G/DL (ref 14–18)
MCH RBC QN AUTO: 30.3 PG (ref 27–31)
MCHC RBC AUTO-ENTMCNC: 32.4 G/DL (ref 32–36)
MCV RBC AUTO: 94 FL (ref 82–98)
PLATELET # BLD AUTO: 85 K/UL (ref 150–350)
PMV BLD AUTO: 12.1 FL (ref 9.2–12.9)
POTASSIUM SERPL-SCNC: 5.1 MMOL/L (ref 3.5–5.1)
RBC # BLD AUTO: 3.89 M/UL (ref 4.6–6.2)
SODIUM SERPL-SCNC: 135 MMOL/L (ref 136–145)
WBC # BLD AUTO: 7.05 K/UL (ref 3.9–12.7)

## 2019-08-27 PROCEDURE — 99900035 HC TECH TIME PER 15 MIN (STAT)

## 2019-08-27 PROCEDURE — 85027 COMPLETE CBC AUTOMATED: CPT

## 2019-08-27 PROCEDURE — 12000002 HC ACUTE/MED SURGE SEMI-PRIVATE ROOM

## 2019-08-27 PROCEDURE — 80048 BASIC METABOLIC PNL TOTAL CA: CPT

## 2019-08-27 PROCEDURE — 94761 N-INVAS EAR/PLS OXIMETRY MLT: CPT

## 2019-08-27 PROCEDURE — 25000003 PHARM REV CODE 250: Performed by: INTERNAL MEDICINE

## 2019-08-27 PROCEDURE — 36415 COLL VENOUS BLD VENIPUNCTURE: CPT

## 2019-08-27 PROCEDURE — 63600175 PHARM REV CODE 636 W HCPCS: Performed by: INTERNAL MEDICINE

## 2019-08-27 RX ADMIN — ONDANSETRON 4 MG: 2 INJECTION INTRAMUSCULAR; INTRAVENOUS at 11:08

## 2019-08-27 RX ADMIN — ASPIRIN 81 MG: 81 TABLET, COATED ORAL at 09:08

## 2019-08-27 RX ADMIN — CALCIUM ACETATE 667 MG: 667 CAPSULE ORAL at 09:08

## 2019-08-27 RX ADMIN — ONDANSETRON 4 MG: 2 INJECTION INTRAMUSCULAR; INTRAVENOUS at 06:08

## 2019-08-27 RX ADMIN — Medication: at 10:08

## 2019-08-27 RX ADMIN — MIDODRINE HYDROCHLORIDE 5 MG: 2.5 TABLET ORAL at 05:08

## 2019-08-27 RX ADMIN — RIFAXIMIN 550 MG: 550 TABLET ORAL at 08:08

## 2019-08-27 RX ADMIN — AMIODARONE HYDROCHLORIDE 200 MG: 200 TABLET ORAL at 09:08

## 2019-08-27 RX ADMIN — MIDODRINE HYDROCHLORIDE 5 MG: 2.5 TABLET ORAL at 04:08

## 2019-08-27 RX ADMIN — RIFAXIMIN 550 MG: 550 TABLET ORAL at 09:08

## 2019-08-27 RX ADMIN — ALLOPURINOL 100 MG: 100 TABLET ORAL at 09:08

## 2019-08-27 RX ADMIN — MIDODRINE HYDROCHLORIDE 5 MG: 2.5 TABLET ORAL at 10:08

## 2019-08-27 RX ADMIN — CALCITRIOL CAPSULES 0.25 MCG 0.25 MCG: 0.25 CAPSULE ORAL at 09:08

## 2019-08-27 NOTE — PROGRESS NOTES
[facility]  Progress Note    Patient Name: Tre Cali  MRN: [id number]  Patient Class: IP- Inpatient   Admission Date: 8/13/2019  Length of Stay: 12 days  Attending Physician: Luis Antonio Wyatt MD  Primary Care Provider: Primary Doctor No        Subjective:     Principal Problem:Volume overload        HPI:  Mr. Cali is a 54-year-old male with past medical history of CAD status post stent several years ago, COPD, liver cirrhosis, ESRD on hemodialysis presents with generalized weakness.  The patient presented emergency room earlier today in the morning for generalized weakness and anasarca, patient was discharged to dialysis center for hemodialysis.  Patient came back from hemodialysis for unclear reasons.  Patient reports history of generalized weakness, reports knees gave out and fell.  History of shortness of breath since last few days.  History of worsening abdominal swelling and abdominal pain since 2-3 weeks.  History of diarrhea since 3 weeks, about 4 episodes every day, nonbloody.  Patient reports sleepiness and intermittent confusion since 1-2 days.  Patient moved from [place] to [place] a week ago and yet to set up doctors.  Patient reports last hemodialysis was last Saturday.  Patient denies any history of fevers, chills or nausea, vomiting.  Patient reports history of stents several years ago.  Reports history of stroke several years ago with resultant right upper extremity and lower extremity weakness.     Patient quit drinking about 20 years ago.  Patient has longstanding history of smoking, reports no smoking intermittently few cigarettes per day.  Denies history of drug abuse.  Past surgical history - right arm AV fistula, AICD placement, cardiac stents    Overview/Hospital Course:  Therapeutic paracentesis performed 8/16  Had HD today. To overnight and follow-up labs in AM. Hopefully discharge in AM. Patient unable to perform ADLs secondary to severe  deconditioning. SNF will not take because of need for intermittent paracentesis and HD. Therefore LTAC or inpatient rehabilitation is being considered, and discharge held.  8/21 Awaiting placement on med/surg. Receiving dialysis MWF. Seen today at dialysis unit  8/22: has been tentatively accepted at in-patient rehab  8/23 Awaiting authorization from [facility].  8/24 Awaiting authorization from [facility]  8/25 Awaiting authorization from [facility]  8/26 Denied inpatient rehab. Will try for SNF placement as his ascites and need for intermittent paraceentesis has improved greatly; not needed since his last on 8/16 8/27 Patient vomiting today and reports vomiting some yesterday.  Reports abdominal fullness but no more than yesterday than the day prior.  Reports chronic abdominal pain with no change in this chronic pain. Having BMs with laxatives.     Interval History: Patient vomiting today and reports vomiting some yesterday.  Reports abdominal fullness but no more than yesterday than the day prior.  Reports chronic abdominal pain with no change in this chronic pain. Having BMs with laxatives. Symptoms persistent, moderately severe.     Review of Systems   Constitutional: Negative.    HENT: Negative.    Eyes: Negative.    Respiratory: Negative.    Cardiovascular: Negative.    Gastrointestinal: Positive for abdominal pain, nausea and vomiting.   Endocrine: Negative.    Genitourinary: Negative.    Musculoskeletal: Negative.    Skin: Negative.    Allergic/Immunologic: Negative.    Neurological: Negative.    Hematological: Negative.    Psychiatric/Behavioral: Negative.      Objective:     Vital Signs (Most Recent):  Temp: 98.2 °F (36.8 °C) (08/27/19 0801)  Pulse: 78 (08/27/19 0801)  Resp: 18 (08/27/19 0801)  BP: (!) 92/56 (08/27/19 0801)  SpO2: (!) 93 % (08/27/19 0801) Vital Signs (24h Range):  Temp:  [96.8 °F (36 °C)-98.2 °F (36.8 °C)] 98.2 °F (36.8 °C)  Pulse:  [71-87] 78  Resp:  [16-20] 18  SpO2:   [92 %-97 %] 93 %  BP: ()/(56-86) 92/56     Weight: 91 kg (200 lb 9.9 oz)  Body mass index is 32.38 kg/m².    Intake/Output Summary (Last 24 hours) at 8/27/2019 1247  Last data filed at 8/27/2019 0400  Gross per 24 hour   Intake 1140 ml   Output 2508 ml   Net -1368 ml      Physical Exam   Constitutional: He is oriented to person, place, and time. He appears well-developed. No distress.   HENT:   Head: Normocephalic and atraumatic.   Mouth/Throat: Oropharynx is clear and moist.   Eyes: Pupils are equal, round, and reactive to light. EOM are normal.   Neck: Normal range of motion.   Cardiovascular: Regular rhythm.   No murmur heard.  Pulmonary/Chest: Effort normal and breath sounds normal. He has no wheezes.   Abdominal: Soft. There is no rebound and no guarding.   abd is full from ascites. Has mild diffuse TTP.   Musculoskeletal: Normal range of motion.   Neurological: He is alert and oriented to person, place, and time. No cranial nerve deficit or sensory deficit.   Skin: No rash noted.   Psychiatric: He has a normal mood and affect.   Nursing note and vitals reviewed.      Significant Labs:   CBC:   Recent Labs   Lab 08/26/19  0527 08/27/19  0450   WBC 8.66  8.66 7.05   HGB 12.0*  12.0* 11.8*   HCT 37.1*  37.1* 36.4*   PLT 99*  99* 85*     CMP:   Recent Labs   Lab 08/25/19  1521 08/26/19  0527 08/27/19  0450   * 132* 135*   K 4.5 5.3* 5.1   CL 93* 91* 95   CO2 24 22* 23   * 133* 118*   BUN 56* 59* 43*   CREATININE 8.7* 9.1* 7.0*   CALCIUM 8.0* 8.5* 8.6*   ALBUMIN 3.2* 3.3*  --    ANIONGAP 15 19* 17*   EGFRNONAA 6.1* 5.8* 8.0*       Significant Imaging: I have reviewed all pertinent imaging results/findings within the past 24 hours.      Assessment/Plan:      * Volume overload  HD on MWF  Diuretics held at this time due to low normal blood pressure      End stage renal disease           Diarrhea of presumed infectious origin    This has been ruled out as infectious.  Having multiple BMs due to  laxatives due to ESLD.    Hemiplegia as late effect of cerebrovascular accident (CVA)        CAD S/P percutaneous coronary angioplasty        Encephalopathy, metabolic  Currently appears resolved      AICD (automatic cardioverter/defibrillator) present        ESRD (end stage renal disease)  HD on MWF      Ascites due to alcoholic cirrhosis    Stable currently; not worsening    Alcoholic cirrhosis of liver with ascites  S/p paracenetesis; stable currently      VTE Risk Mitigation (From admission, onward)        Ordered     IP VTE HIGH RISK PATIENT  Once      08/13/19 1909                Maxx Weaver MD  Department of Hospital Medicine   [facility]

## 2019-08-27 NOTE — PT/OT/SLP PROGRESS
Occupational Therapy      Patient Name:  Tre Cali   MRN:  [id number]    Patient not seen today secondary to Patient ill (Comment)(nausea;OT/RN colaborated care ). Will follow-up at a later date.    Evie Arita OT  8/27/2019

## 2019-08-27 NOTE — PLAN OF CARE
08/27/19 0744   Patient Assessment/Suction   Level of Consciousness (AVPU) alert   PRE-TX-O2   O2 Device (Oxygen Therapy) room air   SpO2 (!) 94 %   Pulse Oximetry Type Intermittent   $ Pulse Oximetry - Multiple Charge Pulse Oximetry - Multiple   Pulse 71   Resp 18   Aerosol Therapy   $ Aerosol Therapy Charges PRN treatment not required

## 2019-08-27 NOTE — SUBJECTIVE & OBJECTIVE
Interval History: Patient vomiting today and reports vomiting some yesterday.  Reports abdominal fullness but no more than yesterday than the day prior.  Reports chronic abdominal pain with no change in this chronic pain. Having BMs with laxatives. Symptoms persistent, moderately severe.     Review of Systems   Constitutional: Negative.    HENT: Negative.    Eyes: Negative.    Respiratory: Negative.    Cardiovascular: Negative.    Gastrointestinal: Positive for abdominal pain, nausea and vomiting.   Endocrine: Negative.    Genitourinary: Negative.    Musculoskeletal: Negative.    Skin: Negative.    Allergic/Immunologic: Negative.    Neurological: Negative.    Hematological: Negative.    Psychiatric/Behavioral: Negative.      Objective:     Vital Signs (Most Recent):  Temp: 98.2 °F (36.8 °C) (08/27/19 0801)  Pulse: 78 (08/27/19 0801)  Resp: 18 (08/27/19 0801)  BP: (!) 92/56 (08/27/19 0801)  SpO2: (!) 93 % (08/27/19 0801) Vital Signs (24h Range):  Temp:  [96.8 °F (36 °C)-98.2 °F (36.8 °C)] 98.2 °F (36.8 °C)  Pulse:  [71-87] 78  Resp:  [16-20] 18  SpO2:  [92 %-97 %] 93 %  BP: ()/(56-86) 92/56     Weight: 91 kg (200 lb 9.9 oz)  Body mass index is 32.38 kg/m².    Intake/Output Summary (Last 24 hours) at 8/27/2019 1247  Last data filed at 8/27/2019 0400  Gross per 24 hour   Intake 1140 ml   Output 2508 ml   Net -1368 ml      Physical Exam   Constitutional: He is oriented to person, place, and time. He appears well-developed. No distress.   HENT:   Head: Normocephalic and atraumatic.   Mouth/Throat: Oropharynx is clear and moist.   Eyes: Pupils are equal, round, and reactive to light. EOM are normal.   Neck: Normal range of motion.   Cardiovascular: Regular rhythm.   No murmur heard.  Pulmonary/Chest: Effort normal and breath sounds normal. He has no wheezes.   Abdominal: Soft. There is no rebound and no guarding.   abd is full from ascites. Has mild diffuse TTP.   Musculoskeletal: Normal range of motion.    Neurological: He is alert and oriented to person, place, and time. No cranial nerve deficit or sensory deficit.   Skin: No rash noted.   Psychiatric: He has a normal mood and affect.   Nursing note and vitals reviewed.      Significant Labs:   CBC:   Recent Labs   Lab 08/26/19  0527 08/27/19  0450   WBC 8.66  8.66 7.05   HGB 12.0*  12.0* 11.8*   HCT 37.1*  37.1* 36.4*   PLT 99*  99* 85*     CMP:   Recent Labs   Lab 08/25/19  1521 08/26/19  0527 08/27/19  0450   * 132* 135*   K 4.5 5.3* 5.1   CL 93* 91* 95   CO2 24 22* 23   * 133* 118*   BUN 56* 59* 43*   CREATININE 8.7* 9.1* 7.0*   CALCIUM 8.0* 8.5* 8.6*   ALBUMIN 3.2* 3.3*  --    ANIONGAP 15 19* 17*   EGFRNONAA 6.1* 5.8* 8.0*       Significant Imaging: I have reviewed all pertinent imaging results/findings within the past 24 hours.

## 2019-08-27 NOTE — PLAN OF CARE
Problem: Adult Inpatient Plan of Care  Goal: Plan of Care Review  Outcome: Ongoing (interventions implemented as appropriate)     08/27/19 6245   Plan of Care Review   Plan of Care Reviewed With patient   Progress no change

## 2019-08-27 NOTE — PLAN OF CARE
Problem: Adult Inpatient Plan of Care  Goal: Patient-Specific Goal (Individualization)  Outcome: Ongoing (interventions implemented as appropriate)     08/20/19 0530 08/27/19 0735   OTHER   Anxieties, Fears or Concerns  --  none   Individualized Care Needs  --  maintain blood pressure   Patient-Specific Goal (Individualization)   Patient-Specific Goals (Include Timeframe) remain free from falls til discharge  --

## 2019-08-27 NOTE — PROGRESS NOTES
[facility]  Nephrology  Progress Note    Patient Name: Tre Cali  MRN: [id number]  Admission Date: 8/13/2019  Hospital Length of Stay: 12 days  Attending Provider: Luis Antonio Wyatt MD   Primary Care Physician: Primary Doctor No  Principal Problem:Volume overload      Subjective:     Interval History: sleeping soundly at present; no obvious distress appreciated;  may need repeat paracentesis prior to discharge; awaiting outpatient LTAC placement (recommend [facility] [place], [place] as they can accommodate his HD needs))    Review of patient's allergies indicates:   Allergen Reactions    Sulfa (sulfonamide antibiotics) Rash     Current Facility-Administered Medications   Medication Frequency    0.9%  NaCl infusion PRN    0.9%  NaCl infusion Once    albumin human 25% bottle 50 g Once    albuterol nebulizer solution 2.5 mg Q4H PRN    allopurinol tablet 100 mg Daily    amiodarone tablet 200 mg Daily    aspirin EC tablet 81 mg Daily    calcitRIOL capsule 0.25 mcg Daily    calcium acetate capsule 667 mg TID WM    dextrose 50% injection 12.5 g PRN    dextrose 50% injection 25 g PRN    glucagon (human recombinant) injection 1 mg PRN    glucose chewable tablet 16 g PRN    glucose chewable tablet 24 g PRN    HYDROcodone-acetaminophen  mg per tablet 1 tablet Q4H PRN    midodrine tablet 5 mg TID    ondansetron injection 4 mg Q6H PRN    prochlorperazine injection Soln 5 mg Q4H PRN    rifAXIMin tablet 550 mg BID    sodium chloride 0.9% flush 10 mL PRN    white petrolatum 41 % ointment Daily       Objective:     Vital Signs (Most Recent):  Temp: 97.9 °F (36.6 °C) (08/27/19 0515)  Pulse: 75 (08/27/19 0515)  Resp: 17 (08/27/19 0515)  BP: 98/67 (08/27/19 0515)  SpO2: (!) 94 % (08/27/19 0515)  O2 Device (Oxygen Therapy): room air (08/27/19 0515) Vital Signs (24h Range):  Temp:  [96.8 °F (36 °C)-98.2 °F (36.8 °C)] 97.9 °F (36.6 °C)  Pulse:  [75-87] 75  Resp:   [16-20] 17  SpO2:  [92 %-98 %] 94 %  BP: ()/(67-86) 98/67     Weight: 91 kg (200 lb 9.9 oz) (08/22/19 0930)  Body mass index is 32.38 kg/m².  Body surface area is 2.06 meters squared.    I/O last 3 completed shifts:  In: 1140 [P.O.:640; Other:500]  Out: 2509 [Other:2504; Stool:5]    Physical Exam   Constitutional: No distress.   Obese; chronically ill-appearing   HENT:   Head: Normocephalic and atraumatic.   Mouth/Throat: Oropharynx is clear and moist.   Eyes: Conjunctivae are normal. Right eye exhibits no discharge. Left eye exhibits no discharge. No scleral icterus.   Neck: Normal range of motion. Neck supple. No JVD present.   Cardiovascular: Normal rate, regular rhythm and intact distal pulses. Exam reveals no gallop and no friction rub.   Murmur heard.  Pulmonary/Chest: Effort normal and breath sounds normal. No respiratory distress. He has no wheezes. He has no rales.   Abdominal: Soft. He exhibits distension. He exhibits no mass. There is tenderness. There is no rebound and no guarding.   Hypoactive BS in all quadrants   Genitourinary:   Genitourinary Comments: Deferred   Musculoskeletal: Normal range of motion. He exhibits edema and tenderness. He exhibits no deformity.   R UE AV Fistula w/ palpable thrill   Neurological: A cranial nerve deficit is present.   Somnolent; no obvious distress appreciated   Skin: Skin is warm and dry. Rash noted. No erythema.   Unable to assess   Psychiatric: He has a normal mood and affect. His behavior is normal.   Nursing note and vitals reviewed.      Significant Labs:sure  CBC:   Recent Labs   Lab 08/27/19  0450   WBC 7.05   RBC 3.89*   HGB 11.8*   HCT 36.4*   PLT 85*   MCV 94   MCH 30.3   MCHC 32.4     CMP:   Recent Labs   Lab 08/23/19  0850  08/26/19  0527 08/27/19  0450   *   < > 133* 118*   CALCIUM 8.6*   < > 8.5* 8.6*   ALBUMIN 3.3*   < > 3.3*  --    PROT 7.6  --   --   --    *   < > 132* 135*   K 5.4*   < > 5.3* 5.1   CO2 20*   < > 22* 23   CL 88*    < > 91* 95   BUN 49*   < > 59* 43*   CREATININE 8.6*   < > 9.1* 7.0*   ALKPHOS 129  --   --   --    ALT 12  --   --   --    AST 15  --   --   --    BILITOT 2.0*  --   --   --     < > = values in this interval not displayed.     LFTs:   Recent Labs   Lab 08/23/19  0850  08/26/19  0527   ALT 12  --   --    AST 15  --   --    ALKPHOS 129  --   --    BILITOT 2.0*  --   --    PROT 7.6  --   --    ALBUMIN 3.3*   < > 3.3*    < > = values in this interval not displayed.       Significant Imaging:  X-Ray: Reviewed  CT: Reviewed    Assessment/Plan:     Active Diagnoses:    Diagnosis Date Noted POA    PRINCIPAL PROBLEM:  Volume overload [E87.70] 08/13/2019 Yes    Alcoholic cirrhosis of liver with ascites [K70.31] 08/13/2019 Unknown     Chronic    Ascites due to alcoholic cirrhosis [K70.31] 08/13/2019 Yes     Chronic    ESRD (end stage renal disease) [N18.6] 08/13/2019 Yes     Chronic    AICD (automatic cardioverter/defibrillator) present [Z95.810] 08/13/2019 Yes     Chronic    Encephalopathy, metabolic [G93.41] 08/13/2019 Yes    CAD S/P percutaneous coronary angioplasty [I25.10, Z98.61] 08/13/2019 Not Applicable     Chronic    Hemiplegia as late effect of cerebrovascular accident (CVA) [I69.359] 08/13/2019 Not Applicable     Chronic    Diarrhea of presumed infectious origin [R19.7] 08/13/2019 Yes    End stage renal disease [N18.6] 08/13/2019 Yes      Problems Resolved During this Admission:     1. ESRD (HD-MWF via R UE AV Fistula)- symptomatically stable at present; significant abdominal distention on admission (s/p therapeutic paracentesis this am); no overt volume overload, significant electrolyte perturbations nor acid-base disturbance    -maintenance HD (duration: 3h; UF Goal: 1-2L as tolerated; STANDARD 'Bath'; Access: AVF; Qb: 400 ml/min, Qd: 2x BFR; give 12.5-25 gm 25% Albumin w/ HD for BP support)    -DAILY renal function panel to document sCr trend, optimize HD electrolyte 'bath' & assess response to  therapy    -avoid nephrotoxic agents (NSAIDs, IV contrast dye)    -renally dose all appropriate medications, including antibiotics     2. HTN- clinically stable at present;  BP AT GOAL (98/67) now w/ relative hypotension; no active issues    -HOLD anti-HTN medications & diuretics for now & reassess in am     3. ANEMIA- clinically stable at present; H/H AT GOAL (11.8/36.4); no active issues    -trend daily CBC (H/H) to effect optimal blood-loss surveillance     4. SECONDARY HYPERPARATHYROIDISM (sHPT)- clinically stable at present; no active issues    -continue dietary binder/Vitamin D +/- HD-associated calcimimetic agent (Cinacalcet vs Etelcalcetide)     5. ESLD (hx of EtOH-induced Cirrhosis) w/ Ascites (s/p therapeutic paracentesis)- appears ill; GI Medicine service on the case; cautious fluid removal to minimize risk of precipitatiing Hepatorenal Syndrome    -management per GI Medicine/Primary Team recommendations    Thank you for your consult. I will follow-up with patient. Please contact us if you have any additional questions.    Armando Cruz III, MD  [facility]  [facility]  [phone] (C)

## 2019-08-27 NOTE — PT/OT/SLP PROGRESS
Physical Therapy      Patient Name:  Tre Cali   MRN:  [id number]    Patient not seen today secondary to Patient ill (Comment)(nausea and vomiting this am). Will follow-up 8/28/2019.    Eliza Melchor, PT

## 2019-08-28 LAB
ALBUMIN SERPL BCP-MCNC: 3.3 G/DL (ref 3.5–5.2)
ALP SERPL-CCNC: 129 U/L (ref 55–135)
ALT SERPL W/O P-5'-P-CCNC: 11 U/L (ref 10–44)
ANION GAP SERPL CALC-SCNC: 19 MMOL/L (ref 8–16)
APTT PPP: 40.3 SEC (ref 26.2–34.7)
AST SERPL-CCNC: 14 U/L (ref 10–40)
BASOPHILS # BLD AUTO: 0.12 K/UL (ref 0–0.2)
BASOPHILS NFR BLD: 1.5 % (ref 0–1.9)
BILIRUB SERPL-MCNC: 2.1 MG/DL (ref 0.1–1)
BUN SERPL-MCNC: 55 MG/DL (ref 6–20)
CALCIUM SERPL-MCNC: 8.9 MG/DL (ref 8.7–10.5)
CHLORIDE SERPL-SCNC: 93 MMOL/L (ref 95–110)
CO2 SERPL-SCNC: 22 MMOL/L (ref 23–29)
CREAT SERPL-MCNC: 8.3 MG/DL (ref 0.5–1.4)
DIFFERENTIAL METHOD: ABNORMAL
EOSINOPHIL # BLD AUTO: 0.1 K/UL (ref 0–0.5)
EOSINOPHIL NFR BLD: 1.5 % (ref 0–8)
ERYTHROCYTE [DISTWIDTH] IN BLOOD BY AUTOMATED COUNT: 16.9 % (ref 11.5–14.5)
EST. GFR  (AFRICAN AMERICAN): 7.5 ML/MIN/1.73 M^2
EST. GFR  (NON AFRICAN AMERICAN): 6.5 ML/MIN/1.73 M^2
GLUCOSE SERPL-MCNC: 100 MG/DL (ref 70–110)
GLUCOSE SERPL-MCNC: 103 MG/DL (ref 70–110)
GLUCOSE SERPL-MCNC: 83 MG/DL (ref 70–110)
HCT VFR BLD AUTO: 39.1 % (ref 40–54)
HGB BLD-MCNC: 12.6 G/DL (ref 14–18)
IMM GRANULOCYTES # BLD AUTO: 0.03 K/UL (ref 0–0.04)
IMM GRANULOCYTES NFR BLD AUTO: 0.4 % (ref 0–0.5)
INR PPP: 1.8
LYMPHOCYTES # BLD AUTO: 0.7 K/UL (ref 1–4.8)
LYMPHOCYTES NFR BLD: 9.3 % (ref 18–48)
MCH RBC QN AUTO: 30.5 PG (ref 27–31)
MCHC RBC AUTO-ENTMCNC: 32.2 G/DL (ref 32–36)
MCV RBC AUTO: 95 FL (ref 82–98)
MONOCYTES # BLD AUTO: 1 K/UL (ref 0.3–1)
MONOCYTES NFR BLD: 12.8 % (ref 4–15)
NEUTROPHILS # BLD AUTO: 5.9 K/UL (ref 1.8–7.7)
NEUTROPHILS NFR BLD: 74.5 % (ref 38–73)
NRBC BLD-RTO: 1 /100 WBC
PLATELET # BLD AUTO: 110 K/UL (ref 150–350)
PMV BLD AUTO: 12.4 FL (ref 9.2–12.9)
POTASSIUM SERPL-SCNC: 5.5 MMOL/L (ref 3.5–5.1)
PROT SERPL-MCNC: 7.2 G/DL (ref 6–8.4)
PROTHROMBIN TIME: 19.8 SEC (ref 11.7–14)
RBC # BLD AUTO: 4.13 M/UL (ref 4.6–6.2)
SODIUM SERPL-SCNC: 134 MMOL/L (ref 136–145)
WBC # BLD AUTO: 7.86 K/UL (ref 3.9–12.7)

## 2019-08-28 PROCEDURE — 85610 PROTHROMBIN TIME: CPT

## 2019-08-28 PROCEDURE — 25000003 PHARM REV CODE 250: Performed by: INTERNAL MEDICINE

## 2019-08-28 PROCEDURE — 63600175 PHARM REV CODE 636 W HCPCS: Performed by: INTERNAL MEDICINE

## 2019-08-28 PROCEDURE — 80053 COMPREHEN METABOLIC PANEL: CPT

## 2019-08-28 PROCEDURE — 94761 N-INVAS EAR/PLS OXIMETRY MLT: CPT

## 2019-08-28 PROCEDURE — 12000002 HC ACUTE/MED SURGE SEMI-PRIVATE ROOM

## 2019-08-28 PROCEDURE — 97530 THERAPEUTIC ACTIVITIES: CPT

## 2019-08-28 PROCEDURE — 99900035 HC TECH TIME PER 15 MIN (STAT)

## 2019-08-28 PROCEDURE — 85025 COMPLETE CBC W/AUTO DIFF WBC: CPT

## 2019-08-28 PROCEDURE — 97535 SELF CARE MNGMENT TRAINING: CPT

## 2019-08-28 PROCEDURE — 36415 COLL VENOUS BLD VENIPUNCTURE: CPT

## 2019-08-28 PROCEDURE — 90935 HEMODIALYSIS ONE EVALUATION: CPT

## 2019-08-28 PROCEDURE — 85730 THROMBOPLASTIN TIME PARTIAL: CPT

## 2019-08-28 RX ORDER — SODIUM CHLORIDE 9 MG/ML
INJECTION, SOLUTION INTRAVENOUS
Status: DISCONTINUED | OUTPATIENT
Start: 2019-08-28 | End: 2019-08-30 | Stop reason: HOSPADM

## 2019-08-28 RX ORDER — MUPIROCIN 20 MG/G
OINTMENT TOPICAL 2 TIMES DAILY
Status: DISCONTINUED | OUTPATIENT
Start: 2019-08-28 | End: 2019-08-30 | Stop reason: HOSPADM

## 2019-08-28 RX ORDER — SODIUM CHLORIDE 9 MG/ML
INJECTION, SOLUTION INTRAVENOUS ONCE
Status: DISCONTINUED | OUTPATIENT
Start: 2019-08-28 | End: 2019-08-30 | Stop reason: HOSPADM

## 2019-08-28 RX ADMIN — MIDODRINE HYDROCHLORIDE 5 MG: 2.5 TABLET ORAL at 05:08

## 2019-08-28 RX ADMIN — ALLOPURINOL 100 MG: 100 TABLET ORAL at 09:08

## 2019-08-28 RX ADMIN — Medication: at 10:08

## 2019-08-28 RX ADMIN — CALCITRIOL CAPSULES 0.25 MCG 0.25 MCG: 0.25 CAPSULE ORAL at 09:08

## 2019-08-28 RX ADMIN — PROCHLORPERAZINE EDISYLATE 5 MG: 5 INJECTION INTRAMUSCULAR; INTRAVENOUS at 03:08

## 2019-08-28 RX ADMIN — ASPIRIN 81 MG: 81 TABLET, COATED ORAL at 09:08

## 2019-08-28 RX ADMIN — MIDODRINE HYDROCHLORIDE 5 MG: 2.5 TABLET ORAL at 12:08

## 2019-08-28 RX ADMIN — HYDROCODONE BITARTRATE AND ACETAMINOPHEN 1 TABLET: 10; 325 TABLET ORAL at 09:08

## 2019-08-28 RX ADMIN — MUPIROCIN: 20 OINTMENT TOPICAL at 09:08

## 2019-08-28 RX ADMIN — RIFAXIMIN 550 MG: 550 TABLET ORAL at 09:08

## 2019-08-28 NOTE — PROGRESS NOTES
"[facility]  Adult Nutrition  Progress Note    SUMMARY       Recommendations    Recommendation/Intervention: 1.) Encourage PO intake as able 2.) Consider oral supplement if tolerated by patient 3.)  to assist with meal selections daily  Goals: 1.) pt to meet >75% EEN via oral diet   Nutrition Goal Status: new  Communication of RD Recs: reviewed with RN    Reason for Assessment    Reason For Assessment: RD follow-up  Diagnosis: other (see comments)(volume overload)  Relevant Medical History: hx: ESRD on HD, cirrhosis, DM, COPD  Interdisciplinary Rounds: attended    Nutrition Risk Screen    Nutrition Risk Screen: no indicators present    Nutrition/Diet History    Food Preferences: none voiced  Spiritual, Cultural Beliefs, Congregational Practices, Values that Affect Care: no  Food Allergies: NKFA  Factors Affecting Nutritional Intake: abdominal pain    Anthropometrics    Temp: 97.8 °F (36.6 °C)  Height Method: Stated  Height: 5' 6" (167.6 cm)  Height (inches): 66 in  Weight Method: Bed Scale  Weight: (no new wt available)  Weight (lb): 200.62 lb  Ideal Body Weight (IBW), Male: 142 lb  % Ideal Body Weight, Male (lb): 141.28 lb  BMI (Calculated): 32.4  BMI Grade: 30 - 34.9- obesity - grade I  Weight Loss: (pt denies any wt changes)       Lab/Procedures/Meds    Pertinent Labs Reviewed: reviewed    BMP  Lab Results   Component Value Date     (L) 08/28/2019    K 5.5 (H) 08/28/2019    CL 93 (L) 08/28/2019    CO2 22 (L) 08/28/2019    BUN 55 (H) 08/28/2019    CREATININE 8.3 (H) 08/28/2019    CALCIUM 8.9 08/28/2019    ANIONGAP 19 (H) 08/28/2019    ESTGFRAFRICA 7.5 (A) 08/28/2019    EGFRNONAA 6.5 (A) 08/28/2019     Lab Results   Component Value Date    WBC 7.86 08/28/2019    HGB 12.6 (L) 08/28/2019    HCT 39.1 (L) 08/28/2019    MCV 95 08/28/2019     (L) 08/28/2019     Lab Results   Component Value Date    CALCIUM 8.9 08/28/2019    PHOS 9.5 (HH) 08/26/2019       Pertinent Medications Reviewed: " reviewed    Scheduled Meds:   sodium chloride 0.9%   Intravenous Once    albumin human 25%  50 g Intravenous Once    allopurinol  100 mg Oral Daily    amiodarone  200 mg Oral Daily    aspirin  81 mg Oral Daily    calcitRIOL  0.25 mcg Oral Daily    calcium acetate  667 mg Oral TID WM    midodrine  5 mg Oral TID    rifAXIMin  550 mg Oral BID    white petrolatum   Topical (Top) Daily         Estimated/Assessed Needs    Weight Used For Calorie Calculations: 91 kg (200 lb 9.9 oz)  Energy Calorie Requirements (kcal): 2730 (30 kcal/kg)  Energy Need Method: Kcal/kg  Protein Requirements: 109-136 g (1.2-1.5 g/kg)  Weight Used For Protein Calculations: 91 kg (200 lb 9.9 oz)     Estimated Fluid Requirement Method: other (see comments)(1000 ml + UOP)  RDA Method (mL): 2730         Nutrition Prescription Ordered    Current Diet Order: renal, carbohydrate consistent  Nutrition Order Comments: PO intake poor; c/o nausea, abd pain    Evaluation of Received Nutrient/Fluid Intake    Energy Calories Required: not meeting needs  Protein Required: not meeting needs  Fluid Required: meeting needs  Comments: encouraged pt to communicate prefs via hostessess and try to choose items he may be able to tolerate  Tolerance: not tolerating  % Meal Intake: 0 - 25 %    Nutrition Risk    Level of Risk/Frequency of Follow-up: high       Monitor and Evaluation    Food and Nutrient Intake: energy intake, food and beverage intake  Food and Nutrient Adminstration: diet order  Physical Activity and Function: nutrition-related ADLs and IADLs  Anthropometric Measurements: weight change  Biochemical Data, Medical Tests and Procedures: electrolyte and renal panel, gastrointestinal profile, glucose/endocrine profile  Nutrition-Focused Physical Findings: overall appearance     Nutrition Follow-Up    RD Follow-up?: Yes    Lois Flores  08/28/2019  10:11 AM

## 2019-08-28 NOTE — PT/OT/SLP PROGRESS
Physical Therapy Treatment    Patient Name:  Tre Cali   MRN:  [id number]    Recommendations:     Discharge Recommendations:  nursing facility, skilled   Discharge Equipment Recommendations: (will continue to assess needs)   Barriers to discharge: None    Assessment:     Tre Cali is a 54 y.o. male admitted with a medical diagnosis of Volume overload.  He presents with the following impairments/functional limitations:  weakness, impaired endurance, gait instability, impaired functional mobilty, pain .   Patient performed repeated sit to stand transfers today and improved with each successive one and was CGA by the fifth one performed.  Patient only able to ambulate x 10 feet due to max pain in right knee.    Rehab Prognosis: Good; patient would benefit from acute skilled PT services to address these deficits and reach maximum level of function.    Recent Surgery: * No surgery found *      Plan:     During this hospitalization, patient to be seen 5 x/week to address the identified rehab impairments via gait training, therapeutic activities, therapeutic exercises and progress toward the following goals:    · Plan of Care Expires:  09/14/19    Subjective     Chief Complaint: pain  Patient/Family Comments/goals: go home  Pain/Comfort:  · Pain Rating 1: 10/10  · Location - Side 1: Right  · Location - Orientation 1: lower  · Location 1: knee  · Pain Addressed 1: Reposition, Cessation of Activity  · Pain Rating Post-Intervention 1: 8/10      Objective:     Communicated with nurse and patient prior to session.  Patient found up in chair with   upon PT entry to room.     General Precautions: Standard, fall   Orthopedic Precautions:    Braces:       Functional Mobility:  · Transfers:     · Sit to Stand:  minimum assistance with rolling walker  · Gait: x 10 feet with RW with min assist      AM-PAC 6 CLICK MOBILITY  Turning over in bed (including adjusting bedclothes, sheets and blankets)?: 3  Sitting down on and  standing up from a chair with arms (e.g., wheelchair, bedside commode, etc.): 3  Moving from lying on back to sitting on the side of the bed?: 3  Moving to and from a bed to a chair (including a wheelchair)?: 3  Need to walk in hospital room?: 3  Climbing 3-5 steps with a railing?: 1  Basic Mobility Total Score: 16       Therapeutic Activities and Exercises:   transfer training sit to stand x 5 times with min-CGA  Gait training x 10 feet with RW with min assist.    Patient left up in chair with call button in reach..    GOALS:   Multidisciplinary Problems     Physical Therapy Goals        Problem: Physical Therapy Goal    Goal Priority Disciplines Outcome Goal Variances Interventions   Physical Therapy Goal     PT, PT/OT Ongoing (interventions implemented as appropriate)     Description:  Goals to be met by: discharge     Patient will increase functional independence with mobility by performin. Supine to sit with Stand-by Assistance  2. Sit to stand transfer with Stand-by Assistance  3. Bed to chair transfer with Stand-by Assistance using Rolling Walker  4. Gait  x 150 feet with Stand-by Assistance using Rolling Walker.                       Time Tracking:     PT Received On: 19  PT Start Time: 1119     PT Stop Time: 1135  PT Total Time (min): 16 min     Billable Minutes: Therapeutic Activity 16    Treatment Type: Treatment  PT/PTA: PT     PTA Visit Number: 0     Chris MeGilligan, PT  2019

## 2019-08-28 NOTE — PROGRESS NOTES
[facility]  Nephrology  Progress Note    Patient Name: Tre Cali  MRN: [id number]  Admission Date: 8/13/2019  Hospital Length of Stay: 13 days  Attending Provider: Luis Antonio Wyatt MD   Primary Care Physician: Primary Doctor No  Principal Problem:Volume overload      Subjective:     Interval History: endorses generalized abdominal pain w/ poor appetite this am (may need repeat paracentesis prior to discharge); awaiting outpatient LTAC placement (recommend [place], [place] as they can accommodate his HD needs)    Review of patient's allergies indicates:   Allergen Reactions    Sulfa (sulfonamide antibiotics) Rash     Current Facility-Administered Medications   Medication Frequency    0.9%  NaCl infusion PRN    0.9%  NaCl infusion Once    albumin human 25% bottle 50 g Once    albuterol nebulizer solution 2.5 mg Q4H PRN    allopurinol tablet 100 mg Daily    amiodarone tablet 200 mg Daily    aspirin EC tablet 81 mg Daily    calcitRIOL capsule 0.25 mcg Daily    calcium acetate capsule 667 mg TID WM    dextrose 50% injection 12.5 g PRN    dextrose 50% injection 25 g PRN    glucagon (human recombinant) injection 1 mg PRN    glucose chewable tablet 16 g PRN    glucose chewable tablet 24 g PRN    HYDROcodone-acetaminophen  mg per tablet 1 tablet Q4H PRN    midodrine tablet 5 mg TID    ondansetron injection 4 mg Q6H PRN    prochlorperazine injection Soln 5 mg Q4H PRN    rifAXIMin tablet 550 mg BID    sodium chloride 0.9% flush 10 mL PRN    white petrolatum 41 % ointment Daily       Objective:     Vital Signs (Most Recent):  Temp: 97.8 °F (36.6 °C) (08/28/19 0815)  Pulse: 76 (08/28/19 0815)  Resp: 20 (08/28/19 0815)  BP: 111/76 (08/28/19 0815)  SpO2: 96 % (08/28/19 0815)  O2 Device (Oxygen Therapy): room air (08/28/19 0815) Vital Signs (24h Range):  Temp:  [97.3 °F (36.3 °C)-98.2 °F (36.8 °C)] 97.8 °F (36.6 °C)  Pulse:  [74-81] 76  Resp:   [16-20] 20  SpO2:  [95 %-99 %] 96 %  BP: ()/(68-80) 111/76     Weight: 91 kg (200 lb 9.9 oz) (08/22/19 0930)  Body mass index is 32.38 kg/m².  Body surface area is 2.06 meters squared.    I/O last 3 completed shifts:  In: 900 [P.O.:400; Other:500]  Out: 2507 [Other:2504; Stool:3]    Physical Exam   Constitutional: He is oriented to person, place, and time. No distress.   Obese; chronically ill-appearing   HENT:   Head: Normocephalic and atraumatic.   Mouth/Throat: Oropharynx is clear and moist.   Eyes: Conjunctivae are normal. Right eye exhibits no discharge. Left eye exhibits no discharge. No scleral icterus.   Neck: Normal range of motion. Neck supple. No JVD present.   Cardiovascular: Normal rate, regular rhythm and intact distal pulses. Exam reveals no gallop and no friction rub.   Murmur heard.  Pulmonary/Chest: Effort normal and breath sounds normal. No respiratory distress. He has no wheezes. He has no rales.   Abdominal: Soft. He exhibits distension. He exhibits no mass. There is tenderness. There is no rebound and no guarding.   Hypoactive BS in all quadrants   Genitourinary:   Genitourinary Comments: Deferred   Musculoskeletal: Normal range of motion. He exhibits edema and tenderness. He exhibits no deformity.   R UE AV Fistula w/ palpable thrill   Neurological: He is alert and oriented to person, place, and time. A cranial nerve deficit is present.   Skin: Skin is warm and dry. Rash noted. No erythema.   Unable to assess   Psychiatric: He has a normal mood and affect. His behavior is normal.   Nursing note and vitals reviewed.      Significant Labs:sure  CBC:   Recent Labs   Lab 08/28/19  0507   WBC 7.86   RBC 4.13*   HGB 12.6*   HCT 39.1*   *   MCV 95   MCH 30.5   MCHC 32.2     CMP:   Recent Labs   Lab 08/28/19  0507      CALCIUM 8.9   ALBUMIN 3.3*   PROT 7.2   *   K 5.5*   CO2 22*   CL 93*   BUN 55*   CREATININE 8.3*   ALKPHOS 129   ALT 11   AST 14   BILITOT 2.1*     LFTs:    Recent Labs   Lab 08/28/19  0507   ALT 11   AST 14   ALKPHOS 129   BILITOT 2.1*   PROT 7.2   ALBUMIN 3.3*       Significant Imaging:  X-Ray: Reviewed  CT: Reviewed    Assessment/Plan:     Active Diagnoses:    Diagnosis Date Noted POA    PRINCIPAL PROBLEM:  Volume overload [E87.70] 08/13/2019 Yes    Physical deconditioning [R53.81] 08/27/2019 Yes    Alcoholic cirrhosis of liver with ascites [K70.31] 08/13/2019 Unknown     Chronic    Ascites due to alcoholic cirrhosis [K70.31] 08/13/2019 Yes     Chronic    ESRD (end stage renal disease) [N18.6] 08/13/2019 Yes     Chronic    AICD (automatic cardioverter/defibrillator) present [Z95.810] 08/13/2019 Yes     Chronic    Encephalopathy, metabolic [G93.41] 08/13/2019 Yes    CAD S/P percutaneous coronary angioplasty [I25.10, Z98.61] 08/13/2019 Not Applicable     Chronic    Hemiplegia as late effect of cerebrovascular accident (CVA) [I69.359] 08/13/2019 Not Applicable     Chronic    Diarrhea of presumed infectious origin [R19.7] 08/13/2019 Yes    End stage renal disease [N18.6] 08/13/2019 Yes      Problems Resolved During this Admission:     1. ESRD (HD-MWF via R UE AV Fistula)- symptomatically stable at present; significant abdominal distention on admission (s/p therapeutic paracentesis this am); no overt volume overload, significant electrolyte perturbations nor acid-base disturbance    -maintenance HD (duration: 3h; UF Goal: 1-2L as tolerated; 1K Bath x 1hr then 2k thereafter Standard 'Bath'; Access: AVF; Qb: 400 ml/min, Qd: 2x BFR; give 12.5-25 gm 25% Albumin w/ HD for BP support)    -DAILY renal function panel to document sCr trend, optimize HD electrolyte 'bath' & assess response to therapy    -avoid nephrotoxic agents (NSAIDs, IV contrast dye)    -renally dose all appropriate medications, including antibiotics     2. HTN- clinically stable at present;  BP AT GOAL (111/76); no active issues    -HOLD anti-HTN medications & diuretics for now & reassess in  am     3. ANEMIA- clinically stable at present; H/H AT GOAL (12.6/39.1); no active issues    -trend daily CBC (H/H) to effect optimal blood-loss surveillance     4. SECONDARY HYPERPARATHYROIDISM (sHPT)- clinically stable at present; no active issues    -continue dietary binder/Vitamin D +/- HD-associated calcimimetic agent (Cinacalcet vs Etelcalcetide)     5. ESLD (hx of EtOH-induced Cirrhosis) w/ Ascites (s/p therapeutic paracentesis)- appears ill; GI Medicine service on the case; cautious fluid removal to minimize risk of precipitatiing Hepatorenal Syndrome    -management per GI Medicine/Primary Team recommendations    Thank you for your consult. I will follow-up with patient. Please contact us if you have any additional questions.    Armando Cruz III, MD  [facility]  [facility]  [phone] (C)

## 2019-08-28 NOTE — PLAN OF CARE
Problem: Adult Inpatient Plan of Care  Goal: Plan of Care Review  Outcome: Ongoing (interventions implemented as appropriate)     08/27/19 1712 08/28/19 145   Plan of Care Review   Plan of Care Reviewed With  --  patient   Progress no change  --

## 2019-08-28 NOTE — PLAN OF CARE
08/28/19 0802   Patient Assessment/Suction   Level of Consciousness (AVPU) alert   Respiratory Effort Normal;Unlabored   All Lung Fields Breath Sounds clear;diminished   PRE-TX-O2   O2 Device (Oxygen Therapy) room air   SpO2 99 %   Pulse Oximetry Type Intermittent   $ Pulse Oximetry - Multiple Charge Pulse Oximetry - Multiple   Pulse 75   Resp 17   Aerosol Therapy   $ Aerosol Therapy Charges PRN treatment not required

## 2019-08-28 NOTE — PLAN OF CARE
Problem: Adult Inpatient Plan of Care  Goal: Plan of Care Review  Outcome: Ongoing (interventions implemented as appropriate)     08/28/19 5896   Plan of Care Review   Plan of Care Reviewed With patient

## 2019-08-28 NOTE — PROGRESS NOTES
[facility] Medicine  Progress Note    Patient Name: Tre Cali  MRN: [id number]  Patient Class: IP- Inpatient   Admission Date: 8/13/2019  Length of Stay: 13 days  Attending Physician: Luis Antonio Wyatt MD  Primary Care Provider: Primary Doctor No        Subjective:     Principal Problem:Volume overload        HPI:  Mr. Cali is a 54-year-old male with past medical history of CAD status post stent several years ago, COPD, liver cirrhosis, ESRD on hemodialysis presents with generalized weakness.  The patient presented emergency room earlier today in the morning for generalized weakness and anasarca, patient was discharged to dialysis center for hemodialysis.  Patient came back from hemodialysis for unclear reasons.  Patient reports history of generalized weakness, reports knees gave out and fell.  History of shortness of breath since last few days.  History of worsening abdominal swelling and abdominal pain since 2-3 weeks.  History of diarrhea since 3 weeks, about 4 episodes every day, nonbloody.  Patient reports sleepiness and intermittent confusion since 1-2 days.  Patient moved from [place] to [place] a week ago and yet to set up doctors.  Patient reports last hemodialysis was last Saturday.  Patient denies any history of fevers, chills or nausea, vomiting.  Patient reports history of stents several years ago.  Reports history of stroke several years ago with resultant right upper extremity and lower extremity weakness.     Patient quit drinking about 20 years ago.  Patient has longstanding history of smoking, reports no smoking intermittently few cigarettes per day.  Denies history of drug abuse.  Past surgical history - right arm AV fistula, AICD placement, cardiac stents    Overview/Hospital Course:  Therapeutic paracentesis performed 8/16  Had HD today. To overnight and follow-up labs in AM. Hopefully discharge in AM. Patient unable to perform ADLs secondary to severe  "deconditioning. SNF will not take because of need for intermittent paracentesis and HD. Therefore LTAC or inpatient rehabilitation is being considered, and discharge held.  8/21 Awaiting placement on med/surg. Receiving dialysis MWF. Seen today at dialysis unit  8/22: has been tentatively accepted at in-patient rehab  8/23 Awaiting authorization from [facility].  8/24 Awaiting authorization from [facility]  8/25 Awaiting authorization from [facility]  8/26 Denied inpatient rehab. Will try for SNF placement as his ascites and need for intermittent paraceentesis has improved greatly; not needed since his last on 8/16 8/27 Patient vomiting today and reports vomiting some yesterday.  Reports abdominal fullness but no more than yesterday than the day prior.  Reports chronic abdominal pain with no change in this chronic pain. Having BMs with laxatives.   8/28 Abdomen feels "tight". Still nauseous, but no vomiting today. Is for HD today. No CP/SOB    Interval History: ascites worsening    Review of Systems   Constitutional: Negative.    HENT: Negative.    Eyes: Negative.    Respiratory: Negative.    Cardiovascular: Negative.    Gastrointestinal: Positive for abdominal distention and nausea.   Endocrine: Negative.    Genitourinary: Negative.    Musculoskeletal: Negative.    Skin: Negative.    Allergic/Immunologic: Negative.    Neurological: Negative.    Hematological: Negative.    All other systems reviewed and are negative.    Objective:     Vital Signs (Most Recent):  Temp: 97.8 °F (36.6 °C) (08/28/19 0815)  Pulse: 76 (08/28/19 0815)  Resp: 20 (08/28/19 0815)  BP: 111/76 (08/28/19 0815)  SpO2: 96 % (08/28/19 0815) Vital Signs (24h Range):  Temp:  [97.3 °F (36.3 °C)-98.2 °F (36.8 °C)] 97.8 °F (36.6 °C)  Pulse:  [74-81] 76  Resp:  [16-20] 20  SpO2:  [95 %-99 %] 96 %  BP: ()/(68-80) 111/76     Weight: 91 kg (200 lb 9.9 oz)  Body mass index is 32.38 kg/m².  No intake or output data in the 24 hours ending " 08/28/19 0841   Physical Exam   Constitutional: He is oriented to person, place, and time. He appears well-developed and well-nourished.   Chronic ill appearing   HENT:   Head: Normocephalic and atraumatic.   Eyes: Pupils are equal, round, and reactive to light. Conjunctivae and EOM are normal.   Neck: Normal range of motion. Neck supple.   Cardiovascular: Normal rate, regular rhythm, normal heart sounds and intact distal pulses.   Pulmonary/Chest: Effort normal and breath sounds normal.   Abdominal: Soft. Bowel sounds are normal. He exhibits distension.   Musculoskeletal: Normal range of motion.   Neurological: He is alert and oriented to person, place, and time.   Skin: Skin is warm and dry. Capillary refill takes less than 2 seconds.   Psychiatric: He has a normal mood and affect. His behavior is normal. Judgment and thought content normal.   Nursing note and vitals reviewed.      Significant Labs:   BMP:   Recent Labs   Lab 08/28/19  0507      *   K 5.5*   CL 93*   CO2 22*   BUN 55*   CREATININE 8.3*   CALCIUM 8.9     CBC:   Recent Labs   Lab 08/27/19  0450 08/28/19  0507   WBC 7.05 7.86   HGB 11.8* 12.6*   HCT 36.4* 39.1*   PLT 85* 110*       Significant Imaging: I have reviewed all pertinent imaging results/findings within the past 24 hours.      Assessment/Plan:      * Volume overload  HD on MWF  Diuretics held at this time due to low normal blood pressure      ESRD (end stage renal disease)  HD on MWF      Ascites due to alcoholic cirrhosis    Stable currently; not worsening    End stage renal disease           Diarrhea of presumed infectious origin    This has been ruled out as infectious.  Having multiple BMs due to laxatives due to ESLD.    Hemiplegia as late effect of cerebrovascular accident (CVA)        CAD S/P percutaneous coronary angioplasty        Encephalopathy, metabolic  Currently appears resolved      AICD (automatic cardioverter/defibrillator) present        Alcoholic cirrhosis of  liver with ascites  S/p paracenetesis; stable currently      VTE Risk Mitigation (From admission, onward)        Ordered     IP VTE HIGH RISK PATIENT  Once      08/13/19 1909                Luis Antonio Wyatt MD  Department of Hospital Medicine   [facility]

## 2019-08-28 NOTE — SUBJECTIVE & OBJECTIVE
Interval History: ascites worsening    Review of Systems   Constitutional: Negative.    HENT: Negative.    Eyes: Negative.    Respiratory: Negative.    Cardiovascular: Negative.    Gastrointestinal: Positive for abdominal distention and nausea.   Endocrine: Negative.    Genitourinary: Negative.    Musculoskeletal: Negative.    Skin: Negative.    Allergic/Immunologic: Negative.    Neurological: Negative.    Hematological: Negative.    All other systems reviewed and are negative.    Objective:     Vital Signs (Most Recent):  Temp: 97.8 °F (36.6 °C) (08/28/19 0815)  Pulse: 76 (08/28/19 0815)  Resp: 20 (08/28/19 0815)  BP: 111/76 (08/28/19 0815)  SpO2: 96 % (08/28/19 0815) Vital Signs (24h Range):  Temp:  [97.3 °F (36.3 °C)-98.2 °F (36.8 °C)] 97.8 °F (36.6 °C)  Pulse:  [74-81] 76  Resp:  [16-20] 20  SpO2:  [95 %-99 %] 96 %  BP: ()/(68-80) 111/76     Weight: 91 kg (200 lb 9.9 oz)  Body mass index is 32.38 kg/m².  No intake or output data in the 24 hours ending 08/28/19 0841   Physical Exam   Constitutional: He is oriented to person, place, and time. He appears well-developed and well-nourished.   Chronic ill appearing   HENT:   Head: Normocephalic and atraumatic.   Eyes: Pupils are equal, round, and reactive to light. Conjunctivae and EOM are normal.   Neck: Normal range of motion. Neck supple.   Cardiovascular: Normal rate, regular rhythm, normal heart sounds and intact distal pulses.   Pulmonary/Chest: Effort normal and breath sounds normal.   Abdominal: Soft. Bowel sounds are normal. He exhibits distension.   Musculoskeletal: Normal range of motion.   Neurological: He is alert and oriented to person, place, and time.   Skin: Skin is warm and dry. Capillary refill takes less than 2 seconds.   Psychiatric: He has a normal mood and affect. His behavior is normal. Judgment and thought content normal.   Nursing note and vitals reviewed.      Significant Labs:   BMP:   Recent Labs   Lab 08/28/19  0507      NA  134*   K 5.5*   CL 93*   CO2 22*   BUN 55*   CREATININE 8.3*   CALCIUM 8.9     CBC:   Recent Labs   Lab 08/27/19  0450 08/28/19  0507   WBC 7.05 7.86   HGB 11.8* 12.6*   HCT 36.4* 39.1*   PLT 85* 110*       Significant Imaging: I have reviewed all pertinent imaging results/findings within the past 24 hours.

## 2019-08-28 NOTE — PLAN OF CARE
Problem: Oral Intake Inadequate  Goal: Improved Oral Intake  Outcome: Ongoing (interventions implemented as appropriate)  PO intake poor; c/o nausea/abd pain. No emesis today. RD encouraged PO intake as able. No prefs voiced; encouraged pt to voice prefs with  to choose items which may be better tolerated.

## 2019-08-28 NOTE — PLAN OF CARE
Problem: Adult Inpatient Plan of Care  Goal: Patient-Specific Goal (Individualization)  Outcome: Ongoing (interventions implemented as appropriate)     08/20/19 0530 08/27/19 3255   OTHER   Anxieties, Fears or Concerns  --  none   Individualized Care Needs  --  maintain blood pressure   Patient-Specific Goal (Individualization)   Patient-Specific Goals (Include Timeframe) remain free from falls til discharge  --

## 2019-08-28 NOTE — PT/OT/SLP PROGRESS
Occupational Therapy   Treatment    Name: Tre Cali  MRN: [id number]  Admitting Diagnosis:  Volume overload       Recommendations:     Discharge Recommendations: nursing facility, skilled  Discharge Equipment Recommendations:  walker, rolling, tub bench(will continue to assess)  Barriers to discharge:       Assessment:     Tre Cali is a 54 y.o. male with a medical diagnosis of Volume overload.  He presents with volume overload. Performance deficits affecting function are weakness, impaired endurance, impaired sensation, impaired self care skills, impaired functional mobilty, gait instability, impaired balance, decreased coordination, decreased upper extremity function, pain.     Rehab Prognosis:  Fair; patient would benefit from acute skilled OT services to address these deficits and reach maximum level of function.       Plan:     Patient to be seen 5 x/week to address the above listed problems via self-care/home management, therapeutic activities, therapeutic exercises  · Plan of Care Expires: 09/15/19  · Plan of Care Reviewed with: patient    Subjective     Pain/Comfort:  · Pain Rating 1: 4/10  · Location - Side 1: Left  · Location - Orientation 1: lower  · Location 1: leg  · Pain Addressed 1: Reposition  · Pain Rating Post-Intervention 1: 4/10    Objective:     Communicated with: patient prior to session.  Patient found supine with peripheral IV upon OT entry to room.    General Precautions: Standard, fall   Orthopedic Precautions:N/A   Braces: N/A     Occupational Performance:     Bed Mobility:    · Patient completed Scooting/Bridging with contact guard assistance  · Patient completed Supine to Sit with contact guard assistance  · Patient completed Sit to Supine with contact guard assistance     Functional Mobility/Transfers:  · Did not perform this session    Activities of Daily Living:  · Grooming: contact guard assistance washing face EOB  · Lower Body Dressing: minimum assistance don/doff sock  EOB using reacher and sock aid.      [facility] 6 Click ADL:      Treatment & Education:  ADL independence, UE therex, Safety Awareness and functional transfers. Patient instructed on use of reacher and sock aide to perform lower body dressing.    Patient left supine with all lines intact and call button in reachEducation:      GOALS:   Multidisciplinary Problems     Occupational Therapy Goals        Problem: Occupational Therapy Goal    Goal Priority Disciplines Outcome Interventions   Occupational Therapy Goal     OT, PT/OT Ongoing (interventions implemented as appropriate)    Description:  Goals to be met by: discharge     Patient will increase functional independence with ADLs by performing:    LE Dressing with Minimal Assistance.  Grooming while seated at sink with [place].  Toileting from toilet with Minimal Assistance for hygiene and clothing management.   Toilet transfer to toilet with Stand-by Assistance.     Adding goal:   Pt to be independent with UE exercises program.   Evie Arita OT  8/22/2019  .                    Time Tracking:     OT Date of Treatment: 08/28/19  OT Start Time: 1426  OT Stop Time: 1453  OT Total Time (min): 27 min    Billable Minutes:Self Care/Home Management 27    Lennox Judge OT  8/28/2019

## 2019-08-29 LAB
ALBUMIN SERPL BCP-MCNC: 3.1 G/DL (ref 3.5–5.2)
ALP SERPL-CCNC: 121 U/L (ref 55–135)
ALT SERPL W/O P-5'-P-CCNC: 11 U/L (ref 10–44)
ANION GAP SERPL CALC-SCNC: 17 MMOL/L (ref 8–16)
AST SERPL-CCNC: 17 U/L (ref 10–40)
BILIRUB SERPL-MCNC: 1.7 MG/DL (ref 0.1–1)
BUN SERPL-MCNC: 39 MG/DL (ref 6–20)
CALCIUM SERPL-MCNC: 8.8 MG/DL (ref 8.7–10.5)
CHLORIDE SERPL-SCNC: 96 MMOL/L (ref 95–110)
CO2 SERPL-SCNC: 24 MMOL/L (ref 23–29)
CREAT SERPL-MCNC: 6.3 MG/DL (ref 0.5–1.4)
ERYTHROCYTE [DISTWIDTH] IN BLOOD BY AUTOMATED COUNT: 17 % (ref 11.5–14.5)
EST. GFR  (AFRICAN AMERICAN): 10.5 ML/MIN/1.73 M^2
EST. GFR  (NON AFRICAN AMERICAN): 9.1 ML/MIN/1.73 M^2
GLUCOSE SERPL-MCNC: 154 MG/DL (ref 70–110)
GLUCOSE SERPL-MCNC: 170 MG/DL (ref 70–110)
GLUCOSE SERPL-MCNC: 171 MG/DL (ref 70–110)
GLUCOSE SERPL-MCNC: 88 MG/DL (ref 70–110)
GLUCOSE SERPL-MCNC: 92 MG/DL (ref 70–110)
HCT VFR BLD AUTO: 36.2 % (ref 40–54)
HGB BLD-MCNC: 11.6 G/DL (ref 14–18)
INR PPP: 1.6
MCH RBC QN AUTO: 30.3 PG (ref 27–31)
MCHC RBC AUTO-ENTMCNC: 32 G/DL (ref 32–36)
MCV RBC AUTO: 95 FL (ref 82–98)
PLATELET # BLD AUTO: 92 K/UL (ref 150–350)
PMV BLD AUTO: 11.3 FL (ref 9.2–12.9)
POTASSIUM SERPL-SCNC: 4.6 MMOL/L (ref 3.5–5.1)
PROT SERPL-MCNC: 7 G/DL (ref 6–8.4)
PROTHROMBIN TIME: 18.3 SEC (ref 11.7–14)
RBC # BLD AUTO: 3.83 M/UL (ref 4.6–6.2)
SODIUM SERPL-SCNC: 137 MMOL/L (ref 136–145)
WBC # BLD AUTO: 6.6 K/UL (ref 3.9–12.7)

## 2019-08-29 PROCEDURE — 12000002 HC ACUTE/MED SURGE SEMI-PRIVATE ROOM

## 2019-08-29 PROCEDURE — 85610 PROTHROMBIN TIME: CPT

## 2019-08-29 PROCEDURE — 36415 COLL VENOUS BLD VENIPUNCTURE: CPT

## 2019-08-29 PROCEDURE — 25000003 PHARM REV CODE 250: Performed by: INTERNAL MEDICINE

## 2019-08-29 PROCEDURE — 94761 N-INVAS EAR/PLS OXIMETRY MLT: CPT

## 2019-08-29 PROCEDURE — 80053 COMPREHEN METABOLIC PANEL: CPT

## 2019-08-29 PROCEDURE — 99900035 HC TECH TIME PER 15 MIN (STAT)

## 2019-08-29 PROCEDURE — 85027 COMPLETE CBC AUTOMATED: CPT

## 2019-08-29 RX ADMIN — RIFAXIMIN 550 MG: 550 TABLET ORAL at 09:08

## 2019-08-29 RX ADMIN — MUPIROCIN: 20 OINTMENT TOPICAL at 09:08

## 2019-08-29 RX ADMIN — Medication: at 11:08

## 2019-08-29 RX ADMIN — ASPIRIN 81 MG: 81 TABLET, COATED ORAL at 11:08

## 2019-08-29 RX ADMIN — RIFAXIMIN 550 MG: 550 TABLET ORAL at 11:08

## 2019-08-29 RX ADMIN — CALCITRIOL CAPSULES 0.25 MCG 0.25 MCG: 0.25 CAPSULE ORAL at 11:08

## 2019-08-29 RX ADMIN — AMIODARONE HYDROCHLORIDE 200 MG: 200 TABLET ORAL at 11:08

## 2019-08-29 RX ADMIN — MIDODRINE HYDROCHLORIDE 5 MG: 2.5 TABLET ORAL at 05:08

## 2019-08-29 RX ADMIN — CALCIUM ACETATE 667 MG: 667 CAPSULE ORAL at 11:08

## 2019-08-29 RX ADMIN — MIDODRINE HYDROCHLORIDE 5 MG: 2.5 TABLET ORAL at 11:08

## 2019-08-29 RX ADMIN — CALCIUM ACETATE 667 MG: 667 CAPSULE ORAL at 05:08

## 2019-08-29 RX ADMIN — MUPIROCIN: 20 OINTMENT TOPICAL at 11:08

## 2019-08-29 RX ADMIN — ALLOPURINOL 100 MG: 100 TABLET ORAL at 11:08

## 2019-08-29 RX ADMIN — MIDODRINE HYDROCHLORIDE 5 MG: 2.5 TABLET ORAL at 06:08

## 2019-08-29 NOTE — PROGRESS NOTES
[facility]  Nephrology  Progress Note    Patient Name: Tre Cali  MRN: [id number]  Admission Date: 8/13/2019  Hospital Length of Stay: 14 days  Attending Provider: Luis Antonio Wyatt MD   Primary Care Physician: Primary Doctor No  Principal Problem:Volume overload      Subjective:     Interval History: feels 'OK'  this am (s/p HD on 8/28/19 w/ net UF: approx 2.5L) (may need repeat paracentesis prior to discharge); awaiting outpatient LTAC placement (recommend [place] as they can accommodate his HD needs)    Review of patient's allergies indicates:   Allergen Reactions    Sulfa (sulfonamide antibiotics) Rash     Current Facility-Administered Medications   Medication Frequency    0.9%  NaCl infusion PRN    0.9%  NaCl infusion Once    0.9%  NaCl infusion PRN    0.9%  NaCl infusion Once    albumin human 25% bottle 50 g Once    albuterol nebulizer solution 2.5 mg Q4H PRN    allopurinol tablet 100 mg Daily    amiodarone tablet 200 mg Daily    aspirin EC tablet 81 mg Daily    calcitRIOL capsule 0.25 mcg Daily    calcium acetate capsule 667 mg TID WM    dextrose 50% injection 12.5 g PRN    dextrose 50% injection 25 g PRN    glucagon (human recombinant) injection 1 mg PRN    glucose chewable tablet 16 g PRN    glucose chewable tablet 24 g PRN    HYDROcodone-acetaminophen  mg per tablet 1 tablet Q4H PRN    midodrine tablet 5 mg TID    mupirocin 2 % ointment BID    ondansetron injection 4 mg Q6H PRN    prochlorperazine injection Soln 5 mg Q4H PRN    rifAXIMin tablet 550 mg BID    sodium chloride 0.9% flush 10 mL PRN    white petrolatum 41 % ointment Daily       Objective:     Vital Signs (Most Recent):  Temp: 97.7 °F (36.5 °C) (08/29/19 0749)  Pulse: 74 (08/29/19 0749)  Resp: 20 (08/29/19 0749)  BP: 91/65 (08/29/19 0749)  SpO2: (!) 94 % (08/29/19 0749)  O2 Device (Oxygen Therapy): room air (08/29/19 0749) Vital Signs (24h  Range):  Temp:  [97 °F (36.1 °C)-97.8 °F (36.6 °C)] 97.7 °F (36.5 °C)  Pulse:  [65-76] 74  Resp:  [16-20] 20  SpO2:  [94 %-98 %] 94 %  BP: ()/(64-77) 91/65     Weight: (no new wt available) (08/28/19 1003)  Body mass index is 32.38 kg/m².  Body surface area is 2.06 meters squared.    I/O last 3 completed shifts:  In: 980 [P.O.:480; Other:500]  Out: 3003 [Other:3002; Stool:1]    Physical Exam   Constitutional: He is oriented to person, place, and time. No distress.   Obese; chronically ill-appearing   HENT:   Head: Normocephalic and atraumatic.   Mouth/Throat: Oropharynx is clear and moist.   Eyes: Conjunctivae are normal. Right eye exhibits no discharge. Left eye exhibits no discharge. No scleral icterus.   Neck: Normal range of motion. Neck supple. No JVD present.   Cardiovascular: Normal rate, regular rhythm and intact distal pulses. Exam reveals no gallop and no friction rub.   Murmur heard.  Pulmonary/Chest: Effort normal and breath sounds normal. No respiratory distress. He has no wheezes. He has no rales.   Abdominal: Soft. He exhibits distension. He exhibits no mass. There is tenderness. There is no rebound and no guarding.   Hypoactive BS in all quadrants   Genitourinary:   Genitourinary Comments: Deferred   Musculoskeletal: Normal range of motion. He exhibits edema and tenderness. He exhibits no deformity.   R UE AV Fistula w/ palpable thrill   Neurological: He is alert and oriented to person, place, and time. A cranial nerve deficit is present.   Skin: Skin is warm and dry. Rash noted. No erythema.   Unable to assess   Psychiatric: He has a normal mood and affect. His behavior is normal.   Nursing note and vitals reviewed.      Significant Labs:sure  CBC:   Recent Labs   Lab 08/29/19  0444   WBC 6.60   RBC 3.83*   HGB 11.6*   HCT 36.2*   PLT 92*   MCV 95   MCH 30.3   MCHC 32.0     CMP:   Recent Labs   Lab 08/29/19  0444   GLU 92   CALCIUM 8.8   ALBUMIN 3.1*   PROT 7.0      K 4.6   CO2 24   CL  96   BUN 39*   CREATININE 6.3*   ALKPHOS 121   ALT 11   AST 17   BILITOT 1.7*     LFTs:   Recent Labs   Lab 08/29/19  0444   ALT 11   AST 17   ALKPHOS 121   BILITOT 1.7*   PROT 7.0   ALBUMIN 3.1*       Significant Imaging:  X-Ray: Reviewed  CT: Reviewed    Assessment/Plan:     Active Diagnoses:    Diagnosis Date Noted POA    PRINCIPAL PROBLEM:  Volume overload [E87.70] 08/13/2019 Yes    Physical deconditioning [R53.81] 08/27/2019 Yes    Alcoholic cirrhosis of liver with ascites [K70.31] 08/13/2019 Unknown     Chronic    Ascites due to alcoholic cirrhosis [K70.31] 08/13/2019 Yes     Chronic    ESRD (end stage renal disease) [N18.6] 08/13/2019 Yes     Chronic    AICD (automatic cardioverter/defibrillator) present [Z95.810] 08/13/2019 Yes     Chronic    Encephalopathy, metabolic [G93.41] 08/13/2019 Yes    CAD S/P percutaneous coronary angioplasty [I25.10, Z98.61] 08/13/2019 Not Applicable     Chronic    Hemiplegia as late effect of cerebrovascular accident (CVA) [I69.359] 08/13/2019 Not Applicable     Chronic    Diarrhea of presumed infectious origin [R19.7] 08/13/2019 Yes    End stage renal disease [N18.6] 08/13/2019 Yes      Problems Resolved During this Admission:     1. ESRD (HD-MWF via R UE AV Fistula)- symptomatically stable at present; significant abdominal distention on admission (s/p therapeutic paracentesis this am); no overt volume overload, significant electrolyte perturbations nor acid-base disturbance    -maintenance HD (duration: 3h; UF Goal: 1-2L as tolerated; 1K Bath x 1hr then 2k thereafter Standard 'Bath'; Access: AVF; Qb: 400 ml/min, Qd: 2x BFR; give 12.5-25 gm 25% Albumin w/ HD for BP support)    -DAILY renal function panel to document sCr trend, optimize HD electrolyte 'bath' & assess response to therapy    -avoid nephrotoxic agents (NSAIDs, IV contrast dye)    -renally dose all appropriate medications, including antibiotics     2. HTN- clinically stable at present;  BP AT GOAL (91/65)  now w/ relative hypotension; no active issues    -HOLD anti-HTN medications & diuretics for now & reassess in am     3. ANEMIA- clinically stable at present; H/H AT GOAL (11.6/36.2); no active issues    -trend daily CBC (H/H) to effect optimal blood-loss surveillance     4. SECONDARY HYPERPARATHYROIDISM (sHPT)- clinically stable at present; no active issues    -continue dietary binder/Vitamin D +/- HD-associated calcimimetic agent (Cinacalcet vs Etelcalcetide)     5. ESLD (hx of EtOH-induced Cirrhosis) w/ Ascites (s/p therapeutic paracentesis)- appears ill; GI Medicine service on the case; cautious fluid removal to minimize risk of precipitatiing Hepatorenal Syndrome    -management per GI Medicine/Primary Team recommendations    Thank you for your consult. I will follow-up with patient. Please contact us if you have any additional questions.    Armando Cruz III, MD  [facility]  [facility]  [phone] (C)

## 2019-08-29 NOTE — PT/OT/SLP PROGRESS
Physical Therapy      Patient Name:  Tre Cali   MRN:  [id number]    2 attempts to see Pt today. Patient not seen today secondary to am: eating breakfast; pm: declined secondary to just returned from procedure and needed to rest. Will follow-up tomorrow.    Hillary Hammant, PTA

## 2019-08-29 NOTE — SUBJECTIVE & OBJECTIVE
Interval History: stable    Review of Systems   Constitutional: Negative.    HENT: Negative.    Eyes: Negative.    Respiratory: Negative.    Cardiovascular: Negative.    Gastrointestinal: Positive for abdominal distention.   Endocrine: Negative.    Genitourinary: Negative.    Musculoskeletal: Negative.    Skin: Negative.    Allergic/Immunologic: Negative.    Neurological: Negative.    Hematological: Negative.    All other systems reviewed and are negative.    Objective:     Vital Signs (Most Recent):  Temp: 97.7 °F (36.5 °C) (08/29/19 0749)  Pulse: 74 (08/29/19 0749)  Resp: 20 (08/29/19 0749)  BP: 91/65 (08/29/19 0749)  SpO2: (!) 94 % (08/29/19 0749) Vital Signs (24h Range):  Temp:  [97 °F (36.1 °C)-97.8 °F (36.6 °C)] 97.7 °F (36.5 °C)  Pulse:  [65-76] 74  Resp:  [16-20] 20  SpO2:  [94 %-98 %] 94 %  BP: ()/(64-77) 91/65     Weight: (no new wt available)  Body mass index is 32.38 kg/m².    Intake/Output Summary (Last 24 hours) at 8/29/2019 0812  Last data filed at 8/28/2019 1900  Gross per 24 hour   Intake 980 ml   Output 3003 ml   Net -2023 ml      Physical Exam   Constitutional: He is oriented to person, place, and time.   Chronic ill appearing   HENT:   Head: Normocephalic and atraumatic.   Eyes: Pupils are equal, round, and reactive to light. Conjunctivae and EOM are normal.   Neck: Normal range of motion. Neck supple.   Cardiovascular: Normal rate, regular rhythm, normal heart sounds and intact distal pulses.   Pulmonary/Chest: Effort normal and breath sounds normal.   Abdominal: Soft. Bowel sounds are normal. He exhibits distension.   Voluntary guarding without guarding   Musculoskeletal: Normal range of motion.   Neurological: He is alert and oriented to person, place, and time.   Skin: Skin is warm and dry. Capillary refill takes less than 2 seconds.   Psychiatric: He has a normal mood and affect. His behavior is normal. Judgment and thought content normal.   Nursing note and vitals  reviewed.      Significant Labs:   BMP:   Recent Labs   Lab 08/29/19  0444   GLU 92      K 4.6   CL 96   CO2 24   BUN 39*   CREATININE 6.3*   CALCIUM 8.8     CBC:   Recent Labs   Lab 08/28/19  0507 08/29/19 0444   WBC 7.86 6.60   HGB 12.6* 11.6*   HCT 39.1* 36.2*   * 92*       Significant Imaging: I have reviewed all pertinent imaging results/findings within the past 24 hours.

## 2019-08-29 NOTE — PROGRESS NOTES
[facility] Medicine  Progress Note    Patient Name: Tre Cali  MRN: [id number]  Patient Class: IP- Inpatient   Admission Date: 8/13/2019  Length of Stay: 14 days  Attending Physician: Luis Antonio Wyatt MD  Primary Care Provider: Primary Doctor No        Subjective:     Principal Problem:Volume overload        HPI:  Mr. Cali is a 54-year-old male with past medical history of CAD status post stent several years ago, COPD, liver cirrhosis, ESRD on hemodialysis presents with generalized weakness.  The patient presented emergency room earlier today in the morning for generalized weakness and anasarca, patient was discharged to dialysis center for hemodialysis.  Patient came back from hemodialysis for unclear reasons.  Patient reports history of generalized weakness, reports knees gave out and fell.  History of shortness of breath since last few days.  History of worsening abdominal swelling and abdominal pain since 2-3 weeks.  History of diarrhea since 3 weeks, about 4 episodes every day, nonbloody.  Patient reports sleepiness and intermittent confusion since 1-2 days.  Patient moved from [place] to [place] a week ago and yet to set up doctors.  Patient reports last hemodialysis was last Saturday.  Patient denies any history of fevers, chills or nausea, vomiting.  Patient reports history of stents several years ago.  Reports history of stroke several years ago with resultant right upper extremity and lower extremity weakness.     Patient quit drinking about 20 years ago.  Patient has longstanding history of smoking, reports no smoking intermittently few cigarettes per day.  Denies history of drug abuse.  Past surgical history - right arm AV fistula, AICD placement, cardiac stents    Overview/Hospital Course:  Therapeutic paracentesis performed 8/16  Had HD today. To overnight and follow-up labs in AM. Hopefully discharge in AM. Patient unable to perform ADLs secondary to severe  "deconditioning. SNF will not take because of need for intermittent paracentesis and HD. Therefore LTAC or inpatient rehabilitation is being considered, and discharge held.  8/21 Awaiting placement on med/surg. Receiving dialysis MWF. Seen today at dialysis unit  8/22: has been tentatively accepted at in-patient rehab  8/23 Awaiting authorization from [facility].  8/24 Awaiting authorization from [facility]  8/25 Awaiting authorization from [facility]  8/26 Denied inpatient rehab. Will try for SNF placement as his ascites and need for intermittent paraceentesis has improved greatly; not needed since his last on 8/16 8/27 Patient vomiting today and reports vomiting some yesterday.  Reports abdominal fullness but no more than yesterday than the day prior.  Reports chronic abdominal pain with no change in this chronic pain. Having BMs with laxatives.   8/28 Abdomen feels "tight". Still nauseous, but no vomiting today. Is for HD today. No CP/SOB  8/29 Waiting placement. Did not have paracentesis yesterday. Abdomen feels "tight". NO CP/SOB    Interval History: stable    Review of Systems   Constitutional: Negative.    HENT: Negative.    Eyes: Negative.    Respiratory: Negative.    Cardiovascular: Negative.    Gastrointestinal: Positive for abdominal distention.   Endocrine: Negative.    Genitourinary: Negative.    Musculoskeletal: Negative.    Skin: Negative.    Allergic/Immunologic: Negative.    Neurological: Negative.    Hematological: Negative.    All other systems reviewed and are negative.    Objective:     Vital Signs (Most Recent):  Temp: 97.7 °F (36.5 °C) (08/29/19 0749)  Pulse: 74 (08/29/19 0749)  Resp: 20 (08/29/19 0749)  BP: 91/65 (08/29/19 0749)  SpO2: (!) 94 % (08/29/19 0749) Vital Signs (24h Range):  Temp:  [97 °F (36.1 °C)-97.8 °F (36.6 °C)] 97.7 °F (36.5 °C)  Pulse:  [65-76] 74  Resp:  [16-20] 20  SpO2:  [94 %-98 %] 94 %  BP: ()/(64-77) 91/65     Weight: (no new wt available)  Body " mass index is 32.38 kg/m².    Intake/Output Summary (Last 24 hours) at 8/29/2019 0812  Last data filed at 8/28/2019 1900  Gross per 24 hour   Intake 980 ml   Output 3003 ml   Net -2023 ml      Physical Exam   Constitutional: He is oriented to person, place, and time.   Chronic ill appearing   HENT:   Head: Normocephalic and atraumatic.   Eyes: Pupils are equal, round, and reactive to light. Conjunctivae and EOM are normal.   Neck: Normal range of motion. Neck supple.   Cardiovascular: Normal rate, regular rhythm, normal heart sounds and intact distal pulses.   Pulmonary/Chest: Effort normal and breath sounds normal.   Abdominal: Soft. Bowel sounds are normal. He exhibits distension.   Voluntary guarding without guarding   Musculoskeletal: Normal range of motion.   Neurological: He is alert and oriented to person, place, and time.   Skin: Skin is warm and dry. Capillary refill takes less than 2 seconds.   Psychiatric: He has a normal mood and affect. His behavior is normal. Judgment and thought content normal.   Nursing note and vitals reviewed.      Significant Labs:   BMP:   Recent Labs   Lab 08/29/19  0444   GLU 92      K 4.6   CL 96   CO2 24   BUN 39*   CREATININE 6.3*   CALCIUM 8.8     CBC:   Recent Labs   Lab 08/28/19  0507 08/29/19  0444   WBC 7.86 6.60   HGB 12.6* 11.6*   HCT 39.1* 36.2*   * 92*       Significant Imaging: I have reviewed all pertinent imaging results/findings within the past 24 hours.      Assessment/Plan:      * Volume overload  HD on MWF  Diuretics held at this time due to low normal blood pressure      ESRD (end stage renal disease)  HD on MWF      Ascites due to alcoholic cirrhosis    Stable currently; not worsening    End stage renal disease           Diarrhea of presumed infectious origin    This has been ruled out as infectious.  Having multiple BMs due to laxatives due to ESLD.    Hemiplegia as late effect of cerebrovascular accident (CVA)        CAD S/P percutaneous  coronary angioplasty        Encephalopathy, metabolic  Currently appears resolved      AICD (automatic cardioverter/defibrillator) present    For paracentesis, hopefully today    Alcoholic cirrhosis of liver with ascites  S/p paracenetesis; stable currently      VTE Risk Mitigation (From admission, onward)        Ordered     IP VTE HIGH RISK PATIENT  Once      08/13/19 1909                Luis Antonio Wyatt MD  Department of Hospital Medicine   [facility]

## 2019-08-29 NOTE — PLAN OF CARE
08/28/19 2200   Patient Assessment/Suction   Level of Consciousness (AVPU) alert   Respiratory Effort Unlabored   Expansion/Accessory Muscles/Retractions no use of accessory muscles   All Lung Fields Breath Sounds clear   Rhythm/Pattern, Respiratory unlabored   Cough Frequency infrequent   Cough Type dry   Aerosol Therapy   $ Aerosol Therapy Charges PRN treatment not required   Respiratory Treatment Status (SVN) PRN treatment not required

## 2019-08-29 NOTE — PT/OT/SLP PROGRESS
Occupational Therapy      Patient Name:  Tre Cali   MRN:  [id number]    Patient not seen today secondary to Patient ill. Will follow-up next service date.    Rody Goel OT  8/29/2019

## 2019-08-29 NOTE — PLAN OF CARE
08/29/19 0917   Discharge Reassessment   Assessment Type Discharge Planning Reassessment   CALLED AND SPOKE TO THE PATIENT'S SISTER, RUBEN RUSS, REGARDING DC PLANNING.  EXPLAINED TO HER THAT AFTER THE PATIENT RECEIVES DIALYSIS HERE AT THE HOSPITAL TOMORROW, THE HOSPITAL WILL BE DISCHARGING HIM TO HOME DUE TO NO NURSING HOME ACCEPTING HIM FOR SNF PLACEMENT.  THE SISTER ACKNOWLEDGED UNDERSTANDING AND ASKED IF THERE WAS ANYTHING HE COULD GET TO ASSIST HIM AT HOME.  SISTER STATED SHE ALREADY HAD A BEDSIDE COMMODE.  I TOLD THE SISTER THAT I WOULD ARRANGE HOME HEALTH, A ROLLING WALKER, AND A WHEELCHAIR FOR THE PATIENT AT DISCHARGE.  SISTER ACKNOWLEDGED UNDERSTANDING AND APPRECIATION.  THIS CM CONTACTED THE HOSPITALIST AND ASKED FOR MD ORDERS FOR THE ABOVE.

## 2019-08-30 VITALS
DIASTOLIC BLOOD PRESSURE: 55 MMHG | TEMPERATURE: 98 F | SYSTOLIC BLOOD PRESSURE: 93 MMHG | HEIGHT: 66 IN | OXYGEN SATURATION: 100 % | HEART RATE: 68 BPM | BODY MASS INDEX: 32.24 KG/M2 | WEIGHT: 200.63 LBS | RESPIRATION RATE: 18 BRPM

## 2019-08-30 PROBLEM — G93.41 ENCEPHALOPATHY, METABOLIC: Status: RESOLVED | Noted: 2019-08-13 | Resolved: 2019-08-30

## 2019-08-30 PROBLEM — E87.70 VOLUME OVERLOAD: Status: RESOLVED | Noted: 2019-08-13 | Resolved: 2019-08-30

## 2019-08-30 PROBLEM — R19.7 DIARRHEA OF PRESUMED INFECTIOUS ORIGIN: Status: RESOLVED | Noted: 2019-08-13 | Resolved: 2019-08-30

## 2019-08-30 LAB
ANION GAP SERPL CALC-SCNC: 21 MMOL/L (ref 8–16)
BUN SERPL-MCNC: 50 MG/DL (ref 6–20)
CALCIUM SERPL-MCNC: 8 MG/DL (ref 8.7–10.5)
CHLORIDE SERPL-SCNC: 90 MMOL/L (ref 95–110)
CO2 SERPL-SCNC: 24 MMOL/L (ref 23–29)
CREAT SERPL-MCNC: 7.9 MG/DL (ref 0.5–1.4)
ERYTHROCYTE [DISTWIDTH] IN BLOOD BY AUTOMATED COUNT: 16.8 % (ref 11.5–14.5)
EST. GFR  (AFRICAN AMERICAN): 8 ML/MIN/1.73 M^2
EST. GFR  (NON AFRICAN AMERICAN): 6.9 ML/MIN/1.73 M^2
GLUCOSE SERPL-MCNC: 112 MG/DL (ref 70–110)
GLUCOSE SERPL-MCNC: 115 MG/DL (ref 70–110)
GLUCOSE SERPL-MCNC: 146 MG/DL (ref 70–110)
GLUCOSE SERPL-MCNC: 200 MG/DL (ref 70–110)
HCT VFR BLD AUTO: 38 % (ref 40–54)
HGB BLD-MCNC: 12.2 G/DL (ref 14–18)
MCH RBC QN AUTO: 30.4 PG (ref 27–31)
MCHC RBC AUTO-ENTMCNC: 32.1 G/DL (ref 32–36)
MCV RBC AUTO: 95 FL (ref 82–98)
PLATELET # BLD AUTO: 97 K/UL (ref 150–350)
PMV BLD AUTO: 11.9 FL (ref 9.2–12.9)
POTASSIUM SERPL-SCNC: 4.8 MMOL/L (ref 3.5–5.1)
RBC # BLD AUTO: 4.01 M/UL (ref 4.6–6.2)
SODIUM SERPL-SCNC: 135 MMOL/L (ref 136–145)
WBC # BLD AUTO: 6.6 K/UL (ref 3.9–12.7)

## 2019-08-30 PROCEDURE — 25000003 PHARM REV CODE 250: Performed by: INTERNAL MEDICINE

## 2019-08-30 PROCEDURE — 36415 COLL VENOUS BLD VENIPUNCTURE: CPT

## 2019-08-30 PROCEDURE — 94761 N-INVAS EAR/PLS OXIMETRY MLT: CPT

## 2019-08-30 PROCEDURE — 90935 HEMODIALYSIS ONE EVALUATION: CPT

## 2019-08-30 PROCEDURE — 99900035 HC TECH TIME PER 15 MIN (STAT)

## 2019-08-30 PROCEDURE — 80048 BASIC METABOLIC PNL TOTAL CA: CPT

## 2019-08-30 PROCEDURE — 85027 COMPLETE CBC AUTOMATED: CPT

## 2019-08-30 RX ADMIN — MIDODRINE HYDROCHLORIDE 5 MG: 2.5 TABLET ORAL at 05:08

## 2019-08-30 RX ADMIN — Medication: at 07:08

## 2019-08-30 RX ADMIN — MIDODRINE HYDROCHLORIDE 5 MG: 2.5 TABLET ORAL at 06:08

## 2019-08-30 RX ADMIN — AMIODARONE HYDROCHLORIDE 200 MG: 200 TABLET ORAL at 07:08

## 2019-08-30 RX ADMIN — ALLOPURINOL 100 MG: 100 TABLET ORAL at 07:08

## 2019-08-30 RX ADMIN — CALCIUM ACETATE 667 MG: 667 CAPSULE ORAL at 06:08

## 2019-08-30 RX ADMIN — ASPIRIN 81 MG: 81 TABLET, COATED ORAL at 07:08

## 2019-08-30 RX ADMIN — MUPIROCIN: 20 OINTMENT TOPICAL at 09:08

## 2019-08-30 RX ADMIN — RIFAXIMIN 550 MG: 550 TABLET ORAL at 07:08

## 2019-08-30 RX ADMIN — CALCITRIOL CAPSULES 0.25 MCG 0.25 MCG: 0.25 CAPSULE ORAL at 07:08

## 2019-08-30 RX ADMIN — CALCIUM ACETATE 667 MG: 667 CAPSULE ORAL at 07:08

## 2019-08-30 RX ADMIN — MIDODRINE HYDROCHLORIDE 5 MG: 2.5 TABLET ORAL at 10:08

## 2019-08-30 NOTE — NURSING
Care assumed pt report received from Elena rn. Pt awake and alert. Pt stated he was leaving today.

## 2019-08-30 NOTE — PLAN OF CARE
08/30/19 1640   Discharge Reassessment   Assessment Type Discharge Planning Reassessment   PER AMPARO WITH [employer]; DME WILL BE DELIVERED TO THE PATIENT AT THE HOSPITAL.

## 2019-08-30 NOTE — PROGRESS NOTES
[facility]  Nephrology  Progress Note    Patient Name: Tre Cali  MRN: [id number]  Admission Date: 8/13/2019  Hospital Length of Stay: 15 days  Attending Provider: Luis Antonio Wyatt MD   Primary Care Physician: Primary Doctor No  Principal Problem:Volume overload      Subjective:     Interval History: feels 'OK'  this am (s/p HD on 8/28/19 w/ net UF: approx 2.5L) (may need repeat paracentesis prior to discharge); awaiting outpatient LTAC placement (recommend [place], [place] as they can accommodate his HD needs)    Review of patient's allergies indicates:   Allergen Reactions    Sulfa (sulfonamide antibiotics) Rash     Current Facility-Administered Medications   Medication Frequency    0.9%  NaCl infusion PRN    0.9%  NaCl infusion Once    0.9%  NaCl infusion PRN    0.9%  NaCl infusion Once    albumin human 25% bottle 50 g Once    albuterol nebulizer solution 2.5 mg Q4H PRN    allopurinol tablet 100 mg Daily    amiodarone tablet 200 mg Daily    aspirin EC tablet 81 mg Daily    calcitRIOL capsule 0.25 mcg Daily    calcium acetate capsule 667 mg TID WM    dextrose 50% injection 12.5 g PRN    dextrose 50% injection 25 g PRN    glucagon (human recombinant) injection 1 mg PRN    glucose chewable tablet 16 g PRN    glucose chewable tablet 24 g PRN    HYDROcodone-acetaminophen  mg per tablet 1 tablet Q4H PRN    midodrine tablet 5 mg TID    mupirocin 2 % ointment BID    ondansetron injection 4 mg Q6H PRN    prochlorperazine injection Soln 5 mg Q4H PRN    rifAXIMin tablet 550 mg BID    sodium chloride 0.9% flush 10 mL PRN    white petrolatum 41 % ointment Daily       Objective:     Vital Signs (Most Recent):  Temp: 97.6 °F (36.4 °C) (08/30/19 0740)  Pulse: 71 (08/30/19 0740)  Resp: 18 (08/30/19 0740)  BP: 107/73 (08/30/19 0740)  SpO2: 98 % (08/30/19 0740)  O2 Device (Oxygen Therapy): room air (08/30/19 0740) Vital Signs (24h Range):  Temp:   [97.5 °F (36.4 °C)-98.3 °F (36.8 °C)] 97.6 °F (36.4 °C)  Pulse:  [67-76] 71  Resp:  [16-20] 18  SpO2:  [94 %-99 %] 98 %  BP: ()/(38-75) 107/73     Weight: (no new wt available) (08/28/19 1003)  Body mass index is 32.38 kg/m².  Body surface area is 2.06 meters squared.    I/O last 3 completed shifts:  In: 680 [P.O.:680]  Out: -     Physical Exam   Constitutional: He is oriented to person, place, and time. No distress.   Obese; chronically ill-appearing   HENT:   Head: Normocephalic and atraumatic.   Mouth/Throat: Oropharynx is clear and moist.   Eyes: Conjunctivae are normal. Right eye exhibits no discharge. Left eye exhibits no discharge. No scleral icterus.   Neck: Normal range of motion. Neck supple. No JVD present.   Cardiovascular: Normal rate, regular rhythm and intact distal pulses. Exam reveals no gallop and no friction rub.   Murmur heard.  Pulmonary/Chest: Effort normal and breath sounds normal. No respiratory distress. He has no wheezes. He has no rales.   Abdominal: Soft. He exhibits distension. He exhibits no mass. There is tenderness. There is no rebound and no guarding.   Hypoactive BS in all quadrants   Genitourinary:   Genitourinary Comments: Deferred   Musculoskeletal: Normal range of motion. He exhibits edema and tenderness. He exhibits no deformity.   R UE AV Fistula w/ palpable thrill   Neurological: He is alert and oriented to person, place, and time. A cranial nerve deficit is present.   Skin: Skin is warm and dry. Rash noted. No erythema.   Unable to assess   Psychiatric: He has a normal mood and affect. His behavior is normal.   Nursing note and vitals reviewed.      Significant Labs:sure  CBC:   Recent Labs   Lab 08/30/19  0501   WBC 6.60   RBC 4.01*   HGB 12.2*   HCT 38.0*   PLT 97*   MCV 95   MCH 30.4   MCHC 32.1     CMP:   Recent Labs   Lab 08/29/19  0444 08/30/19  0501   GLU 92 146*   CALCIUM 8.8 8.0*   ALBUMIN 3.1*  --    PROT 7.0  --     135*   K 4.6 4.8   CO2 24 24   CL 96  90*   BUN 39* 50*   CREATININE 6.3* 7.9*   ALKPHOS 121  --    ALT 11  --    AST 17  --    BILITOT 1.7*  --      LFTs:   Recent Labs   Lab 08/29/19  0444   ALT 11   AST 17   ALKPHOS 121   BILITOT 1.7*   PROT 7.0   ALBUMIN 3.1*       Significant Imaging:  X-Ray: Reviewed  CT: Reviewed    Assessment/Plan:     Active Diagnoses:    Diagnosis Date Noted POA    PRINCIPAL PROBLEM:  Volume overload [E87.70] 08/13/2019 Yes    Physical deconditioning [R53.81] 08/27/2019 Yes    Alcoholic cirrhosis of liver with ascites [K70.31] 08/13/2019 Unknown     Chronic    Ascites due to alcoholic cirrhosis [K70.31] 08/13/2019 Yes     Chronic    ESRD (end stage renal disease) [N18.6] 08/13/2019 Yes     Chronic    AICD (automatic cardioverter/defibrillator) present [Z95.810] 08/13/2019 Yes     Chronic    Encephalopathy, metabolic [G93.41] 08/13/2019 Yes    CAD S/P percutaneous coronary angioplasty [I25.10, Z98.61] 08/13/2019 Not Applicable     Chronic    Hemiplegia as late effect of cerebrovascular accident (CVA) [I69.359] 08/13/2019 Not Applicable     Chronic    Diarrhea of presumed infectious origin [R19.7] 08/13/2019 Yes    End stage renal disease [N18.6] 08/13/2019 Yes      Problems Resolved During this Admission:     1. ESRD (HD-MWF via R UE AV Fistula)- symptomatically stable at present; significant abdominal distention on admission (s/p therapeutic paracentesis this am); no overt volume overload, significant electrolyte perturbations nor acid-base disturbance    -maintenance HD (duration: 3h; UF Goal: 1-2L as tolerated; Standard 'Bath'; Access: AVF; Qb: 400 ml/min, Qd: 2x BFR; give 12.5-25 gm 25% Albumin w/ HD for BP support)    -DAILY renal function panel to document sCr trend, optimize HD electrolyte 'bath' & assess response to therapy    -avoid nephrotoxic agents (NSAIDs, IV contrast dye)    -renally dose all appropriate medications, including antibiotics     2. HTN- clinically stable at present;  BP AT GOAL (107/72)  now w/ relative hypotension; no active issues    -HOLD anti-HTN medications & diuretics for now & reassess in am     3. ANEMIA- clinically stable at present; H/H AT GOAL (12.2/38.0); no active issues    -trend daily CBC (H/H) to effect optimal blood-loss surveillance     4. SECONDARY HYPERPARATHYROIDISM (sHPT)- clinically stable at present; no active issues    -continue dietary binder/Vitamin D +/- HD-associated calcimimetic agent (Cinacalcet vs Etelcalcetide)     5. ESLD (hx of EtOH-induced Cirrhosis) w/ Ascites (s/p therapeutic paracentesis)- appears ill; GI Medicine service on the case; cautious fluid removal to minimize risk of precipitatiing Hepatorenal Syndrome    -management per GI Medicine/Primary Team recommendations    Thank you for your consult. I will follow-up with patient. Please contact us if you have any additional questions.    Armando Cruz III, MD  [facility]  Critical access hospital  [phone] (C)

## 2019-08-30 NOTE — PLAN OF CARE
08/30/19 1023   Discharge Reassessment   Assessment Type Discharge Planning Reassessment   Anticipated Discharge Disposition Home-Health   THIS CM SPOKE TO THE PATIENT REGARDING HOME HEALTH PREFERENCE.  PROVIDED THE PATIENT WITH THE [facility] HH PROVIDER LISTING.  THE PATIENT HAD NOT PREFERENCE FOR A HOME HEALTH.  THE PATIENT CHOICE FORM WAS COMPLETED AND SIGNED.    HOME HEALTH REFERRAL MANUALLY FAXED TO [facility]; AWAIT RESPONSE.

## 2019-08-30 NOTE — PLAN OF CARE
08/30/19 0749   Patient Assessment/Suction   Level of Consciousness (AVPU) alert   Respiratory Effort Normal;Unlabored   Rhythm/Pattern, Respiratory no shortness of breath reported   PRE-TX-O2   O2 Device (Oxygen Therapy) room air   SpO2 95 %   Pulse Oximetry Type Intermittent   $ Pulse Oximetry - Multiple Charge Pulse Oximetry - Multiple   Pulse 76   Resp 18   Aerosol Therapy   $ Aerosol Therapy Charges PRN treatment not required

## 2019-08-30 NOTE — PROGRESS NOTES
Tx complete, per orders.   No complications throughout tx; Pt asymptomatic at this time.  Total UF: 1500 mL  Net UF: 1000 mL  Pressure dsg applied to RUE AVF site.        08/30/19 1630   Vital Signs   Temp 98 °F (36.7 °C)   Temp src Oral   Pulse 68   Heart Rate Source Monitor   Resp 18   O2 Device (Oxygen Therapy) room air   BP (!) 93/55   BP Location Left arm   BP Method Automatic   Patient Position Sitting   Post-Hemodialysis Assessment   Rinseback Volume (mL) 250 mL   Blood Volume Processed (Liters) 60 L   Dialyzer Clearance Lightly streaked   Duration of Treatment (minutes) 180 minutes   Hemodialysis Intake (mL) 500 mL   Total UF (mL) 1500 mL   Net Fluid Removal 1000   Patient Response to Treatment Tolerated well   Post-Treatment Weight   (VIV)   Arterial bleeding stop time (min) 2 min   Venous bleeding stop time (min) 2 min   Post-Hemodialysis Comments Tx complete, per orders. No complications present throughout, and Pt asymptomatic at this time.

## 2019-08-30 NOTE — DISCHARGE SUMMARY
[facility] Medicine  Discharge Summary      Patient Name: Tre Cali  MRN: [id number]  Admission Date: 8/13/2019  Hospital Length of Stay: 15 days  Discharge Date and Time:  08/30/2019 4:39 PM  Attending Physician: Luis Antonio Wyatt MD   Discharging Provider: Luis Antonio Wyatt MD  Primary Care Provider: Primary Doctor No      HPI:   Mr. Cali is a 54-year-old male with past medical history of CAD status post stent several years ago, COPD, liver cirrhosis, ESRD on hemodialysis presents with generalized weakness.  The patient presented emergency room earlier today in the morning for generalized weakness and anasarca, patient was discharged to dialysis center for hemodialysis.  Patient came back from hemodialysis for unclear reasons.  Patient reports history of generalized weakness, reports knees gave out and fell.  History of shortness of breath since last few days.  History of worsening abdominal swelling and abdominal pain since 2-3 weeks.  History of diarrhea since 3 weeks, about 4 episodes every day, nonbloody.  Patient reports sleepiness and intermittent confusion since 1-2 days.  Patient moved from [place] to [place] a week ago and yet to set up doctors.  Patient reports last hemodialysis was last Saturday.  Patient denies any history of fevers, chills or nausea, vomiting.  Patient reports history of stents several years ago.  Reports history of stroke several years ago with resultant right upper extremity and lower extremity weakness.     Patient quit drinking about 20 years ago.  Patient has longstanding history of smoking, reports no smoking intermittently few cigarettes per day.  Denies history of drug abuse.  Past surgical history - right arm AV fistula, AICD placement, cardiac stents    * No surgery found *      Hospital Course:   Therapeutic paracentesis performed 8/16  Had HD today. To overnight and follow-up labs in AM. Hopefully discharge in AM. Patient unable to perform  "ADLs secondary to severe deconditioning. SNF will not take because of need for intermittent paracentesis and HD. Therefore LTAC or inpatient rehabilitation is being considered, and discharge held.  8/21 Awaiting placement on med/surg. Receiving dialysis MWF. Seen today at dialysis unit  8/22: has been tentatively accepted at in-patient rehab  8/23 Awaiting authorization from [facility].  8/24 Awaiting authorization from [facility]  8/25 Awaiting authorization from [facility]  8/26 Denied inpatient rehab. Will try for SNF placement as his ascites and need for intermittent paraceentesis has improved greatly; not needed since his last on 8/16 8/27 Patient vomiting today and reports vomiting some yesterday.  Reports abdominal fullness but no more than yesterday than the day prior.  Reports chronic abdominal pain with no change in this chronic pain. Having BMs with laxatives.   8/28 Abdomen feels "tight". Still nauseous, but no vomiting today. Is for HD today. No CP/SOB  8/29 Waiting placement. Did not have paracentesis yesterday. Abdomen feels "tight". NO CP/SOB  8/30 Feels well today. Not able to place the patient will send home with home health, PT, OT Skilled Nurse follow-up      Consults:   Consults (From admission, onward)        Status Ordering Provider     Inpatient consult to Gastroenterology  Once     Provider:  Thong Peña MD    Completed CHAPINCITO QUINN     Inpatient consult to Gastroenterology  Once     Provider:  Kalyan Olvera III, MD    Completed IZAIAH CEVALLOS     Inpatient consult to Hospitalist  Once     Provider:  Corazon Wyatt MD    Completed CHAPINCITO QUINN     Inpatient consult to Inpatient Rehab  Once     Provider:  (Not yet assigned)    CORAZON Ash     Inpatient consult to LTAC  Once     Provider:  (Not yet assigned)    CORAZON Ash     Inpatient consult to Nephrology  Once     Provider:  Mikel Howell MD    Completed CHEYENNE, " "CHAPINCITO     Inpatient consult to Nephrology  Once     Provider:  Mikel Howell MD    Completed IZAIAH CEVALLOS     Inpatient consult to   Once     Provider:  (Not yet assigned)    Completed FLORESITA PINEDA          No new Assessment & Plan notes have been filed under this hospital service since the last note was generated.  Service: Hospital Medicine    Final Active Diagnoses:    Diagnosis Date Noted POA    ESRD (end stage renal disease) [N18.6] 08/13/2019 Yes     Chronic    Ascites due to alcoholic cirrhosis [K70.31] 08/13/2019 Yes     Chronic    Physical deconditioning [R53.81] 08/27/2019 Yes    Alcoholic cirrhosis of liver with ascites [K70.31] 08/13/2019 Unknown     Chronic    AICD (automatic cardioverter/defibrillator) present [Z95.810] 08/13/2019 Yes     Chronic    CAD S/P percutaneous coronary angioplasty [I25.10, Z98.61] 08/13/2019 Not Applicable     Chronic    Hemiplegia as late effect of cerebrovascular accident (CVA) [I69.359] 08/13/2019 Not Applicable     Chronic    End stage renal disease [N18.6] 08/13/2019 Yes      Problems Resolved During this Admission:    Diagnosis Date Noted Date Resolved POA    PRINCIPAL PROBLEM:  Volume overload [E87.70] 08/13/2019 08/30/2019 Yes    Encephalopathy, metabolic [G93.41] 08/13/2019 08/30/2019 Yes    Diarrhea of presumed infectious origin [R19.7] 08/13/2019 08/30/2019 Yes        Discharged Condition: good    Disposition: Home or Self Care    Follow Up:  Follow-up Information     Mikel Howell MD In 3 days.    Specialty:  Nephrology  Why:  for dialysis Monday, Wednesday, Friday  Contact information:  [address] [facility] [address]  [phone]                 Patient Instructions:      WHEELCHAIR GEL CUSHION FOR HOME USE     Order Specific Question Answer Comments   Height: 5' 6" (1.676 m)    Weight: 91 kg    Length of need (1-99 months): 9      WALKER FOR HOME USE     Order Specific Question Answer Comments   Type of Walker: " "Adult (5'4"-6'6")    With wheels? Yes    Height: 5' 6" (1.676 m)    Weight: 91 kg    Length of need (1-99 months): 99    Does patient have medical equipment at home? shower chair    Please check all that apply: Patient's condition impairs ambulation.      WHEELCHAIR FOR HOME USE     Order Specific Question Answer Comments   Hours in W/C per day: 16    Type of Wheelchair: Lightweight    Patient unable to propel in Standard wheelchair? Yes    Size(Width): 18"(STD adult)    Leg Support: Elevating leg rests    Lap Belt: Velcro    Cushion: Basic    Height: 5' 6" (1.676 m)    Weight: 32.38 kg    Does patient have medical equipment at home? shower chair    Length of need (1-99 months): 99    Please check all that apply: Caregiver is capable and willing to operate wheelchair safely.    Please check all that apply: Patient's upper body strength is sufficient for propulsion.      Ambulatory referral to Physical Therapy eval and treat   Referral Priority: Routine Referral Type: Physical Medicine   Referral Reason: Specialty Services Required   Requested Specialty: Physical Therapy   Number of Visits Requested: 1     Ambulatory referral to Occupational Therapy eval and treat   Referral Priority: Routine Referral Type: Occupational Therapy   Referral Reason: Specialty Services Required   Requested Specialty: Occupational Therapy   Number of Visits Requested: 1     Diet Cardiac     Diet diabetic     Diet renal     Skilled Nurse to evaluate patient and develop plan of care to be approved by MD     Patient is homebound   Order Comments: due to generalized weakness from ESRD, and cirrhosis. He cannot walk more than 10' without assist.     Skilled Nurse to complete comprehensive assessment including vital signs   Order Comments: Instruct on disease process and signs and symptoms of complications to report to MD. Review/verify medication list sent home with the patient at time of discharge and instruct patient/caregiver as needed. " Frequency may be adjusted depending on start of care date.     Activity as tolerated       Significant Diagnostic Studies: Labs:   BMP:   Recent Labs   Lab 08/29/19  0444 08/30/19  0501   GLU 92 146*    135*   K 4.6 4.8   CL 96 90*   CO2 24 24   BUN 39* 50*   CREATININE 6.3* 7.9*   CALCIUM 8.8 8.0*    and CBC   Recent Labs   Lab 08/29/19  0444 08/30/19  0501   WBC 6.60 6.60   HGB 11.6* 12.2*   HCT 36.2* 38.0*   PLT 92* 97*       Pending Diagnostic Studies:     None         Medications:  Reconciled Home Medications:      Medication List      START taking these medications    white petrolatum 41 % Oint  Apply topically once daily.  Start taking on:  8/31/2019        CONTINUE taking these medications    allopurinol 100 MG tablet  Commonly known as:  ZYLOPRIM  100 mg.     amiodarone 200 MG Tab  Commonly known as:  PACERONE  Take 1 tablet by mouth.     aspirin 81 MG EC tablet  Commonly known as:  ECOTRIN  Take 1 tablet by mouth.     bumetanide 2 MG tablet  Commonly known as:  BUMEX  bumetanide 2 mg tablet   Take 1 tablet every day by oral route.     calcium acetate 667 mg tablet  Commonly known as:  PHOSLO  calcium acetate 667 mg tablet   Take 4 tablets 3 times a day with each meal     DEMADEX 20 MG Tab  Generic drug:  torsemide  Demadex 20 mg tablet   Take 1 tablet every day by oral route.     FLOVENT  mcg/actuation inhaler  Generic drug:  fluticasone propionate  Flovent  mcg/actuation aerosol inhaler   Inhale 1 puff twice a day by inhalation route.     gabapentin 100 MG capsule  Commonly known as:  NEURONTIN  gabapentin 100 mg capsule   Take 1 capsule 3 times a day by oral route.     midodrine 5 MG Tab  Commonly known as:  PROAMATINE  midodrine 5 mg tablet   Take 2 tablets 3 times a day by oral route.     NovoLOG Flexpen U-100 Insulin 100 unit/mL (3 mL) Inpn pen  Generic drug:  insulin aspart U-100  Novolog Flexpen U-100 Insulin     PROVENTIL HFA 90 mcg/actuation inhaler  Generic drug:   albuterol  Proventil HFA 90 mcg/actuation aerosol inhaler   Inhale 2 puffs every 4 hours by inhalation route.     spironolactone 100 MG tablet  Commonly known as:  ALDACTONE  spironolactone 100 mg tablet   Take 1 tablet every day by oral route.     XIFAXAN 550 mg Tab  Generic drug:  rifAXIMin  Xifaxan 550 mg tablet   Take 1 tablet 3 times a day by oral route for 14 days.            Indwelling Lines/Drains at time of discharge:   Lines/Drains/Airways     Drain                 Hemodialysis AV Fistula Right upper arm -- days                Time spent on the discharge of patient: 32 minutes  Patient was seen and examined on the date of discharge and determined to be suitable for discharge.         Luis Antonio Wyatt MD  Department of Hospital Medicine  [facility]

## 2019-08-30 NOTE — PLAN OF CARE
08/30/19 1219   Discharge Reassessment   Assessment Type Discharge Planning Reassessment   DME REFERRAL MANUALLY FAXED TO [facility].  THIS CM SPOKE TO AMPARO AND MADE HER AWARE OF THE REFERRAL.

## 2019-08-30 NOTE — PT/OT/SLP PROGRESS
"Physical Therapy      Patient Name:  Tre Cali   MRN:  [id number]    Patient not seen today secondary to Pt declined PT stating "I am about to go to dialysis." PTA offered to ambulate with Pt prior to dialysis noting Pt declined PT for the day. Will follow-up tomorrow.    Hillary Hammant, DANIS    "

## 2019-08-30 NOTE — PT/OT/SLP PROGRESS
Occupational Therapy      Patient Name:  Tre Cali   MRN:  [id number]    Patient not seen today secondary to Patient unwilling to participate. Pt states that he is going to dialysis. OT offered to do ADL tasks before they take pt to dialysis, and pt declined. Will follow-up next service date.    Rody Goel OT  8/30/2019

## 2019-08-30 NOTE — PLAN OF CARE
08/30/19 1113   Discharge Reassessment   Assessment Type Discharge Planning Reassessment   Anticipated Discharge Disposition Home-Health   PER GARIMA WITH [facility], THIS PATIENT HAS BEEN ACCEPTED FOR HOME HEALTH SERVICES.  THIS CM ACKNOWLEDGED UNDERSTANDING.

## 2019-08-30 NOTE — PLAN OF CARE
08/30/19 1636   Discharge Reassessment   Assessment Type Discharge Planning Reassessment   THIS CM CALLED AND SPOKE TO THE PATIENT'S SISTER, RUBEN RUSS ([phone]).  SHE STATED SHE WAS STUCK IN TRAFFIC ON THE [employer].  I EXPLAINED THAT THE DME COMPANY NEEDED A COPAY FOR THE DME AND ASKED IF SHE COULD PROVIDE THIS OVER THE PHONE.  THE PATIENT'S SISTER STATED SHE WOULD PULL OVER AND CALL THE DME COMPANY AND GET THIS DONE.  I PROVIDED THE PHONE NUMBER FOR THE DME COMPANY.

## 2019-08-31 NOTE — NURSING
Removed PIV from left arm. Patient home via Taxi,  2 walkers and a wheelchair sent home with patient and personal belongings.  Patient stated he is paying taxi fare. Patient stated he is going to his sisters home, and believes she will be home to meet home.  All discharge orders reviewed with patient , answered all questions.

## 2019-09-10 NOTE — PT/OT/SLP DISCHARGE
Occupational Therapy Discharge Summary    Tre Cali  MRN: [id number]   Principal Problem: Volume overload      Patient Discharged from acute Occupational Therapy on 8/30/2019.  Please refer to prior OT note dated 8/28/2019 for functional status.    Assessment:      Patient appropriate for care in another setting.    Objective:     GOALS:   Multidisciplinary Problems     Occupational Therapy Goals     Not on file          Multidisciplinary Problems (Resolved)        Problem: Occupational Therapy Goal    Goal Priority Disciplines Outcome Interventions   Occupational Therapy Goal   (Resolved)     OT, PT/OT Outcome(s) achieved    Description:  Goals to be met by: discharge     Patient will increase functional independence with ADLs by performing:    LE Dressing with Minimal Assistance.  Grooming while seated at sink with [place].  Toileting from toilet with Minimal Assistance for hygiene and clothing management.   Toilet transfer to toilet with Stand-by Assistance.     Adding goal:   Pt to be independent with UE exercises program.   Evie Arita, OT  8/22/2019  .                    Reasons for Discontinuation of Therapy Services  Transfer to alternate level of care.      Plan:     Patient Discharged to: Home with Home Health Service    Lennox Judge, OT  9/10/2019

## 2019-09-11 ENCOUNTER — EXTERNAL HOME HEALTH (OUTPATIENT)
Dept: HOME HEALTH SERVICES | Facility: HOSPITAL | Age: 54
End: 2019-09-11
Payer: MEDICARE

## 2019-09-19 ENCOUNTER — CLINICAL SUPPORT (OUTPATIENT)
Dept: CARDIOLOGY | Facility: HOSPITAL | Age: 54
DRG: 432 | End: 2019-09-19
Attending: HOSPITALIST
Payer: MEDICARE

## 2019-09-19 ENCOUNTER — HOSPITAL ENCOUNTER (INPATIENT)
Facility: HOSPITAL | Age: 54
LOS: 12 days | Discharge: HOSPICE | DRG: 432 | End: 2019-10-01
Attending: EMERGENCY MEDICINE | Admitting: HOSPITALIST
Payer: MEDICARE

## 2019-09-19 DIAGNOSIS — R06.02 SHORTNESS OF BREATH: ICD-10-CM

## 2019-09-19 DIAGNOSIS — I50.810 RIGHT-SIDED HEART FAILURE: ICD-10-CM

## 2019-09-19 DIAGNOSIS — I50.813 ACUTE ON CHRONIC RIGHT-SIDED CONGESTIVE HEART FAILURE: Primary | ICD-10-CM

## 2019-09-19 DIAGNOSIS — N18.6 END-STAGE RENAL DISEASE (ESRD): ICD-10-CM

## 2019-09-19 PROBLEM — E87.70 VOLUME OVERLOAD: Status: ACTIVE | Noted: 2019-09-19

## 2019-09-19 LAB
ALBUMIN SERPL BCP-MCNC: 3.1 G/DL (ref 3.5–5.2)
ALP SERPL-CCNC: 130 U/L (ref 55–135)
ALT SERPL W/O P-5'-P-CCNC: 11 U/L (ref 10–44)
ANION GAP SERPL CALC-SCNC: 16 MMOL/L (ref 8–16)
APTT PPP: 40 SEC (ref 26.2–34.7)
AST SERPL-CCNC: 19 U/L (ref 10–40)
BASOPHILS # BLD AUTO: 0.1 K/UL (ref 0–0.2)
BASOPHILS NFR BLD: 1.5 % (ref 0–1.9)
BILIRUB SERPL-MCNC: 1.6 MG/DL (ref 0.1–1)
BNP SERPL-MCNC: 3144 PG/ML (ref 0–99)
BUN SERPL-MCNC: 50 MG/DL (ref 6–20)
CALCIUM SERPL-MCNC: 7.9 MG/DL (ref 8.7–10.5)
CHLORIDE SERPL-SCNC: 93 MMOL/L (ref 95–110)
CO2 SERPL-SCNC: 31 MMOL/L (ref 23–29)
CREAT SERPL-MCNC: 7.1 MG/DL (ref 0.5–1.4)
DIFFERENTIAL METHOD: ABNORMAL
EOSINOPHIL # BLD AUTO: 0.2 K/UL (ref 0–0.5)
EOSINOPHIL NFR BLD: 2.4 % (ref 0–8)
ERYTHROCYTE [DISTWIDTH] IN BLOOD BY AUTOMATED COUNT: 17.4 % (ref 11.5–14.5)
EST. GFR  (AFRICAN AMERICAN): 9.1 ML/MIN/1.73 M^2
EST. GFR  (NON AFRICAN AMERICAN): 7.8 ML/MIN/1.73 M^2
GLUCOSE SERPL-MCNC: 128 MG/DL (ref 70–110)
HCT VFR BLD AUTO: 36.6 % (ref 40–54)
HGB BLD-MCNC: 11.4 G/DL (ref 14–18)
IMM GRANULOCYTES # BLD AUTO: 0.03 K/UL (ref 0–0.04)
IMM GRANULOCYTES NFR BLD AUTO: 0.4 % (ref 0–0.5)
INR PPP: 1.5
INR PPP: 1.5
LYMPHOCYTES # BLD AUTO: 0.7 K/UL (ref 1–4.8)
LYMPHOCYTES NFR BLD: 10.1 % (ref 18–48)
MCH RBC QN AUTO: 30.4 PG (ref 27–31)
MCHC RBC AUTO-ENTMCNC: 31.1 G/DL (ref 32–36)
MCV RBC AUTO: 98 FL (ref 82–98)
MONOCYTES # BLD AUTO: 1 K/UL (ref 0.3–1)
MONOCYTES NFR BLD: 14.1 % (ref 4–15)
NEUTROPHILS # BLD AUTO: 4.8 K/UL (ref 1.8–7.7)
NEUTROPHILS NFR BLD: 71.5 % (ref 38–73)
NRBC BLD-RTO: 0 /100 WBC
PLATELET # BLD AUTO: 137 K/UL (ref 150–350)
PMV BLD AUTO: 11.6 FL (ref 9.2–12.9)
POTASSIUM SERPL-SCNC: 4.9 MMOL/L (ref 3.5–5.1)
PROT SERPL-MCNC: 7.7 G/DL (ref 6–8.4)
PROTHROMBIN TIME: 17.4 SEC (ref 11.7–14)
PROTHROMBIN TIME: 17.8 SEC (ref 11.7–14)
RBC # BLD AUTO: 3.75 M/UL (ref 4.6–6.2)
SODIUM SERPL-SCNC: 140 MMOL/L (ref 136–145)
T4 FREE SERPL-MCNC: 0.91 NG/DL (ref 0.71–1.51)
TROPONIN I SERPL DL<=0.01 NG/ML-MCNC: 0.05 NG/ML (ref 0.02–0.04)
TROPONIN I SERPL DL<=0.01 NG/ML-MCNC: 0.05 NG/ML (ref 0.02–0.04)
TSH SERPL DL<=0.005 MIU/L-ACNC: 10.86 UIU/ML (ref 0.34–5.6)
WBC # BLD AUTO: 6.74 K/UL (ref 3.9–12.7)

## 2019-09-19 PROCEDURE — 25000003 PHARM REV CODE 250: Performed by: HOSPITALIST

## 2019-09-19 PROCEDURE — 93306 TTE W/DOPPLER COMPLETE: CPT

## 2019-09-19 PROCEDURE — 85730 THROMBOPLASTIN TIME PARTIAL: CPT

## 2019-09-19 PROCEDURE — 90935 HEMODIALYSIS ONE EVALUATION: CPT

## 2019-09-19 PROCEDURE — 84439 ASSAY OF FREE THYROXINE: CPT

## 2019-09-19 PROCEDURE — 84484 ASSAY OF TROPONIN QUANT: CPT | Mod: 91

## 2019-09-19 PROCEDURE — 85610 PROTHROMBIN TIME: CPT | Mod: 91

## 2019-09-19 PROCEDURE — G0378 HOSPITAL OBSERVATION PER HR: HCPCS

## 2019-09-19 PROCEDURE — 84443 ASSAY THYROID STIM HORMONE: CPT

## 2019-09-19 PROCEDURE — 85025 COMPLETE CBC W/AUTO DIFF WBC: CPT

## 2019-09-19 PROCEDURE — 36415 COLL VENOUS BLD VENIPUNCTURE: CPT

## 2019-09-19 PROCEDURE — 83880 ASSAY OF NATRIURETIC PEPTIDE: CPT

## 2019-09-19 PROCEDURE — 93005 ELECTROCARDIOGRAM TRACING: CPT

## 2019-09-19 PROCEDURE — 80053 COMPREHEN METABOLIC PANEL: CPT

## 2019-09-19 PROCEDURE — 99285 EMERGENCY DEPT VISIT HI MDM: CPT | Mod: 25

## 2019-09-19 PROCEDURE — 85610 PROTHROMBIN TIME: CPT

## 2019-09-19 PROCEDURE — 21400001 HC TELEMETRY ROOM

## 2019-09-19 PROCEDURE — 84484 ASSAY OF TROPONIN QUANT: CPT

## 2019-09-19 PROCEDURE — 27000221 HC OXYGEN, UP TO 24 HOURS

## 2019-09-19 PROCEDURE — 94761 N-INVAS EAR/PLS OXIMETRY MLT: CPT

## 2019-09-19 RX ORDER — ACETAMINOPHEN 325 MG/1
650 TABLET ORAL EVERY 6 HOURS PRN
Status: DISCONTINUED | OUTPATIENT
Start: 2019-09-19 | End: 2019-10-01 | Stop reason: HOSPADM

## 2019-09-19 RX ORDER — CALCIUM ACETATE 667 MG/1
2668 CAPSULE ORAL
COMMUNITY

## 2019-09-19 RX ORDER — POTASSIUM CHLORIDE 20 MEQ/15ML
40 SOLUTION ORAL
Status: DISCONTINUED | OUTPATIENT
Start: 2019-09-19 | End: 2019-09-27

## 2019-09-19 RX ORDER — LOPERAMIDE HYDROCHLORIDE 2 MG/1
2 CAPSULE ORAL 4 TIMES DAILY PRN
Status: DISCONTINUED | OUTPATIENT
Start: 2019-09-19 | End: 2019-09-21

## 2019-09-19 RX ORDER — ONDANSETRON 2 MG/ML
4 INJECTION INTRAMUSCULAR; INTRAVENOUS EVERY 6 HOURS PRN
Status: DISCONTINUED | OUTPATIENT
Start: 2019-09-19 | End: 2019-10-01 | Stop reason: HOSPADM

## 2019-09-19 RX ORDER — LOPERAMIDE HYDROCHLORIDE 2 MG/1
2 CAPSULE ORAL 4 TIMES DAILY PRN
COMMUNITY

## 2019-09-19 RX ORDER — SODIUM,POTASSIUM PHOSPHATES 280-250MG
2 POWDER IN PACKET (EA) ORAL
Status: DISCONTINUED | OUTPATIENT
Start: 2019-09-19 | End: 2019-09-27

## 2019-09-19 RX ORDER — SODIUM CHLORIDE 0.9 % (FLUSH) 0.9 %
10 SYRINGE (ML) INJECTION
Status: DISCONTINUED | OUTPATIENT
Start: 2019-09-19 | End: 2019-10-01 | Stop reason: HOSPADM

## 2019-09-19 RX ORDER — ENOXAPARIN SODIUM 100 MG/ML
30 INJECTION SUBCUTANEOUS EVERY 24 HOURS
Status: DISCONTINUED | OUTPATIENT
Start: 2019-09-19 | End: 2019-10-01 | Stop reason: HOSPADM

## 2019-09-19 RX ORDER — ACETAMINOPHEN 325 MG/1
650 TABLET ORAL EVERY 4 HOURS PRN
Status: DISCONTINUED | OUTPATIENT
Start: 2019-09-19 | End: 2019-10-01 | Stop reason: HOSPADM

## 2019-09-19 RX ORDER — LANOLIN ALCOHOL/MO/W.PET/CERES
800 CREAM (GRAM) TOPICAL
Status: DISCONTINUED | OUTPATIENT
Start: 2019-09-19 | End: 2019-10-01 | Stop reason: HOSPADM

## 2019-09-19 RX ORDER — RAMELTEON 8 MG/1
8 TABLET ORAL NIGHTLY PRN
Status: DISCONTINUED | OUTPATIENT
Start: 2019-09-19 | End: 2019-10-01 | Stop reason: HOSPADM

## 2019-09-19 RX ORDER — ASPIRIN 81 MG/1
81 TABLET ORAL DAILY
Status: DISCONTINUED | OUTPATIENT
Start: 2019-09-19 | End: 2019-10-01 | Stop reason: HOSPADM

## 2019-09-19 RX ORDER — CALCIUM ACETATE 667 MG/1
2668 CAPSULE ORAL
Status: DISCONTINUED | OUTPATIENT
Start: 2019-09-19 | End: 2019-10-01 | Stop reason: HOSPADM

## 2019-09-19 RX ADMIN — LOPERAMIDE HYDROCHLORIDE 2 MG: 2 CAPSULE ORAL at 09:09

## 2019-09-19 RX ADMIN — CALCIUM ACETATE 2668 MG: 667 CAPSULE ORAL at 12:09

## 2019-09-19 RX ADMIN — RAMELTEON 8 MG: 8 TABLET ORAL at 09:09

## 2019-09-19 RX ADMIN — ASPIRIN 81 MG: 81 TABLET, DELAYED RELEASE ORAL at 12:09

## 2019-09-19 NOTE — H&P
[facility] Medicine  History & Physical    Patient Name: Tre Cali  MRN: [id number]  Admission Date: 9/19/2019  Attending Physician: Ceferino Kohli DO   Primary Care Provider: Primary Doctor No         Patient information was obtained from patient and ER records.     Subjective:     Principal Problem:Acute on chronic right-sided congestive heart failure    Chief Complaint:   Chief Complaint   Patient presents with    Shortness of Breath     Today        HPI: Patient is a 54-year-old male with a history of end-stage renal disease on dialysis.  He receives dialysis on Tuesdays Thursdays and Saturdays.  Patient presents to our emergency department with complaints of shortness of breath.  He was scheduled  scheduled for dialysis this morning however was unable to wait until 8:00 a.m. secondary to dyspnea .  In the emergency department patient was seated upright in significant respiratory distress improved with non-rebreather .  Patient  denies any chest pain , but reports increasing bilateral lower extremity swelling and erythema increasing abdominal girth.  Patient states he is having difficulty sleeping because he cannot lay flat.  He denies fever chills nausea vomiting.  He does have loose stool 2 to 3 times a day.  Patient denies abdominal pain    Past Medical History:   Diagnosis Date    Cirrhosis     COPD (chronic obstructive pulmonary disease)     Diabetes mellitus     Renal disorder        Past Surgical History:   Procedure Laterality Date    AV FISTULA PLACEMENT Right     CARDIAC DEFIBRILLATOR PLACEMENT Left        Review of patient's allergies indicates:   Allergen Reactions    Sulfa (sulfonamide antibiotics) Rash       No current facility-administered medications on file prior to encounter.      Current Outpatient Medications on File Prior to Encounter   Medication Sig    aspirin (ECOTRIN) 81 MG EC tablet Take 1 tablet by mouth once daily.     calcium acetate (PHOSLO)  667 mg capsule Take 2,668 mg by mouth 3 (three) times daily with meals.    loperamide (IMODIUM) 2 mg capsule Take 2 mg by mouth 4 (four) times daily as needed for Diarrhea.    white petrolatum 41 % Oint Apply topically once daily. (Patient taking differently: Apply 1 application topically once daily. TO LEGS)    amiodarone (PACERONE) 200 MG Tab Take 1 tablet by mouth.    bumetanide (BUMEX) 2 MG tablet bumetanide 2 mg tablet   Take 1 tablet every day by oral route.    [DISCONTINUED] albuterol (PROVENTIL HFA) 90 mcg/actuation inhaler Proventil HFA 90 mcg/actuation aerosol inhaler   Inhale 2 puffs every 4 hours by inhalation route.    [DISCONTINUED] allopurinol (ZYLOPRIM) 100 MG tablet 100 mg.    [DISCONTINUED] calcium acetate (PHOSLO) 667 mg tablet calcium acetate 667 mg tablet   Take 4 tablets 3 times a day with each meal    [DISCONTINUED] fluticasone propionate (FLOVENT HFA) 110 mcg/actuation inhaler Flovent  mcg/actuation aerosol inhaler   Inhale 1 puff twice a day by inhalation route.    [DISCONTINUED] gabapentin (NEURONTIN) 100 MG capsule gabapentin 100 mg capsule   Take 1 capsule 3 times a day by oral route.    [DISCONTINUED] insulin aspart U-100 (NOVOLOG FLEXPEN U-100 INSULIN) 100 unit/mL (3 mL) InPn pen Novolog Flexpen U-100 Insulin    [DISCONTINUED] midodrine (PROAMATINE) 5 MG Tab midodrine 5 mg tablet   Take 2 tablets 3 times a day by oral route.    [DISCONTINUED] rifAXIMin (XIFAXAN) 550 mg Tab Xifaxan 550 mg tablet   Take 1 tablet 3 times a day by oral route for 14 days.    [DISCONTINUED] spironolactone (ALDACTONE) 100 MG tablet spironolactone 100 mg tablet   Take 1 tablet every day by oral route.    [DISCONTINUED] torsemide (DEMADEX) 20 MG Tab Demadex 20 mg tablet   Take 1 tablet every day by oral route.     Family History     None        Tobacco Use    Smoking status: Former Smoker   Substance and Sexual Activity    Alcohol use: Not Currently     Comment: no longer drinking    Drug  use: Not on file    Sexual activity: Not on file     Review of Systems   Constitutional: Positive for activity change and fatigue. Negative for appetite change, chills and fever.   HENT: Negative for sore throat and trouble swallowing.    Respiratory: Positive for shortness of breath. Negative for cough, choking, chest tightness and wheezing.    Cardiovascular: Positive for leg swelling. Negative for chest pain and palpitations.   Gastrointestinal: Positive for abdominal distention and diarrhea. Negative for abdominal pain and constipation.   Genitourinary: Negative for dysuria and flank pain.   Musculoskeletal: Negative for back pain and neck pain.   Neurological: Negative for seizures, syncope and headaches.   Psychiatric/Behavioral: Negative for confusion.     Objective:     Vital Signs (Most Recent):  Temp: 97.8 °F (36.6 °C) (09/19/19 0654)  Pulse: 84 (09/19/19 0830)  Resp: 18 (09/19/19 0830)  BP: (!) 105/53 (09/19/19 0830)  SpO2: 100 % (09/19/19 0830) Vital Signs (24h Range):  Temp:  [97.8 °F (36.6 °C)] 97.8 °F (36.6 °C)  Pulse:  [80-85] 84  Resp:  [17-18] 18  SpO2:  [100 %] 100 %  BP: (103-113)/(53-58) 105/53     Weight: 103.9 kg (229 lb)  Body mass index is 36.96 kg/m².    Physical Exam   Constitutional: He is oriented to person, place, and time. He appears well-developed and well-nourished.   Patient appears older than stated age.  No acute distress.  Speaking full sentences   HENT:   Head: Normocephalic and atraumatic.   Eyes: Conjunctivae are normal.   Neck: Neck supple.   Cardiovascular: Normal rate and regular rhythm.   Pulmonary/Chest:   Occasional scattered rhonchi.  Bibasilar rales.  Moderate air movement   Abdominal: Soft. Bowel sounds are normal. There is no tenderness. There is no guarding.   Musculoskeletal: He exhibits edema.   Neurological: He is alert and oriented to person, place, and time.   Skin:   Bilateral lower extremities with chronic venous insufficiency changes.  Chronic appearing  ulcerations on the anterior aspects bilaterally   Psychiatric: He has a normal mood and affect. His behavior is normal.           Significant Labs:   BMP:   Recent Labs   Lab 09/19/19  0722   *      K 4.9   CL 93*   CO2 31*   BUN 50*   CREATININE 7.1*   CALCIUM 7.9*     CBC:   Recent Labs   Lab 09/19/19 0722   WBC 6.74   HGB 11.4*   HCT 36.6*   *     CMP:   Recent Labs   Lab 09/19/19  0722      K 4.9   CL 93*   CO2 31*   *   BUN 50*   CREATININE 7.1*   CALCIUM 7.9*   PROT 7.7   ALBUMIN 3.1*   BILITOT 1.6*   ALKPHOS 130   AST 19   ALT 11   ANIONGAP 16   EGFRNONAA 7.8*     Troponin:   Recent Labs   Lab 09/19/19 0722   TROPONINI 0.049*       Significant Imaging:  ECG shows normal sinus rhythm first-degree AV block nonspecific ST wave changes.  ECG personally read by me  Chest x-ray shows cardiomegaly no acute cardiopulmonary process read by me.  See report    Assessment/Plan:     * Acute on chronic right-sided congestive heart failure  Patient appears to be in volume overload secondary to acute on chronic right-sided congestive heart failure.  Patient is due for dialysis today      AICD (automatic cardioverter/defibrillator) present  Details unknown      ESRD (end stage renal disease)  Patient is followed by Dr. Howell  Dialysis on Tuesdays Thursdays and Saturdays  Will consult renal      VTE Risk Mitigation (From admission, onward)        Ordered     enoxaparin injection 30 mg  Daily      09/19/19 0953     IP VTE HIGH RISK PATIENT  Once      09/19/19 0953         Patient with a history of noncompliance.  Will consult Renal and Cardiology.  There was question of patient was on amiodarone previously.    Ceferino Kohli DO  Department of Hospital Medicine   [facility]

## 2019-09-19 NOTE — SUBJECTIVE & OBJECTIVE
Past Medical History:   Diagnosis Date    Cirrhosis     COPD (chronic obstructive pulmonary disease)     Diabetes mellitus     Renal disorder        Past Surgical History:   Procedure Laterality Date    AV FISTULA PLACEMENT Right     CARDIAC DEFIBRILLATOR PLACEMENT Left        Review of patient's allergies indicates:   Allergen Reactions    Sulfa (sulfonamide antibiotics) Rash       No current facility-administered medications on file prior to encounter.      Current Outpatient Medications on File Prior to Encounter   Medication Sig    aspirin (ECOTRIN) 81 MG EC tablet Take 1 tablet by mouth once daily.     calcium acetate (PHOSLO) 667 mg capsule Take 2,668 mg by mouth 3 (three) times daily with meals.    loperamide (IMODIUM) 2 mg capsule Take 2 mg by mouth 4 (four) times daily as needed for Diarrhea.    white petrolatum 41 % Oint Apply topically once daily. (Patient taking differently: Apply 1 application topically once daily. TO LEGS)    amiodarone (PACERONE) 200 MG Tab Take 1 tablet by mouth.    bumetanide (BUMEX) 2 MG tablet bumetanide 2 mg tablet   Take 1 tablet every day by oral route.    [DISCONTINUED] albuterol (PROVENTIL HFA) 90 mcg/actuation inhaler Proventil HFA 90 mcg/actuation aerosol inhaler   Inhale 2 puffs every 4 hours by inhalation route.    [DISCONTINUED] allopurinol (ZYLOPRIM) 100 MG tablet 100 mg.    [DISCONTINUED] calcium acetate (PHOSLO) 667 mg tablet calcium acetate 667 mg tablet   Take 4 tablets 3 times a day with each meal    [DISCONTINUED] fluticasone propionate (FLOVENT HFA) 110 mcg/actuation inhaler Flovent  mcg/actuation aerosol inhaler   Inhale 1 puff twice a day by inhalation route.    [DISCONTINUED] gabapentin (NEURONTIN) 100 MG capsule gabapentin 100 mg capsule   Take 1 capsule 3 times a day by oral route.    [DISCONTINUED] insulin aspart U-100 (NOVOLOG FLEXPEN U-100 INSULIN) 100 unit/mL (3 mL) InPn pen Novolog Flexpen U-100 Insulin    [DISCONTINUED]  midodrine (PROAMATINE) 5 MG Tab midodrine 5 mg tablet   Take 2 tablets 3 times a day by oral route.    [DISCONTINUED] rifAXIMin (XIFAXAN) 550 mg Tab Xifaxan 550 mg tablet   Take 1 tablet 3 times a day by oral route for 14 days.    [DISCONTINUED] spironolactone (ALDACTONE) 100 MG tablet spironolactone 100 mg tablet   Take 1 tablet every day by oral route.    [DISCONTINUED] torsemide (DEMADEX) 20 MG Tab Demadex 20 mg tablet   Take 1 tablet every day by oral route.     Family History     None        Tobacco Use    Smoking status: Former Smoker   Substance and Sexual Activity    Alcohol use: Not Currently     Comment: no longer drinking    Drug use: Not on file    Sexual activity: Not on file     Review of Systems   Constitutional: Positive for activity change and fatigue. Negative for appetite change, chills and fever.   HENT: Negative for sore throat and trouble swallowing.    Respiratory: Positive for shortness of breath. Negative for cough, choking, chest tightness and wheezing.    Cardiovascular: Positive for leg swelling. Negative for chest pain and palpitations.   Gastrointestinal: Positive for abdominal distention and diarrhea. Negative for abdominal pain and constipation.   Genitourinary: Negative for dysuria and flank pain.   Musculoskeletal: Negative for back pain and neck pain.   Neurological: Negative for seizures, syncope and headaches.   Psychiatric/Behavioral: Negative for confusion.     Objective:     Vital Signs (Most Recent):  Temp: 97.8 °F (36.6 °C) (09/19/19 0654)  Pulse: 84 (09/19/19 0830)  Resp: 18 (09/19/19 0830)  BP: (!) 105/53 (09/19/19 0830)  SpO2: 100 % (09/19/19 0830) Vital Signs (24h Range):  Temp:  [97.8 °F (36.6 °C)] 97.8 °F (36.6 °C)  Pulse:  [80-85] 84  Resp:  [17-18] 18  SpO2:  [100 %] 100 %  BP: (103-113)/(53-58) 105/53     Weight: 103.9 kg (229 lb)  Body mass index is 36.96 kg/m².    Physical Exam   Constitutional: He is oriented to person, place, and time. He appears  well-developed and well-nourished.   Patient appears older than stated age.  No acute distress.  Speaking full sentences   HENT:   Head: Normocephalic and atraumatic.   Eyes: Conjunctivae are normal.   Neck: Neck supple.   Cardiovascular: Normal rate and regular rhythm.   Pulmonary/Chest:   Occasional scattered rhonchi.  Bibasilar rales.  Moderate air movement   Abdominal: Soft. Bowel sounds are normal. There is no tenderness. There is no guarding.   Musculoskeletal: He exhibits edema.   Neurological: He is alert and oriented to person, place, and time.   Skin:   Bilateral lower extremities with chronic venous insufficiency changes.  Chronic appearing ulcerations on the anterior aspects bilaterally   Psychiatric: He has a normal mood and affect. His behavior is normal.           Significant Labs:   BMP:   Recent Labs   Lab 09/19/19  0722   *      K 4.9   CL 93*   CO2 31*   BUN 50*   CREATININE 7.1*   CALCIUM 7.9*     CBC:   Recent Labs   Lab 09/19/19 0722   WBC 6.74   HGB 11.4*   HCT 36.6*   *     CMP:   Recent Labs   Lab 09/19/19  0722      K 4.9   CL 93*   CO2 31*   *   BUN 50*   CREATININE 7.1*   CALCIUM 7.9*   PROT 7.7   ALBUMIN 3.1*   BILITOT 1.6*   ALKPHOS 130   AST 19   ALT 11   ANIONGAP 16   EGFRNONAA 7.8*     Troponin:   Recent Labs   Lab 09/19/19 0722   TROPONINI 0.049*       Significant Imaging:  ECG shows normal sinus rhythm first-degree AV block nonspecific ST wave changes.  ECG personally read by me  Chest x-ray shows cardiomegaly no acute cardiopulmonary process read by me.  See report

## 2019-09-19 NOTE — CONSULTS
[facility]  Cardiology  Consult Note    Patient Name: Tre Cali  MRN: [id number]  Admission Date: 9/19/2019  Hospital Length of Stay: 0 days  Code Status: Full Code   Attending Provider: Ceferino Kohli DO   Consulting Provider: Brooke Moran NP  Primary Care Physician: Primary Doctor No  Principal Problem:Acute on chronic right-sided congestive heart failure    Patient information was obtained from patient, past medical records and ER records.     Inpatient consult to Cardiology  Consult performed by: Brooklyn Beasley MD  Consult ordered by: Ceferino Kohli DO        Subjective:     REASON FOR CONSULT:  Chest pain     HPI:   Mr. Cali is a 54 year old male with past medical history significant for ischemic cardiomyopathy, CAD, HFrEF, ESRD on HD, ICD in situ, COPD, cirrhosis, and dyslipidemia. He had a recent hospital admission last month for complaints of generalized weakness after HD. He underwent therapeutic paracentesis on 8/16/2019. He presents to the ED for complaints of shortness of breath. He states that over the past 1 week he has become progressively short of breath. Last night, he reports it progressed to the point that he could not sleep or get comfortable in any position. He has not been able to lie flat on the bed. He reports leg swelling and increased abdominal swelling. He reports chest pains that started last night. He states that the chest pain stopped when he arrived to the ED and was placed on oxygen. He reports compliance with his medications. He does eat salty foods and eat out at restaurants. He reports that he recently moved back here from [place] 1 month ago. He has not seen any cardiologist since he was in [place] nor has he been taking any medications. He does not know which medications he is supposed to be taking.  He was seen by Dr. White's group when hospitalized at [facility] in 2016, but has not followed up with them. Two months ago, he reports he was in the  hospital in [place] because he was shocked by his ICD. He does not know why he was shocked. He goes to dialysis on Tuesday, Thursday, Saturday. He states he has not missed any treatments, however, he reports they have not been able to pull as much fluid as desired due to hypotension with sessions.   Currently he is chest pain free and he states his breathing is better. Reports dizziness and lightheadedness on occasion. Reports palpitations on occasion. Denies any syncopal episodes. Denies blood in the stool. He is anuric.   Troponin is mildly elevated. ECG with non specific ST - changes.  Past Medical History:   Diagnosis Date    Cirrhosis     CKD (chronic kidney disease) 2017    COPD (chronic obstructive pulmonary disease)     Diabetes mellitus     Dialysis patient 2017    Renal disorder        Past Surgical History:   Procedure Laterality Date    AV FISTULA PLACEMENT Right     CARDIAC DEFIBRILLATOR PLACEMENT Left        Review of patient's allergies indicates:   Allergen Reactions    Sulfa (sulfonamide antibiotics) Rash       No current facility-administered medications on file prior to encounter.      Current Outpatient Medications on File Prior to Encounter   Medication Sig    aspirin (ECOTRIN) 81 MG EC tablet Take 1 tablet by mouth once daily.     calcium acetate (PHOSLO) 667 mg capsule Take 2,668 mg by mouth 3 (three) times daily with meals.    loperamide (IMODIUM) 2 mg capsule Take 2 mg by mouth 4 (four) times daily as needed for Diarrhea.    white petrolatum 41 % Oint Apply topically once daily. (Patient taking differently: Apply 1 application topically once daily. TO LEGS)    amiodarone (PACERONE) 200 MG Tab Take 1 tablet by mouth.    bumetanide (BUMEX) 2 MG tablet bumetanide 2 mg tablet   Take 1 tablet every day by oral route.    [DISCONTINUED] albuterol (PROVENTIL HFA) 90 mcg/actuation inhaler Proventil HFA 90 mcg/actuation aerosol inhaler   Inhale 2 puffs every 4 hours by inhalation route.     [DISCONTINUED] allopurinol (ZYLOPRIM) 100 MG tablet 100 mg.    [DISCONTINUED] calcium acetate (PHOSLO) 667 mg tablet calcium acetate 667 mg tablet   Take 4 tablets 3 times a day with each meal    [DISCONTINUED] fluticasone propionate (FLOVENT HFA) 110 mcg/actuation inhaler Flovent  mcg/actuation aerosol inhaler   Inhale 1 puff twice a day by inhalation route.    [DISCONTINUED] gabapentin (NEURONTIN) 100 MG capsule gabapentin 100 mg capsule   Take 1 capsule 3 times a day by oral route.    [DISCONTINUED] insulin aspart U-100 (NOVOLOG FLEXPEN U-100 INSULIN) 100 unit/mL (3 mL) InPn pen Novolog Flexpen U-100 Insulin    [DISCONTINUED] midodrine (PROAMATINE) 5 MG Tab midodrine 5 mg tablet   Take 2 tablets 3 times a day by oral route.    [DISCONTINUED] rifAXIMin (XIFAXAN) 550 mg Tab Xifaxan 550 mg tablet   Take 1 tablet 3 times a day by oral route for 14 days.    [DISCONTINUED] spironolactone (ALDACTONE) 100 MG tablet spironolactone 100 mg tablet   Take 1 tablet every day by oral route.    [DISCONTINUED] torsemide (DEMADEX) 20 MG Tab Demadex 20 mg tablet   Take 1 tablet every day by oral route.       Scheduled Meds:   aspirin  81 mg Oral Daily    calcium acetate  2,668 mg Oral TID WM    enoxaparin  30 mg Subcutaneous Daily     Continuous Infusions:  PRN Meds:.acetaminophen, acetaminophen, loperamide, magnesium oxide, magnesium oxide, ondansetron, potassium chloride 10%, potassium chloride 10%, potassium chloride 10%, potassium, sodium phosphates, potassium, sodium phosphates, potassium, sodium phosphates, ramelteon, sodium chloride 0.9%    Family History     None      Dad- CHF  Mother- CVA    Tobacco Use    Smoking status: Former Smoker     Last attempt to quit: 2019     Years since quittin.3   Substance and Sexual Activity    Alcohol use: Not Currently     Comment: no longer drinking    Drug use: Never    Sexual activity: Not on file   states he quit smoking 1 month ago  Quit drinking 20  years ago.     ROS     No significant headaches or sore throat or runny nose.   No recent changes in vision.   No recent changes in hearing.  No dysphagia or odynophagia.  Reports  shortness of breath at baseline.   Denies any cough or hemoptysis.   Denies any abdominal pain, nausea, vomiting,  or constipation.   Denies any fevers or chills.   Denies any recent significant weight changes.   Denies bleeding diathesis    Objective:     Vital Signs (Most Recent):  Temp: 97.3 °F (36.3 °C) (09/19/19 1136)  Pulse: 86 (09/19/19 1358)  Resp: 16 (09/19/19 1358)  BP: 129/81 (09/19/19 1136)  SpO2: 98 % (09/19/19 1358) Vital Signs (24h Range):  Temp:  [97.3 °F (36.3 °C)-97.8 °F (36.6 °C)] 97.3 °F (36.3 °C)  Pulse:  [80-86] 86  Resp:  [16-21] 16  SpO2:  [98 %-100 %] 98 %  BP: (103-129)/(53-81) 129/81     Weight: 101.7 kg (224 lb 3.3 oz)  Body mass index is 36.19 kg/m².    SpO2: 98 %  O2 Device (Oxygen Therapy): Oxymask(oxymask)    No intake or output data in the 24 hours ending 09/19/19 1431    Lines/Drains/Airways     Drain                 Hemodialysis AV Fistula Right upper arm -- days          Peripheral Intravenous Line                 Peripheral IV - Single Lumen 09/19/19 0724 18 G Left Upper Arm less than 1 day                Physical Exam    HEENT: Normocephalic, atraumatic, PERRL, Conjunctiva pink, no scleral icterus.   CVS: S1S2+, RRR, no murmurs, rubs or gallops, JVP: Elevated.   LUNGS: Crackles to the bases.   ABDOMEN: Distended. BS+  EXTREMITIES: No cyanosis, clubbing. 1+ ble edema. Nonhealing wounds to BLE. Dry. Weak pedal pulses. Cap refill to toes < 3 seconds. AV fistula right arm. +thrill + bruit.   NEURO: AAO X 3.       Significant Labs:   ABG: No results for input(s): PH, PCO2, HCO3, POCSATURATED, BE in the last 48 hours., Blood Culture: No results for input(s): LABBLOO in the last 48 hours., BMP:   Recent Labs   Lab 09/19/19  0722   *      K 4.9   CL 93*   CO2 31*   BUN 50*   CREATININE 7.1*    CALCIUM 7.9*   , CMP   Recent Labs   Lab 09/19/19 0722      K 4.9   CL 93*   CO2 31*   *   BUN 50*   CREATININE 7.1*   CALCIUM 7.9*   PROT 7.7   ALBUMIN 3.1*   BILITOT 1.6*   ALKPHOS 130   AST 19   ALT 11   ANIONGAP 16   ESTGFRAFRICA 9.1*   EGFRNONAA 7.8*   , CBC   Recent Labs   Lab 09/19/19 0722   WBC 6.74   HGB 11.4*   HCT 36.6*   *   , INR   Recent Labs   Lab 09/19/19 0722 09/19/19  1126   INR 1.5 1.5   , Lipid Panel No results for input(s): CHOL, HDL, LDLCALC, TRIG, CHOLHDL in the last 48 hours.,   Pathology Results  (Last 10 years)    None      , Troponin   Recent Labs   Lab 09/19/19 0722 09/19/19 1126   TROPONINI 0.049* 0.048*    and All pertinent lab results from the last 24 hours have been reviewed.    Significant Imaging: X-Ray: CXR: X-Ray Chest 1 View (CXR): No results found for this visit on 09/19/19.    X-Ray Chest AP Portable [[id number]] Resulted: 09/19/19 0840   Order Status: Completed Updated: 09/19/19 0843   Narrative:     EXAMINATION:  XR CHEST AP PORTABLE    CLINICAL HISTORY:  CHF;    FINDINGS:  Portable chest at 07:22 is compared to 08/13/2019 shows normal cardiomediastinal silhouette.    Lungs are clear. Pulmonary vasculature is normal. No acute osseous abnormality.   Impression:       Cardiomegaly with no confluent infiltrates      Electronically signed by: Genevieve Moralez MD  Date: 09/19/2019  Time: 08:40     ECG:  SR, 1st degree AV block, prolonged QT interval, non specific ST changes  Assessment and Plan:     IMPRESSION:    Ischemic cardiomyopathy. BNP 3144. EF 20% on last echo in  2015. Volume overload on exam.   Chest pain. Resolved. Troponin elevated to 0.58.   ESRD on HD. Followed by Dr. Howell  Hypothyroidism. Subclinical.   Mitral Regurgitation. Moderate.   Tricuspid regurgitation. Mod-severe.   Pulmonary HTN.   DM  History of CVA  CAD s/p stents. Details not available.   ICD in situ. Details not available.   Dyslipidemia   Former smoker. States he quit 1  month ago.   Chronic venous insufficiency.   Cirrhosis of liver. With ascites.   Noncompliance.     RECOMMENDATIONS:    1. Follow up on echo.  2. Trend cardiac enzymes.  3. Fluid removal with HD.   4. Resume home medications.     Brooke Moran NP  Cardiology   [facility]    I have personally seen and examined the patient. I reviewed the notes, assessments, and/or procedures performed by Ms Brooke Moran, I concur with her documentation of Tre Cali.  Mr Cali reportedly saw Dr White in the past and would like follow with their group. Informed the nurse of the same.

## 2019-09-19 NOTE — ASSESSMENT & PLAN NOTE
Patient is followed by Dr. Howell  Dialysis on Tuesdays Thursdays and Saturdays  Will consult renal

## 2019-09-19 NOTE — ASSESSMENT & PLAN NOTE
Patient appears to be in volume overload secondary to acute on chronic right-sided congestive heart failure.  Patient is due for dialysis today

## 2019-09-19 NOTE — HPI
Patient is a 54-year-old male with a history of end-stage renal disease on dialysis.  He receives dialysis on Tuesdays Thursdays and Saturdays.  Patient presents to our emergency department with complaints of shortness of breath.  He was scheduled  scheduled for dialysis this morning however was unable to wait until 8:00 a.m. secondary to dyspnea .  In the emergency department patient was seated upright in significant respiratory distress improved with non-rebreather .  Patient  denies any chest pain , but reports increasing bilateral lower extremity swelling and erythema increasing abdominal girth.  Patient states he is having difficulty sleeping because he cannot lay flat.  He denies fever chills nausea vomiting.  He does have loose stool 2 to 3 times a day.  Patient denies abdominal pain

## 2019-09-19 NOTE — ED PROVIDER NOTES
Encounter Date: 9/19/2019       History     Chief Complaint   Patient presents with    Shortness of Breath     Patient here with reported B shortness of breath scheduled for dialysis this morning however was unable to wait until 8:00 a.m. secondary to dyspnea id an of arrival patient seated upright in significant respiratory distress improved with non-rebreather denies any chest pain reports increasing bilateral lower extremity swelling and erythema increasing abdominal so he denies significant cough no chest pain patient states that his blood pressure has been running low causing them to have difficulty at dialysis states they have been unable to remove as much fluid as he would like symptoms were significantly worse this morning        Review of patient's allergies indicates:   Allergen Reactions    Sulfa (sulfonamide antibiotics) Rash     Past Medical History:   Diagnosis Date    Cirrhosis     COPD (chronic obstructive pulmonary disease)     Diabetes mellitus     Renal disorder      Past Surgical History:   Procedure Laterality Date    AV FISTULA PLACEMENT Right     CARDIAC DEFIBRILLATOR PLACEMENT Left      No family history on file.  Social History     Tobacco Use    Smoking status: Former Smoker   Substance Use Topics    Alcohol use: Not Currently     Comment: no longer drinking    Drug use: Not on file     Review of Systems   Constitutional: Positive for fatigue. Negative for chills and fever.   HENT: Negative.    Eyes: Negative.    Respiratory: Positive for shortness of breath. Negative for cough, wheezing and stridor.    Cardiovascular: Positive for leg swelling. Negative for chest pain.   Gastrointestinal: Positive for abdominal distention and diarrhea. Negative for abdominal pain, constipation, nausea and vomiting.   Endocrine: Negative.    Genitourinary: Negative.    Musculoskeletal: Negative.    Skin: Negative.    Allergic/Immunologic: Negative for immunocompromised state.   Neurological:  Negative.    Hematological: Negative.    Psychiatric/Behavioral: Negative.        Physical Exam     Initial Vitals [09/19/19 0654]   BP Pulse Resp Temp SpO2   (!) 103/58 85 17 97.8 °F (36.6 °C) 100 %      MAP       --         Physical Exam    Constitutional: He appears well-developed and well-nourished. He appears ill. He appears distressed.   HENT:   Head: Normocephalic and atraumatic.   Mouth/Throat: Oropharynx is clear and moist.   Eyes: Pupils are equal, round, and reactive to light.   Neck: Normal range of motion. Neck supple. JVD present.   Cardiovascular: Normal rate, regular rhythm and intact distal pulses.   Pulmonary/Chest: Tachypnea noted. He is in respiratory distress. He has decreased breath sounds. He has no wheezes. He has no rhonchi. He has no rales. He exhibits no mass and no tenderness.   Abdominal: He exhibits distension. Bowel sounds are decreased.   Musculoskeletal:        Right lower leg: He exhibits tenderness and edema.        Left lower leg: He exhibits tenderness and edema.   Bilateral lower extremity edema   Neurological: He is alert and oriented to person, place, and time.   Skin: Capillary refill takes less than 2 seconds. There is erythema.   Psychiatric: His behavior is normal. His mood appears anxious.         ED Course   Procedures  Labs Reviewed   CBC W/ AUTO DIFFERENTIAL   COMPREHENSIVE METABOLIC PANEL   TROPONIN I   B-TYPE NATRIURETIC PEPTIDE   PROTIME-INR          Imaging Results    None          Medical Decision Making:   ED Management:  Will contact Dr. harris for dialysis I have spoken to Dr. rocha who will evaluate patient emergency department                      Clinical Impression:       ICD-10-CM ICD-9-CM   1. Acute on chronic right-sided congestive heart failure I50.813 428.0   2. Shortness of breath R06.02 786.05   3. End-stage renal disease (ESRD) N18.6 585.6                                Julio Cesar Salinas MD  09/19/19 0988

## 2019-09-20 LAB
ANION GAP SERPL CALC-SCNC: 13 MMOL/L (ref 8–16)
BUN SERPL-MCNC: 41 MG/DL (ref 6–20)
CALCIUM SERPL-MCNC: 8 MG/DL (ref 8.7–10.5)
CHLORIDE SERPL-SCNC: 98 MMOL/L (ref 95–110)
CO2 SERPL-SCNC: 27 MMOL/L (ref 23–29)
CREAT SERPL-MCNC: 6.4 MG/DL (ref 0.5–1.4)
ERYTHROCYTE [DISTWIDTH] IN BLOOD BY AUTOMATED COUNT: 17.4 % (ref 11.5–14.5)
EST. GFR  (AFRICAN AMERICAN): 10.3 ML/MIN/1.73 M^2
EST. GFR  (NON AFRICAN AMERICAN): 8.9 ML/MIN/1.73 M^2
GLUCOSE SERPL-MCNC: 123 MG/DL (ref 70–110)
HCT VFR BLD AUTO: 33.7 % (ref 40–54)
HGB BLD-MCNC: 10.4 G/DL (ref 14–18)
MCH RBC QN AUTO: 30.2 PG (ref 27–31)
MCHC RBC AUTO-ENTMCNC: 30.9 G/DL (ref 32–36)
MCV RBC AUTO: 98 FL (ref 82–98)
PLATELET # BLD AUTO: 122 K/UL (ref 150–350)
PMV BLD AUTO: 11.9 FL (ref 9.2–12.9)
POTASSIUM SERPL-SCNC: 4.8 MMOL/L (ref 3.5–5.1)
RBC # BLD AUTO: 3.44 M/UL (ref 4.6–6.2)
SODIUM SERPL-SCNC: 138 MMOL/L (ref 136–145)
TROPONIN I SERPL DL<=0.01 NG/ML-MCNC: 0.04 NG/ML (ref 0.02–0.04)
TROPONIN I SERPL DL<=0.01 NG/ML-MCNC: 0.04 NG/ML (ref 0.02–0.04)
WBC # BLD AUTO: 6.09 K/UL (ref 3.9–12.7)

## 2019-09-20 PROCEDURE — 25000003 PHARM REV CODE 250: Performed by: HOSPITALIST

## 2019-09-20 PROCEDURE — 85027 COMPLETE CBC AUTOMATED: CPT

## 2019-09-20 PROCEDURE — 36415 COLL VENOUS BLD VENIPUNCTURE: CPT

## 2019-09-20 PROCEDURE — 90935 HEMODIALYSIS ONE EVALUATION: CPT

## 2019-09-20 PROCEDURE — 25000003 PHARM REV CODE 250: Performed by: INTERNAL MEDICINE

## 2019-09-20 PROCEDURE — 80048 BASIC METABOLIC PNL TOTAL CA: CPT

## 2019-09-20 PROCEDURE — G0378 HOSPITAL OBSERVATION PER HR: HCPCS

## 2019-09-20 PROCEDURE — 94761 N-INVAS EAR/PLS OXIMETRY MLT: CPT

## 2019-09-20 PROCEDURE — 63600175 PHARM REV CODE 636 W HCPCS: Performed by: HOSPITALIST

## 2019-09-20 PROCEDURE — 21400001 HC TELEMETRY ROOM

## 2019-09-20 PROCEDURE — 84484 ASSAY OF TROPONIN QUANT: CPT

## 2019-09-20 PROCEDURE — 63600175 PHARM REV CODE 636 W HCPCS: Performed by: INTERNAL MEDICINE

## 2019-09-20 RX ORDER — HYDROMORPHONE HYDROCHLORIDE 1 MG/ML
0.25 INJECTION, SOLUTION INTRAMUSCULAR; INTRAVENOUS; SUBCUTANEOUS ONCE
Status: COMPLETED | OUTPATIENT
Start: 2019-09-20 | End: 2019-09-20

## 2019-09-20 RX ORDER — SODIUM CHLORIDE 9 MG/ML
INJECTION, SOLUTION INTRAVENOUS
Status: DISCONTINUED | OUTPATIENT
Start: 2019-09-20 | End: 2019-10-01 | Stop reason: HOSPADM

## 2019-09-20 RX ORDER — MUPIROCIN 20 MG/G
OINTMENT TOPICAL 2 TIMES DAILY
Status: DISPENSED | OUTPATIENT
Start: 2019-09-20 | End: 2019-09-25

## 2019-09-20 RX ORDER — SODIUM CHLORIDE 9 MG/ML
INJECTION, SOLUTION INTRAVENOUS ONCE
Status: COMPLETED | OUTPATIENT
Start: 2019-09-20 | End: 2019-09-20

## 2019-09-20 RX ORDER — CARVEDILOL 3.12 MG/1
3.12 TABLET ORAL 2 TIMES DAILY
Status: DISCONTINUED | OUTPATIENT
Start: 2019-09-20 | End: 2019-09-23

## 2019-09-20 RX ADMIN — SODIUM CHLORIDE: 0.9 INJECTION, SOLUTION INTRAVENOUS at 03:09

## 2019-09-20 RX ADMIN — MUPIROCIN: 20 OINTMENT TOPICAL at 09:09

## 2019-09-20 RX ADMIN — ENOXAPARIN SODIUM 30 MG: 100 INJECTION SUBCUTANEOUS at 05:09

## 2019-09-20 RX ADMIN — CALCIUM ACETATE 2668 MG: 667 CAPSULE ORAL at 01:09

## 2019-09-20 RX ADMIN — CALCIUM ACETATE 2668 MG: 667 CAPSULE ORAL at 05:09

## 2019-09-20 RX ADMIN — HYDROMORPHONE HYDROCHLORIDE 0.25 MG: 1 INJECTION, SOLUTION INTRAMUSCULAR; INTRAVENOUS; SUBCUTANEOUS at 01:09

## 2019-09-20 RX ADMIN — CARVEDILOL 3.12 MG: 3.12 TABLET, FILM COATED ORAL at 09:09

## 2019-09-20 NOTE — PROGRESS NOTES
Total UF: 2500 mL  Net UF: 2000 mL       09/19/19 1959   Vital Signs   Temp 97 °F (36.1 °C)   Temp src Oral   Pulse 81   Heart Rate Source Monitor   Resp 18   Flow (L/min) 2   O2 Device (Oxygen Therapy) nasal cannula   BP (!) 105/55   BP Location Left arm   BP Method Automatic   Patient Position Lying   Post-Hemodialysis Assessment   Rinseback Volume (mL) 250 mL   Blood Volume Processed (Liters) 49.1 L   Dialyzer Clearance Lightly streaked   Duration of Treatment (minutes) 120 minutes   Hemodialysis Intake (mL) 500 mL   Total UF (mL) 2500 mL   Net Fluid Removal 2000   Patient Response to Treatment Tolerated well   Post-Treatment Weight 99.8 kg (220 lb 0.3 oz)   Treatment Weight Change -2.2   Arterial bleeding stop time (min) 2 min   Venous bleeding stop time (min) 2 min   Post-Hemodialysis Comments Tx complete, with no complications throughout. Pt stable.

## 2019-09-20 NOTE — PROGRESS NOTES
[facility]    Cardiology Progress Note    Subjective:  Asked to take over cardiac evaluation because I have seen this patient previously.  Currently patient is being dialyzed and appears to be comfortable with no new complaints.  I do not know if he has had is AICD interrogated and will find all this out      Objective:  Vital Signs (Most Recent)  Temp: 97.5 °F (36.4 °C) (09/20/19 0900)  Pulse: 76 (09/20/19 0900)  Resp: 20 (09/20/19 0900)  BP: 104/68 (09/20/19 0900)  SpO2: 98 % (09/20/19 0900)    Vital Signs Range (Last 24H):  Temp:  [96.9 °F (36.1 °C)-97.8 °F (36.6 °C)]   Pulse:  [70-86]   Resp:  [16-21]   BP: ()/(52-81)   SpO2:  [97 %-100 %]     I & O (Last 24H):    Intake/Output Summary (Last 24 hours) at 9/20/2019 0955  Last data filed at 9/20/2019 0900  Gross per 24 hour   Intake 860 ml   Output 2500 ml   Net -1640 ml       Current Diet:     Current Diet Order   Procedures    Diet renal        Allergies:  Review of patient's allergies indicates:   Allergen Reactions    Sulfa (sulfonamide antibiotics) Rash       Meds:  Scheduled Meds:   aspirin  81 mg Oral Daily    calcium acetate  2,668 mg Oral TID WM    enoxaparin  30 mg Subcutaneous Daily     Continuous Infusions:  PRN Meds:acetaminophen, acetaminophen, loperamide, magnesium oxide, magnesium oxide, ondansetron, potassium chloride 10%, potassium chloride 10%, potassium chloride 10%, potassium, sodium phosphates, potassium, sodium phosphates, potassium, sodium phosphates, ramelteon, sodium chloride 0.9%    Lab Results :  Recent Results (from the past 24 hour(s))   PT/INR    Collection Time: 09/19/19 11:26 AM   Result Value Ref Range    PT 17.8 (H) 11.7 - 14.0 sec    INR 1.5    PTT    Collection Time: 09/19/19 11:26 AM   Result Value Ref Range    aPTT 40.0 (H) 26.2 - 34.7 sec   TSH    Collection Time: 09/19/19 11:26 AM   Result Value Ref Range    TSH 10.860 (H) 0.340 - 5.600 uIU/mL   Troponin I    Collection Time: 09/19/19 11:26 AM    Result Value Ref Range    Troponin I 0.048 (H) 0.020 - 0.040 ng/mL   T4, free    Collection Time: 09/19/19 11:26 AM   Result Value Ref Range    Free T4 0.91 0.71 - 1.51 ng/dL   Echo Color Flow Doppler? Yes; Bubble Contrast? No    Collection Time: 09/19/19  2:19 PM   Result Value Ref Range    BSA 2.18 m2   Basic Metabolic Panel (BMP)    Collection Time: 09/20/19  3:39 AM   Result Value Ref Range    Sodium 138 136 - 145 mmol/L    Potassium 4.8 3.5 - 5.1 mmol/L    Chloride 98 95 - 110 mmol/L    CO2 27 23 - 29 mmol/L    Glucose 123 (H) 70 - 110 mg/dL    BUN, Bld 41 (H) 6 - 20 mg/dL    Creatinine 6.4 (H) 0.5 - 1.4 mg/dL    Calcium 8.0 (L) 8.7 - 10.5 mg/dL    Anion Gap 13 8 - 16 mmol/L    eGFR if African American 10.3 (A) >60 mL/min/1.73 m^2    eGFR if non African American 8.9 (A) >60 mL/min/1.73 m^2   CBC Without Differential    Collection Time: 09/20/19  3:39 AM   Result Value Ref Range    WBC 6.09 3.90 - 12.70 K/uL    RBC 3.44 (L) 4.60 - 6.20 M/uL    Hemoglobin 10.4 (L) 14.0 - 18.0 g/dL    Hematocrit 33.7 (L) 40.0 - 54.0 %    Mean Corpuscular Volume 98 82 - 98 fL    Mean Corpuscular Hemoglobin 30.2 27.0 - 31.0 pg    Mean Corpuscular Hemoglobin Conc 30.9 (L) 32.0 - 36.0 g/dL    RDW 17.4 (H) 11.5 - 14.5 %    Platelets 122 (L) 150 - 350 K/uL    MPV 11.9 9.2 - 12.9 fL       Diagnostic Results:  Imaging Results          X-Ray Chest AP Portable (Final result)  Result time 09/19/19 08:40:40    Final result by Genevieve Moralez MD (09/19/19 08:40:40)                 Impression:      Cardiomegaly with no confluent infiltrates      Electronically signed by: Genevieve Moralez MD  Date:    09/19/2019  Time:    08:40             Narrative:    EXAMINATION:  XR CHEST AP PORTABLE    CLINICAL HISTORY:  CHF;    FINDINGS:  Portable chest at 07:22 is compared to 08/13/2019 shows normal cardiomediastinal silhouette.    Lungs are clear. Pulmonary vasculature is normal. No acute osseous abnormality.                                Recent  Cardiac Rhythm   (if applicable)      Physical Exam:  Objective    Current Consults:  IP CONSULT TO HOSPITAL MEDICINE  IP CONSULT TO NEPHROLOGY  IP CONSULT TO NEPHROLOGY  IP CONSULT TO CARDIOLOGY  IP CONSULT TO SOCIAL WORK/CASE MANAGEMENT  WOUND CARE CONSULT  IP CONSULT TO CARDIOLOGY    Assessment/Plan:  Assessment:   Ischemic cardiomyopathy. BNP 3144. EF 20% on last echo in  2015. Volume overload on exam.   Chest pain. Resolved. Troponin elevated to 0.58.   ESRD on HD. Followed by Dr. Howell  Hypothyroidism. Subclinical.   Mitral Regurgitation. Moderate.   Tricuspid regurgitation. Mod-severe.   Pulmonary HTN.   DM  History of CVA  CAD s/p stents. Details not available.   ICD in situ. Details not available.   Dyslipidemia   Former smoker. States he quit 1 month ago.   Chronic venous insufficiency.   Cirrhosis of liver. With ascites.   Noncompliance  Plan:   Cont dialysis, watch BP will follow

## 2019-09-20 NOTE — SUBJECTIVE & OBJECTIVE
Interval History:    ROS patient states he feels improved.  No shortness of breath or chest pain no abdominal pain. Patient still feels tired.  No headache no trouble swallowing no nausea vomiting no urine changes or bowel changes  Objective:     Vital Signs (Most Recent):  Temp: 97.3 °F (36.3 °C) (09/20/19 1305)  Pulse: 72 (09/20/19 1305)  Resp: 20 (09/20/19 1305)  BP: (!) 107/57 (09/20/19 1305)  SpO2: (!) 93 % (09/20/19 1305) Vital Signs (24h Range):  Temp:  [96.9 °F (36.1 °C)-97.8 °F (36.6 °C)] 97.3 °F (36.3 °C)  Pulse:  [70-86] 72  Resp:  [18-20] 20  SpO2:  [93 %-100 %] 93 %  BP: ()/(50-78) 107/57     Weight: 102 kg (224 lb 13.9 oz)  Body mass index is 36.29 kg/m².    Intake/Output Summary (Last 24 hours) at 9/20/2019 1710  Last data filed at 9/20/2019 1300  Gross per 24 hour   Intake 1120 ml   Output 6000 ml   Net -4880 ml      Physical Exam patient appears better  Eating breakfast appears no apparent distress  HEENT sclerae nonicteric  Neck is supple nontender  Lungs are coarse moderate air movement  Heart is regular rate rhythm  Abdomen is obese soft nontender  Extremities wounds as noted see need care photos   no Banerjee  Neuro patient is alert oriented x3 moves all extremities equally    Significant Labs:   BMP:   Recent Labs   Lab 09/20/19 0339   *      K 4.8   CL 98   CO2 27   BUN 41*   CREATININE 6.4*   CALCIUM 8.0*     CBC:   Recent Labs   Lab 09/19/19 0722 09/20/19 0339   WBC 6.74 6.09   HGB 11.4* 10.4*   HCT 36.6* 33.7*   * 122*     CMP:   Recent Labs   Lab 09/19/19 0722 09/20/19 0339    138   K 4.9 4.8   CL 93* 98   CO2 31* 27   * 123*   BUN 50* 41*   CREATININE 7.1* 6.4*   CALCIUM 7.9* 8.0*   PROT 7.7  --    ALBUMIN 3.1*  --    BILITOT 1.6*  --    ALKPHOS 130  --    AST 19  --    ALT 11  --    ANIONGAP 16 13   EGFRNONAA 7.8* 8.9*       Significant Imaging:

## 2019-09-20 NOTE — CONSULTS
[facility]  Nephrology  Consult Note    Patient Name: Tre Cali  MRN: [id number]  Admission Date: 9/19/2019  Hospital Length of Stay: 0 days  Attending Provider: Ceferino Kohli DO   Primary Care Physician: Primary Doctor No  Principal Problem:Acute on chronic right-sided congestive heart failure    Inpatient consult to Nephrology  Consult performed by: Armando Cruz MD  Consult ordered by: Ceferino Kohli DO      : hx ESRD (HD-TTS via L UE AV Fistula); requests inpatient maintenance RRT    Subjective:     HPI:   55 y/o M w/ ESRD (HD-MWF via R UE AV Fistula), HTN, T2DM, CVD (s/p CVA w/ hemiplegia), ESLD (EtOH-induced Cirrhosis) w/ Ascites, COPD, HFrEF (s/p AICD implantation), CAD (s/p stent placement) admitted from [facility] ED on 9/19/19 w/ complaints of progressively worsening SoB x last 1-2 days, abdominal swelling w/ nonbloody, watery diarrhea concerning for C. difficile colitis; formal Nephrology service consultation is requested to provide urgent/maintenance RRT as inpatient (s/p urgent HD on 9/19/19 w/ net UF approx 2L); requesting additional HD this am 2/2 marked SoB.       Past Medical History:   Diagnosis Date    Cirrhosis     CKD (chronic kidney disease) 2017    COPD (chronic obstructive pulmonary disease)     Diabetes mellitus     Dialysis patient 2017    Renal disorder        Past Surgical History:   Procedure Laterality Date    AV FISTULA PLACEMENT Right     CARDIAC DEFIBRILLATOR PLACEMENT Left        Review of patient's allergies indicates:   Allergen Reactions    Sulfa (sulfonamide antibiotics) Rash     Current Facility-Administered Medications   Medication Frequency    acetaminophen tablet 650 mg Q6H PRN    acetaminophen tablet 650 mg Q4H PRN    aspirin EC tablet 81 mg Daily    calcium acetate capsule 2,668 mg TID WM    enoxaparin injection 30 mg Daily    loperamide capsule 2 mg QID PRN    magnesium oxide tablet 800 mg PRN    magnesium oxide tablet 800 mg PRN     ondansetron injection 4 mg Q6H PRN    potassium chloride 10% oral solution 40 mEq PRN    potassium chloride 10% oral solution 40 mEq PRN    potassium chloride 10% oral solution 40 mEq PRN    potassium, sodium phosphates 280-160-250 mg packet 2 packet PRN    potassium, sodium phosphates 280-160-250 mg packet 2 packet PRN    potassium, sodium phosphates 280-160-250 mg packet 2 packet PRN    ramelteon tablet 8 mg Nightly PRN    sodium chloride 0.9% flush 10 mL PRN     Family History     None        Tobacco Use    Smoking status: Former Smoker     Last attempt to quit: 2019     Years since quittin.3   Substance and Sexual Activity    Alcohol use: Not Currently     Comment: no longer drinking    Drug use: Never    Sexual activity: Not on file     Review of Systems   Constitutional: Positive for activity change and fatigue.   HENT: Negative.    Eyes: Negative.    Respiratory: Positive for cough, chest tightness and shortness of breath.    Cardiovascular: Positive for leg swelling. Negative for chest pain.   Gastrointestinal: Positive for abdominal distention. Negative for abdominal pain, nausea and vomiting.   Endocrine: Negative.    Genitourinary: Negative.    Skin: Negative.    Allergic/Immunologic: Negative.    Neurological: Negative.    Hematological: Negative.    Psychiatric/Behavioral: Negative.      Objective:     Vital Signs (Most Recent):  Temp: 97.7 °F (36.5 °C) (19)  Pulse: 75 (19)  Resp: 19 (19)  BP: 95/63 (19)  SpO2: 97 % (19)  O2 Device (Oxygen Therapy): Oxymask (19) Vital Signs (24h Range):  Temp:  [96.9 °F (36.1 °C)-97.8 °F (36.6 °C)] 97.7 °F (36.5 °C)  Pulse:  [70-86] 75  Resp:  [16-21] 19  SpO2:  [97 %-100 %] 97 %  BP: ()/(52-81) 95/63     Weight: 102 kg (224 lb 13.9 oz) (19)  Body mass index is 36.29 kg/m².  Body surface area is 2.18 meters squared.    I/O last 3 completed shifts:  In: 240  [P.O.:240]  Out: -     Physical Exam   Constitutional: He is oriented to person, place, and time. He appears well-developed and well-nourished. No distress.   HENT:   Head: Normocephalic and atraumatic.   Mouth/Throat: Oropharynx is clear and moist.   Eyes: Right eye exhibits no discharge. Left eye exhibits no discharge. No scleral icterus.   B conjunctival injection   Neck: Normal range of motion. Neck supple. No JVD present.   Cardiovascular: Normal rate and intact distal pulses. Exam reveals no gallop and no friction rub.   Murmur heard.  Irregularly irregular rhythm   Pulmonary/Chest: He is in respiratory distress. He has no rales.   Coarse, diffuse BS B w/ increased WoB exhibited at the bedside   Abdominal: Soft. He exhibits distension. He exhibits no mass. There is no tenderness.   Hypoactive BS in all quadrants  Tense abdominal distention w/o fluid wave   Genitourinary:   Genitourinary Comments: Deferred   Musculoskeletal: He exhibits edema.   R UE AV Fistula w/ good thrill  Decreased RoM   Neurological: He is alert and oriented to person, place, and time. A cranial nerve deficit is present.   L hemiparesis   Skin: Skin is warm and dry. No rash noted. He is not diaphoretic. There is erythema.   Psychiatric: He has a normal mood and affect. His behavior is normal.   Nursing note and vitals reviewed.      Significant Labs:  Cardiac Markers: No results for input(s): CKMB, TROPONINT, MYOGLOBIN in the last 168 hours.  CBC:   Recent Labs   Lab 09/20/19  0339   WBC 6.09   RBC 3.44*   HGB 10.4*   HCT 33.7*   *   MCV 98   MCH 30.2   MCHC 30.9*     CMP:   Recent Labs   Lab 09/19/19  0722 09/20/19  0339   * 123*   CALCIUM 7.9* 8.0*   ALBUMIN 3.1*  --    PROT 7.7  --     138   K 4.9 4.8   CO2 31* 27   CL 93* 98   BUN 50* 41*   CREATININE 7.1* 6.4*   ALKPHOS 130  --    ALT 11  --    AST 19  --    BILITOT 1.6*  --        Significant Imaging:  X-Ray: Reviewed    Assessment/Plan:     Active Diagnoses:     Diagnosis Date Noted POA    PRINCIPAL PROBLEM:  Acute on chronic right-sided congestive heart failure [I50.813] 09/19/2019 Yes    ESRD (end stage renal disease) [N18.6] 08/13/2019 Yes     Chronic    AICD (automatic cardioverter/defibrillator) present [Z95.810] 08/13/2019 Yes     Chronic      Problems Resolved During this Admission:     1. ESRD (HD-MWF via R UE AV Fistula)- symptomatically stable at present; significant abdominal distention on admission (s/p urgent HD on 9/19/19 w/ net UF: approx 2L ); no overt volume overload, significant electrolyte perturbations nor acid-base disturbance    -HD this am to relieve ongoing dyspnea (duration: 3h; UF Goal: 1-2L as tolerated; Standard 'Bath'; Access: AVF; Qb: 400 ml/min, Qd: 2x BFR; give 12.5-25 gm 25% Albumin w/ HD for BP support)    -DAILY renal function panel to document sCr trend, optimize HD electrolyte 'bath' & assess response to therapy    -avoid nephrotoxic agents (NSAIDs, IV contrast dye)    -renally dose all appropriate medications, including antibiotics     2. HTN- clinically stable at present;  BP AT GOAL (95/63) now w/ relative hypotension; no active issues    -HOLD anti-HTN medications & diuretics for now & reassess in am     3. ANEMIA- clinically stable at present; H/H AT GOAL (10.4/33.7); no active issues    -trend daily CBC (H/H) to effect optimal blood-loss surveillance     4. SECONDARY HYPERPARATHYROIDISM (sHPT)- clinically stable at present; no active issues    -continue dietary binder/Vitamin D +/- HD-associated calcimimetic agent (Cinacalcet vs Etelcalcetide)     5. 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 I will follow-up with patient. Please contact us if you have any additional questions.    Armando Cruz III, MD  [facility]  [facility]  [phone] (C)

## 2019-09-20 NOTE — PROGRESS NOTES
09/20/19 1330 09/20/19 1333 09/20/19 1338        Wound 09/20/19 1300 lower Leg   Date First Assessed/Time First Assessed: 09/20/19 1300   Side: Left  Orientation: lower  Location: Leg   Wound Image   --   --    Dressing Appearance Dried drainage  --   --    Drainage Amount Small  --   --    Drainage Characteristics/Odor Yellow  --   --    Appearance Pink;Dry;Other (see comments)  (crusty old drainage with animal hair )  --   --    Periwound Area Dry;Edematous;Other (see comments)  (scarred)  --   --    Wound Length (cm) 12.2 cm  --   --    Wound Width (cm) 4.6 cm  --   --    Wound Depth (cm) 0.2 cm  --   --    Wound Volume (cm^3) 11.22 cm^3  --   --    Wound Surface Area (cm^2) 56.12 cm^2  --   --    Care Cleansed with:;Wound cleanser  --   --    Dressing Honey;Non-adherent;Rolled gauze  --   --         Wound 08/13/19 2100 distal Leg #1   Date First Assessed/Time First Assessed: 08/13/19 2100   Pre-existing: Yes  Side: Right  Orientation: distal  Location: Leg  Wound/PI Number (optional): #1   Wound Image  --     --    Dressing Appearance  --  Dried drainage  --    Drainage Amount  --  Small  --    Drainage Characteristics/Odor  --  Yellow  --    Appearance  --  Pink;Other (see comments)  (medial leg, dry crusty wound edges with animal hair in wound)  --    Periwound Area  --  Dry;Edematous;Redness  --    Wound Edges  --  Irregular  (lateral dry scab 1x3.4cm dark brown, no drainage)  --    Wound Length (cm)  --  3.5 cm  --    Wound Width (cm)  --  1.7 cm  --    Wound Depth (cm)  --  0.2 cm  --    Wound Volume (cm^3)  --  1.19 cm^3  --    Wound Surface Area (cm^2)  --  5.95 cm^2  --    Care  --  Cleansed with:;Wound cleanser  --    Dressing  --  Honey;Non-adherent;Gauze;Rolled gauze  --         Wound 08/19/19 1415 Abrasion(s) anterior Knee   Date First Assessed/Time First Assessed: 08/19/19 7360   Primary Wound Type: Abrasion(s)  Side: Left  Orientation: anterior  Location: Knee   Appearance  --   --    (bilateral  legs with healing abrasions, dry multiple bumps.  )

## 2019-09-20 NOTE — PROGRESS NOTES
[facility] Medicine  Progress Note    Patient Name: Tre Clai  MRN: [id number]  Patient Class: OP- Observation   Admission Date: 9/19/2019  Length of Stay: 0 days  Attending Physician: Ceferino Kohli DO  Primary Care Provider: Primary Doctor No        Subjective:     Principal Problem:Acute on chronic right-sided congestive heart failure        HPI:  Patient is a 54-year-old male with a history of end-stage renal disease on dialysis.  He receives dialysis on Tuesdays Thursdays and Saturdays.  Patient presents to our emergency department with complaints of shortness of breath.  He was scheduled  scheduled for dialysis this morning however was unable to wait until 8:00 a.m. secondary to dyspnea .  In the emergency department patient was seated upright in significant respiratory distress improved with non-rebreather .  Patient  denies any chest pain , but reports increasing bilateral lower extremity swelling and erythema increasing abdominal girth.  Patient states he is having difficulty sleeping because he cannot lay flat.  He denies fever chills nausea vomiting.  He does have loose stool 2 to 3 times a day.  Patient denies abdominal pain    Overview/Hospital Course:  No notes on file    Interval History:    ROS patient states he feels improved.  No shortness of breath or chest pain no abdominal pain. Patient still feels tired.  No headache no trouble swallowing no nausea vomiting no urine changes or bowel changes  Objective:     Vital Signs (Most Recent):  Temp: 97.3 °F (36.3 °C) (09/20/19 1305)  Pulse: 72 (09/20/19 1305)  Resp: 20 (09/20/19 1305)  BP: (!) 107/57 (09/20/19 1305)  SpO2: (!) 93 % (09/20/19 1305) Vital Signs (24h Range):  Temp:  [96.9 °F (36.1 °C)-97.8 °F (36.6 °C)] 97.3 °F (36.3 °C)  Pulse:  [70-86] 72  Resp:  [18-20] 20  SpO2:  [93 %-100 %] 93 %  BP: ()/(50-78) 107/57     Weight: 102 kg (224 lb 13.9 oz)  Body mass index is 36.29 kg/m².    Intake/Output Summary  (Last 24 hours) at 9/20/2019 1710  Last data filed at 9/20/2019 1300  Gross per 24 hour   Intake 1120 ml   Output 6000 ml   Net -4880 ml      Physical Exam patient appears better  Eating breakfast appears no apparent distress  HEENT sclerae nonicteric  Neck is supple nontender  Lungs are coarse moderate air movement  Heart is regular rate rhythm  Abdomen is obese soft nontender  Extremities wounds as noted see need care photos   no Banerjee  Neuro patient is alert oriented x3 moves all extremities equally    Significant Labs:   BMP:   Recent Labs   Lab 09/20/19  0339   *      K 4.8   CL 98   CO2 27   BUN 41*   CREATININE 6.4*   CALCIUM 8.0*     CBC:   Recent Labs   Lab 09/19/19 0722 09/20/19 0339   WBC 6.74 6.09   HGB 11.4* 10.4*   HCT 36.6* 33.7*   * 122*     CMP:   Recent Labs   Lab 09/19/19 0722 09/20/19 0339    138   K 4.9 4.8   CL 93* 98   CO2 31* 27   * 123*   BUN 50* 41*   CREATININE 7.1* 6.4*   CALCIUM 7.9* 8.0*   PROT 7.7  --    ALBUMIN 3.1*  --    BILITOT 1.6*  --    ALKPHOS 130  --    AST 19  --    ALT 11  --    ANIONGAP 16 13   EGFRNONAA 7.8* 8.9*       Significant Imaging:       Assessment/Plan:      * Acute on chronic right-sided congestive heart failure  Improved after dialysis  Cardiology and Renal following      AICD (automatic cardioverter/defibrillator) present  Details unknown      ESRD (end stage renal disease)  Patient is followed by Dr. Howell  Dialysis normally on Tuesdays Thursdays and Saturdays  Renal following        VTE Risk Mitigation (From admission, onward)        Ordered     enoxaparin injection 30 mg  Daily      09/19/19 0953     IP VTE HIGH RISK PATIENT  Once      09/19/19 0953        I discussed case with wound care.  Wound Care is following for chronic lower extremity wounds.  Will consult physical therapy.        Ceferino Kohli DO  Department of Hospital Medicine   [facility]

## 2019-09-20 NOTE — ASSESSMENT & PLAN NOTE
Patient is followed by Dr. Howell  Dialysis normally on Tuesdays Thursdays and Saturdays  Renal following

## 2019-09-20 NOTE — NURSING
Pt back in room after dialysis, report received from Lorin, 2 litres taken off, vitals on arrival 97% O2 on room air, heart rate 87

## 2019-09-21 PROBLEM — I25.5 ISCHEMIC CARDIOMYOPATHY: Status: ACTIVE | Noted: 2019-09-21

## 2019-09-21 LAB
ANION GAP SERPL CALC-SCNC: 9 MMOL/L (ref 8–16)
BUN SERPL-MCNC: 33 MG/DL (ref 6–20)
CALCIUM SERPL-MCNC: 8.1 MG/DL (ref 8.7–10.5)
CHLORIDE SERPL-SCNC: 96 MMOL/L (ref 95–110)
CO2 SERPL-SCNC: 28 MMOL/L (ref 23–29)
CREAT SERPL-MCNC: 5.3 MG/DL (ref 0.5–1.4)
EST. GFR  (AFRICAN AMERICAN): 12.9 ML/MIN/1.73 M^2
EST. GFR  (NON AFRICAN AMERICAN): 11.2 ML/MIN/1.73 M^2
GLUCOSE SERPL-MCNC: 109 MG/DL (ref 70–110)
GLUCOSE SERPL-MCNC: 158 MG/DL (ref 70–110)
GLUCOSE SERPL-MCNC: 99 MG/DL (ref 70–110)
POTASSIUM SERPL-SCNC: 4.4 MMOL/L (ref 3.5–5.1)
SODIUM SERPL-SCNC: 133 MMOL/L (ref 136–145)

## 2019-09-21 PROCEDURE — 21400001 HC TELEMETRY ROOM

## 2019-09-21 PROCEDURE — 25000003 PHARM REV CODE 250: Performed by: HOSPITALIST

## 2019-09-21 PROCEDURE — G0378 HOSPITAL OBSERVATION PER HR: HCPCS

## 2019-09-21 PROCEDURE — 25000003 PHARM REV CODE 250: Performed by: INTERNAL MEDICINE

## 2019-09-21 PROCEDURE — 94761 N-INVAS EAR/PLS OXIMETRY MLT: CPT

## 2019-09-21 PROCEDURE — 90935 HEMODIALYSIS ONE EVALUATION: CPT

## 2019-09-21 PROCEDURE — 36415 COLL VENOUS BLD VENIPUNCTURE: CPT

## 2019-09-21 PROCEDURE — 63600175 PHARM REV CODE 636 W HCPCS: Performed by: HOSPITALIST

## 2019-09-21 PROCEDURE — 80048 BASIC METABOLIC PNL TOTAL CA: CPT

## 2019-09-21 RX ORDER — DIPHENOXYLATE HYDROCHLORIDE AND ATROPINE SULFATE 2.5; .025 MG/1; MG/1
2 TABLET ORAL 4 TIMES DAILY PRN
Status: DISCONTINUED | OUTPATIENT
Start: 2019-09-21 | End: 2019-10-01 | Stop reason: HOSPADM

## 2019-09-21 RX ADMIN — RAMELTEON 8 MG: 8 TABLET ORAL at 09:09

## 2019-09-21 RX ADMIN — MUPIROCIN: 20 OINTMENT TOPICAL at 08:09

## 2019-09-21 RX ADMIN — MUPIROCIN: 20 OINTMENT TOPICAL at 09:09

## 2019-09-21 RX ADMIN — CALCIUM ACETATE 2668 MG: 667 CAPSULE ORAL at 04:09

## 2019-09-21 RX ADMIN — CARVEDILOL 3.12 MG: 3.12 TABLET, FILM COATED ORAL at 08:09

## 2019-09-21 RX ADMIN — ASPIRIN 81 MG: 81 TABLET, DELAYED RELEASE ORAL at 08:09

## 2019-09-21 RX ADMIN — CARVEDILOL 3.12 MG: 3.12 TABLET, FILM COATED ORAL at 09:09

## 2019-09-21 RX ADMIN — ENOXAPARIN SODIUM 30 MG: 100 INJECTION SUBCUTANEOUS at 04:09

## 2019-09-21 RX ADMIN — DIPHENOXYLATE HYDROCHLORIDE AND ATROPINE SULFATE 2 TABLET: 2.5; .025 TABLET ORAL at 04:09

## 2019-09-21 NOTE — PROGRESS NOTES
Progress Note  Nephrology  Tre Cali  54 y.o.  1965  Admit Date: 9/19/2019   LOS: 0 days       SUBJECTIVE:     Follow-up For:    ESRD  C/o diarrhea and back pain       OBJECTIVE:     Vital Signs (Most Recent)  Temp: 97 °F (36.1 °C) (09/21/19 0830)  Pulse: 64 (09/21/19 1205)  Resp: 16 (09/21/19 0830)  BP: (!) 111/50 (09/21/19 1205)  SpO2: 97 % (09/21/19 0716)    Vital Signs Range (Last 24H):  Temp:  [97 °F (36.1 °C)-97.4 °F (36.3 °C)]   Pulse:  [64-79]   Resp:  [16-20]   BP: ()/(50-67)   SpO2:  [64 %-97 %]         Date 09/21/19 0700 - 09/22/19 0659   Shift 2107-4158 6261-0408 3442-2217 24 Hour Total   INTAKE   P.O. 420   420   Other 500   500   Shift Total(mL/kg) 920(9.5)   920(9.5)   OUTPUT   Other 3500   3500   Shift Total(mL/kg) 3500(36)   3500(36)   Weight (kg) 97.2 97.2 97.2 97.2     Physical Exam   General                NAD,  Pleasant, A&O x 3   HEENT                 No LAD, O/P Clear,   NECK                   Supple,  No swelling, No masses  CV                        S1 S2 No rub   PULM                   CTA   ABD                      Obese   EXTR                    No Clubbing/ No Cyanosis / No Edema  NEURO                Grossly Intact  SKIN                     No rashes/ No lesions   MS                        Normal range of motion, No joint effusions   PSYCH                 Normal Mood       Laboratory:    Recent Labs   Lab 09/19/19 0722 09/20/19 0339 09/21/19  0422    138 133*   K 4.9 4.8 4.4   CL 93* 98 96   CO2 31* 27 28   BUN 50* 41* 33*   CREATININE 7.1* 6.4* 5.3*   * 123* 109   CALCIUM 7.9* 8.0* 8.1*         Recent Labs   Lab 09/19/19 0722 09/20/19 0339   WBC 6.74 6.09   HGB 11.4* 10.4*   HCT 36.6* 33.7*   * 122*       Radiology  Imaging Results          X-Ray Chest AP Portable (Final result)  Result time 09/19/19 08:40:40    Final result by Genevieve Moralez MD (09/19/19 08:40:40)                 Impression:      Cardiomegaly with no confluent  infiltrates      Electronically signed by: Genevieve Moralez MD  Date:    09/19/2019  Time:    08:40             Narrative:    EXAMINATION:  XR CHEST AP PORTABLE    CLINICAL HISTORY:  CHF;    FINDINGS:  Portable chest at 07:22 is compared to 08/13/2019 shows normal cardiomediastinal silhouette.    Lungs are clear. Pulmonary vasculature is normal. No acute osseous abnormality.                                    ASSESSMENT/PLAN:     1. ESRD (HD-MWF via R UE AV Fistula)- symptomatically stable at present; significant abdominal distention on admission (s/p urgent HD on 9/19/19 w/ net UF: approx 2L ); no overt volume overload, significant electrolyte perturbations nor acid-base disturbance    - Tolerated HD yesterday     -renally dose all appropriate medications, including antibiotics     2. HTN- clinically stable at present;  BP AT GOAL     3. ANEMIA- clinically stable at present; H/H AT GOAL (10.4/33.7); no active issues    -trend daily CBC (H/H) to effect optimal blood-loss surveillance     4. SECONDARY HYPERPARATHYROIDISM (sHPT)- clinically stable at present; no active issues    -continue dietary binder/Vitamin D +/- HD-associated calcimimetic agent (Cinacalcet vs Etelcalcetide)     5. 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

## 2019-09-21 NOTE — PT/OT/SLP PROGRESS
Physical Therapy      Patient Name:  Tre Cali   MRN:  [id number]    Patient not seen today secondary to Dialysis. Will follow-up 09/22/19.    Marlin Kramer, PT

## 2019-09-21 NOTE — ASSESSMENT & PLAN NOTE
Ischemic cardiomyopathy with EF 20%.  Cardiology has been following patient.  Ace and beta-blocker have not been given in the past due to hypotension.  Will see if patient can now tolerate beta-blocker  Case discussed with Dr. White

## 2019-09-21 NOTE — PROGRESS NOTES
[facility]    Critical Care Cardiology Progress Note    Subjective:  Patient is current we on dialysis.  He does feel much better.  He does admit to being totally noncompliant and not on any cardiac medications.    Objective:  Vital Signs (Most Recent)  Temp: 97.4 °F (36.3 °C) (09/21/19 0716)  Pulse: 64 (09/21/19 0716)  Resp: 19 (09/21/19 0716)  BP: 93/61 (09/21/19 0716)  SpO2: 97 % (09/21/19 0716)    Vital Signs Range (Last 24H):  Temp:  [97 °F (36.1 °C)-97.4 °F (36.3 °C)]   Pulse:  [64-79]   Resp:  [18-20]   BP: ()/(50-67)   SpO2:  [64 %-97 %]     I & O (Last 24H):    Intake/Output Summary (Last 24 hours) at 9/21/2019 0908  Last data filed at 9/21/2019 0815  Gross per 24 hour   Intake 1100 ml   Output 3501 ml   Net -2401 ml       Current Diet:     Current Diet Order   Procedures    Diet renal        Allergies:  Review of patient's allergies indicates:   Allergen Reactions    Sulfa (sulfonamide antibiotics) Rash       Meds:  Scheduled Meds:   aspirin  81 mg Oral Daily    calcium acetate  2,668 mg Oral TID WM    carvedilol  3.125 mg Oral BID    enoxaparin  30 mg Subcutaneous Daily    mupirocin   Nasal BID     Continuous Infusions:  PRN Meds:sodium chloride 0.9%, acetaminophen, acetaminophen, loperamide, magnesium oxide, magnesium oxide, ondansetron, potassium chloride 10%, potassium chloride 10%, potassium chloride 10%, potassium, sodium phosphates, potassium, sodium phosphates, potassium, sodium phosphates, ramelteon, sodium chloride 0.9%    Oxygen/Ventilator Data (Last 24H):  (if applicable)        Hemodynamic Parameters (Last 24H):   (if applicable)        Lines/Drains:  (if applicable)       Peripheral IV - Single Lumen 09/19/19 0724 18 G Left Upper Arm (Active)   Site Assessment Clean;Dry;Intact;No redness;No swelling 9/20/2019  7:33 PM   Number of days: 2            Hemodialysis AV Fistula Right upper arm (Active)   Number of days:        Lab Results :  Recent Results (from the past 24  "hour(s))   Troponin I    Collection Time: 09/20/19  9:10 AM   Result Value Ref Range    Troponin I 0.044 (H) 0.020 - 0.040 ng/mL   Troponin I    Collection Time: 09/20/19  3:13 PM   Result Value Ref Range    Troponin I 0.038 0.020 - 0.040 ng/mL   Basic metabolic panel    Collection Time: 09/21/19  4:22 AM   Result Value Ref Range    Sodium 133 (L) 136 - 145 mmol/L    Potassium 4.4 3.5 - 5.1 mmol/L    Chloride 96 95 - 110 mmol/L    CO2 28 23 - 29 mmol/L    Glucose 109 70 - 110 mg/dL    BUN, Bld 33 (H) 6 - 20 mg/dL    Creatinine 5.3 (H) 0.5 - 1.4 mg/dL    Calcium 8.1 (L) 8.7 - 10.5 mg/dL    Anion Gap 9 8 - 16 mmol/L    eGFR if African American 12.9 (A) >60 mL/min/1.73 m^2    eGFR if non  11.2 (A) >60 mL/min/1.73 m^2   POCT glucose    Collection Time: 09/21/19  5:59 AM   Result Value Ref Range    POC Glucose 99 70 - 110       Diagnostic Results:  Imaging Results          X-Ray Chest AP Portable (Final result)  Result time 09/19/19 08:40:40    Final result by Genevieve Moralez MD (09/19/19 08:40:40)                 Impression:      Cardiomegaly with no confluent infiltrates      Electronically signed by: Genevieve Moralez MD  Date:    09/19/2019  Time:    08:40             Narrative:    EXAMINATION:  XR CHEST AP PORTABLE    CLINICAL HISTORY:  CHF;    FINDINGS:  Portable chest at 07:22 is compared to 08/13/2019 shows normal cardiomediastinal silhouette.    Lungs are clear. Pulmonary vasculature is normal. No acute osseous abnormality.                                12-lead EKG interpretation:   (if applicable)    Recent Cardiac Rhythm:  (if applicable)      Physical Exam:  Objective:  General Appearance:  Comfortable.    Vital signs: (most recent): Blood pressure 93/61, pulse 64, temperature 97.4 °F (36.3 °C), temperature source Oral, resp. rate 19, height 5' 6" (1.676 m), weight 97.2 kg (214 lb 4.6 oz), SpO2 97 %.    Lungs:  Breath sounds clear to auscultation.    Heart: Normal rate.  Regular rhythm.  S1 " normal and S2 normal.  Positive for murmur.    Abdomen: Abdomen is soft.  Bowel sounds are normal.     Extremities: There is local swelling.        Current Consults:  IP CONSULT TO HOSPITAL MEDICINE  IP CONSULT TO NEPHROLOGY  IP CONSULT TO NEPHROLOGY  IP CONSULT TO CARDIOLOGY  IP CONSULT TO SOCIAL WORK/CASE MANAGEMENT  WOUND CARE CONSULT  IP CONSULT TO CARDIOLOGY    Assessment/Plan:  Assessment:   Ischemic cardiomyopathy. BNP 3144. EF 20% on last echo in  2015. Volume overload on exam.   Chest pain. Resolved. Troponin elevated to 0.58.   ESRD on HD. Followed by Dr. Howell  Hypothyroidism. Subclinical.   Mitral Regurgitation. Moderate.   Tricuspid regurgitation. Mod-severe.   Pulmonary HTN.   DM  History of CVA  CAD s/p stents. Details not available.   ICD in situ. Details not available.   Dyslipidemia   Former smoker. States he quit 1 month ago.   Chronic venous insufficiency.   Cirrhosis of liver. With ascites.   Noncompliance  Plan:   Continue beta-blocker would like to add ACE-inhibitor if okay with Nephrology and blood pressure will tolerate this

## 2019-09-21 NOTE — SUBJECTIVE & OBJECTIVE
Interval History:  No new issues    Review of Systems patient is sleeping better no fever no chills no chest pain, shortness of breath is improved after dialysis no abdominal pain , bilateral leg pain is chronic secondary wounds.  No nausea no vomiting.  Patient does have occasional bowel incontinence  Objective:     Vital Signs (Most Recent):  Temp: 97.4 °F (36.3 °C) (09/21/19 0716)  Pulse: 64 (09/21/19 1205)  Resp: 19 (09/21/19 0716)  BP: (!) 111/50 (09/21/19 1205)  SpO2: 97 % (09/21/19 0716) Vital Signs (24h Range):  Temp:  [97 °F (36.1 °C)-97.4 °F (36.3 °C)] 97.4 °F (36.3 °C)  Pulse:  [64-79] 64  Resp:  [18-20] 19  SpO2:  [64 %-97 %] 97 %  BP: ()/(50-67) 111/50     Weight: 97.2 kg (214 lb 4.6 oz)  Body mass index is 34.59 kg/m².    Intake/Output Summary (Last 24 hours) at 9/21/2019 1244  Last data filed at 9/21/2019 1205  Gross per 24 hour   Intake 1600 ml   Output 7001 ml   Net -5401 ml      Physical Exam patient appears older than stated age  HEENT sclerae nonicteric  Neck is supple and nontender  Lungs are clear but coarse breath sounds moderate air movement  Heart is regular rate and rhythm  Abdomen is soft nontender, obese  Extremities venous insufficiency changes leg wounds see wound care note  Neuro patient is alert oriented x3 moves all extremities equally   exam no Banerjee    Significant Labs:   BMP:   Recent Labs   Lab 09/21/19 0422      *   K 4.4   CL 96   CO2 28   BUN 33*   CREATININE 5.3*   CALCIUM 8.1*     CBC:   Recent Labs   Lab 09/20/19 0339   WBC 6.09   HGB 10.4*   HCT 33.7*   *     CMP:   Recent Labs   Lab 09/20/19 0339 09/21/19 0422    133*   K 4.8 4.4   CL 98 96   CO2 27 28   * 109   BUN 41* 33*   CREATININE 6.4* 5.3*   CALCIUM 8.0* 8.1*   ANIONGAP 13 9   EGFRNONAA 8.9* 11.2*     Troponin:   Recent Labs   Lab 09/20/19  0910 09/20/19  1513   TROPONINI 0.044* 0.038       Significant Imaging:

## 2019-09-21 NOTE — PROGRESS NOTES
[facility] Medicine  Progress Note    Patient Name: Tre Cali  MRN: [id number]  Patient Class: OP- Observation   Admission Date: 9/19/2019  Length of Stay: 0 days  Attending Physician: Ceferino Kohli DO  Primary Care Provider: Primary Doctor No        Subjective:     Principal Problem:Acute on chronic right-sided congestive heart failure        HPI:  Patient is a 54-year-old male with a history of end-stage renal disease on dialysis.  He receives dialysis on Tuesdays Thursdays and Saturdays.  Patient presents to our emergency department with complaints of shortness of breath.  He was scheduled  scheduled for dialysis this morning however was unable to wait until 8:00 a.m. secondary to dyspnea .  In the emergency department patient was seated upright in significant respiratory distress improved with non-rebreather .  Patient  denies any chest pain , but reports increasing bilateral lower extremity swelling and erythema increasing abdominal girth.  Patient states he is having difficulty sleeping because he cannot lay flat.  He denies fever chills nausea vomiting.  He does have loose stool 2 to 3 times a day.  Patient denies abdominal pain    Overview/Hospital Course:  No notes on file    Interval History:  No new issues    Review of Systems patient is sleeping better no fever no chills no chest pain, shortness of breath is improved after dialysis no abdominal pain , bilateral leg pain is chronic secondary wounds.  No nausea no vomiting.  Patient does have occasional bowel incontinence  Objective:     Vital Signs (Most Recent):  Temp: 97.4 °F (36.3 °C) (09/21/19 0716)  Pulse: 64 (09/21/19 1205)  Resp: 19 (09/21/19 0716)  BP: (!) 111/50 (09/21/19 1205)  SpO2: 97 % (09/21/19 0716) Vital Signs (24h Range):  Temp:  [97 °F (36.1 °C)-97.4 °F (36.3 °C)] 97.4 °F (36.3 °C)  Pulse:  [64-79] 64  Resp:  [18-20] 19  SpO2:  [64 %-97 %] 97 %  BP: ()/(50-67) 111/50     Weight: 97.2 kg (214 lb 4.6  oz)  Body mass index is 34.59 kg/m².    Intake/Output Summary (Last 24 hours) at 9/21/2019 1244  Last data filed at 9/21/2019 1205  Gross per 24 hour   Intake 1600 ml   Output 7001 ml   Net -5401 ml      Physical Exam patient appears older than stated age  HEENT sclerae nonicteric  Neck is supple and nontender  Lungs are clear but coarse breath sounds moderate air movement  Heart is regular rate and rhythm  Abdomen is soft nontender, obese  Extremities venous insufficiency changes leg wounds see wound care note  Neuro patient is alert oriented x3 moves all extremities equally   exam no Banerjee    Significant Labs:   BMP:   Recent Labs   Lab 09/21/19  0422      *   K 4.4   CL 96   CO2 28   BUN 33*   CREATININE 5.3*   CALCIUM 8.1*     CBC:   Recent Labs   Lab 09/20/19 0339   WBC 6.09   HGB 10.4*   HCT 33.7*   *     CMP:   Recent Labs   Lab 09/20/19  0339 09/21/19  0422    133*   K 4.8 4.4   CL 98 96   CO2 27 28   * 109   BUN 41* 33*   CREATININE 6.4* 5.3*   CALCIUM 8.0* 8.1*   ANIONGAP 13 9   EGFRNONAA 8.9* 11.2*     Troponin:   Recent Labs   Lab 09/20/19  0910 09/20/19  1513   TROPONINI 0.044* 0.038       Significant Imaging:       Assessment/Plan:      * Acute on chronic right-sided congestive heart failure  Improved after dialysis  Cardiology and Renal following      Ischemic cardiomyopathy  Ischemic cardiomyopathy with EF 20%.  Cardiology has been following patient.  Ace and beta-blocker have not been given in the past due to hypotension.  Will see if patient can now tolerate beta-blocker  Case discussed with Dr. Cindy NUNEZ (automatic cardioverter/defibrillator) present  Details unknown      ESRD (end stage renal disease)  Patient is followed by Dr. Howell  Dialysis normally on Tuesdays Thursdays and Saturdays  Renal following       Patient with intermittent bowel incontinence.  Will try symptomatic meds.  Patient states this is been happening for quite some time.  He  denies any new or recent neurological deficits.  He states he uses a wheelchair due to his bilateral knee pain.  Check x-rays of lumbar spine  May need CT lumbar  VTE Risk Mitigation (From admission, onward)        Ordered     enoxaparin injection 30 mg  Daily      09/19/19 0953     IP VTE HIGH RISK PATIENT  Once      09/19/19 0953                Ceferino Kohli DO  Department of Hospital Medicine   [facility]

## 2019-09-22 LAB
ANION GAP SERPL CALC-SCNC: 11 MMOL/L (ref 8–16)
BUN SERPL-MCNC: 28 MG/DL (ref 6–20)
CALCIUM SERPL-MCNC: 8.2 MG/DL (ref 8.7–10.5)
CHLORIDE SERPL-SCNC: 96 MMOL/L (ref 95–110)
CO2 SERPL-SCNC: 28 MMOL/L (ref 23–29)
CREAT SERPL-MCNC: 4.7 MG/DL (ref 0.5–1.4)
EST. GFR  (AFRICAN AMERICAN): 14.9 ML/MIN/1.73 M^2
EST. GFR  (NON AFRICAN AMERICAN): 12.9 ML/MIN/1.73 M^2
GLUCOSE SERPL-MCNC: 95 MG/DL (ref 70–110)
POTASSIUM SERPL-SCNC: 4.8 MMOL/L (ref 3.5–5.1)
SODIUM SERPL-SCNC: 135 MMOL/L (ref 136–145)

## 2019-09-22 PROCEDURE — 25000003 PHARM REV CODE 250: Performed by: HOSPITALIST

## 2019-09-22 PROCEDURE — 21400001 HC TELEMETRY ROOM

## 2019-09-22 PROCEDURE — 94761 N-INVAS EAR/PLS OXIMETRY MLT: CPT

## 2019-09-22 PROCEDURE — 36415 COLL VENOUS BLD VENIPUNCTURE: CPT

## 2019-09-22 PROCEDURE — 25000003 PHARM REV CODE 250: Performed by: INTERNAL MEDICINE

## 2019-09-22 PROCEDURE — 80048 BASIC METABOLIC PNL TOTAL CA: CPT

## 2019-09-22 PROCEDURE — 63600175 PHARM REV CODE 636 W HCPCS: Performed by: HOSPITALIST

## 2019-09-22 PROCEDURE — 97530 THERAPEUTIC ACTIVITIES: CPT

## 2019-09-22 PROCEDURE — G0378 HOSPITAL OBSERVATION PER HR: HCPCS

## 2019-09-22 PROCEDURE — 97162 PT EVAL MOD COMPLEX 30 MIN: CPT

## 2019-09-22 RX ORDER — TRAMADOL HYDROCHLORIDE 50 MG/1
50 TABLET ORAL EVERY 6 HOURS PRN
Status: DISCONTINUED | OUTPATIENT
Start: 2019-09-22 | End: 2019-10-01 | Stop reason: HOSPADM

## 2019-09-22 RX ADMIN — ASPIRIN 81 MG: 81 TABLET, DELAYED RELEASE ORAL at 08:09

## 2019-09-22 RX ADMIN — DIPHENOXYLATE HYDROCHLORIDE AND ATROPINE SULFATE 2 TABLET: 2.5; .025 TABLET ORAL at 08:09

## 2019-09-22 RX ADMIN — MUPIROCIN: 20 OINTMENT TOPICAL at 08:09

## 2019-09-22 RX ADMIN — ENOXAPARIN SODIUM 30 MG: 100 INJECTION SUBCUTANEOUS at 05:09

## 2019-09-22 RX ADMIN — CALCIUM ACETATE 2668 MG: 667 CAPSULE ORAL at 05:09

## 2019-09-22 RX ADMIN — CARVEDILOL 3.12 MG: 3.12 TABLET, FILM COATED ORAL at 08:09

## 2019-09-22 RX ADMIN — TRAMADOL HYDROCHLORIDE 50 MG: 50 TABLET, COATED ORAL at 10:09

## 2019-09-22 NOTE — PROGRESS NOTES
[facility]    Critical Care Cardiology Progress Note    Subjective:    Complains of diarrhea.  No cp or shortness of breath.     Objective:  Vital Signs (Most Recent)  Temp: 97.6 °F (36.4 °C) (09/22/19 0706)  Pulse: 70 (09/22/19 0706)  Resp: 16 (09/22/19 0706)  BP: (!) 88/56 (09/22/19 0706)  SpO2: 99 % (09/22/19 0706)    Vital Signs Range (Last 24H):  Temp:  [97 °F (36.1 °C)-98.4 °F (36.9 °C)]   Pulse:  [64-78]   Resp:  [16-19]   BP: ()/(50-65)   SpO2:  [96 %-99 %]     I & O (Last 24H):    Intake/Output Summary (Last 24 hours) at 9/22/2019 0818  Last data filed at 9/21/2019 1727  Gross per 24 hour   Intake 740 ml   Output 3500 ml   Net -2760 ml       Current Diet:     Current Diet Order   Procedures    Diet renal        Allergies:  Review of patient's allergies indicates:   Allergen Reactions    Sulfa (sulfonamide antibiotics) Rash       Meds:  Scheduled Meds:   aspirin  81 mg Oral Daily    calcium acetate  2,668 mg Oral TID WM    carvedilol  3.125 mg Oral BID    enoxaparin  30 mg Subcutaneous Daily    mupirocin   Nasal BID     Continuous Infusions:  PRN Meds:sodium chloride 0.9%, acetaminophen, acetaminophen, diphenoxylate-atropine 2.5-0.025 mg, magnesium oxide, magnesium oxide, ondansetron, potassium chloride 10%, potassium chloride 10%, potassium chloride 10%, potassium, sodium phosphates, potassium, sodium phosphates, potassium, sodium phosphates, ramelteon, sodium chloride 0.9%    Oxygen/Ventilator Data (Last 24H):  (if applicable)        Hemodynamic Parameters (Last 24H):   (if applicable)        Lines/Drains:  (if applicable)       Peripheral IV - Single Lumen 09/19/19 0724 18 G Left Upper Arm (Active)   Site Assessment Clean;Dry;Intact;No redness;No swelling 9/20/2019  7:33 PM   Number of days: 2            Hemodialysis AV Fistula Right upper arm (Active)   Number of days:        Lab Results :  Recent Results (from the past 24 hour(s))   POCT glucose    Collection Time: 09/21/19   "4:16 PM   Result Value Ref Range    POC Glucose 158 (H) 70 - 110   Basic metabolic panel    Collection Time: 09/22/19  4:31 AM   Result Value Ref Range    Sodium 135 (L) 136 - 145 mmol/L    Potassium 4.8 3.5 - 5.1 mmol/L    Chloride 96 95 - 110 mmol/L    CO2 28 23 - 29 mmol/L    Glucose 95 70 - 110 mg/dL    BUN, Bld 28 (H) 6 - 20 mg/dL    Creatinine 4.7 (H) 0.5 - 1.4 mg/dL    Calcium 8.2 (L) 8.7 - 10.5 mg/dL    Anion Gap 11 8 - 16 mmol/L    eGFR if African American 14.9 (A) >60 mL/min/1.73 m^2    eGFR if non African American 12.9 (A) >60 mL/min/1.73 m^2       Diagnostic Results:  Imaging Results          X-Ray Chest AP Portable (Final result)  Result time 09/19/19 08:40:40    Final result by Genevieve Moralez MD (09/19/19 08:40:40)                 Impression:      Cardiomegaly with no confluent infiltrates      Electronically signed by: Genevieve Moralez MD  Date:    09/19/2019  Time:    08:40             Narrative:    EXAMINATION:  XR CHEST AP PORTABLE    CLINICAL HISTORY:  CHF;    FINDINGS:  Portable chest at 07:22 is compared to 08/13/2019 shows normal cardiomediastinal silhouette.    Lungs are clear. Pulmonary vasculature is normal. No acute osseous abnormality.                                12-lead EKG interpretation:   (if applicable)    Recent Cardiac Rhythm:  (if applicable)      Physical Exam:  Objective:  General Appearance:  Comfortable.    Vital signs: (most recent): Blood pressure (!) 88/56, pulse 70, temperature 97.6 °F (36.4 °C), temperature source Oral, resp. rate 16, height 5' 6" (1.676 m), weight 97.2 kg (214 lb 4.6 oz), SpO2 99 %.    Lungs:  Breath sounds clear to auscultation.    Heart: Normal rate.  Regular rhythm.  S1 normal and S2 normal.  Positive for murmur.    Abdomen: Abdomen is soft.  Bowel sounds are normal.     Extremities: There is local swelling.        Current Consults:  IP CONSULT TO HOSPITAL MEDICINE  IP CONSULT TO NEPHROLOGY  IP CONSULT TO NEPHROLOGY  IP CONSULT TO CARDIOLOGY  IP " CONSULT TO SOCIAL WORK/CASE MANAGEMENT  WOUND CARE CONSULT  IP CONSULT TO CARDIOLOGY    Assessment/Plan:  Assessment:     diarrhea  Ischemic cardiomyopathy. BNP 3144. EF 20% on last echo in  2015. Volume overload on exam.   Chest pain. Resolved. Troponin elevated to 0.58.   ESRD on HD. Followed by Dr. Howell  Hypothyroidism. Subclinical.   Mitral Regurgitation. Moderate.   Tricuspid regurgitation. Mod-severe.   Pulmonary HTN.   DM  History of CVA  CAD s/p stents. Details not available.   ICD in situ. Details not available.   Dyslipidemia   Former smoker. States he quit 1 month ago.   Chronic venous insufficiency.   Cirrhosis of liver. With ascites.   Noncompliance    Plan:     Cont. Asa, coreg

## 2019-09-22 NOTE — PLAN OF CARE
Problem: Physical Therapy Goal  Goal: Physical Therapy Goal  Description  Goals to be met by: 10/22/2019     Patient will increase functional independence with mobility by performin. Supine to sit with Modified [place] SBA  2. Sit to supine with Modified [place] SBA  3. Bed to chair transfer with Supervision using Rolling Walker CGA  4. Sit to stand transfer with Supervision using Rolling Walker. Min assist back to bed     Outcome: Ongoing, Progressing

## 2019-09-22 NOTE — SUBJECTIVE & OBJECTIVE
Interval History:  No new issues    Review of Systems Lomotil worked- no bowel incontinence.  No fever no chills no chest pain or shortness of breath no abdominal pain or nausea vomiting.  Patient states he is having pain and leg wound sites    Objective:     Vital Signs (Most Recent):  Temp: 97.6 °F (36.4 °C) (09/22/19 0706)  Pulse: 70 (09/22/19 0706)  Resp: 16 (09/22/19 0706)  BP: (!) 88/56 (09/22/19 0706)  SpO2: 99 % (09/22/19 0706) Vital Signs (24h Range):  Temp:  [97.6 °F (36.4 °C)-98.4 °F (36.9 °C)] 97.6 °F (36.4 °C)  Pulse:  [64-78] 70  Resp:  [16-19] 16  SpO2:  [96 %-99 %] 99 %  BP: ()/(50-65) 88/56     Weight: 97.2 kg (214 lb 4.6 oz)  Body mass index is 34.59 kg/m².    Intake/Output Summary (Last 24 hours) at 9/22/2019 0931  Last data filed at 9/22/2019 0906  Gross per 24 hour   Intake 980 ml   Output 3500 ml   Net -2520 ml      Physical Exam appears comfortable lying in bed  Appears older than stated age  Neck is supple nontender  Lungs are coarse but clear  Heart is regular rate and rhythm  Abdomen is obese soft nontender  Extremities no edema dressings are intact  Neuro patient is alert oriented x3 moves  All extremities equally    exam no Banerjee  Significant Labs:   BMP:   Recent Labs   Lab 09/22/19  0431   GLU 95   *   K 4.8   CL 96   CO2 28   BUN 28*   CREATININE 4.7*   CALCIUM 8.2*     CBC: No results for input(s): WBC, HGB, HCT, PLT in the last 48 hours.    Significant Imaging:  Lumbar x-ray results reviewed

## 2019-09-22 NOTE — PT/OT/SLP EVAL
Physical Therapy Evaluation    Patient Name:  Tre Cali   MRN:  [id number]    Recommendations:     Discharge Recommendations:  home health PT   Discharge Equipment Recommendations: none   Barriers to discharge: Inaccessible home (one step to enter using w/c, performs without assist with difficulty, per patient)    Assessment:     Tre Cali is a 54 y.o. male admitted with a medical diagnosis of Acute on chronic right-sided congestive heart failure.  He presents with the following impairments/functional limitations:  weakness, impaired endurance, impaired self care skills, impaired balance, impaired functional mobilty, pain, decreased lower extremity function, decreased ROM, and gait instability.    Rehab Prognosis: Good; patient would benefit from acute skilled PT services to address these deficits and reach maximum level of function.    Recent Surgery: * No surgery found *      Plan:     During this hospitalization, patient to be seen 6 x/week to address the identified rehab impairments via therapeutic activities, therapeutic exercises and progress toward the following goals:    · Plan of Care Expires:  10/22/19    Subjective     Chief Complaint: weakness  Patient/Family Comments/goals: return home with sister  Pain/Comfort:  · Pain Rating 1: 8/10  · Location - Side 1: Bilateral  · Location 1: leg(wounds)  · Pain Addressed 1: Reposition  · Pain Rating Post-Intervention 2: 8/10    Patients cultural, spiritual, Voodoo conflicts given the current situation: no    Living Environment:  Lives in a one-story home with his sister and niece with one step to enter (no ramp).  Prior to admission, patients level of function was Mod Indep w/c level.  Equipment used at home: wheelchair, bedside commode, shower chair, grab bar.  DME owned (not currently used): rolling walker.  Upon discharge, patient will have assistance from to be determined (lives with family but reports does not have assist with mobility and  ADL).    Objective:     Communicated with nurse prior to session.  Patient found supine with bed alarm, peripheral IV  upon PT entry to room.    General Precautions: Standard, fall   Orthopedic Precautions:    Braces:       Exams:  · Postural Exam:  Patient presented with the following abnormalities:    · -       Rounded shoulders  · -       Forward head  · Sensation:    · -       Intact  · RLE ROM: Deficits: hip flexion ~ 93; knee WFL; ankle < 25%WNL  · RLE Strength: grossly 3+/5  · LLE ROM: hip flexion ~ 95; knee WFL; ankle < 25%WNL  · LLE Strength: grossly 4-/5    Functional Mobility:  · Bed Mobility:     · Supine to Sit: stand by assistance  · Transfers:     · Sit to Stand:  contact guard assistance with rolling walker  · Bed to Chair: contact guard assistance with  rolling walker  using  Stand Pivot  · Gait: N/A (nonambulatory PTA)      Therapeutic Activities and Exercises:   Patient assisted up to chair at bedside and left with chair alarm on and needs in reach including call button.  Nursing aware patient up in chair.    AM-PAC 6 CLICK MOBILITY  Total Score:15     Patient left up in chair with all lines intact, call button in reach, chair alarm on and nurse notified.    GOALS:   Multidisciplinary Problems     Physical Therapy Goals        Problem: Physical Therapy Goal    Goal Priority Disciplines Outcome Goal Variances Interventions   Physical Therapy Goal     PT, PT/OT      Description:  Goals to be met by: 10/22/2019     Patient will increase functional independence with mobility by performin. Supine to sit with Modified [place]  2. Sit to supine with Modified [place]  3. Bed to chair transfer with Supervision using Rolling Walker  4. Sit to stand transfer with Supervision using Rolling Walker.                      History:     Past Medical History:   Diagnosis Date    Cirrhosis     CKD (chronic kidney disease) 2017    COPD (chronic obstructive pulmonary disease)     Diabetes mellitus      Dialysis patient 2017    Renal disorder        Past Surgical History:   Procedure Laterality Date    AV FISTULA PLACEMENT Right     CARDIAC DEFIBRILLATOR PLACEMENT Left        Time Tracking:     PT Received On: 09/22/19  PT Start Time: 0945     PT Stop Time: 1005  PT Total Time (min): 20 min     Billable Minutes: Evaluation 20      Pamela Ahn, PT  09/22/2019

## 2019-09-22 NOTE — PROGRESS NOTES
[facility] Medicine  Progress Note    Patient Name: Tre Cali  MRN: [id number]  Patient Class: OP- Observation   Admission Date: 9/19/2019  Length of Stay: 0 days  Attending Physician: Ceferino Kohli DO  Primary Care Provider: Primary Doctor No        Subjective:     Principal Problem:Acute on chronic right-sided congestive heart failure        HPI:  Patient is a 54-year-old male with a history of end-stage renal disease on dialysis.  He receives dialysis on Tuesdays Thursdays and Saturdays.  Patient presents to our emergency department with complaints of shortness of breath.  He was scheduled  scheduled for dialysis this morning however was unable to wait until 8:00 a.m. secondary to dyspnea .  In the emergency department patient was seated upright in significant respiratory distress improved with non-rebreather .  Patient  denies any chest pain , but reports increasing bilateral lower extremity swelling and erythema increasing abdominal girth.  Patient states he is having difficulty sleeping because he cannot lay flat.  He denies fever chills nausea vomiting.  He does have loose stool 2 to 3 times a day.  Patient denies abdominal pain    Overview/Hospital Course:  No notes on file    Interval History:  No new issues    Review of Systems Lomotil worked- no bowel incontinence.  No fever no chills no chest pain or shortness of breath no abdominal pain or nausea vomiting.  Patient states he is having pain and leg wound sites    Objective:     Vital Signs (Most Recent):  Temp: 97.6 °F (36.4 °C) (09/22/19 0706)  Pulse: 70 (09/22/19 0706)  Resp: 16 (09/22/19 0706)  BP: (!) 88/56 (09/22/19 0706)  SpO2: 99 % (09/22/19 0706) Vital Signs (24h Range):  Temp:  [97.6 °F (36.4 °C)-98.4 °F (36.9 °C)] 97.6 °F (36.4 °C)  Pulse:  [64-78] 70  Resp:  [16-19] 16  SpO2:  [96 %-99 %] 99 %  BP: ()/(50-65) 88/56     Weight: 97.2 kg (214 lb 4.6 oz)  Body mass index is 34.59 kg/m².    Intake/Output  Summary (Last 24 hours) at 9/22/2019 0931  Last data filed at 9/22/2019 0906  Gross per 24 hour   Intake 980 ml   Output 3500 ml   Net -2520 ml      Physical Exam appears comfortable lying in bed  Appears older than stated age  Neck is supple nontender  Lungs are coarse but clear  Heart is regular rate and rhythm  Abdomen is obese soft nontender  Extremities no edema dressings are intact  Neuro patient is alert oriented x3 moves  All extremities equally    exam no Banerjee  Significant Labs:   BMP:   Recent Labs   Lab 09/22/19  0431   GLU 95   *   K 4.8   CL 96   CO2 28   BUN 28*   CREATININE 4.7*   CALCIUM 8.2*     CBC: No results for input(s): WBC, HGB, HCT, PLT in the last 48 hours.    Significant Imaging:  Lumbar x-ray results reviewed      Assessment/Plan:      * Acute on chronic right-sided congestive heart failure  Improved after dialysis  Cardiology and Renal following      Ischemic cardiomyopathy  Ischemic cardiomyopathy with EF 20%.  Cardiology has been following patient.  Ace and beta-blocker have not been given in the past due to hypotension.  Will see if patient can now tolerate beta-blocker  Case discussed with Dr. Cindy NUNEZ (automatic cardioverter/defibrillator) present  Details unknown      ESRD (end stage renal disease)  Patient is followed by Dr. Howell  Dialysis normally on Tuesdays Thursdays and Saturdays  Renal following        VTE Risk Mitigation (From admission, onward)         Ordered     enoxaparin injection 30 mg  Daily      09/19/19 0953     IP VTE HIGH RISK PATIENT  Once      09/19/19 0953                      Ceferino Kohli DO  Department of Hospital Medicine   [facility]

## 2019-09-22 NOTE — CARE UPDATE
Went to get MOON form signed from patient and PT was talking about recommending home health for the patient when discharged.  Went to go get patient choice form signed by patient and patient stated that he is already receiving home health services. He does not know the name of the agency.  Called patients sister Skye at [phone] and she didn't know the agency either.  But she stated that she is interested in finding placement for the patient since he lives with her and is requiring more help than she and home health can provide at this time.

## 2019-09-22 NOTE — PT/OT/SLP PROGRESS
Physical Therapy Treatment    Patient Name:  Tre Cali   MRN:  [id number]    Recommendations:     Discharge Recommendations:  home health PT, home health OT   Discharge Equipment Recommendations: none   Barriers to discharge: Inaccessible home    Assessment:     Tre Cali is a 54 y.o. male admitted with a medical diagnosis of Acute on chronic right-sided congestive heart failure.  He presents with the following impairments/functional limitations:  weakness, impaired endurance, impaired self care skills, impaired balance, gait instability, impaired functional mobilty, decreased lower extremity function, and decreased ROM.    Rehab Prognosis: Good; patient would benefit from acute skilled PT services to address these deficits and reach maximum level of function.    Recent Surgery: * No surgery found *      Plan:     During this hospitalization, patient to be seen 6 x/week to address the identified rehab impairments via therapeutic activities, therapeutic exercises and progress toward the following goals:    · Plan of Care Expires:  10/22/19    Subjective     Chief Complaint: weakness  Patient/Family Comments/goals: return home  Pain/Comfort:  · Pain Rating 1: (no c/o)  · Location - Side 1: Bilateral  · Location 1: leg(wounds)  · Pain Addressed 1: Reposition  · Pain Rating Post-Intervention 2: 8/10      Objective:     Communicated with nurse prior to session (request to assist patient back to bed due to fatigue from pain medication).  Patient found up in chair with peripheral IV(chair alarm) upon PT entry to room.     General Precautions: Standard, fall   Orthopedic Precautions:N/A   Braces: N/A     Functional Mobility:  · Bed Mobility:     · Sit to Supine: contact guard assistance  · Transfers:     · Sit to Stand:  minimum assistance with rolling walker  · Bed to Chair: contact guard assistance with  rolling walker  using  Stand Pivot      AM-PAC 6 CLICK MOBILITY  Turning over in bed (including adjusting  bedclothes, sheets and blankets)?: 4  Sitting down on and standing up from a chair with arms (e.g., wheelchair, bedside commode, etc.): 3  Moving from lying on back to sitting on the side of the bed?: 3  Moving to and from a bed to a chair (including a wheelchair)?: 3  Need to walk in hospital room?: 1  Climbing 3-5 steps with a railing?: 1  Basic Mobility Total Score: 15       Therapeutic Activities and Exercises:   Patient assisted back to bed after up in chair x ~ 1 hour 20 minutes.    Patient left supine with all lines intact, call button in reach and bed alarm on..    GOALS:   Multidisciplinary Problems     Physical Therapy Goals        Problem: Physical Therapy Goal    Goal Priority Disciplines Outcome Goal Variances Interventions   Physical Therapy Goal     PT, PT/OT Ongoing, Progressing     Description:  Goals to be met by: 10/22/2019     Patient will increase functional independence with mobility by performin. Supine to sit with Modified [place]  2. Sit to supine with Modified [place]  3. Bed to chair transfer with Supervision using Rolling Walker  4. Sit to stand transfer with Supervision using Rolling Walker.                      Time Tracking:     PT Received On: 19  PT Start Time: 1123     PT Stop Time: 1132  PT Total Time (min): 9 min     Billable Minutes: Therapeutic Activity 9    Treatment Type: Treatment  PT/PTA: PT           Pamela Ahn, PT  2019

## 2019-09-22 NOTE — ASSESSMENT & PLAN NOTE
Ischemic cardiomyopathy with EF 20%.  Cardiology has been following patient.  Ace and beta-blocker have not been given in the past due to hypotension.  Now placed on beta-blocker

## 2019-09-23 PROBLEM — R18.8 ASCITIC FLUID: Status: ACTIVE | Noted: 2019-08-13

## 2019-09-23 PROCEDURE — 63600175 PHARM REV CODE 636 W HCPCS: Mod: JG | Performed by: FAMILY MEDICINE

## 2019-09-23 PROCEDURE — 20000000 HC ICU ROOM

## 2019-09-23 PROCEDURE — 25000003 PHARM REV CODE 250: Performed by: INTERNAL MEDICINE

## 2019-09-23 PROCEDURE — 63600175 PHARM REV CODE 636 W HCPCS: Performed by: HOSPITALIST

## 2019-09-23 PROCEDURE — 25000003 PHARM REV CODE 250: Performed by: HOSPITALIST

## 2019-09-23 PROCEDURE — 94761 N-INVAS EAR/PLS OXIMETRY MLT: CPT

## 2019-09-23 PROCEDURE — P9047 ALBUMIN (HUMAN), 25%, 50ML: HCPCS | Mod: JG | Performed by: FAMILY MEDICINE

## 2019-09-23 RX ORDER — ALBUMIN HUMAN 250 G/1000ML
50 SOLUTION INTRAVENOUS ONCE
Status: COMPLETED | OUTPATIENT
Start: 2019-09-23 | End: 2019-09-23

## 2019-09-23 RX ADMIN — ASPIRIN 81 MG: 81 TABLET, DELAYED RELEASE ORAL at 08:09

## 2019-09-23 RX ADMIN — CALCIUM ACETATE 2668 MG: 667 CAPSULE ORAL at 01:09

## 2019-09-23 RX ADMIN — ONDANSETRON 4 MG: 2 INJECTION INTRAMUSCULAR; INTRAVENOUS at 08:09

## 2019-09-23 RX ADMIN — SODIUM CHLORIDE 250 ML: 0.9 INJECTION, SOLUTION INTRAVENOUS at 09:09

## 2019-09-23 RX ADMIN — CARVEDILOL 3.12 MG: 3.12 TABLET, FILM COATED ORAL at 08:09

## 2019-09-23 RX ADMIN — TRAMADOL HYDROCHLORIDE 50 MG: 50 TABLET, COATED ORAL at 11:09

## 2019-09-23 RX ADMIN — ALBUMIN (HUMAN) 50 G: 12.5 SOLUTION INTRAVENOUS at 09:09

## 2019-09-23 RX ADMIN — SODIUM CHLORIDE 200 ML: 0.9 INJECTION, SOLUTION INTRAVENOUS at 05:09

## 2019-09-23 RX ADMIN — MUPIROCIN: 20 OINTMENT TOPICAL at 09:09

## 2019-09-23 RX ADMIN — ENOXAPARIN SODIUM 30 MG: 100 INJECTION SUBCUTANEOUS at 05:09

## 2019-09-23 RX ADMIN — CALCIUM ACETATE 2668 MG: 667 CAPSULE ORAL at 05:09

## 2019-09-23 RX ADMIN — CALCIUM ACETATE 2668 MG: 667 CAPSULE ORAL at 08:09

## 2019-09-23 NOTE — PLAN OF CARE
Ultrasound guided paracentesis performed by - 6 L removed- cloudy/addy in color.  VS remained stable for duration of procedure- catheter  d/c'd, with tip intact. Access site covered with pressure dressing. Pt tolerated procedure well.

## 2019-09-23 NOTE — ASSESSMENT & PLAN NOTE
Patient with increased ascites.  I discussed case with Renal this a.m. and renal suggest we do ultrasound-guided paracentesis prior to discharge

## 2019-09-23 NOTE — PROGRESS NOTES
[facility]    Cardiology Progress Note    Subjective:  Patient seen and examined this am. Feels well. States he has been vomiting this am. Denies any CP/SOB, currently lying flat. -6L since admission. Weight is down about 10-15 lbs. No labs back this am for review. BP has remained low.      Objective:  Vital Signs (Most Recent)  Temp: 97.5 °F (36.4 °C) (09/23/19 0724)  Pulse: 61 (09/23/19 0737)  Resp: 16 (09/23/19 0737)  BP: 96/64 (09/23/19 0724)  SpO2: 97 % (09/23/19 0737)    Vital Signs Range (Last 24H):  Temp:  [97.5 °F (36.4 °C)-97.9 °F (36.6 °C)]   Pulse:  [61-76]   Resp:  [16-18]   BP: (78-96)/(26-64)   SpO2:  [92 %-98 %]     I & O (Last 24H):    Intake/Output Summary (Last 24 hours) at 9/23/2019 0941  Last data filed at 9/23/2019 0851  Gross per 24 hour   Intake 530 ml   Output --   Net 530 ml       Current Diet:     Current Diet Order   Procedures    Diet renal        Allergies:  Review of patient's allergies indicates:   Allergen Reactions    Sulfa (sulfonamide antibiotics) Rash       Meds:  Scheduled Meds:   aspirin  81 mg Oral Daily    calcium acetate  2,668 mg Oral TID WM    carvedilol  3.125 mg Oral BID    enoxaparin  30 mg Subcutaneous Daily    mupirocin   Nasal BID     Continuous Infusions:  PRN Meds:sodium chloride 0.9%, acetaminophen, acetaminophen, diphenoxylate-atropine 2.5-0.025 mg, magnesium oxide, magnesium oxide, ondansetron, potassium chloride 10%, potassium chloride 10%, potassium chloride 10%, potassium, sodium phosphates, potassium, sodium phosphates, potassium, sodium phosphates, ramelteon, sodium chloride 0.9%, traMADol    Lab Results :  No results found for this or any previous visit (from the past 24 hour(s)).    Diagnostic Results:  Imaging Results          X-Ray Chest AP Portable (Final result)  Result time 09/19/19 08:40:40    Final result by Genevieve Moralez MD (09/19/19 08:40:40)                 Impression:      Cardiomegaly with no confluent  "infiltrates      Electronically signed by: Genevieve Moralez MD  Date:    09/19/2019  Time:    08:40             Narrative:    EXAMINATION:  XR CHEST AP PORTABLE    CLINICAL HISTORY:  CHF;    FINDINGS:  Portable chest at 07:22 is compared to 08/13/2019 shows normal cardiomediastinal silhouette.    Lungs are clear. Pulmonary vasculature is normal. No acute osseous abnormality.                                Recent Cardiac Rhythm   (if applicable)      Physical Exam:  Objective:  General Appearance:  Comfortable and well-appearing.    Vital signs: (most recent): Blood pressure 96/64, pulse 61, temperature 97.5 °F (36.4 °C), temperature source Oral, resp. rate 16, height 5' 6" (1.676 m), weight 98.3 kg (216 lb 11.4 oz), SpO2 97 %.  Vital signs are normal.  (BP low).    Lungs:  Normal effort and normal respiratory rate.  Breath sounds clear to auscultation.    Heart: Normal rate.  Regular rhythm.  Positive for murmur.    Extremities: There is dependent edema.    Neurological: Patient is alert and oriented to person, place and time.    Skin:  Warm and dry.  There is a rash.        Current Consults:  IP CONSULT TO HOSPITAL MEDICINE  IP CONSULT TO NEPHROLOGY  IP CONSULT TO NEPHROLOGY  IP CONSULT TO CARDIOLOGY  IP CONSULT TO SOCIAL WORK/CASE MANAGEMENT  WOUND CARE CONSULT  IP CONSULT TO CARDIOLOGY    Assessment/Plan:  Assessment:   Diarrhea/vomiting  Ischemic cardiomyopathy. BNP 3144. EF 20% on last echo in  2015. Volume overload on exam.   Chest pain. Resolved. Troponin elevated to 0.58.   ESRD on HD. Followed by Dr. Howell  Hypothyroidism. Subclinical.   Mitral Regurgitation. Moderate.   Tricuspid regurgitation. Mod-severe.   Pulmonary HTN.   DM  History of CVA  CAD s/p stents. Details not available.   ICD in situ. Details not available.   Dyslipidemia   Former smoker. States he quit 1 month ago.   Chronic venous insufficiency.   Cirrhosis of liver. With ascites.   Noncompliance    Plan:  D/c coreg for now given " hypotension  Can consider toprol 12.5 mg if BP tolerates, will re-evaluate tomorrow  Breathing has improved, -6 L since admission and weight is down. No further CP.  Abdomen is distended-- consider abdominal u/s and +/- paracentesis if deemed necessary

## 2019-09-23 NOTE — PROGRESS NOTES
[facility] Medicine  Progress Note    Patient Name: Tre Cali  MRN: [id number]  Patient Class: OP- Observation   Admission Date: 9/19/2019  Length of Stay: 0 days  Attending Physician: Ceferino Kohli DO  Primary Care Provider: Primary Doctor No   September 23, 2018 1055        Subjective:     Principal Problem:Acute on chronic right-sided congestive heart failure        HPI:  Patient is a 54-year-old male with a history of end-stage renal disease on dialysis.  He receives dialysis on Tuesdays Thursdays and Saturdays.  Patient presents to our emergency department with complaints of shortness of breath.  He was scheduled  scheduled for dialysis this morning however was unable to wait until 8:00 a.m. secondary to dyspnea .  In the emergency department patient was seated upright in significant respiratory distress improved with non-rebreather .  Patient  denies any chest pain , but reports increasing bilateral lower extremity swelling and erythema increasing abdominal girth.  Patient states he is having difficulty sleeping because he cannot lay flat.  He denies fever chills nausea vomiting.  He does have loose stool 2 to 3 times a day.  Patient denies abdominal pain    Overview/Hospital Course:  No notes on file    Interval History:  No new issues    Review of Systems patient complains of abdominal distention shortness of breath better no chest pain no nausea no vomiting no fever no chills back pain improved diarrhea improved otherwise comprehensive review of systems are unchanged  Objective:     Vital Signs (Most Recent):  Temp: 97.5 °F (36.4 °C) (09/23/19 0724)  Pulse: 61 (09/23/19 0737)  Resp: 16 (09/23/19 0737)  BP: 96/64 (09/23/19 0724)  SpO2: 97 % (09/23/19 0737) Vital Signs (24h Range):  Temp:  [97.5 °F (36.4 °C)-97.9 °F (36.6 °C)] 97.5 °F (36.4 °C)  Pulse:  [61-68] 61  Resp:  [16-18] 16  SpO2:  [92 %-98 %] 97 %  BP: (78-96)/(26-64) 96/64     Weight: 98.3 kg (216 lb 11.4 oz)  Body  mass index is 34.98 kg/m².    Intake/Output Summary (Last 24 hours) at 9/23/2019 1052  Last data filed at 9/23/2019 0851  Gross per 24 hour   Intake 530 ml   Output --   Net 530 ml      Physical Exam patient appears comfortable lying in bed  HEENT sclerae nonicteric  Neck is supple and nontender  Lungs are coarse good air movement occasional rhonchi  Heart is regular rate rhythm  Abdomen is distended positive bowel sounds nontender  Extremities no edema  Skin chronic appearing wounds anterior aspects of bilateral distal lower extremities  Neuro patient is alert oriented x3 motor exam is grossly nonfocal   exam no Banerjee    Significant Labs:   Blood Culture: No results for input(s): LABBLOO in the last 48 hours.  BMP:   Recent Labs   Lab 09/22/19  0431   GLU 95   *   K 4.8   CL 96   CO2 28   BUN 28*   CREATININE 4.7*   CALCIUM 8.2*     CBC: No results for input(s): WBC, HGB, HCT, PLT in the last 48 hours.  CMP:   Recent Labs   Lab 09/22/19  0431   *   K 4.8   CL 96   CO2 28   GLU 95   BUN 28*   CREATININE 4.7*   CALCIUM 8.2*   ANIONGAP 11   EGFRNONAA 12.9*     Troponin: No results for input(s): TROPONINI in the last 48 hours.    Significant Imaging:  Ultrasound guided paracentesis is pending      Assessment/Plan:      * Acute on chronic right-sided congestive heart failure  Improved after dialysis  Cardiology and Renal following      Ischemic cardiomyopathy  Ischemic cardiomyopathy with EF 20%.  Cardiology has been following patient.  Ace and beta-blocker have not been given in the past due to hypotension.  Now  on beta-blocker        AICD (automatic cardioverter/defibrillator) present  Details unknown      ESRD (end stage renal disease)  Patient is followed by Dr. Howell  Dialysis normally on Tuesdays Thursdays and Saturdays  Renal following      Other ascites  Patient with increased ascites.  I discussed case with Renal this a.m. and renal suggest we do ultrasound-guided paracentesis prior to  discharge      VTE Risk Mitigation (From admission, onward)         Ordered     enoxaparin injection 30 mg  Daily      09/19/19 0953     IP VTE HIGH RISK PATIENT  Once      09/19/19 0953              1617 Case discussed with renal earlier today  They recommended patient have paracentesis.  Patient is now status post therapeutic paracenteses of approximately 6 L. earlier today  He is now hypotensive approximately mid 80s over 50s.  Patient appears to be tolerating well however he is in Trendelenburg position  Will transfer patient to the ICU for close observation and serial exams.  Case discussed with nurse          Ceferino Kohli DO  Department of Hospital Medicine   Replaced by [facility]

## 2019-09-23 NOTE — NURSING
9/23/2019      Transfer To: 3022 From: 3008     Transfer via bed     Transfer with  cardiac monitoring     Transported by Annie RICO RN / April RN     Medicines sent: none     Chart send with patient: Yes     Notified: NA     Patient reassessed at:  09/23/2019 1700     Upon arrival to floor: ICU RN's intro to patient , patient had monitors applied I removed Tele box ,patient oriented to room, call bell in reach and bed in lowest position

## 2019-09-23 NOTE — SUBJECTIVE & OBJECTIVE
Interval History:  No new issues    Review of Systems patient complains of abdominal distention shortness of breath better no chest pain no nausea no vomiting no fever no chills back pain improved diarrhea improved otherwise comprehensive review of systems are unchanged  Objective:     Vital Signs (Most Recent):  Temp: 97.5 °F (36.4 °C) (09/23/19 0724)  Pulse: 61 (09/23/19 0737)  Resp: 16 (09/23/19 0737)  BP: 96/64 (09/23/19 0724)  SpO2: 97 % (09/23/19 0737) Vital Signs (24h Range):  Temp:  [97.5 °F (36.4 °C)-97.9 °F (36.6 °C)] 97.5 °F (36.4 °C)  Pulse:  [61-68] 61  Resp:  [16-18] 16  SpO2:  [92 %-98 %] 97 %  BP: (78-96)/(26-64) 96/64     Weight: 98.3 kg (216 lb 11.4 oz)  Body mass index is 34.98 kg/m².    Intake/Output Summary (Last 24 hours) at 9/23/2019 1052  Last data filed at 9/23/2019 0851  Gross per 24 hour   Intake 530 ml   Output --   Net 530 ml      Physical Exam patient appears comfortable lying in bed  HEENT sclerae nonicteric  Neck is supple and nontender  Lungs are coarse good air movement occasional rhonchi  Heart is regular rate rhythm  Abdomen is distended positive bowel sounds nontender  Extremities no edema  Skin chronic appearing wounds anterior aspects of bilateral distal lower extremities  Neuro patient is alert oriented x3 motor exam is grossly nonfocal   exam no Banerjee    Significant Labs:   Blood Culture: No results for input(s): LABBLOO in the last 48 hours.  BMP:   Recent Labs   Lab 09/22/19  0431   GLU 95   *   K 4.8   CL 96   CO2 28   BUN 28*   CREATININE 4.7*   CALCIUM 8.2*     CBC: No results for input(s): WBC, HGB, HCT, PLT in the last 48 hours.  CMP:   Recent Labs   Lab 09/22/19  0431   *   K 4.8   CL 96   CO2 28   GLU 95   BUN 28*   CREATININE 4.7*   CALCIUM 8.2*   ANIONGAP 11   EGFRNONAA 12.9*     Troponin: No results for input(s): TROPONINI in the last 48 hours.    Significant Imaging:  Ultrasound guided paracentesis is pending

## 2019-09-23 NOTE — NURSING
1145 BP = 91/42 HR = 61  1200 BP = 85/47 HR = 65  Per Dr Kohli --- ok.    1351 BP 81/49 HR = 61   Per Dr Sorto -- Patient having a paracentesis and will address BP after this procedure. Beta Blocker has been D/C'd.  Per Dr Kohli --- Ok.

## 2019-09-23 NOTE — ASSESSMENT & PLAN NOTE
Ischemic cardiomyopathy with EF 20%.  Cardiology has been following patient.  Ace and beta-blocker have not been given in the past due to hypotension.  Now  on beta-blocker

## 2019-09-23 NOTE — PROGRESS NOTES
[facility]  Nephrology  Progress Note    Patient Name: Tre Cali  MRN: [id number]  Admission Date: 9/19/2019  Hospital Length of Stay: 0 days  Attending Provider: Ceferino Kohli DO   Primary Care Physician: Primary Doctor No  Principal Problem:Acute on chronic right-sided congestive heart failure      Subjective:     Interval History: endorses some fatigue, feels 'ok' otherwise this am    Review of patient's allergies indicates:   Allergen Reactions    Sulfa (sulfonamide antibiotics) Rash     Current Facility-Administered Medications   Medication Frequency    0.9%  NaCl infusion PRN    acetaminophen tablet 650 mg Q6H PRN    acetaminophen tablet 650 mg Q4H PRN    aspirin EC tablet 81 mg Daily    calcium acetate capsule 2,668 mg TID WM    carvedilol tablet 3.125 mg BID    diphenoxylate-atropine 2.5-0.025 mg per tablet 2 tablet QID PRN    enoxaparin injection 30 mg Daily    magnesium oxide tablet 800 mg PRN    magnesium oxide tablet 800 mg PRN    mupirocin 2 % ointment BID    ondansetron injection 4 mg Q6H PRN    potassium chloride 10% oral solution 40 mEq PRN    potassium chloride 10% oral solution 40 mEq PRN    potassium chloride 10% oral solution 40 mEq PRN    potassium, sodium phosphates 280-160-250 mg packet 2 packet PRN    potassium, sodium phosphates 280-160-250 mg packet 2 packet PRN    potassium, sodium phosphates 280-160-250 mg packet 2 packet PRN    ramelteon tablet 8 mg Nightly PRN    sodium chloride 0.9% flush 10 mL PRN    traMADol tablet 50 mg Q6H PRN       Objective:     Vital Signs (Most Recent):  Temp: 97.5 °F (36.4 °C) (09/23/19 0724)  Pulse: 61 (09/23/19 0737)  Resp: 16 (09/23/19 0737)  BP: 96/64 (09/23/19 0724)  SpO2: 97 % (09/23/19 0737)  O2 Device (Oxygen Therapy): room air (09/23/19 0737) Vital Signs (24h Range):  Temp:  [97.5 °F (36.4 °C)-97.9 °F (36.6 °C)] 97.5 °F (36.4 °C)  Pulse:  [61-76] 61  Resp:  [16-18] 16  SpO2:  [92 %-98 %] 97 %  BP:  (78-96)/(26-64) 96/64     Weight: 98.3 kg (216 lb 11.4 oz) (09/23/19 0429)  Body mass index is 34.98 kg/m².  Body surface area is 2.14 meters squared.    I/O last 3 completed shifts:  In: 290 [P.O.:290]  Out: -     Physical Exam   Constitutional: He is oriented to person, place, and time. He appears well-developed and well-nourished. No distress.   HENT:   Head: Normocephalic and atraumatic.   Mouth/Throat: Oropharynx is clear and moist. No oropharyngeal exudate.   Eyes: Conjunctivae are normal. Right eye exhibits no discharge. Left eye exhibits no discharge. No scleral icterus.   Neck: Normal range of motion. Neck supple. No JVD present.   Cardiovascular: Normal rate, regular rhythm and intact distal pulses. Exam reveals no gallop and no friction rub.   Murmur heard.  Pulmonary/Chest: Effort normal and breath sounds normal. No respiratory distress. He has no rales.   Abdominal: Soft. He exhibits distension. He exhibits no mass. There is no tenderness. There is no guarding.   Genitourinary:   Genitourinary Comments: Deferred   Musculoskeletal: He exhibits edema. He exhibits no tenderness or deformity.   R UE AV Fistula w/ good thrill  Decreased RoM   Neurological: He is alert and oriented to person, place, and time. No cranial nerve deficit.   Skin: Skin is warm and dry. He is not diaphoretic. No erythema. No pallor.   Psychiatric: He has a normal mood and affect. His behavior is normal.   Nursing note and vitals reviewed.      Significant Labs:sure  CBC:   Recent Labs   Lab 09/20/19  0339   WBC 6.09   RBC 3.44*   HGB 10.4*   HCT 33.7*   *   MCV 98   MCH 30.2   MCHC 30.9*     CMP:   Recent Labs   Lab 09/19/19  0722  09/22/19  0431   *   < > 95   CALCIUM 7.9*   < > 8.2*   ALBUMIN 3.1*  --   --    PROT 7.7  --   --       < > 135*   K 4.9   < > 4.8   CO2 31*   < > 28   CL 93*   < > 96   BUN 50*   < > 28*   CREATININE 7.1*   < > 4.7*   ALKPHOS 130  --   --    ALT 11  --   --    AST 19  --   --     BILITOT 1.6*  --   --     < > = values in this interval not displayed.     LFTs:   Recent Labs   Lab 09/19/19  0722   ALT 11   AST 19   ALKPHOS 130   BILITOT 1.6*   PROT 7.7   ALBUMIN 3.1*     All labs within the past 24 hours have been reviewed.    Significant Imaging:  X-Ray: Reviewed    Assessment/Plan:     Active Diagnoses:    Diagnosis Date Noted POA    PRINCIPAL PROBLEM:  Acute on chronic right-sided congestive heart failure [I50.813] 09/19/2019 Yes    Ischemic cardiomyopathy [I25.5] 09/21/2019 Yes    ESRD (end stage renal disease) [N18.6] 08/13/2019 Yes     Chronic    AICD (automatic cardioverter/defibrillator) present [Z95.810] 08/13/2019 Yes     Chronic      Problems Resolved During this Admission:     1. ESRD (HD-TTS via R UE AV Fistula)- symptomatically stable at present; significant abdominal distention on admission (s/p urgent HD on 9/19/19 w/ net UF: approx 2L ); no overt volume overload, significant electrolyte perturbations nor acid-base disturbance    - maintenance HD (duration: 3h; UF Goal: 1-2L as tolerated; Standard 'Bath'; Access: AVF; Qb: 400 ml/min, Qd: 2x BFR; give 12.5-25 gm 25% Albumin w/ HD for BP support) per outpatient schedule    -DAILY renal function panel to document sCr trend, optimize HD electrolyte 'bath' & assess response to therapy    -avoid nephrotoxic agents (NSAIDs, IV contrast dye)    -renally dose all appropriate medications, including antibiotics     2. HTN- clinically stable at present;  BP AT GOAL (96/64) now w/ relative hypotension; no active issues    -HOLD anti-HTN medications & diuretics for now & reassess in am     3. ANEMIA- clinically stable at present; H/H AT GOAL (10.4/33.7 on 9/20/19; NO CBC THIS AM); no active issues    -trend daily CBC (H/H) to effect optimal blood-loss surveillance     4. SECONDARY HYPERPARATHYROIDISM (sHPT)- clinically stable at present; no active issues    -continue dietary binder/Vitamin D +/- HD-associated calcimimetic agent  (Cinacalcet vs Etelcalcetide)     5. 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 I will follow-up with patient. Please contact us if you have any additional questions.    Armando Cruz III, MD  [facility]  [facility]  [phone] (C)

## 2019-09-23 NOTE — NURSING
Patient back from paracentesis    1524 BP = 57/28 Rt leg patient asymptomatic   1530 BP RT arm 75/23 patient is asymptomatic   1535 BP RT arm 83/47 patient is asymptomatic  Per Dr Kohli --- put patient in trendelenburg and recheck bp manual in 1/2 hour     1612 could not hear BP manual x 2 nurses  Automatic cuff    BP = 90/56.    Per Dr Kohli --- He will transfer patient to the unit.

## 2019-09-23 NOTE — PT/OT/SLP PROGRESS
Physical Therapy      Patient Name:  Tre Cali   MRN:  [id number]    Patient on hold as per RN secondary to low blood pressure. Will follow-up tomorrow.    Hillary Hammant, PTA

## 2019-09-23 NOTE — PLAN OF CARE
Problem: Adult Inpatient Plan of Care  Goal: Plan of Care Review  Outcome: Ongoing, Progressing  Goal: Patient-Specific Goal (Individualization)  Outcome: Ongoing, Progressing  Goal: Absence of Hospital-Acquired Illness or Injury  Outcome: Ongoing, Progressing  Goal: Optimal Comfort and Wellbeing  Outcome: Ongoing, Progressing  Goal: Readiness for Transition of Care  Outcome: Ongoing, Progressing  Goal: Rounds/Family Conference  Outcome: Ongoing, Progressing     Problem: Adjustment to Illness (Heart Failure)  Goal: Optimal Coping  Outcome: Ongoing, Progressing     Problem: Arrhythmia/Dysrhythmia (Heart Failure)  Goal: Stable Heart Rate and Rhythm  Outcome: Ongoing, Progressing     Problem: Cardiac Output Decreased (Heart Failure)  Goal: Optimal Cardiac Output  Outcome: Ongoing, Progressing     Problem: Fluid Imbalance (Heart Failure)  Goal: Fluid Balance  Outcome: Ongoing, Progressing     Problem: Functional Ability Impaired (Heart Failure)  Goal: Optimal Functional Ability  Outcome: Ongoing, Progressing     Problem: Oral Intake Inadequate (Heart Failure)  Goal: Optimal Nutrition Intake  Outcome: Ongoing, Progressing     Problem: Respiratory Compromise (Heart Failure)  Goal: Effective Oxygenation and Ventilation  Outcome: Ongoing, Progressing     Problem: Sleep Disordered Breathing (Heart Failure)  Goal: Effective Breathing Pattern During Sleep  Outcome: Ongoing, Progressing     Problem: Fall Injury Risk  Goal: Absence of Fall and Fall-Related Injury  Outcome: Ongoing, Progressing     Problem: Skin Injury Risk Increased  Goal: Skin Health and Integrity  Outcome: Ongoing, Progressing     Problem: Device-Related Complication Risk (Hemodialysis)  Goal: Safe, Effective Therapy Delivery  Outcome: Ongoing, Progressing     Problem: Hemodynamic Instability (Hemodialysis)  Goal: Vital Signs Remain in Desired Range  Outcome: Ongoing, Progressing     Problem: Infection (Hemodialysis)  Goal: Absence of Infection  Signs/Symptoms  Outcome: Ongoing, Progressing

## 2019-09-24 LAB
ALBUMIN SERPL BCP-MCNC: 3 G/DL (ref 3.5–5.2)
ALP SERPL-CCNC: 118 U/L (ref 55–135)
ALT SERPL W/O P-5'-P-CCNC: 11 U/L (ref 10–44)
ANION GAP SERPL CALC-SCNC: 12 MMOL/L (ref 8–16)
AST SERPL-CCNC: 15 U/L (ref 10–40)
BASOPHILS # BLD AUTO: 0.14 K/UL (ref 0–0.2)
BASOPHILS NFR BLD: 2.2 % (ref 0–1.9)
BILIRUB SERPL-MCNC: 1.6 MG/DL (ref 0.1–1)
BUN SERPL-MCNC: 44 MG/DL (ref 6–20)
CALCIUM SERPL-MCNC: 7.8 MG/DL (ref 8.7–10.5)
CHLORIDE SERPL-SCNC: 96 MMOL/L (ref 95–110)
CO2 SERPL-SCNC: 25 MMOL/L (ref 23–29)
CREAT SERPL-MCNC: 6.9 MG/DL (ref 0.5–1.4)
DIFFERENTIAL METHOD: ABNORMAL
EOSINOPHIL # BLD AUTO: 0.1 K/UL (ref 0–0.5)
EOSINOPHIL NFR BLD: 1.9 % (ref 0–8)
ERYTHROCYTE [DISTWIDTH] IN BLOOD BY AUTOMATED COUNT: 17 % (ref 11.5–14.5)
EST. GFR  (AFRICAN AMERICAN): 9.4 ML/MIN/1.73 M^2
EST. GFR  (NON AFRICAN AMERICAN): 8.1 ML/MIN/1.73 M^2
GLUCOSE SERPL-MCNC: 104 MG/DL (ref 70–110)
HCT VFR BLD AUTO: 38.2 % (ref 40–54)
HGB BLD-MCNC: 11.7 G/DL (ref 14–18)
IMM GRANULOCYTES # BLD AUTO: 0.03 K/UL (ref 0–0.04)
IMM GRANULOCYTES NFR BLD AUTO: 0.5 % (ref 0–0.5)
LYMPHOCYTES # BLD AUTO: 0.8 K/UL (ref 1–4.8)
LYMPHOCYTES NFR BLD: 12.2 % (ref 18–48)
MCH RBC QN AUTO: 29.8 PG (ref 27–31)
MCHC RBC AUTO-ENTMCNC: 30.6 G/DL (ref 32–36)
MCV RBC AUTO: 97 FL (ref 82–98)
MONOCYTES # BLD AUTO: 0.8 K/UL (ref 0.3–1)
MONOCYTES NFR BLD: 12.3 % (ref 4–15)
NEUTROPHILS # BLD AUTO: 4.4 K/UL (ref 1.8–7.7)
NEUTROPHILS NFR BLD: 70.9 % (ref 38–73)
NRBC BLD-RTO: 1 /100 WBC
PLATELET # BLD AUTO: 93 K/UL (ref 150–350)
PMV BLD AUTO: 12.9 FL (ref 9.2–12.9)
POTASSIUM SERPL-SCNC: 4.1 MMOL/L (ref 3.5–5.1)
POTASSIUM SERPL-SCNC: 6.1 MMOL/L (ref 3.5–5.1)
PROT SERPL-MCNC: 6.5 G/DL (ref 6–8.4)
RBC # BLD AUTO: 3.92 M/UL (ref 4.6–6.2)
SODIUM SERPL-SCNC: 133 MMOL/L (ref 136–145)
WBC # BLD AUTO: 6.24 K/UL (ref 3.9–12.7)

## 2019-09-24 PROCEDURE — 63600175 PHARM REV CODE 636 W HCPCS: Performed by: HOSPITALIST

## 2019-09-24 PROCEDURE — 25000003 PHARM REV CODE 250: Performed by: HOSPITALIST

## 2019-09-24 PROCEDURE — P9045 ALBUMIN (HUMAN), 5%, 250 ML: HCPCS | Mod: JG | Performed by: INTERNAL MEDICINE

## 2019-09-24 PROCEDURE — 25000003 PHARM REV CODE 250: Performed by: INTERNAL MEDICINE

## 2019-09-24 PROCEDURE — 25000003 PHARM REV CODE 250

## 2019-09-24 PROCEDURE — 85025 COMPLETE CBC W/AUTO DIFF WBC: CPT

## 2019-09-24 PROCEDURE — 27000221 HC OXYGEN, UP TO 24 HOURS

## 2019-09-24 PROCEDURE — 63600175 PHARM REV CODE 636 W HCPCS: Mod: JG | Performed by: INTERNAL MEDICINE

## 2019-09-24 PROCEDURE — 20000000 HC ICU ROOM

## 2019-09-24 PROCEDURE — 80053 COMPREHEN METABOLIC PANEL: CPT

## 2019-09-24 PROCEDURE — 94761 N-INVAS EAR/PLS OXIMETRY MLT: CPT

## 2019-09-24 PROCEDURE — 90935 HEMODIALYSIS ONE EVALUATION: CPT

## 2019-09-24 PROCEDURE — 36415 COLL VENOUS BLD VENIPUNCTURE: CPT

## 2019-09-24 PROCEDURE — 84132 ASSAY OF SERUM POTASSIUM: CPT

## 2019-09-24 RX ORDER — ALBUMIN HUMAN 50 G/1000ML
25 SOLUTION INTRAVENOUS ONCE
Status: COMPLETED | OUTPATIENT
Start: 2019-09-24 | End: 2019-09-24

## 2019-09-24 RX ORDER — MIDODRINE HYDROCHLORIDE 2.5 MG/1
5 TABLET ORAL 3 TIMES DAILY
Status: DISCONTINUED | OUTPATIENT
Start: 2019-09-24 | End: 2019-09-24

## 2019-09-24 RX ORDER — CHLORHEXIDINE GLUCONATE ORAL RINSE 1.2 MG/ML
15 SOLUTION DENTAL 2 TIMES DAILY
Status: DISPENSED | OUTPATIENT
Start: 2019-09-24 | End: 2019-09-29

## 2019-09-24 RX ORDER — MIDODRINE HYDROCHLORIDE 2.5 MG/1
5 TABLET ORAL 3 TIMES DAILY
Status: DISCONTINUED | OUTPATIENT
Start: 2019-09-24 | End: 2019-09-26

## 2019-09-24 RX ADMIN — CHLORHEXIDINE GLUCONATE 15 ML: 1.2 RINSE ORAL at 09:09

## 2019-09-24 RX ADMIN — ALBUMIN (HUMAN) 25 G: 12.5 SOLUTION INTRAVENOUS at 08:09

## 2019-09-24 RX ADMIN — CALCIUM ACETATE 2668 MG: 667 CAPSULE ORAL at 05:09

## 2019-09-24 RX ADMIN — MUPIROCIN: 20 OINTMENT TOPICAL at 08:09

## 2019-09-24 RX ADMIN — CALCIUM ACETATE 2668 MG: 667 CAPSULE ORAL at 11:09

## 2019-09-24 RX ADMIN — CHLORHEXIDINE GLUCONATE 15 ML: 1.2 RINSE ORAL at 08:09

## 2019-09-24 RX ADMIN — ENOXAPARIN SODIUM 30 MG: 100 INJECTION SUBCUTANEOUS at 05:09

## 2019-09-24 RX ADMIN — MIDODRINE HYDROCHLORIDE 5 MG: 2.5 TABLET ORAL at 03:09

## 2019-09-24 RX ADMIN — ASPIRIN 81 MG: 81 TABLET, DELAYED RELEASE ORAL at 08:09

## 2019-09-24 NOTE — NURSING
Cardiology has rounded on the pt. Asked if midodrine would be appropriate. Recommended if it is ok with Nephrology. Pt. Is asymptomatic has responded to the Albumin. Will continue to monitor closely.

## 2019-09-24 NOTE — PHYSICIAN QUERY
PT Name: Tre Cali  MR #: [id number]    Physician Query Form - Respiratory Condition Clarification      CDS/: Paulette Parsons               Contact information:[phone] or visit the [facility] dept    This form is a permanent document in the medical record.    Query Date: September 24, 2019    By submitting this query, we are merely seeking further clarification of documentation. Please utilize your independent clinical judgment when addressing the question(s) below.    The Medical record(ER note) contains the following;  Tachypnea noted.   He is in significant respiratory distress.   He has decreased breath sounds.  Pt reports it progressed to the point that he could not sleep or get comfortable in any position. He has not been able to lie flat on the bed.    Pt improved on NRB.  CO2 31 on 9/19 initial blood draw.  Acute on Chronic CHF. ESRD.   Ascites (6000ml removed with paracentesis on 2/23)    Provider, please specify diagnosis or diagnoses associated with above clinical findings.   [x   ] Acute Respiratory Failure with Hypoxia - ABG pO2 < 60 mmHg or O2 sat of 88% on RA and respiratory symptoms documented   [   ] Acute Respiratory Failure with Hypercapnia - pCO2 > 50 mmHg with pH < 7.35 and respiratory symptoms documented   [   ] Acute Respiratory Failure with Hypoxia and Hypercapnia - Hypoxia: ABG pO2 < 60 mmHg or O2 sat of 88% on RA and Hypercapnia: pCO2 > 50 mmHg with pH < 7.35 and respiratory symptoms documented   [   ] Other Acute Respiratory Failure     [   ] Acute and (on) Chronic Respiratory Failure with Hypoxia - pO2 >10 mmHg below baseline OR SpO2 < 91% on usual home O2 OR O2 > 2L/min over baseline home O2    [   ] Acute and (on) Chronic Respiratory Failure with Hypercapnia - pCO2 >10 mmHg over baseline and pH < 7.35   [   ] Acute and (on) Chronic Respiratory Failure with Hypoxia and Hypercapnia - Hypoxic: pO2 >10 mmHg below baseline OR SpO2 < 91% on usual home O2 OR O2 > 2L/min over baseline  home O2 and Hypercapnic:  pCO2 >10 mmHg over baseline and pH < 7.35    [   ] Other Acute and (on) Chronic Respiratory Failure    [   ] Chronic Respiratory Failure with Hypoxia -Continuous home oxygen use   [   ] Chronic Respiratory Failure with Hypercapnia - Normal pH with high pCO2 (chronic hypercapnia) (common example: COPD)   [   ] Chronic Respiratory Failure with Hypoxia and Hypercapnia - Continuous home oxygen and Normal pH with high pCO2 (chronic hypercapnia) (common example: COPD)   [   ] Other Chronic Respiratory Failure   [   ] Acute Respiratory Insufficiency - Generally describes less severe respiratory symptoms and measurements (pO2, SpO2, pH, and pCO2) NOT meeting criteria for respiratory failure     [   ] Acute Respiratory Distress - Generally describes less severe respiratory symptoms (tachypnea, in respiratory distress, increased work of breathing, unable to speak in complete sentences, labored breathing, use of accessory muscles, RR> 24, cyanosis, dyspnea, wheezing, stridor, lethargy) without sufficient measurements (pO2, SpO2, pH, and pCO2) to meet criteria for respiratory failure    [   ] Hypoxia Only   [   ] No Respiratory Failure, Maintained on Vent for Routine Care or Airway Protection -  purposely intubated for airway protection (e.g.: angioedema, stroke, trauma); without meeting the criteria for respiratory failure.    [   ] Other Respiratory Diagnosis (please specify): _________________________________   [   ]  Clinically Undetermined       Please document in your progress notes daily for the duration of treatment until resolved and include in your discharge summary.

## 2019-09-24 NOTE — PROGRESS NOTES
Total UF: 2500 mL  Net UF: 2000 mL       09/24/19 1141   Vital Signs   Temp 98.2 °F (36.8 °C)   Temp src Oral   Pulse 63   Heart Rate Source Monitor   Resp (!) 21   SpO2 97 %   Pulse Oximetry Type Continuous   Flow (L/min) 2   O2 Device (Oxygen Therapy) nasal cannula   BP (!) 83/42   BP Location Left arm   BP Method Automatic   Patient Position Lying   Post-Hemodialysis Assessment   Rinseback Volume (mL) 250 mL   Blood Volume Processed (Liters) 67.1 L   Dialyzer Clearance Lightly streaked   Duration of Treatment (minutes) 180 minutes   Hemodialysis Intake (mL) 500 mL   Total UF (mL) 2500 mL   Net Fluid Removal 2000   Patient Response to Treatment Tolerated well   Post-Treatment Weight 92 kg (202 lb 13.2 oz)   Treatment Weight Change -2   Arterial bleeding stop time (min) 2 min   Venous bleeding stop time (min) 2 min   Post-Hemodialysis Comments Tx complete, with no complications noted at this time. Pt AAOX4 and stable.

## 2019-09-24 NOTE — PROGRESS NOTES
[facility]  Progress Note    Patient Name: Tre Cali  MRN: [id number]  Patient Class: IP- Inpatient   Admission Date: 9/19/2019  Length of Stay: 1 days  Attending Physician: Hong Mercer MD  Primary Care Provider: Primary Doctor No        Subjective:     Principal Problem:Acute on chronic right-sided congestive heart failure    Interval History:  Currently receiving dialysis. Plans to remove 2 L today. Had paracentesis yesterday and started developing symptoms due to hypotension and therefore was placed in ICU for close monitoring. Continues to have hypotension however asymptomatic. Discussed case with nephrology and emanuel for midodrine. Cardiology has started on midodrine today. Reports feeling well. Denies any chest pain, shortness of breath. Noticed that shortness of breath has improved significantly since admission. Currently breathing on room air. Continue to monitor in ICU due to profound hypotension.    Review of Systems   Constitutional: Negative for chills, fatigue, fever and unexpected weight change.   HENT: Negative for sore throat.    Eyes: Negative for visual disturbance.   Respiratory: Negative for cough, chest tightness and shortness of breath.    Cardiovascular: Negative for chest pain.   Gastrointestinal: Negative for abdominal pain, constipation, diarrhea, nausea and vomiting.   Endocrine: Negative for polyuria.   Genitourinary: Negative for dysuria and hematuria.   Neurological: Negative for headaches.     Objective:     Vital Signs (Most Recent):  Temp: 97.8 °F (36.6 °C) (09/24/19 1515)  Pulse: (!) 57 (09/24/19 1515)  Resp: 16 (09/24/19 1515)  BP: (!) 80/46 (09/24/19 1515)  SpO2: 96 % (09/24/19 1515) Vital Signs (24h Range):  Temp:  [97.7 °F (36.5 °C)-98.6 °F (37 °C)] 97.8 °F (36.6 °C)  Pulse:  [53-68] 57  Resp:  [12-49] 16  SpO2:  [86 %-100 %] 96 %  BP: ()/(28-58) 80/46     Weight: 98.3 kg (216 lb 11.4 oz)  Body mass index is 34.98  kg/m².    Intake/Output Summary (Last 24 hours) at 9/24/2019 1642  Last data filed at 9/24/2019 1141  Gross per 24 hour   Intake 1200 ml   Output 2500 ml   Net -1300 ml      Physical Exam   Constitutional: He is oriented to person, place, and time. He appears well-developed and well-nourished. No distress.   HENT:   Head: Normocephalic and atraumatic.   Right Ear: External ear normal.   Left Ear: External ear normal.   Eyes: Conjunctivae and EOM are normal. Right eye exhibits no discharge. Left eye exhibits no discharge. No scleral icterus.   Neck: Normal range of motion.   Cardiovascular: Normal rate and regular rhythm.   Pulmonary/Chest: Effort normal. No respiratory distress. He has no wheezes. He has rales (Mild rales in lower bases). He exhibits no tenderness.   Abdominal: Soft. He exhibits distension. There is no tenderness.   Musculoskeletal: He exhibits no edema or tenderness.   Neurological: He is alert and oriented to person, place, and time.   Skin: Skin is warm. He is not diaphoretic.   Psychiatric: He has a normal mood and affect. His behavior is normal. Judgment and thought content normal.   Nursing note and vitals reviewed.      Significant Labs:   CBC:   Recent Labs   Lab 09/24/19  0505   WBC 6.24   HGB 11.7*   HCT 38.2*   PLT 93*     CMP:   Recent Labs   Lab 09/24/19  0505 09/24/19  0925   *  --    K 6.1* 4.1   CL 96  --    CO2 25  --      --    BUN 44*  --    CREATININE 6.9*  --    CALCIUM 7.8*  --    PROT 6.5  --    ALBUMIN 3.0*  --    BILITOT 1.6*  --    ALKPHOS 118  --    AST 15  --    ALT 11  --    ANIONGAP 12  --    EGFRNONAA 8.1*  --        Assessment/Plan:     Active Hospital Problems    Diagnosis    *Acute on chronic right-sided congestive heart failure    Ischemic cardiomyopathy    ESRD (end stage renal disease)    AICD (automatic cardioverter/defibrillator) present    Other ascites      * Acute on chronic right-sided congestive heart failure  Improved after  dialysis  Cardiology and Renal following      Ischemic cardiomyopathy  Ischemic cardiomyopathy with EF 20%.  Cardiology has been following patient.  Ace and beta-blocker have not been given in the past due to hypotension.  Now  on beta-blocker        AICD (automatic cardioverter/defibrillator) present  Details unknown      ESRD (end stage renal disease)  Patient is followed by Dr. Howell  Dialysis normally on Tuesdays Thursdays and Saturdays  Renal following      Other ascites  S/p paracentesis on 9/23 that removed 6 L fluid      VTE Risk Mitigation (From admission, onward)         Ordered     enoxaparin injection 30 mg  Daily      09/19/19 0953     IP VTE HIGH RISK PATIENT  Once      09/19/19 0953                      Hong Mercer MD  Department of Hospital Medicine   [facility]  Date of service: 09/24/2019 4:45 PM

## 2019-09-24 NOTE — PLAN OF CARE
09/24/19 1000   Discharge Assessment   Assessment Type Discharge Planning Assessment   Confirmed/corrected address and phone number on facesheet? Yes   Assessment information obtained from? Patient   Communicated expected length of stay with patient/caregiver no   Prior to hospitilization cognitive status: Alert/Oriented   Prior to hospitalization functional status: Assistive Equipment   Current cognitive status: Alert/Oriented   Current Functional Status: Wheelchair Bound;Partially Dependent   Lives With sibling(s)  (Lives with sister Skye Cali)   Able to Return to Prior Arrangements yes   Is patient able to care for self after discharge? Unable to determine at this time (comments)   Who are your caregiver(s) and their phone number(s)? Skye leigh [phone]   Patient's perception of discharge disposition home or selfcare   Readmission Within the Last 30 Days unable to assess   Patient currently being followed by outpatient case management? No   Patient currently receives any other outside agency services? No   Equipment Currently Used at Home wheelchair;bedside commode;shower chair;grab bar   Do you have any problems affording any of your prescribed medications? No   Is the patient taking medications as prescribed? yes   Does the patient have transportation home? Yes   Transportation Anticipated family or friend will provide   Does the patient receive services at the [facility]? No   Discharge Plan A Home with family   DME Needed Upon Discharge  none   Patient/Family in Agreement with Plan yes   Assessment done. Pt states he has been here often but cannot say exactly when. Pt states he lives with his sister and that his  came to get him the last time he was discharged from hospital. Cm to follow until discharge from hospital.

## 2019-09-24 NOTE — NURSING
Spoke to Dr. Mercer regarding pt. BP, SBP 70's explained pt. Has had albumin and fluid bolus for hypotension throughout the night. Pt., is asymptomatic. Sitting up in the bed eating breakfast at this time. Awake alert and oriented. Dr. Mercer states bp has been low this admission no new orders. Being pt. Is asymptomatic to await nephrology and cardiology to see pt. And decide if pt. Needs midodrine. I have a call in to Nephrology now because dialysis nurse arrived to [place] to run pt. K+ is 6.1 at this time.

## 2019-09-24 NOTE — PROGRESS NOTES
"[facility]  Adult Nutrition  Progress Note    SUMMARY       Recommendations    Recommendation/Intervention: 1.) Continue current diet as tolerated 2.)  to assist with meal selections daily  Goals: 1.) Pt to meet >75% estimated nutritional needs via diet. 2.) Continued diet tolerance  Nutrition Goal Status: goal met, new  Communication of RD Recs: reviewed with RN    Reason for Assessment    Reason For Assessment: length of stay  Diagnosis: cardiac disease  Relevant Medical History: hx: ESRD on HD, COPD, DM, cirrhosis    Nutrition Risk Screen    Nutrition Risk Screen: no indicators present    Nutrition/Diet History    Spiritual, Cultural Beliefs, Christianity Practices, Values that Affect Care: no  Food Allergies: NKFA  Factors Affecting Nutritional Intake: None identified at this time    Anthropometrics    Temp: 98.2 °F (36.8 °C)  Height Method: Stated  Height: 5' 6" (167.6 cm)  Height (inches): 66 in  Weight Method: Bed Scale  Weight: 98.3 kg (216 lb 11.4 oz)  Weight (lb): 216.71 lb  Ideal Body Weight (IBW), Male: 142 lb  % Ideal Body Weight, Male (lb): 152.61 lb  BMI (Calculated): 35.1  BMI Grade: 35 - 39.9 - obesity - grade II       Lab/Procedures/Meds    Pertinent Labs Reviewed: reviewed      Lab Results   Component Value Date    WBC 6.24 09/24/2019    HGB 11.7 (L) 09/24/2019    HCT 38.2 (L) 09/24/2019    MCV 97 09/24/2019    PLT 93 (L) 09/24/2019     BMP  Lab Results   Component Value Date     (L) 09/24/2019    K 4.1 09/24/2019    CL 96 09/24/2019    CO2 25 09/24/2019    BUN 44 (H) 09/24/2019    CREATININE 6.9 (H) 09/24/2019    CALCIUM 7.8 (L) 09/24/2019    ANIONGAP 12 09/24/2019    ESTGFRAFRICA 9.4 (A) 09/24/2019    EGFRNONAA 8.1 (A) 09/24/2019       Pertinent Medications Reviewed: reviewed    Scheduled Meds:   aspirin  81 mg Oral Daily    calcium acetate  2,668 mg Oral TID WM    chlorhexidine  15 mL Mouth/Throat BID    enoxaparin  30 mg Subcutaneous Daily    midodrine  5 mg Oral " TID    mupirocin   Nasal BID       Estimated/Assessed Needs    Weight Used For Calorie Calculations: 98.3 kg (216 lb 11.4 oz)  Energy Calorie Requirements (kcal): 2457 kcals (25 kcal/kg)  Energy Need Method: Kcal/kg  Protein Requirements: 97 g (1.5 g/kg) per IBW  Weight Used For Protein Calculations: 65 kg (143 lb 4.8 oz)(IBW)  Fluid Requirements (mL): 1000 ml + UOP  Estimated Fluid Requirement Method: other (see comments)  RDA Method (mL): 2457         Nutrition Prescription Ordered    Current Diet Order: renal     Evaluation of Received Nutrient/Fluid Intake    Energy Calories Required: meeting needs  Protein Required: meeting needs  Fluid Required: meeting needs  Tolerance: tolerating    % Meal Intake: 75 - 100 %    Nutrition Risk    Level of Risk/Frequency of Follow-up: moderate     Assessment and Plan    Pt assessed by RD 2' LOS. Currently undergoing HD during rounds. Nutritional care discussed with RN; states appetite/intake good. Tolerated PO diet w/o problems.  LBM 9/23.     Monitor and Evaluation    Food and Nutrient Intake: food and beverage intake, energy intake  Food and Nutrient Adminstration: diet order  Physical Activity and Function: nutrition-related ADLs and IADLs  Anthropometric Measurements: weight change  Biochemical Data, Medical Tests and Procedures: electrolyte and renal panel, gastrointestinal profile, glucose/endocrine profile  Nutrition-Focused Physical Findings: overall appearance       Nutrition Follow-Up    RD Follow-up?: Yes    Lois Flores  09/24/2019  11:55 AM

## 2019-09-24 NOTE — PT/OT/SLP PROGRESS
Physical Therapy      Patient Name:  Tre Cali   MRN:  [id number]    Patient not seen today secondary to Dialysis. Will follow-up 09/25/19.    Marlin Kramer, PT

## 2019-09-24 NOTE — NURSING
MD made aware pt. Was still hypotensive. First dose of Midodrine has been given. Will continue to monitor closely. Pt. Is asymptomatic and awake alert eating crackers at this time.

## 2019-09-24 NOTE — PROGRESS NOTES
[facility]  Nephrology  Progress Note    Patient Name: Tre Cali  MRN: [id number]  Admission Date: 9/19/2019  Hospital Length of Stay: 1 days  Attending Provider: Ceferino Kohli DO   Primary Care Physician: Primary Doctor No  Principal Problem:Acute on chronic right-sided congestive heart failure      Subjective:     Interval History: endorses some fatigue, feels 'ok' otherwise this am; transferred to ICU bed overnight 2/2 paracentesis (approx 6L removed)-induced asymptomatic hypotension    Review of patient's allergies indicates:   Allergen Reactions    Sulfa (sulfonamide antibiotics) Rash     Current Facility-Administered Medications   Medication Frequency    0.9%  NaCl infusion PRN    acetaminophen tablet 650 mg Q6H PRN    acetaminophen tablet 650 mg Q4H PRN    aspirin EC tablet 81 mg Daily    calcium acetate capsule 2,668 mg TID WM    diphenoxylate-atropine 2.5-0.025 mg per tablet 2 tablet QID PRN    enoxaparin injection 30 mg Daily    magnesium oxide tablet 800 mg PRN    magnesium oxide tablet 800 mg PRN    mupirocin 2 % ointment BID    ondansetron injection 4 mg Q6H PRN    potassium chloride 10% oral solution 40 mEq PRN    potassium chloride 10% oral solution 40 mEq PRN    potassium chloride 10% oral solution 40 mEq PRN    potassium, sodium phosphates 280-160-250 mg packet 2 packet PRN    potassium, sodium phosphates 280-160-250 mg packet 2 packet PRN    potassium, sodium phosphates 280-160-250 mg packet 2 packet PRN    ramelteon tablet 8 mg Nightly PRN    sodium chloride 0.9% flush 10 mL PRN    traMADol tablet 50 mg Q6H PRN       Objective:     Vital Signs (Most Recent):  Temp: 98.4 °F (36.9 °C) (09/23/19 2315)  Pulse: (!) 54 (09/24/19 0600)  Resp: (!) 31 (09/24/19 0600)  BP: (!) 82/44 (09/24/19 0600)  SpO2: 100 % (09/24/19 0600)  O2 Device (Oxygen Therapy): room air (09/23/19 1501) Vital Signs (24h Range):  Temp:  [97.5 °F (36.4 °C)-98.6 °F (37 °C)] 98.4 °F (36.9  °C)  Pulse:  [54-67] 54  Resp:  [12-49] 31  SpO2:  [86 %-100 %] 100 %  BP: ()/(28-65) 82/44     Weight: 98.3 kg (216 lb 11.4 oz) (09/23/19 0429)  Body mass index is 34.98 kg/m².  Body surface area is 2.14 meters squared.    I/O last 3 completed shifts:  In: 1210 [P.O.:1010; IV Piggyback:200]  Out: -     Physical Exam   Constitutional: He is oriented to person, place, and time. He appears well-developed and well-nourished. No distress.   HENT:   Head: Normocephalic and atraumatic.   Mouth/Throat: Oropharynx is clear and moist. No oropharyngeal exudate.   Eyes: Conjunctivae are normal. Right eye exhibits no discharge. Left eye exhibits no discharge. No scleral icterus.   Neck: Normal range of motion. Neck supple. No JVD present.   Cardiovascular: Normal rate, regular rhythm and intact distal pulses. Exam reveals no gallop and no friction rub.   Murmur heard.  Pulmonary/Chest: Effort normal and breath sounds normal. No respiratory distress. He has no rales.   Abdominal: Soft. He exhibits distension. He exhibits no mass. There is no tenderness. There is no guarding.   Genitourinary:   Genitourinary Comments: Deferred   Musculoskeletal: He exhibits edema. He exhibits no tenderness or deformity.   R UE AV Fistula w/ good thrill  Decreased RoM   Neurological: He is alert and oriented to person, place, and time. No cranial nerve deficit.   Skin: Skin is warm and dry. He is not diaphoretic. No erythema. No pallor.   Psychiatric: He has a normal mood and affect. His behavior is normal.   Nursing note and vitals reviewed.      Significant Labs:sure  CBC:   Recent Labs   Lab 09/24/19  0505   WBC 6.24   RBC 3.92*   HGB 11.7*   HCT 38.2*   PLT 93*   MCV 97   MCH 29.8   MCHC 30.6*     CMP:   Recent Labs   Lab 09/24/19  0505      CALCIUM 7.8*   ALBUMIN 3.0*   PROT 6.5   *   K 6.1*   CO2 25   CL 96   BUN 44*   CREATININE 6.9*   ALKPHOS 118   ALT 11   AST 15   BILITOT 1.6*     LFTs:   Recent Labs   Lab  09/24/19  0505   ALT 11   AST 15   ALKPHOS 118   BILITOT 1.6*   PROT 6.5   ALBUMIN 3.0*     All labs within the past 24 hours have been reviewed.    Significant Imaging:  X-Ray: Reviewed    Assessment/Plan:     Active Diagnoses:    Diagnosis Date Noted POA    PRINCIPAL PROBLEM:  Acute on chronic right-sided congestive heart failure [I50.813] 09/19/2019 Yes    Ischemic cardiomyopathy [I25.5] 09/21/2019 Yes    ESRD (end stage renal disease) [N18.6] 08/13/2019 Yes     Chronic    AICD (automatic cardioverter/defibrillator) present [Z95.810] 08/13/2019 Yes     Chronic    Other ascites [R18.8] 08/13/2019 Yes      Problems Resolved During this Admission:     1. ESRD (HD-TTS via R UE AV Fistula)- symptomatically stable at present; significant abdominal distention on admission (s/p urgent HD on 9/19/19 w/ net UF: approx 2L); no overt volume overload, significant electrolyte perturbations nor acid-base disturbance    - maintenance HD (duration: 3h; UF Goal: 1-2L as tolerated; Standard 'Bath'; Access: AVF; Qb: 400 ml/min, Qd: 2x BFR; give 12.5-25 gm 25% Albumin w/ HD for BP support) per outpatient schedule    -DAILY renal function panel to document sCr trend, optimize HD electrolyte 'bath' & assess response to therapy    -avoid nephrotoxic agents (NSAIDs, IV contrast dye)    -renally dose all appropriate medications, including antibiotics     2. HTN- clinically stable at present;  BP AT GOAL (82/44) now w/ relative hypotension; no active issues    -HOLD anti-HTN medications & diuretics for now & reassess in am     3. ANEMIA- clinically stable at present; H/H AT GOAL (11.7/38.2); no active issues    -trend daily CBC (H/H) to effect optimal blood-loss surveillance     4. SECONDARY HYPERPARATHYROIDISM (sHPT)- clinically stable at present; no active issues    -continue dietary binder/Vitamin D +/- HD-associated calcimimetic agent (Cinacalcet vs Etelcalcetide)     5. 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 I will follow-up with patient. Please contact us if you have any additional questions.    Armando Cruz III, MD  [facility]  Formerly [facility]   (C)

## 2019-09-24 NOTE — PROGRESS NOTES
[facility]    Cardiology Progress Note    Subjective:  In ICU on HD.  S/p paracentesis yesterday.  Blood pressure very low but asymptomatic.  Diarrhea resolved with imodium     Objective:  Vital Signs (Most Recent)  Temp: (P) 97.7 °F (36.5 °C) (09/24/19 0817)  Pulse: (!) (P) 53 (09/24/19 0817)  Resp: (P) 19 (09/24/19 0817)  BP: (!) 82/44 (09/24/19 0600)  SpO2: (P) 99 % (09/24/19 0817)    Vital Signs Range (Last 24H):  Temp:  [97.5 °F (36.4 °C)-98.6 °F (37 °C)]   Pulse:  [53-67]   Resp:  [12-49]   BP: ()/(28-65)   SpO2:  [86 %-100 %]     I & O (Last 24H):    Intake/Output Summary (Last 24 hours) at 9/24/2019 0910  Last data filed at 9/24/2019 0500  Gross per 24 hour   Intake 940 ml   Output 0 ml   Net 940 ml       Current Diet:     Current Diet Order   Procedures    Diet renal        Allergies:  Review of patient's allergies indicates:   Allergen Reactions    Sulfa (sulfonamide antibiotics) Rash       Meds:  Scheduled Meds:   aspirin  81 mg Oral Daily    calcium acetate  2,668 mg Oral TID WM    chlorhexidine  15 mL Mouth/Throat BID    enoxaparin  30 mg Subcutaneous Daily    mupirocin   Nasal BID     Continuous Infusions:  PRN Meds:sodium chloride 0.9%, acetaminophen, acetaminophen, diphenoxylate-atropine 2.5-0.025 mg, magnesium oxide, magnesium oxide, ondansetron, potassium chloride 10%, potassium chloride 10%, potassium chloride 10%, potassium, sodium phosphates, potassium, sodium phosphates, potassium, sodium phosphates, ramelteon, sodium chloride 0.9%, traMADol    Lab Results :  Recent Results (from the past 24 hour(s))   Comprehensive metabolic panel    Collection Time: 09/24/19  5:05 AM   Result Value Ref Range    Sodium 133 (L) 136 - 145 mmol/L    Potassium 6.1 (H) 3.5 - 5.1 mmol/L    Chloride 96 95 - 110 mmol/L    CO2 25 23 - 29 mmol/L    Glucose 104 70 - 110 mg/dL    BUN, Bld 44 (H) 6 - 20 mg/dL    Creatinine 6.9 (H) 0.5 - 1.4 mg/dL    Calcium 7.8 (L) 8.7 - 10.5 mg/dL    Total Protein  6.5 6.0 - 8.4 g/dL    Albumin 3.0 (L) 3.5 - 5.2 g/dL    Total Bilirubin 1.6 (H) 0.1 - 1.0 mg/dL    Alkaline Phosphatase 118 55 - 135 U/L    AST 15 10 - 40 U/L    ALT 11 10 - 44 U/L    Anion Gap 12 8 - 16 mmol/L    eGFR if African American 9.4 (A) >60 mL/min/1.73 m^2    eGFR if non  8.1 (A) >60 mL/min/1.73 m^2   CBC auto differential    Collection Time: 09/24/19  5:05 AM   Result Value Ref Range    WBC 6.24 3.90 - 12.70 K/uL    RBC 3.92 (L) 4.60 - 6.20 M/uL    Hemoglobin 11.7 (L) 14.0 - 18.0 g/dL    Hematocrit 38.2 (L) 40.0 - 54.0 %    Mean Corpuscular Volume 97 82 - 98 fL    Mean Corpuscular Hemoglobin 29.8 27.0 - 31.0 pg    Mean Corpuscular Hemoglobin Conc 30.6 (L) 32.0 - 36.0 g/dL    RDW 17.0 (H) 11.5 - 14.5 %    Platelets 93 (L) 150 - 350 K/uL    MPV 12.9 9.2 - 12.9 fL    Immature Granulocytes 0.5 0.0 - 0.5 %    Gran # (ANC) 4.4 1.8 - 7.7 K/uL    Immature Grans (Abs) 0.03 0.00 - 0.04 K/uL    Lymph # 0.8 (L) 1.0 - 4.8 K/uL    Mono # 0.8 0.3 - 1.0 K/uL    Eos # 0.1 0.0 - 0.5 K/uL    Baso # 0.14 0.00 - 0.20 K/uL    nRBC 1 (A) 0 /100 WBC    Gran% 70.9 38.0 - 73.0 %    Lymph% 12.2 (L) 18.0 - 48.0 %    Mono% 12.3 4.0 - 15.0 %    Eosinophil% 1.9 0.0 - 8.0 %    Basophil% 2.2 (H) 0.0 - 1.9 %    Differential Method Automated        Diagnostic Results:  Imaging Results          X-Ray Chest AP Portable (Final result)  Result time 09/19/19 08:40:40    Final result by Genevieve Moralez MD (09/19/19 08:40:40)                 Impression:      Cardiomegaly with no confluent infiltrates      Electronically signed by: Genevieve Moralez MD  Date:    09/19/2019  Time:    08:40             Narrative:    EXAMINATION:  XR CHEST AP PORTABLE    CLINICAL HISTORY:  CHF;    FINDINGS:  Portable chest at 07:22 is compared to 08/13/2019 shows normal cardiomediastinal silhouette.    Lungs are clear. Pulmonary vasculature is normal. No acute osseous abnormality.                                Recent Cardiac Rhythm   (if  "applicable)      Physical Exam:  Objective:  General Appearance:  Comfortable and well-appearing.    Vital signs: (most recent): Blood pressure (!) 82/44, pulse (!) (P) 53, temperature (P) 97.7 °F (36.5 °C), temperature source (P) Oral, resp. rate (P) 19, height 5' 6" (1.676 m), weight 98.3 kg (216 lb 11.4 oz), SpO2 (P) 99 %.  Vital signs are normal.  (BP low).    Lungs:  Normal effort and normal respiratory rate.  Breath sounds clear to auscultation.    Heart: Normal rate.  Regular rhythm.  Positive for murmur.    Extremities: There is dependent edema.    Neurological: Patient is alert and oriented to person, place and time.    Skin:  Warm and dry.  There is a rash.        Current Consults:  IP CONSULT TO HOSPITAL MEDICINE  IP CONSULT TO NEPHROLOGY  IP CONSULT TO NEPHROLOGY  IP CONSULT TO CARDIOLOGY  IP CONSULT TO SOCIAL WORK/CASE MANAGEMENT  WOUND CARE CONSULT  IP CONSULT TO CARDIOLOGY    Assessment/Plan:  Assessment:     Hypotension   Diarrhea/vomiting resolved  Ischemic cardiomyopathy. BNP 3144. EF 20% on last echo in  2015. Volume overload on exam.   Chest pain. Resolved. Troponin elevated to 0.58.   ESRD on HD. Followed by Dr. Howell  Hypothyroidism. Subclinical.   Mitral Regurgitation. Moderate.   Tricuspid regurgitation. Mod-severe.   Pulmonary HTN.   DM  History of CVA  CAD s/p stents. Details not available.   ICD in situ. Details not available.   Dyslipidemia   Former smoker. States he quit 1 month ago.   Chronic venous insufficiency.   Cirrhosis of liver. With ascites.   Noncompliance    Plan:    Add midodrine 5mg TID if ok with nephrology   "

## 2019-09-24 NOTE — NURSING
BP 68/32 after 250mL bolus. Pt asymptomatic, currently sitting up in bed eating a sandwich. MD notified, see MAR for new orders.

## 2019-09-24 NOTE — SUBJECTIVE & OBJECTIVE
Interval History:  Currently receiving dialysis. Plans to remove 2 L today. Had paracentesis yesterday and started developing symptoms due to hypotension and therefore was placed in ICU for close monitoring. Continues to have hypotension however asymptomatic. Discussed case with nephrology and emanuel for midodrine. Cardiology has started on midodrine today. Reports feeling well. Denies any chest pain, shortness of breath. Noticed that shortness of breath has improved significantly since admission. Currently breathing on room air. Continue to monitor in ICU due to profound hypotension.    Review of Systems   Constitutional: Negative for chills, fatigue, fever and unexpected weight change.   HENT: Negative for sore throat.    Eyes: Negative for visual disturbance.   Respiratory: Negative for cough, chest tightness and shortness of breath.    Cardiovascular: Negative for chest pain.   Gastrointestinal: Negative for abdominal pain, constipation, diarrhea, nausea and vomiting.   Endocrine: Negative for polyuria.   Genitourinary: Negative for dysuria and hematuria.   Neurological: Negative for headaches.     Objective:     Vital Signs (Most Recent):  Temp: 97.8 °F (36.6 °C) (09/24/19 1515)  Pulse: (!) 57 (09/24/19 1515)  Resp: 16 (09/24/19 1515)  BP: (!) 80/46 (09/24/19 1515)  SpO2: 96 % (09/24/19 1515) Vital Signs (24h Range):  Temp:  [97.7 °F (36.5 °C)-98.6 °F (37 °C)] 97.8 °F (36.6 °C)  Pulse:  [53-68] 57  Resp:  [12-49] 16  SpO2:  [86 %-100 %] 96 %  BP: ()/(28-58) 80/46     Weight: 98.3 kg (216 lb 11.4 oz)  Body mass index is 34.98 kg/m².    Intake/Output Summary (Last 24 hours) at 9/24/2019 1642  Last data filed at 9/24/2019 1141  Gross per 24 hour   Intake 1200 ml   Output 2500 ml   Net -1300 ml      Physical Exam   Constitutional: He is oriented to person, place, and time. He appears well-developed and well-nourished. No distress.   HENT:   Head: Normocephalic and atraumatic.   Right Ear: External ear normal.    Left Ear: External ear normal.   Eyes: Conjunctivae and EOM are normal. Right eye exhibits no discharge. Left eye exhibits no discharge. No scleral icterus.   Neck: Normal range of motion.   Cardiovascular: Normal rate and regular rhythm.   Pulmonary/Chest: Effort normal. No respiratory distress. He has no wheezes. He has rales (Mild rales in lower bases). He exhibits no tenderness.   Abdominal: Soft. He exhibits distension. There is no tenderness.   Musculoskeletal: He exhibits no edema or tenderness.   Neurological: He is alert and oriented to person, place, and time.   Skin: Skin is warm. He is not diaphoretic.   Psychiatric: He has a normal mood and affect. His behavior is normal. Judgment and thought content normal.   Nursing note and vitals reviewed.      Significant Labs:   CBC:   Recent Labs   Lab 09/24/19  0505   WBC 6.24   HGB 11.7*   HCT 38.2*   PLT 93*     CMP:   Recent Labs   Lab 09/24/19  0505 09/24/19  0925   *  --    K 6.1* 4.1   CL 96  --    CO2 25  --      --    BUN 44*  --    CREATININE 6.9*  --    CALCIUM 7.8*  --    PROT 6.5  --    ALBUMIN 3.0*  --    BILITOT 1.6*  --    ALKPHOS 118  --    AST 15  --    ALT 11  --    ANIONGAP 12  --    EGFRNONAA 8.1*  --

## 2019-09-25 PROCEDURE — 97530 THERAPEUTIC ACTIVITIES: CPT

## 2019-09-25 PROCEDURE — 94761 N-INVAS EAR/PLS OXIMETRY MLT: CPT

## 2019-09-25 PROCEDURE — 97164 PT RE-EVAL EST PLAN CARE: CPT

## 2019-09-25 PROCEDURE — 27000221 HC OXYGEN, UP TO 24 HOURS

## 2019-09-25 PROCEDURE — 25000003 PHARM REV CODE 250: Performed by: HOSPITALIST

## 2019-09-25 PROCEDURE — 25000003 PHARM REV CODE 250: Performed by: INTERNAL MEDICINE

## 2019-09-25 PROCEDURE — 25000003 PHARM REV CODE 250: Performed by: FAMILY MEDICINE

## 2019-09-25 PROCEDURE — 63600175 PHARM REV CODE 636 W HCPCS: Performed by: HOSPITALIST

## 2019-09-25 PROCEDURE — 21400001 HC TELEMETRY ROOM

## 2019-09-25 RX ORDER — HYDROXYZINE HYDROCHLORIDE 25 MG/1
50 TABLET, FILM COATED ORAL ONCE
Status: COMPLETED | OUTPATIENT
Start: 2019-09-25 | End: 2019-09-25

## 2019-09-25 RX ADMIN — CHLORHEXIDINE GLUCONATE 15 ML: 1.2 RINSE ORAL at 09:09

## 2019-09-25 RX ADMIN — RAMELTEON 8 MG: 8 TABLET ORAL at 09:09

## 2019-09-25 RX ADMIN — MIDODRINE HYDROCHLORIDE 5 MG: 2.5 TABLET ORAL at 04:09

## 2019-09-25 RX ADMIN — MUPIROCIN: 20 OINTMENT TOPICAL at 09:09

## 2019-09-25 RX ADMIN — ENOXAPARIN SODIUM 30 MG: 100 INJECTION SUBCUTANEOUS at 04:09

## 2019-09-25 RX ADMIN — CALCIUM ACETATE 2668 MG: 667 CAPSULE ORAL at 07:09

## 2019-09-25 RX ADMIN — MIDODRINE HYDROCHLORIDE 5 MG: 2.5 TABLET ORAL at 11:09

## 2019-09-25 RX ADMIN — ASPIRIN 81 MG: 81 TABLET, DELAYED RELEASE ORAL at 09:09

## 2019-09-25 RX ADMIN — CALCIUM ACETATE 2668 MG: 667 CAPSULE ORAL at 11:09

## 2019-09-25 RX ADMIN — HYDROXYZINE HYDROCHLORIDE 50 MG: 25 TABLET, FILM COATED ORAL at 01:09

## 2019-09-25 RX ADMIN — MIDODRINE HYDROCHLORIDE 5 MG: 2.5 TABLET ORAL at 07:09

## 2019-09-25 RX ADMIN — CALCIUM ACETATE 2668 MG: 667 CAPSULE ORAL at 04:09

## 2019-09-25 NOTE — SUBJECTIVE & OBJECTIVE
Interval History:  Feeling well today, breathing comfortably on room air. Working with PT. Currently sitting up in the wheelchair without any acute respiratory distress. BP slightly improved on midodrine. Awaiting GI and cardiac evaluation. Step down to cardio B. Patient reports that he is mostly wheelchair bound. Able to transfer however does not ambulate.    Review of Systems   Constitutional: Negative for chills, fatigue, fever and unexpected weight change.   HENT: Negative for sore throat.    Eyes: Negative for visual disturbance.   Respiratory: Negative for cough, chest tightness and shortness of breath.    Cardiovascular: Negative for chest pain.   Gastrointestinal: Negative for abdominal pain, constipation, diarrhea, nausea and vomiting.   Endocrine: Negative for polyuria.   Genitourinary: Negative for dysuria and hematuria.   Neurological: Negative for headaches.     Objective:     Vital Signs (Most Recent):  Temp: 98.2 °F (36.8 °C) (09/25/19 0830)  Pulse: 69 (09/25/19 1400)  Resp: 16 (09/25/19 1400)  BP: (!) 85/48 (09/25/19 1400)  SpO2: (!) 94 % (09/25/19 1400) Vital Signs (24h Range):  Temp:  [97.8 °F (36.6 °C)-98.5 °F (36.9 °C)] 98.2 °F (36.8 °C)  Pulse:  [56-73] 69  Resp:  [16-57] 16  SpO2:  [85 %-100 %] 94 %  BP: ()/(33-79) 85/48     Weight: 98.3 kg (216 lb 11.4 oz)  Body mass index is 34.98 kg/m².    Intake/Output Summary (Last 24 hours) at 9/25/2019 1439  Last data filed at 9/24/2019 1800  Gross per 24 hour   Intake 490 ml   Output --   Net 490 ml      Physical Exam  Constitutional: He is oriented to person, place, and time. He appears well-developed and well-nourished. No distress.   HENT:   Head: Normocephalic and atraumatic.   Right Ear: External ear normal.   Left Ear: External ear normal.   Eyes: Conjunctivae and EOM are normal. Right eye exhibits no discharge. Left eye exhibits no discharge. No scleral icterus.   Neck: Normal range of motion.   Cardiovascular: Normal rate and regular  rhythm.   Pulmonary/Chest: Effort normal. No respiratory distress. He has no wheezes. He has rales (Mild rales in lower bases). He exhibits no tenderness.   Abdominal: Soft. He exhibits distension. There is no tenderness.   Musculoskeletal: He exhibits no edema or tenderness.   Neurological: He is alert and oriented to person, place, and time.   Skin: Skin is warm. He is not diaphoretic.   Psychiatric: He has a normal mood and affect. His behavior is normal. Judgment and thought content normal.   Nursing note and vitals reviewed.    Significant Labs:   CBC:   Recent Labs   Lab 09/24/19  0505   WBC 6.24   HGB 11.7*   HCT 38.2*   PLT 93*     CMP:   Recent Labs   Lab 09/24/19  0505 09/24/19  0925   *  --    K 6.1* 4.1   CL 96  --    CO2 25  --      --    BUN 44*  --    CREATININE 6.9*  --    CALCIUM 7.8*  --    PROT 6.5  --    ALBUMIN 3.0*  --    BILITOT 1.6*  --    ALKPHOS 118  --    AST 15  --    ALT 11  --    ANIONGAP 12  --    EGFRNONAA 8.1*  --

## 2019-09-25 NOTE — CONSULTS
Chief Complaint:  Cirrhosis, ascites       HPI: Patient is a 54 y.o. male with PMHx cirrhosis ([place]), ESRD on HD, COPD, DM, chronic anemia, defibrillator presents for abd distension. Acute / chronic, intermittent, progressive over past weeks. Has had paracentesis several times with tap yesterday.  Received 25 grams albumin.  bp persistently low.  Has severe chf and right heart failure  No cell count, albumin/protein level to review    Admits to chronic persistent recurrent ascites better with taps.    CHART REVIEW:   Hg 11; Plts 100; TB 2.1  RUQ u/s 8/'19 - large volume ascites  Tap q 2 weeks since 8/19    CARDS REPORT  Ischemic cardiomyopathy. BNP 3144. EF 20% on last echo in  2015. Volume overload on exam.   Chest pain. Resolved. Troponin elevated to 0.58.   ESRD on HD. Followed by Dr. Howell  Hypothyroidism. Subclinical.   Mitral Regurgitation. Moderate.   Tricuspid regurgitation. Mod-severe.   Pulmonary HTN.   DM  History of CVA  CAD s/p stents. Details not available.   ICD in situ. Details not available.   Dyslipidemia   Former smoker. States he quit 1 month ago.   Chronic venous insufficiency.   Cirrhosis of liver. With ascites.   Noncompliance.       Review of Systems:  See hpi    Past Medical History:   Diagnosis Date    Cirrhosis     CKD (chronic kidney disease) 2017    COPD (chronic obstructive pulmonary disease)     Diabetes mellitus     Dialysis patient 2017    Renal disorder        Past Surgical History:   Procedure Laterality Date    AV FISTULA PLACEMENT Right     CARDIAC DEFIBRILLATOR PLACEMENT Left        History reviewed. No pertinent family history.    Social History     Socioeconomic History    Marital status:      Spouse name: Not on file    Number of children: Not on file    Years of education: Not on file    Highest education level: Not on file   Occupational History    Not on file   Social Needs    Financial resource strain: Not on file    Food insecurity:     Worry:  "Not on file     Inability: Not on file    Transportation needs:     Medical: Not on file     Non-medical: Not on file   Tobacco Use    Smoking status: Former Smoker     Last attempt to quit: 2019     Years since quittin.3   Substance and Sexual Activity    Alcohol use: Not Currently     Comment: no longer drinking    Drug use: Never    Sexual activity: Not on file   Lifestyle    Physical activity:     Days per week: Not on file     Minutes per session: Not on file    Stress: Not on file   Relationships    Social connections:     Talks on phone: Not on file     Gets together: Not on file     Attends L[employer]-day service: Not on file     Active member of club or organization: Not on file     Attends meetings of clubs or organizations: Not on file     Relationship status: Not on file   Other Topics Concern    Not on file   Social History Narrative    Not on file       Review of patient's allergies indicates:   Allergen Reactions    Sulfa (sulfonamide antibiotics) Rash       No current facility-administered medications on file prior to encounter.      Current Outpatient Medications on File Prior to Encounter   Medication Sig Dispense Refill    aspirin (ECOTRIN) 81 MG EC tablet Take 1 tablet by mouth once daily.       calcium acetate (PHOSLO) 667 mg capsule Take 2,668 mg by mouth 3 (three) times daily with meals.      loperamide (IMODIUM) 2 mg capsule Take 2 mg by mouth 4 (four) times daily as needed for Diarrhea.      white petrolatum 41 % Oint Apply topically once daily. (Patient taking differently: Apply 1 application topically once daily. TO LEGS)      amiodarone (PACERONE) 200 MG Tab Take 1 tablet by mouth.      bumetanide (BUMEX) 2 MG tablet bumetanide 2 mg tablet   Take 1 tablet every day by oral route.         Objective:  BP (!) 85/48   Pulse 69   Temp 97.8 °F (36.6 °C) (Oral)   Resp 16   Ht 5' 6" (1.676 m)   Wt 98.3 kg (216 lb 11.4 oz)   SpO2 (!) 94%   BMI 34.98 kg/m²   General: ill " appearing  HE: ncat, perrl, eomi  ENT: op pink and moist without lesions or exudates  CV: +s1s2, no mrg, rrr  Resp: ctapb, no wrr  GI: bs active, +fluid shift  Skin: no lesions, no rash  Neuro: cn 2-12 in tact, no focal deficits, no asterixis  Psych: regular rate speech, normal affect    Labs:  Recent Results (from the past 2688 hour(s))   CBC auto differential    Collection Time: 08/07/19  4:48 AM   Result Value Ref Range    WBC 5.97 3.90 - 12.70 K/uL    RBC 3.87 (L) 4.60 - 6.20 M/uL    Hemoglobin 11.6 (L) 14.0 - 18.0 g/dL    Hematocrit 36.8 (L) 40.0 - 54.0 %    Mean Corpuscular Volume 95 82 - 98 fL    Mean Corpuscular Hemoglobin 30.0 27.0 - 31.0 pg    Mean Corpuscular Hemoglobin Conc 31.5 (L) 32.0 - 36.0 g/dL    RDW 15.8 (H) 11.5 - 14.5 %    Platelets 108 (L) 150 - 350 K/uL    MPV 12.1 9.2 - 12.9 fL    Immature Granulocytes 0.3 0.0 - 0.5 %    Gran # (ANC) 4.3 1.8 - 7.7 K/uL    Immature Grans (Abs) 0.02 0.00 - 0.04 K/uL    Lymph # 0.7 (L) 1.0 - 4.8 K/uL    Mono # 0.7 0.3 - 1.0 K/uL    Eos # 0.1 0.0 - 0.5 K/uL    Baso # 0.12 0.00 - 0.20 K/uL    nRBC 0 0 /100 WBC    Gran% 71.9 38.0 - 73.0 %    Lymph% 11.4 (L) 18.0 - 48.0 %    Mono% 12.4 4.0 - 15.0 %    Eosinophil% 2.0 0.0 - 8.0 %    Basophil% 2.0 (H) 0.0 - 1.9 %    Differential Method Automated    Comprehensive metabolic panel    Collection Time: 08/07/19  4:48 AM   Result Value Ref Range    Sodium 136 136 - 145 mmol/L    Potassium 3.6 3.5 - 5.1 mmol/L    Chloride 93 (L) 95 - 110 mmol/L    CO2 26 23 - 29 mmol/L    Glucose 113 (H) 70 - 110 mg/dL    BUN, Bld 51 (H) 6 - 20 mg/dL    Creatinine 8.7 (H) 0.5 - 1.4 mg/dL    Calcium 7.8 (L) 8.7 - 10.5 mg/dL    Total Protein 7.3 6.0 - 8.4 g/dL    Albumin 3.1 (L) 3.5 - 5.2 g/dL    Total Bilirubin 2.4 (H) 0.1 - 1.0 mg/dL    Alkaline Phosphatase 107 55 - 135 U/L    AST 11 10 - 40 U/L    ALT 9 (L) 10 - 44 U/L    Anion Gap 17 (H) 8 - 16 mmol/L    eGFR if African American 7.1 (A) >60 mL/min/1.73 m^2    eGFR if non African American  6.1 (A) >60 mL/min/1.73 m^2   Protime-INR    Collection Time: 08/07/19  4:48 AM   Result Value Ref Range    PT 19.8 (H) 11.7 - 14.0 sec    INR 1.8    Basic metabolic panel    Collection Time: 08/08/19  4:33 AM   Result Value Ref Range    Sodium 139 136 - 145 mmol/L    Potassium 4.0 3.5 - 5.1 mmol/L    Chloride 96 95 - 110 mmol/L    CO2 26 23 - 29 mmol/L    Glucose 135 (H) 70 - 110 mg/dL    BUN, Bld 54 (H) 6 - 20 mg/dL    Creatinine 9.5 (H) 0.5 - 1.4 mg/dL    Calcium 8.0 (L) 8.7 - 10.5 mg/dL    Anion Gap 17 (H) 8 - 16 mmol/L    eGFR if African American 6.4 (A) >60 mL/min/1.73 m^2    eGFR if non African American 5.5 (A) >60 mL/min/1.73 m^2   CBC auto differential    Collection Time: 08/13/19  8:10 AM   Result Value Ref Range    WBC 6.55 3.90 - 12.70 K/uL    RBC 3.82 (L) 4.60 - 6.20 M/uL    Hemoglobin 11.5 (L) 14.0 - 18.0 g/dL    Hematocrit 37.0 (L) 40.0 - 54.0 %    Mean Corpuscular Volume 97 82 - 98 fL    Mean Corpuscular Hemoglobin 30.1 27.0 - 31.0 pg    Mean Corpuscular Hemoglobin Conc 31.1 (L) 32.0 - 36.0 g/dL    RDW 16.1 (H) 11.5 - 14.5 %    Platelets 99 (L) 150 - 350 K/uL    MPV 11.2 9.2 - 12.9 fL    Immature Granulocytes 0.5 0.0 - 0.5 %    Gran # (ANC) 5.2 1.8 - 7.7 K/uL    Immature Grans (Abs) 0.03 0.00 - 0.04 K/uL    Lymph # 0.4 (L) 1.0 - 4.8 K/uL    Mono # 0.7 0.3 - 1.0 K/uL    Eos # 0.2 0.0 - 0.5 K/uL    Baso # 0.10 0.00 - 0.20 K/uL    nRBC 0 0 /100 WBC    Gran% 79.1 (H) 38.0 - 73.0 %    Lymph% 6.7 (L) 18.0 - 48.0 %    Mono% 9.9 4.0 - 15.0 %    Eosinophil% 2.3 0.0 - 8.0 %    Basophil% 1.5 0.0 - 1.9 %    Differential Method Automated    Comprehensive metabolic panel    Collection Time: 08/13/19  8:10 AM   Result Value Ref Range    Sodium 137 136 - 145 mmol/L    Potassium 3.6 3.5 - 5.1 mmol/L    Chloride 93 (L) 95 - 110 mmol/L    CO2 26 23 - 29 mmol/L    Glucose 137 (H) 70 - 110 mg/dL    BUN, Bld 55 (H) 6 - 20 mg/dL    Creatinine 8.0 (H) 0.5 - 1.4 mg/dL    Calcium 8.4 (L) 8.7 - 10.5 mg/dL    Total Protein 7.9  6.0 - 8.4 g/dL    Albumin 3.2 (L) 3.5 - 5.2 g/dL    Total Bilirubin 1.8 (H) 0.1 - 1.0 mg/dL    Alkaline Phosphatase 108 55 - 135 U/L    AST 13 10 - 40 U/L    ALT 9 (L) 10 - 44 U/L    Anion Gap 18 (H) 8 - 16 mmol/L    eGFR if African American 7.8 (A) >60 mL/min/1.73 m^2    eGFR if non  6.8 (A) >60 mL/min/1.73 m^2   B-Type natriuretic peptide (BNP)    Collection Time: 08/13/19  8:10 AM   Result Value Ref Range    BNP 4,300 (H) 0 - 99 pg/mL   Ammonia    Collection Time: 08/13/19  8:10 PM   Result Value Ref Range    Ammonia 37 10 - 50 umol/L   Comprehensive metabolic panel    Collection Time: 08/14/19  5:21 AM   Result Value Ref Range    Sodium 141 136 - 145 mmol/L    Potassium 4.0 3.5 - 5.1 mmol/L    Chloride 98 95 - 110 mmol/L    CO2 23 23 - 29 mmol/L    Glucose 108 70 - 110 mg/dL    BUN, Bld 57 (H) 6 - 20 mg/dL    Creatinine 8.9 (H) 0.5 - 1.4 mg/dL    Calcium 8.4 (L) 8.7 - 10.5 mg/dL    Total Protein 7.0 6.0 - 8.4 g/dL    Albumin 3.1 (L) 3.5 - 5.2 g/dL    Total Bilirubin 2.1 (H) 0.1 - 1.0 mg/dL    Alkaline Phosphatase 99 55 - 135 U/L    AST 11 10 - 40 U/L    ALT 8 (L) 10 - 44 U/L    Anion Gap 20 (H) 8 - 16 mmol/L    eGFR if African American 6.9 (A) >60 mL/min/1.73 m^2    eGFR if non African American 6.0 (A) >60 mL/min/1.73 m^2   CBC auto differential    Collection Time: 08/14/19  5:21 AM   Result Value Ref Range    WBC 5.03 3.90 - 12.70 K/uL    RBC 3.58 (L) 4.60 - 6.20 M/uL    Hemoglobin 10.9 (L) 14.0 - 18.0 g/dL    Hematocrit 34.5 (L) 40.0 - 54.0 %    Mean Corpuscular Volume 96 82 - 98 fL    Mean Corpuscular Hemoglobin 30.4 27.0 - 31.0 pg    Mean Corpuscular Hemoglobin Conc 31.6 (L) 32.0 - 36.0 g/dL    RDW 16.0 (H) 11.5 - 14.5 %    Platelets 95 (L) 150 - 350 K/uL    MPV 11.5 9.2 - 12.9 fL    Immature Granulocytes 0.6 (H) 0.0 - 0.5 %    Gran # (ANC) 3.7 1.8 - 7.7 K/uL    Immature Grans (Abs) 0.03 0.00 - 0.04 K/uL    Lymph # 0.5 (L) 1.0 - 4.8 K/uL    Mono # 0.6 0.3 - 1.0 K/uL    Eos # 0.2 0.0 - 0.5  K/uL    Baso # 0.07 0.00 - 0.20 K/uL    nRBC 0 0 /100 WBC    Gran% 72.8 38.0 - 73.0 %    Lymph% 10.1 (L) 18.0 - 48.0 %    Mono% 11.5 4.0 - 15.0 %    Eosinophil% 3.6 0.0 - 8.0 %    Basophil% 1.4 0.0 - 1.9 %    Differential Method Automated    Blood culture    Collection Time: 08/14/19  5:22 AM   Result Value Ref Range    Blood Culture, Routine No growth after 5 days.    Blood culture    Collection Time: 08/14/19  5:22 AM   Result Value Ref Range    Blood Culture, Routine No growth after 5 days.    POCT glucose    Collection Time: 08/14/19 11:39 AM   Result Value Ref Range    POC Glucose 94 70 - 110   POCT glucose    Collection Time: 08/14/19  5:39 PM   Result Value Ref Range    POC Glucose 97 70 - 110   POCT glucose    Collection Time: 08/14/19  8:43 PM   Result Value Ref Range    POC Glucose 151 (H) 70 - 110   Comprehensive metabolic panel    Collection Time: 08/15/19  4:57 AM   Result Value Ref Range    Sodium 140 136 - 145 mmol/L    Potassium 4.0 3.5 - 5.1 mmol/L    Chloride 96 95 - 110 mmol/L    CO2 25 23 - 29 mmol/L    Glucose 102 70 - 110 mg/dL    BUN, Bld 48 (H) 6 - 20 mg/dL    Creatinine 7.7 (H) 0.5 - 1.4 mg/dL    Calcium 8.7 8.7 - 10.5 mg/dL    Total Protein 7.3 6.0 - 8.4 g/dL    Albumin 3.2 (L) 3.5 - 5.2 g/dL    Total Bilirubin 1.8 (H) 0.1 - 1.0 mg/dL    Alkaline Phosphatase 98 55 - 135 U/L    AST 12 10 - 40 U/L    ALT 7 (L) 10 - 44 U/L    Anion Gap 19 (H) 8 - 16 mmol/L    eGFR if African American 8.2 (A) >60 mL/min/1.73 m^2    eGFR if non  7.1 (A) >60 mL/min/1.73 m^2   CBC auto differential    Collection Time: 08/15/19  4:58 AM   Result Value Ref Range    WBC 4.91 3.90 - 12.70 K/uL    RBC 3.61 (L) 4.60 - 6.20 M/uL    Hemoglobin 10.8 (L) 14.0 - 18.0 g/dL    Hematocrit 34.4 (L) 40.0 - 54.0 %    Mean Corpuscular Volume 95 82 - 98 fL    Mean Corpuscular Hemoglobin 29.9 27.0 - 31.0 pg    Mean Corpuscular Hemoglobin Conc 31.4 (L) 32.0 - 36.0 g/dL    RDW 15.9 (H) 11.5 - 14.5 %    Platelets 91 (L)  150 - 350 K/uL    MPV 12.3 9.2 - 12.9 fL    Immature Granulocytes 0.4 0.0 - 0.5 %    Gran # (ANC) 3.4 1.8 - 7.7 K/uL    Immature Grans (Abs) 0.02 0.00 - 0.04 K/uL    Lymph # 0.6 (L) 1.0 - 4.8 K/uL    Mono # 0.7 0.3 - 1.0 K/uL    Eos # 0.2 0.0 - 0.5 K/uL    Baso # 0.09 0.00 - 0.20 K/uL    nRBC 0 0 /100 WBC    Gran% 68.8 38.0 - 73.0 %    Lymph% 11.6 (L) 18.0 - 48.0 %    Mono% 14.3 4.0 - 15.0 %    Eosinophil% 3.1 0.0 - 8.0 %    Basophil% 1.8 0.0 - 1.9 %    Differential Method Automated    Protime-INR    Collection Time: 08/15/19  4:58 AM   Result Value Ref Range    PT 22.3 (H) 11.7 - 14.0 sec    INR 2.0    APTT    Collection Time: 08/15/19  4:58 AM   Result Value Ref Range    aPTT 37.7 (H) 26.2 - 34.7 sec   POCT glucose    Collection Time: 08/15/19  7:05 AM   Result Value Ref Range    POC Glucose 109 70 - 110   POCT glucose    Collection Time: 08/15/19 11:43 AM   Result Value Ref Range    POC Glucose 93 70 - 110   POCT glucose    Collection Time: 08/15/19  4:04 PM   Result Value Ref Range    POC Glucose 141 (H) 70 - 110   POCT glucose    Collection Time: 08/15/19  8:51 PM   Result Value Ref Range    POC Glucose 166 (H) 70 - 110   Protime-INR    Collection Time: 08/16/19  4:24 AM   Result Value Ref Range    PT 19.2 (H) 11.7 - 14.0 sec    INR 1.7    Comprehensive metabolic panel    Collection Time: 08/16/19  4:24 AM   Result Value Ref Range    Sodium 137 136 - 145 mmol/L    Potassium 4.3 3.5 - 5.1 mmol/L    Chloride 94 (L) 95 - 110 mmol/L    CO2 26 23 - 29 mmol/L    Glucose 113 (H) 70 - 110 mg/dL    BUN, Bld 55 (H) 6 - 20 mg/dL    Creatinine 8.8 (H) 0.5 - 1.4 mg/dL    Calcium 8.2 (L) 8.7 - 10.5 mg/dL    Total Protein 7.0 6.0 - 8.4 g/dL    Albumin 3.1 (L) 3.5 - 5.2 g/dL    Total Bilirubin 1.8 (H) 0.1 - 1.0 mg/dL    Alkaline Phosphatase 93 55 - 135 U/L    AST 11 10 - 40 U/L    ALT 8 (L) 10 - 44 U/L    Anion Gap 17 (H) 8 - 16 mmol/L    eGFR if African American 7.0 (A) >60 mL/min/1.73 m^2    eGFR if non African American 6.0  (A) >60 mL/min/1.73 m^2   Magnesium    Collection Time: 08/16/19  4:24 AM   Result Value Ref Range    Magnesium 2.3 1.6 - 2.6 mg/dL   Phosphorus    Collection Time: 08/16/19  4:24 AM   Result Value Ref Range    Phosphorus 10.7 (HH) 2.7 - 4.5 mg/dL   Protime-INR    Collection Time: 08/16/19  4:24 AM   Result Value Ref Range    PT 19.2 (H) 11.7 - 14.0 sec    INR 1.7    CBC auto differential    Collection Time: 08/16/19  4:25 AM   Result Value Ref Range    WBC 5.38 3.90 - 12.70 K/uL    RBC 3.69 (L) 4.60 - 6.20 M/uL    Hemoglobin 11.3 (L) 14.0 - 18.0 g/dL    Hematocrit 35.5 (L) 40.0 - 54.0 %    Mean Corpuscular Volume 96 82 - 98 fL    Mean Corpuscular Hemoglobin 30.6 27.0 - 31.0 pg    Mean Corpuscular Hemoglobin Conc 31.8 (L) 32.0 - 36.0 g/dL    RDW 16.0 (H) 11.5 - 14.5 %    Platelets 104 (L) 150 - 350 K/uL    MPV 11.8 9.2 - 12.9 fL    Immature Granulocytes 0.2 0.0 - 0.5 %    Gran # (ANC) 3.7 1.8 - 7.7 K/uL    Immature Grans (Abs) 0.01 0.00 - 0.04 K/uL    Lymph # 0.6 (L) 1.0 - 4.8 K/uL    Mono # 0.9 0.3 - 1.0 K/uL    Eos # 0.1 0.0 - 0.5 K/uL    Baso # 0.09 0.00 - 0.20 K/uL    nRBC 0 0 /100 WBC    Gran% 68.2 38.0 - 73.0 %    Lymph% 10.4 (L) 18.0 - 48.0 %    Mono% 17.1 (H) 4.0 - 15.0 %    Eosinophil% 2.4 0.0 - 8.0 %    Basophil% 1.7 0.0 - 1.9 %    Differential Method Automated    Hepatitis B core antibody, total    Collection Time: 08/16/19  8:03 AM   Result Value Ref Range    Hep B Core Total Ab Negative Negative   Hepatitis B surface antibody    Collection Time: 08/16/19  8:03 AM   Result Value Ref Range    Hep B S Ab Reactive    Hepatitis B surface antigen    Collection Time: 08/16/19  8:03 AM   Result Value Ref Range    Hepatitis B Surface Ag Negative Negative   POCT glucose    Collection Time: 08/16/19  1:19 PM   Result Value Ref Range    POC Glucose 98 70 - 110   CBC auto differential    Collection Time: 08/17/19  5:12 AM   Result Value Ref Range    WBC 4.87 3.90 - 12.70 K/uL    RBC 3.70 (L) 4.60 - 6.20 M/uL     Hemoglobin 11.2 (L) 14.0 - 18.0 g/dL    Hematocrit 35.5 (L) 40.0 - 54.0 %    Mean Corpuscular Volume 96 82 - 98 fL    Mean Corpuscular Hemoglobin 30.3 27.0 - 31.0 pg    Mean Corpuscular Hemoglobin Conc 31.5 (L) 32.0 - 36.0 g/dL    RDW 16.1 (H) 11.5 - 14.5 %    Platelets 92 (L) 150 - 350 K/uL    MPV 12.0 9.2 - 12.9 fL    Immature Granulocytes 0.4 0.0 - 0.5 %    Gran # (ANC) 3.4 1.8 - 7.7 K/uL    Immature Grans (Abs) 0.02 0.00 - 0.04 K/uL    Lymph # 0.6 (L) 1.0 - 4.8 K/uL    Mono # 0.6 0.3 - 1.0 K/uL    Eos # 0.2 0.0 - 0.5 K/uL    Baso # 0.09 0.00 - 0.20 K/uL    nRBC 0 0 /100 WBC    Gran% 70.1 38.0 - 73.0 %    Lymph% 11.7 (L) 18.0 - 48.0 %    Mono% 12.9 4.0 - 15.0 %    Eosinophil% 3.1 0.0 - 8.0 %    Basophil% 1.8 0.0 - 1.9 %    Differential Method Automated    Comprehensive metabolic panel    Collection Time: 08/17/19  5:12 AM   Result Value Ref Range    Sodium 140 136 - 145 mmol/L    Potassium 4.2 3.5 - 5.1 mmol/L    Chloride 98 95 - 110 mmol/L    CO2 24 23 - 29 mmol/L    Glucose 126 (H) 70 - 110 mg/dL    BUN, Bld 41 (H) 6 - 20 mg/dL    Creatinine 7.2 (H) 0.5 - 1.4 mg/dL    Calcium 8.6 (L) 8.7 - 10.5 mg/dL    Total Protein 6.9 6.0 - 8.4 g/dL    Albumin 3.5 3.5 - 5.2 g/dL    Total Bilirubin 1.5 (H) 0.1 - 1.0 mg/dL    Alkaline Phosphatase 90 55 - 135 U/L    AST 11 10 - 40 U/L    ALT 8 (L) 10 - 44 U/L    Anion Gap 18 (H) 8 - 16 mmol/L    eGFR if African American 8.9 (A) >60 mL/min/1.73 m^2    eGFR if non  7.7 (A) >60 mL/min/1.73 m^2   Magnesium    Collection Time: 08/17/19  5:12 AM   Result Value Ref Range    Magnesium 2.4 1.6 - 2.6 mg/dL   Phosphorus    Collection Time: 08/17/19  5:12 AM   Result Value Ref Range    Phosphorus 8.5 (H) 2.7 - 4.5 mg/dL   Protime-INR    Collection Time: 08/17/19  5:12 AM   Result Value Ref Range    PT 19.6 (H) 11.7 - 14.0 sec    INR 1.7    POCT glucose    Collection Time: 08/17/19  6:24 AM   Result Value Ref Range    POC Glucose 105 70 - 110   POCT glucose    Collection  Time: 08/17/19 11:56 AM   Result Value Ref Range    POC Glucose 121 (H) 70 - 110   POCT glucose    Collection Time: 08/17/19  5:23 PM   Result Value Ref Range    POC Glucose 106 70 - 110   POCT glucose    Collection Time: 08/17/19  8:25 PM   Result Value Ref Range    POC Glucose 151 (H) 70 - 110   POCT glucose    Collection Time: 08/18/19  5:40 AM   Result Value Ref Range    POC Glucose 96 70 - 110   CBC auto differential    Collection Time: 08/18/19  5:50 AM   Result Value Ref Range    WBC 5.73 3.90 - 12.70 K/uL    RBC 3.90 (L) 4.60 - 6.20 M/uL    Hemoglobin 11.8 (L) 14.0 - 18.0 g/dL    Hematocrit 37.2 (L) 40.0 - 54.0 %    Mean Corpuscular Volume 95 82 - 98 fL    Mean Corpuscular Hemoglobin 30.3 27.0 - 31.0 pg    Mean Corpuscular Hemoglobin Conc 31.7 (L) 32.0 - 36.0 g/dL    RDW 16.3 (H) 11.5 - 14.5 %    Platelets 103 (L) 150 - 350 K/uL    MPV 12.2 9.2 - 12.9 fL    Immature Granulocytes 0.5 0.0 - 0.5 %    Gran # (ANC) 4.1 1.8 - 7.7 K/uL    Immature Grans (Abs) 0.03 0.00 - 0.04 K/uL    Lymph # 0.6 (L) 1.0 - 4.8 K/uL    Mono # 0.8 0.3 - 1.0 K/uL    Eos # 0.2 0.0 - 0.5 K/uL    Baso # 0.10 0.00 - 0.20 K/uL    nRBC 0 0 /100 WBC    Gran% 71.5 38.0 - 73.0 %    Lymph% 10.1 (L) 18.0 - 48.0 %    Mono% 13.4 4.0 - 15.0 %    Eosinophil% 2.8 0.0 - 8.0 %    Basophil% 1.7 0.0 - 1.9 %    Differential Method Automated    Comprehensive metabolic panel    Collection Time: 08/18/19  5:50 AM   Result Value Ref Range    Sodium 138 136 - 145 mmol/L    Potassium 4.7 3.5 - 5.1 mmol/L    Chloride 98 95 - 110 mmol/L    CO2 23 23 - 29 mmol/L    Glucose 107 70 - 110 mg/dL    BUN, Bld 52 (H) 6 - 20 mg/dL    Creatinine 8.4 (H) 0.5 - 1.4 mg/dL    Calcium 8.4 (L) 8.7 - 10.5 mg/dL    Total Protein 6.8 6.0 - 8.4 g/dL    Albumin 3.1 (L) 3.5 - 5.2 g/dL    Total Bilirubin 1.5 (H) 0.1 - 1.0 mg/dL    Alkaline Phosphatase 95 55 - 135 U/L    AST 13 10 - 40 U/L    ALT 9 (L) 10 - 44 U/L    Anion Gap 17 (H) 8 - 16 mmol/L    eGFR if African American 7.4 (A) >60  mL/min/1.73 m^2    eGFR if non African American 6.4 (A) >60 mL/min/1.73 m^2   Magnesium    Collection Time: 08/18/19  5:50 AM   Result Value Ref Range    Magnesium 2.4 1.6 - 2.6 mg/dL   Phosphorus    Collection Time: 08/18/19  5:50 AM   Result Value Ref Range    Phosphorus 9.8 (HH) 2.7 - 4.5 mg/dL   Protime-INR    Collection Time: 08/18/19  5:50 AM   Result Value Ref Range    PT 19.2 (H) 11.7 - 14.0 sec    INR 1.7    Calcium, ionized    Collection Time: 08/18/19  5:50 AM   Result Value Ref Range    Calcium, Ion 0.98 (L) 1.06 - 1.42 mmol/L   POCT glucose    Collection Time: 08/18/19 11:52 AM   Result Value Ref Range    POC Glucose 156 (H) 70 - 110   POCT TB Skin Test Read    Collection Time: 08/18/19  3:25 PM   Result Value Ref Range    TB Induration 48 - 72 hr read 0 mm    TB Skin Test Second Read Negative    POCT glucose    Collection Time: 08/18/19  4:01 PM   Result Value Ref Range    POC Glucose 117 (H) 70 - 110   POCT glucose    Collection Time: 08/18/19  9:46 PM   Result Value Ref Range    POC Glucose 95 70 - 110   CBC auto differential    Collection Time: 08/19/19  5:04 AM   Result Value Ref Range    WBC 6.25 3.90 - 12.70 K/uL    RBC 4.07 (L) 4.60 - 6.20 M/uL    Hemoglobin 12.2 (L) 14.0 - 18.0 g/dL    Hematocrit 38.5 (L) 40.0 - 54.0 %    Mean Corpuscular Volume 95 82 - 98 fL    Mean Corpuscular Hemoglobin 30.0 27.0 - 31.0 pg    Mean Corpuscular Hemoglobin Conc 31.7 (L) 32.0 - 36.0 g/dL    RDW 16.1 (H) 11.5 - 14.5 %    Platelets 117 (L) 150 - 350 K/uL    MPV 11.9 9.2 - 12.9 fL    Immature Granulocytes 0.3 0.0 - 0.5 %    Gran # (ANC) 4.4 1.8 - 7.7 K/uL    Immature Grans (Abs) 0.02 0.00 - 0.04 K/uL    Lymph # 0.7 (L) 1.0 - 4.8 K/uL    Mono # 0.8 0.3 - 1.0 K/uL    Eos # 0.2 0.0 - 0.5 K/uL    Baso # 0.15 0.00 - 0.20 K/uL    nRBC 0 0 /100 WBC    Gran% 70.6 38.0 - 73.0 %    Lymph% 11.0 (L) 18.0 - 48.0 %    Mono% 12.8 4.0 - 15.0 %    Eosinophil% 2.9 0.0 - 8.0 %    Basophil% 2.4 (H) 0.0 - 1.9 %    Differential Method  Automated    Magnesium    Collection Time: 08/19/19  5:04 AM   Result Value Ref Range    Magnesium 2.6 1.6 - 2.6 mg/dL   Phosphorus    Collection Time: 08/19/19  5:04 AM   Result Value Ref Range    Phosphorus 10.1 (HH) 2.7 - 4.5 mg/dL   Protime-INR    Collection Time: 08/19/19  5:04 AM   Result Value Ref Range    PT 18.8 (H) 11.7 - 14.0 sec    INR 1.6    Comprehensive metabolic panel    Collection Time: 08/19/19  5:04 AM   Result Value Ref Range    Sodium 134 (L) 136 - 145 mmol/L    Potassium 4.8 3.5 - 5.1 mmol/L    Chloride 95 95 - 110 mmol/L    CO2 20 (L) 23 - 29 mmol/L    Glucose 110 70 - 110 mg/dL    BUN, Bld 59 (H) 6 - 20 mg/dL    Creatinine 9.1 (H) 0.5 - 1.4 mg/dL    Calcium 8.6 (L) 8.7 - 10.5 mg/dL    Total Protein 7.0 6.0 - 8.4 g/dL    Albumin 3.1 (L) 3.5 - 5.2 g/dL    Total Bilirubin 1.4 (H) 0.1 - 1.0 mg/dL    Alkaline Phosphatase 105 55 - 135 U/L    AST 13 10 - 40 U/L    ALT 10 10 - 44 U/L    Anion Gap 19 (H) 8 - 16 mmol/L    eGFR if African American 6.7 (A) >60 mL/min/1.73 m^2    eGFR if non African American 5.8 (A) >60 mL/min/1.73 m^2   POCT glucose    Collection Time: 08/19/19  6:15 AM   Result Value Ref Range    POC Glucose 117 (H) 70 - 110   POCT glucose    Collection Time: 08/19/19  4:07 PM   Result Value Ref Range    POC Glucose 167 (H) 70 - 110   Renal function panel    Collection Time: 08/19/19  4:21 PM   Result Value Ref Range    Glucose 135 (H) 70 - 110 mg/dL    Sodium 135 (L) 136 - 145 mmol/L    Potassium 4.0 3.5 - 5.1 mmol/L    Chloride 93 (L) 95 - 110 mmol/L    CO2 26 23 - 29 mmol/L    BUN, Bld 39 (H) 6 - 20 mg/dL    Calcium 8.3 (L) 8.7 - 10.5 mg/dL    Creatinine 7.3 (H) 0.5 - 1.4 mg/dL    Albumin 3.2 (L) 3.5 - 5.2 g/dL    Phosphorus 7.7 (H) 2.7 - 4.5 mg/dL    eGFR if African American 8.8 (A) >60 mL/min/1.73 m^2    eGFR if non  7.6 (A) >60 mL/min/1.73 m^2    Anion Gap 16 8 - 16 mmol/L   Stool culture    Collection Time: 08/19/19  7:52 PM   Result Value Ref Range    Stool  Culture       No Salmonella,Shigella,Aeromonas,Vibrio,Campylobacter or     Stool Culture E coli [id number]:[id number] isolated.    WBC, Stool    Collection Time: 08/19/19  7:52 PM   Result Value Ref Range    Stool WBC No neutrophils seen No neutrophils seen   Stool Exam-Ova,Cysts,Parasites    Collection Time: 08/19/19  7:52 PM   Result Value Ref Range    Stool Exam-Ova,Cysts,Parasites No ova or parasites seen    POCT glucose    Collection Time: 08/19/19  9:53 PM   Result Value Ref Range    POC Glucose 149 (H) 70 - 110   CBC auto differential    Collection Time: 08/20/19  4:53 AM   Result Value Ref Range    WBC 6.44 3.90 - 12.70 K/uL    RBC 3.76 (L) 4.60 - 6.20 M/uL    Hemoglobin 11.4 (L) 14.0 - 18.0 g/dL    Hematocrit 35.9 (L) 40.0 - 54.0 %    Mean Corpuscular Volume 96 82 - 98 fL    Mean Corpuscular Hemoglobin 30.3 27.0 - 31.0 pg    Mean Corpuscular Hemoglobin Conc 31.8 (L) 32.0 - 36.0 g/dL    RDW 16.1 (H) 11.5 - 14.5 %    Platelets 100 (L) 150 - 350 K/uL    MPV 12.1 9.2 - 12.9 fL    Immature Granulocytes 0.3 0.0 - 0.5 %    Gran # (ANC) 4.6 1.8 - 7.7 K/uL    Immature Grans (Abs) 0.02 0.00 - 0.04 K/uL    Lymph # 0.7 (L) 1.0 - 4.8 K/uL    Mono # 0.8 0.3 - 1.0 K/uL    Eos # 0.2 0.0 - 0.5 K/uL    Baso # 0.11 0.00 - 0.20 K/uL    nRBC 0 0 /100 WBC    Gran% 71.1 38.0 - 73.0 %    Lymph% 10.9 (L) 18.0 - 48.0 %    Mono% 13.0 4.0 - 15.0 %    Eosinophil% 3.0 0.0 - 8.0 %    Basophil% 1.7 0.0 - 1.9 %    Differential Method Automated    Magnesium    Collection Time: 08/20/19  4:53 AM   Result Value Ref Range    Magnesium 2.6 1.6 - 2.6 mg/dL   Phosphorus    Collection Time: 08/20/19  4:53 AM   Result Value Ref Range    Phosphorus 8.7 (H) 2.7 - 4.5 mg/dL   Protime-INR    Collection Time: 08/20/19  4:53 AM   Result Value Ref Range    PT 18.7 (H) 11.7 - 14.0 sec    INR 1.6    CBC Without Differential    Collection Time: 08/20/19  4:53 AM   Result Value Ref Range    WBC 6.44 3.90 - 12.70 K/uL    RBC 3.76 (L) 4.60 - 6.20 M/uL    Hemoglobin 11.4 (L)  14.0 - 18.0 g/dL    Hematocrit 35.9 (L) 40.0 - 54.0 %    Mean Corpuscular Volume 96 82 - 98 fL    Mean Corpuscular Hemoglobin 30.3 27.0 - 31.0 pg    Mean Corpuscular Hemoglobin Conc 31.8 (L) 32.0 - 36.0 g/dL    RDW 16.1 (H) 11.5 - 14.5 %    Platelets 100 (L) 150 - 350 K/uL    MPV 12.1 9.2 - 12.9 fL   POCT glucose    Collection Time: 08/20/19  6:08 AM   Result Value Ref Range    POC Glucose 140 (H) 70 - 110   POCT glucose    Collection Time: 08/20/19 11:43 AM   Result Value Ref Range    POC Glucose 139 (H) 70 - 110   POCT glucose    Collection Time: 08/20/19  4:27 PM   Result Value Ref Range    POC Glucose 154 (H) 70 - 110   Renal function panel    Collection Time: 08/20/19  4:29 PM   Result Value Ref Range    Glucose 111 (H) 70 - 110 mg/dL    Sodium 136 136 - 145 mmol/L    Potassium 4.6 3.5 - 5.1 mmol/L    Chloride 92 (L) 95 - 110 mmol/L    CO2 25 23 - 29 mmol/L    BUN, Bld 50 (H) 6 - 20 mg/dL    Calcium 8.7 8.7 - 10.5 mg/dL    Creatinine 8.1 (H) 0.5 - 1.4 mg/dL    Albumin 3.3 (L) 3.5 - 5.2 g/dL    Phosphorus 9.1 (HH) 2.7 - 4.5 mg/dL    eGFR if  7.7 (A) >60 mL/min/1.73 m^2    eGFR if non  6.7 (A) >60 mL/min/1.73 m^2    Anion Gap 19 (H) 8 - 16 mmol/L   POCT glucose    Collection Time: 08/20/19  9:10 PM   Result Value Ref Range    POC Glucose 149 (H) 70 - 110   CBC auto differential    Collection Time: 08/21/19  4:46 AM   Result Value Ref Range    WBC 7.44 3.90 - 12.70 K/uL    RBC 4.00 (L) 4.60 - 6.20 M/uL    Hemoglobin 12.2 (L) 14.0 - 18.0 g/dL    Hematocrit 38.1 (L) 40.0 - 54.0 %    Mean Corpuscular Volume 95 82 - 98 fL    Mean Corpuscular Hemoglobin 30.5 27.0 - 31.0 pg    Mean Corpuscular Hemoglobin Conc 32.0 32.0 - 36.0 g/dL    RDW 16.0 (H) 11.5 - 14.5 %    Platelets 96 (L) 150 - 350 K/uL    MPV 11.9 9.2 - 12.9 fL    Immature Granulocytes 0.3 0.0 - 0.5 %    Gran # (ANC) 5.5 1.8 - 7.7 K/uL    Immature Grans (Abs) 0.02 0.00 - 0.04 K/uL    Lymph # 0.7 (L) 1.0 - 4.8 K/uL    Mono # 0.9  0.3 - 1.0 K/uL    Eos # 0.1 0.0 - 0.5 K/uL    Baso # 0.13 0.00 - 0.20 K/uL    nRBC 0 0 /100 WBC    Gran% 74.0 (H) 38.0 - 73.0 %    Lymph% 9.8 (L) 18.0 - 48.0 %    Mono% 12.5 4.0 - 15.0 %    Eosinophil% 1.7 0.0 - 8.0 %    Basophil% 1.7 0.0 - 1.9 %    Differential Method Automated    Magnesium    Collection Time: 08/21/19  4:46 AM   Result Value Ref Range    Magnesium 2.6 1.6 - 2.6 mg/dL   Phosphorus    Collection Time: 08/21/19  4:46 AM   Result Value Ref Range    Phosphorus 10.0 (HH) 2.7 - 4.5 mg/dL   Protime-INR    Collection Time: 08/21/19  4:46 AM   Result Value Ref Range    PT 18.2 (H) 11.7 - 14.0 sec    INR 1.6    CBC Without Differential    Collection Time: 08/21/19  4:46 AM   Result Value Ref Range    WBC 7.44 3.90 - 12.70 K/uL    RBC 4.00 (L) 4.60 - 6.20 M/uL    Hemoglobin 12.2 (L) 14.0 - 18.0 g/dL    Hematocrit 38.1 (L) 40.0 - 54.0 %    Mean Corpuscular Volume 95 82 - 98 fL    Mean Corpuscular Hemoglobin 30.5 27.0 - 31.0 pg    Mean Corpuscular Hemoglobin Conc 32.0 32.0 - 36.0 g/dL    RDW 16.0 (H) 11.5 - 14.5 %    Platelets 96 (L) 150 - 350 K/uL    MPV 11.9 9.2 - 12.9 fL   POCT glucose    Collection Time: 08/21/19  6:18 AM   Result Value Ref Range    POC Glucose 115 (H) 70 - 110   POCT glucose    Collection Time: 08/21/19  1:29 PM   Result Value Ref Range    POC Glucose 128 (H) 70 - 110   Renal function panel    Collection Time: 08/21/19  4:10 PM   Result Value Ref Range    Glucose 139 (H) 70 - 110 mg/dL    Sodium 135 (L) 136 - 145 mmol/L    Potassium 4.0 3.5 - 5.1 mmol/L    Chloride 95 95 - 110 mmol/L    CO2 26 23 - 29 mmol/L    BUN, Bld 33 (H) 6 - 20 mg/dL    Calcium 8.7 8.7 - 10.5 mg/dL    Creatinine 6.1 (H) 0.5 - 1.4 mg/dL    Albumin 3.5 3.5 - 5.2 g/dL    Phosphorus 7.1 (H) 2.7 - 4.5 mg/dL    eGFR if African American 10.9 (A) >60 mL/min/1.73 m^2    eGFR if non African American 9.4 (A) >60 mL/min/1.73 m^2    Anion Gap 14 8 - 16 mmol/L   POCT glucose    Collection Time: 08/21/19  5:42 PM   Result Value Ref  Range    POC Glucose 193 (H) 70 - 110   POCT glucose    Collection Time: 08/21/19  8:27 PM   Result Value Ref Range    POC Glucose 183 (H) 70 - 110   POCT glucose    Collection Time: 08/21/19  9:01 PM   Result Value Ref Range    POC Glucose 182 (H) 70 - 110   CBC auto differential    Collection Time: 08/22/19  4:51 AM   Result Value Ref Range    WBC 7.05 3.90 - 12.70 K/uL    RBC 3.91 (L) 4.60 - 6.20 M/uL    Hemoglobin 11.9 (L) 14.0 - 18.0 g/dL    Hematocrit 38.8 (L) 40.0 - 54.0 %    Mean Corpuscular Volume 99 (H) 82 - 98 fL    Mean Corpuscular Hemoglobin 30.4 27.0 - 31.0 pg    Mean Corpuscular Hemoglobin Conc 30.7 (L) 32.0 - 36.0 g/dL    RDW 16.3 (H) 11.5 - 14.5 %    Platelets 90 (L) 150 - 350 K/uL    MPV 12.1 9.2 - 12.9 fL    Immature Granulocytes 0.4 0.0 - 0.5 %    Gran # (ANC) 5.0 1.8 - 7.7 K/uL    Immature Grans (Abs) 0.03 0.00 - 0.04 K/uL    Lymph # 0.8 (L) 1.0 - 4.8 K/uL    Mono # 0.8 0.3 - 1.0 K/uL    Eos # 0.2 0.0 - 0.5 K/uL    Baso # 0.15 0.00 - 0.20 K/uL    nRBC 0 0 /100 WBC    Gran% 71.5 38.0 - 73.0 %    Lymph% 11.6 (L) 18.0 - 48.0 %    Mono% 11.8 4.0 - 15.0 %    Eosinophil% 2.6 0.0 - 8.0 %    Basophil% 2.1 (H) 0.0 - 1.9 %    Differential Method Automated    Magnesium    Collection Time: 08/22/19  4:51 AM   Result Value Ref Range    Magnesium 2.5 1.6 - 2.6 mg/dL   Phosphorus    Collection Time: 08/22/19  4:51 AM   Result Value Ref Range    Phosphorus 8.5 (H) 2.7 - 4.5 mg/dL   Protime-INR    Collection Time: 08/22/19  4:51 AM   Result Value Ref Range    PT 17.9 (H) 11.7 - 14.0 sec    INR 1.5    POCT glucose    Collection Time: 08/22/19  5:59 AM   Result Value Ref Range    POC Glucose 120 (H) 70 - 110   POCT glucose    Collection Time: 08/22/19 10:56 AM   Result Value Ref Range    POC Glucose 119 (H) 70 - 110   POCT glucose    Collection Time: 08/22/19  5:07 PM   Result Value Ref Range    POC Glucose 118 (H) 70 - 110   POCT glucose    Collection Time: 08/22/19  8:32 PM   Result Value Ref Range    POC  Glucose 107 70 - 110   POCT glucose    Collection Time: 08/23/19  6:07 AM   Result Value Ref Range    POC Glucose 114 (H) 70 - 110   CBC auto differential    Collection Time: 08/23/19  8:50 AM   Result Value Ref Range    WBC 6.95 3.90 - 12.70 K/uL    RBC 4.37 (L) 4.60 - 6.20 M/uL    Hemoglobin 12.8 (L) 14.0 - 18.0 g/dL    Hematocrit 42.1 40.0 - 54.0 %    Mean Corpuscular Volume 96 82 - 98 fL    Mean Corpuscular Hemoglobin 29.3 27.0 - 31.0 pg    Mean Corpuscular Hemoglobin Conc 30.4 (L) 32.0 - 36.0 g/dL    RDW 16.2 (H) 11.5 - 14.5 %    Platelets 87 (L) 150 - 350 K/uL    MPV 12.4 9.2 - 12.9 fL    Immature Granulocytes 0.6 (H) 0.0 - 0.5 %    Gran # (ANC) 5.1 1.8 - 7.7 K/uL    Immature Grans (Abs) 0.04 0.00 - 0.04 K/uL    Lymph # 0.8 (L) 1.0 - 4.8 K/uL    Mono # 0.8 0.3 - 1.0 K/uL    Eos # 0.1 0.0 - 0.5 K/uL    Baso # 0.13 0.00 - 0.20 K/uL    nRBC 0 0 /100 WBC    Gran% 72.7 38.0 - 73.0 %    Lymph% 10.8 (L) 18.0 - 48.0 %    Mono% 12.1 4.0 - 15.0 %    Eosinophil% 1.9 0.0 - 8.0 %    Basophil% 1.9 0.0 - 1.9 %    Differential Method Automated    Comprehensive metabolic panel    Collection Time: 08/23/19  8:50 AM   Result Value Ref Range    Sodium 130 (L) 136 - 145 mmol/L    Potassium 5.4 (H) 3.5 - 5.1 mmol/L    Chloride 88 (L) 95 - 110 mmol/L    CO2 20 (L) 23 - 29 mmol/L    Glucose 133 (H) 70 - 110 mg/dL    BUN, Bld 49 (H) 6 - 20 mg/dL    Creatinine 8.6 (H) 0.5 - 1.4 mg/dL    Calcium 8.6 (L) 8.7 - 10.5 mg/dL    Total Protein 7.6 6.0 - 8.4 g/dL    Albumin 3.3 (L) 3.5 - 5.2 g/dL    Total Bilirubin 2.0 (H) 0.1 - 1.0 mg/dL    Alkaline Phosphatase 129 55 - 135 U/L    AST 15 10 - 40 U/L    ALT 12 10 - 44 U/L    Anion Gap 22 (H) 8 - 16 mmol/L    eGFR if African American 7.2 (A) >60 mL/min/1.73 m^2    eGFR if non  6.2 (A) >60 mL/min/1.73 m^2   POCT glucose    Collection Time: 08/23/19  4:08 PM   Result Value Ref Range    POC Glucose 106 70 - 110   POCT glucose    Collection Time: 08/23/19  8:25 PM   Result Value Ref  Range    POC Glucose 139 (H) 70 - 110   Renal function panel    Collection Time: 08/24/19  5:56 AM   Result Value Ref Range    Glucose 121 (H) 70 - 110 mg/dL    Sodium 133 (L) 136 - 145 mmol/L    Potassium 4.7 3.5 - 5.1 mmol/L    Chloride 92 (L) 95 - 110 mmol/L    CO2 24 23 - 29 mmol/L    BUN, Bld 39 (H) 6 - 20 mg/dL    Calcium 8.6 (L) 8.7 - 10.5 mg/dL    Creatinine 6.8 (H) 0.5 - 1.4 mg/dL    Albumin 3.5 3.5 - 5.2 g/dL    Phosphorus 8.2 (H) 2.7 - 4.5 mg/dL    eGFR if African American 9.5 (A) >60 mL/min/1.73 m^2    eGFR if non  8.3 (A) >60 mL/min/1.73 m^2    Anion Gap 17 (H) 8 - 16 mmol/L   CBC auto differential    Collection Time: 08/24/19  5:56 AM   Result Value Ref Range    WBC 7.14 3.90 - 12.70 K/uL    RBC 4.13 (L) 4.60 - 6.20 M/uL    Hemoglobin 12.4 (L) 14.0 - 18.0 g/dL    Hematocrit 40.1 40.0 - 54.0 %    Mean Corpuscular Volume 97 82 - 98 fL    Mean Corpuscular Hemoglobin 30.0 27.0 - 31.0 pg    Mean Corpuscular Hemoglobin Conc 30.9 (L) 32.0 - 36.0 g/dL    RDW 16.5 (H) 11.5 - 14.5 %    Platelets 92 (L) 150 - 350 K/uL    MPV 12.1 9.2 - 12.9 fL    Immature Granulocytes 0.4 0.0 - 0.5 %    Gran # (ANC) 5.5 1.8 - 7.7 K/uL    Immature Grans (Abs) 0.03 0.00 - 0.04 K/uL    Lymph # 0.7 (L) 1.0 - 4.8 K/uL    Mono # 0.7 0.3 - 1.0 K/uL    Eos # 0.1 0.0 - 0.5 K/uL    Baso # 0.13 0.00 - 0.20 K/uL    nRBC 0 0 /100 WBC    Gran% 76.3 (H) 38.0 - 73.0 %    Lymph% 9.4 (L) 18.0 - 48.0 %    Mono% 10.1 4.0 - 15.0 %    Eosinophil% 2.0 0.0 - 8.0 %    Basophil% 1.8 0.0 - 1.9 %    Differential Method Automated    POCT glucose    Collection Time: 08/24/19  6:26 AM   Result Value Ref Range    POC Glucose 164 (H) 70 - 110   POCT glucose    Collection Time: 08/24/19 11:15 AM   Result Value Ref Range    POC Glucose 167 (H) 70 - 110   POCT glucose    Collection Time: 08/24/19  4:12 PM   Result Value Ref Range    POC Glucose 160 (H) 70 - 110   POCT glucose    Collection Time: 08/24/19  9:00 PM   Result Value Ref Range    POC  Glucose 165 (H) 70 - 110   POCT glucose    Collection Time: 08/25/19  5:48 AM   Result Value Ref Range    POC Glucose 110 70 - 110   Basic metabolic panel    Collection Time: 08/25/19  6:09 AM   Result Value Ref Range    Sodium 128 (L) 136 - 145 mmol/L    Potassium 4.7 3.5 - 5.1 mmol/L    Chloride 88 (L) 95 - 110 mmol/L    CO2 24 23 - 29 mmol/L    Glucose 110 70 - 110 mg/dL    BUN, Bld 50 (H) 6 - 20 mg/dL    Creatinine 7.9 (H) 0.5 - 1.4 mg/dL    Calcium 8.6 (L) 8.7 - 10.5 mg/dL    Anion Gap 16 8 - 16 mmol/L    eGFR if African American 8.0 (A) >60 mL/min/1.73 m^2    eGFR if non African American 6.9 (A) >60 mL/min/1.73 m^2   Magnesium    Collection Time: 08/25/19  6:09 AM   Result Value Ref Range    Magnesium 2.6 1.6 - 2.6 mg/dL   POCT glucose    Collection Time: 08/25/19  7:27 AM   Result Value Ref Range    POC Glucose 120 (H) 70 - 110   POCT glucose    Collection Time: 08/25/19 11:37 AM   Result Value Ref Range    POC Glucose 162 (H) 70 - 110   Renal function panel    Collection Time: 08/25/19  3:21 PM   Result Value Ref Range    Glucose 126 (H) 70 - 110 mg/dL    Sodium 132 (L) 136 - 145 mmol/L    Potassium 4.5 3.5 - 5.1 mmol/L    Chloride 93 (L) 95 - 110 mmol/L    CO2 24 23 - 29 mmol/L    BUN, Bld 56 (H) 6 - 20 mg/dL    Calcium 8.0 (L) 8.7 - 10.5 mg/dL    Creatinine 8.7 (H) 0.5 - 1.4 mg/dL    Albumin 3.2 (L) 3.5 - 5.2 g/dL    Phosphorus 9.0 (HH) 2.7 - 4.5 mg/dL    eGFR if  7.1 (A) >60 mL/min/1.73 m^2    eGFR if non  6.1 (A) >60 mL/min/1.73 m^2    Anion Gap 15 8 - 16 mmol/L   POCT glucose    Collection Time: 08/25/19  4:29 PM   Result Value Ref Range    POC Glucose 99 70 - 110   POCT glucose    Collection Time: 08/25/19  8:43 PM   Result Value Ref Range    POC Glucose 190 (H) 70 - 110   Renal function panel    Collection Time: 08/26/19  5:27 AM   Result Value Ref Range    Glucose 133 (H) 70 - 110 mg/dL    Sodium 132 (L) 136 - 145 mmol/L    Potassium 5.3 (H) 3.5 - 5.1 mmol/L     Chloride 91 (L) 95 - 110 mmol/L    CO2 22 (L) 23 - 29 mmol/L    BUN, Bld 59 (H) 6 - 20 mg/dL    Calcium 8.5 (L) 8.7 - 10.5 mg/dL    Creatinine 9.1 (H) 0.5 - 1.4 mg/dL    Albumin 3.3 (L) 3.5 - 5.2 g/dL    Phosphorus 9.5 (HH) 2.7 - 4.5 mg/dL    eGFR if African American 6.7 (A) >60 mL/min/1.73 m^2    eGFR if non African American 5.8 (A) >60 mL/min/1.73 m^2    Anion Gap 19 (H) 8 - 16 mmol/L   CBC auto differential    Collection Time: 08/26/19  5:27 AM   Result Value Ref Range    WBC 8.66 3.90 - 12.70 K/uL    RBC 3.95 (L) 4.60 - 6.20 M/uL    Hemoglobin 12.0 (L) 14.0 - 18.0 g/dL    Hematocrit 37.1 (L) 40.0 - 54.0 %    Mean Corpuscular Volume 94 82 - 98 fL    Mean Corpuscular Hemoglobin 30.4 27.0 - 31.0 pg    Mean Corpuscular Hemoglobin Conc 32.3 32.0 - 36.0 g/dL    RDW 16.2 (H) 11.5 - 14.5 %    Platelets 99 (L) 150 - 350 K/uL    MPV 12.7 9.2 - 12.9 fL    Immature Granulocytes 0.6 (H) 0.0 - 0.5 %    Gran # (ANC) 6.5 1.8 - 7.7 K/uL    Immature Grans (Abs) 0.05 (H) 0.00 - 0.04 K/uL    Lymph # 0.7 (L) 1.0 - 4.8 K/uL    Mono # 1.1 (H) 0.3 - 1.0 K/uL    Eos # 0.1 0.0 - 0.5 K/uL    Baso # 0.12 0.00 - 0.20 K/uL    nRBC 0 0 /100 WBC    Gran% 75.5 (H) 38.0 - 73.0 %    Lymph% 8.1 (L) 18.0 - 48.0 %    Mono% 12.8 4.0 - 15.0 %    Eosinophil% 1.6 0.0 - 8.0 %    Basophil% 1.4 0.0 - 1.9 %    Differential Method Automated    CBC Without Differential    Collection Time: 08/26/19  5:27 AM   Result Value Ref Range    WBC 8.66 3.90 - 12.70 K/uL    RBC 3.95 (L) 4.60 - 6.20 M/uL    Hemoglobin 12.0 (L) 14.0 - 18.0 g/dL    Hematocrit 37.1 (L) 40.0 - 54.0 %    Mean Corpuscular Volume 94 82 - 98 fL    Mean Corpuscular Hemoglobin 30.4 27.0 - 31.0 pg    Mean Corpuscular Hemoglobin Conc 32.3 32.0 - 36.0 g/dL    RDW 16.2 (H) 11.5 - 14.5 %    Platelets 99 (L) 150 - 350 K/uL    MPV 12.7 9.2 - 12.9 fL   POCT glucose    Collection Time: 08/26/19  6:42 AM   Result Value Ref Range    POC Glucose 153 (H) 70 - 110   POCT glucose    Collection Time: 08/26/19   4:48 PM   Result Value Ref Range    POC Glucose 144 (H) 70 - 110   POCT glucose    Collection Time: 08/26/19  9:39 PM   Result Value Ref Range    POC Glucose 134 (H) 70 - 110   CBC Without Differential    Collection Time: 08/27/19  4:50 AM   Result Value Ref Range    WBC 7.05 3.90 - 12.70 K/uL    RBC 3.89 (L) 4.60 - 6.20 M/uL    Hemoglobin 11.8 (L) 14.0 - 18.0 g/dL    Hematocrit 36.4 (L) 40.0 - 54.0 %    Mean Corpuscular Volume 94 82 - 98 fL    Mean Corpuscular Hemoglobin 30.3 27.0 - 31.0 pg    Mean Corpuscular Hemoglobin Conc 32.4 32.0 - 36.0 g/dL    RDW 16.8 (H) 11.5 - 14.5 %    Platelets 85 (L) 150 - 350 K/uL    MPV 12.1 9.2 - 12.9 fL   Basic metabolic panel    Collection Time: 08/27/19  4:50 AM   Result Value Ref Range    Sodium 135 (L) 136 - 145 mmol/L    Potassium 5.1 3.5 - 5.1 mmol/L    Chloride 95 95 - 110 mmol/L    CO2 23 23 - 29 mmol/L    Glucose 118 (H) 70 - 110 mg/dL    BUN, Bld 43 (H) 6 - 20 mg/dL    Creatinine 7.0 (H) 0.5 - 1.4 mg/dL    Calcium 8.6 (L) 8.7 - 10.5 mg/dL    Anion Gap 17 (H) 8 - 16 mmol/L    eGFR if African American 9.2 (A) >60 mL/min/1.73 m^2    eGFR if non  8.0 (A) >60 mL/min/1.73 m^2   POCT glucose    Collection Time: 08/27/19  6:11 AM   Result Value Ref Range    POC Glucose 119 (H) 70 - 110   POCT glucose    Collection Time: 08/27/19 11:26 AM   Result Value Ref Range    POC Glucose 100 70 - 110   POCT glucose    Collection Time: 08/27/19  4:01 PM   Result Value Ref Range    POC Glucose 107 70 - 110   POCT glucose    Collection Time: 08/27/19  8:17 PM   Result Value Ref Range    POC Glucose 107 70 - 110   CBC auto differential    Collection Time: 08/28/19  5:07 AM   Result Value Ref Range    WBC 7.86 3.90 - 12.70 K/uL    RBC 4.13 (L) 4.60 - 6.20 M/uL    Hemoglobin 12.6 (L) 14.0 - 18.0 g/dL    Hematocrit 39.1 (L) 40.0 - 54.0 %    Mean Corpuscular Volume 95 82 - 98 fL    Mean Corpuscular Hemoglobin 30.5 27.0 - 31.0 pg    Mean Corpuscular Hemoglobin Conc 32.2 32.0 - 36.0  g/dL    RDW 16.9 (H) 11.5 - 14.5 %    Platelets 110 (L) 150 - 350 K/uL    MPV 12.4 9.2 - 12.9 fL    Immature Granulocytes 0.4 0.0 - 0.5 %    Gran # (ANC) 5.9 1.8 - 7.7 K/uL    Immature Grans (Abs) 0.03 0.00 - 0.04 K/uL    Lymph # 0.7 (L) 1.0 - 4.8 K/uL    Mono # 1.0 0.3 - 1.0 K/uL    Eos # 0.1 0.0 - 0.5 K/uL    Baso # 0.12 0.00 - 0.20 K/uL    nRBC 1 (A) 0 /100 WBC    Gran% 74.5 (H) 38.0 - 73.0 %    Lymph% 9.3 (L) 18.0 - 48.0 %    Mono% 12.8 4.0 - 15.0 %    Eosinophil% 1.5 0.0 - 8.0 %    Basophil% 1.5 0.0 - 1.9 %    Differential Method Automated    Comprehensive metabolic panel    Collection Time: 08/28/19  5:07 AM   Result Value Ref Range    Sodium 134 (L) 136 - 145 mmol/L    Potassium 5.5 (H) 3.5 - 5.1 mmol/L    Chloride 93 (L) 95 - 110 mmol/L    CO2 22 (L) 23 - 29 mmol/L    Glucose 100 70 - 110 mg/dL    BUN, Bld 55 (H) 6 - 20 mg/dL    Creatinine 8.3 (H) 0.5 - 1.4 mg/dL    Calcium 8.9 8.7 - 10.5 mg/dL    Total Protein 7.2 6.0 - 8.4 g/dL    Albumin 3.3 (L) 3.5 - 5.2 g/dL    Total Bilirubin 2.1 (H) 0.1 - 1.0 mg/dL    Alkaline Phosphatase 129 55 - 135 U/L    AST 14 10 - 40 U/L    ALT 11 10 - 44 U/L    Anion Gap 19 (H) 8 - 16 mmol/L    eGFR if African American 7.5 (A) >60 mL/min/1.73 m^2    eGFR if non African American 6.5 (A) >60 mL/min/1.73 m^2   Protime-INR    Collection Time: 08/28/19  9:32 AM   Result Value Ref Range    PT 19.8 (H) 11.7 - 14.0 sec    INR 1.8    APTT    Collection Time: 08/28/19  9:32 AM   Result Value Ref Range    aPTT 40.3 (H) 26.2 - 34.7 sec   POCT glucose    Collection Time: 08/28/19 11:15 AM   Result Value Ref Range    POC Glucose 103 70 - 110   POCT glucose    Collection Time: 08/28/19  8:19 PM   Result Value Ref Range    POC Glucose 83 70 - 110   CBC Without Differential    Collection Time: 08/29/19  4:44 AM   Result Value Ref Range    WBC 6.60 3.90 - 12.70 K/uL    RBC 3.83 (L) 4.60 - 6.20 M/uL    Hemoglobin 11.6 (L) 14.0 - 18.0 g/dL    Hematocrit 36.2 (L) 40.0 - 54.0 %    Mean Corpuscular  Volume 95 82 - 98 fL    Mean Corpuscular Hemoglobin 30.3 27.0 - 31.0 pg    Mean Corpuscular Hemoglobin Conc 32.0 32.0 - 36.0 g/dL    RDW 17.0 (H) 11.5 - 14.5 %    Platelets 92 (L) 150 - 350 K/uL    MPV 11.3 9.2 - 12.9 fL   Comprehensive metabolic panel    Collection Time: 08/29/19  4:44 AM   Result Value Ref Range    Sodium 137 136 - 145 mmol/L    Potassium 4.6 3.5 - 5.1 mmol/L    Chloride 96 95 - 110 mmol/L    CO2 24 23 - 29 mmol/L    Glucose 92 70 - 110 mg/dL    BUN, Bld 39 (H) 6 - 20 mg/dL    Creatinine 6.3 (H) 0.5 - 1.4 mg/dL    Calcium 8.8 8.7 - 10.5 mg/dL    Total Protein 7.0 6.0 - 8.4 g/dL    Albumin 3.1 (L) 3.5 - 5.2 g/dL    Total Bilirubin 1.7 (H) 0.1 - 1.0 mg/dL    Alkaline Phosphatase 121 55 - 135 U/L    AST 17 10 - 40 U/L    ALT 11 10 - 44 U/L    Anion Gap 17 (H) 8 - 16 mmol/L    eGFR if African American 10.5 (A) >60 mL/min/1.73 m^2    eGFR if non  9.1 (A) >60 mL/min/1.73 m^2   POCT glucose    Collection Time: 08/29/19  5:44 AM   Result Value Ref Range    POC Glucose 88 70 - 110   Protime-INR    Collection Time: 08/29/19  8:38 AM   Result Value Ref Range    PT 18.3 (H) 11.7 - 14.0 sec    INR 1.6    POCT glucose    Collection Time: 08/29/19 12:02 PM   Result Value Ref Range    POC Glucose 170 (H) 70 - 110   POCT glucose    Collection Time: 08/29/19  4:39 PM   Result Value Ref Range    POC Glucose 171 (H) 70 - 110   POCT glucose    Collection Time: 08/29/19  8:08 PM   Result Value Ref Range    POC Glucose 154 (H) 70 - 110   Basic metabolic panel    Collection Time: 08/30/19  5:01 AM   Result Value Ref Range    Sodium 135 (L) 136 - 145 mmol/L    Potassium 4.8 3.5 - 5.1 mmol/L    Chloride 90 (L) 95 - 110 mmol/L    CO2 24 23 - 29 mmol/L    Glucose 146 (H) 70 - 110 mg/dL    BUN, Bld 50 (H) 6 - 20 mg/dL    Creatinine 7.9 (H) 0.5 - 1.4 mg/dL    Calcium 8.0 (L) 8.7 - 10.5 mg/dL    Anion Gap 21 (H) 8 - 16 mmol/L    eGFR if African American 8.0 (A) >60 mL/min/1.73 m^2    eGFR if non African American  6.9 (A) >60 mL/min/1.73 m^2   CBC Without Differential    Collection Time: 08/30/19  5:01 AM   Result Value Ref Range    WBC 6.60 3.90 - 12.70 K/uL    RBC 4.01 (L) 4.60 - 6.20 M/uL    Hemoglobin 12.2 (L) 14.0 - 18.0 g/dL    Hematocrit 38.0 (L) 40.0 - 54.0 %    Mean Corpuscular Volume 95 82 - 98 fL    Mean Corpuscular Hemoglobin 30.4 27.0 - 31.0 pg    Mean Corpuscular Hemoglobin Conc 32.1 32.0 - 36.0 g/dL    RDW 16.8 (H) 11.5 - 14.5 %    Platelets 97 (L) 150 - 350 K/uL    MPV 11.9 9.2 - 12.9 fL   POCT glucose    Collection Time: 08/30/19  6:46 AM   Result Value Ref Range    POC Glucose 200 (H) 70 - 110   POCT glucose    Collection Time: 08/30/19 11:31 AM   Result Value Ref Range    POC Glucose 112 (H) 70 - 110   POCT glucose    Collection Time: 08/30/19  5:24 PM   Result Value Ref Range    POC Glucose 115 (H) 70 - 110   CBC auto differential    Collection Time: 09/19/19  7:22 AM   Result Value Ref Range    WBC 6.74 3.90 - 12.70 K/uL    RBC 3.75 (L) 4.60 - 6.20 M/uL    Hemoglobin 11.4 (L) 14.0 - 18.0 g/dL    Hematocrit 36.6 (L) 40.0 - 54.0 %    Mean Corpuscular Volume 98 82 - 98 fL    Mean Corpuscular Hemoglobin 30.4 27.0 - 31.0 pg    Mean Corpuscular Hemoglobin Conc 31.1 (L) 32.0 - 36.0 g/dL    RDW 17.4 (H) 11.5 - 14.5 %    Platelets 137 (L) 150 - 350 K/uL    MPV 11.6 9.2 - 12.9 fL    Immature Granulocytes 0.4 0.0 - 0.5 %    Gran # (ANC) 4.8 1.8 - 7.7 K/uL    Immature Grans (Abs) 0.03 0.00 - 0.04 K/uL    Lymph # 0.7 (L) 1.0 - 4.8 K/uL    Mono # 1.0 0.3 - 1.0 K/uL    Eos # 0.2 0.0 - 0.5 K/uL    Baso # 0.10 0.00 - 0.20 K/uL    nRBC 0 0 /100 WBC    Gran% 71.5 38.0 - 73.0 %    Lymph% 10.1 (L) 18.0 - 48.0 %    Mono% 14.1 4.0 - 15.0 %    Eosinophil% 2.4 0.0 - 8.0 %    Basophil% 1.5 0.0 - 1.9 %    Differential Method Automated    Comprehensive metabolic panel    Collection Time: 09/19/19  7:22 AM   Result Value Ref Range    Sodium 140 136 - 145 mmol/L    Potassium 4.9 3.5 - 5.1 mmol/L    Chloride 93 (L) 95 - 110 mmol/L     CO2 31 (H) 23 - 29 mmol/L    Glucose 128 (H) 70 - 110 mg/dL    BUN, Bld 50 (H) 6 - 20 mg/dL    Creatinine 7.1 (H) 0.5 - 1.4 mg/dL    Calcium 7.9 (L) 8.7 - 10.5 mg/dL    Total Protein 7.7 6.0 - 8.4 g/dL    Albumin 3.1 (L) 3.5 - 5.2 g/dL    Total Bilirubin 1.6 (H) 0.1 - 1.0 mg/dL    Alkaline Phosphatase 130 55 - 135 U/L    AST 19 10 - 40 U/L    ALT 11 10 - 44 U/L    Anion Gap 16 8 - 16 mmol/L    eGFR if African American 9.1 (A) >60 mL/min/1.73 m^2    eGFR if non  7.8 (A) >60 mL/min/1.73 m^2   Troponin I    Collection Time: 09/19/19  7:22 AM   Result Value Ref Range    Troponin I 0.049 (H) 0.020 - 0.040 ng/mL   Brain natriuretic peptide    Collection Time: 09/19/19  7:22 AM   Result Value Ref Range    BNP 3,144 (H) 0 - 99 pg/mL   Protime-INR    Collection Time: 09/19/19  7:22 AM   Result Value Ref Range    PT 17.4 (H) 11.7 - 14.0 sec    INR 1.5    PT/INR    Collection Time: 09/19/19 11:26 AM   Result Value Ref Range    PT 17.8 (H) 11.7 - 14.0 sec    INR 1.5    PTT    Collection Time: 09/19/19 11:26 AM   Result Value Ref Range    aPTT 40.0 (H) 26.2 - 34.7 sec   TSH    Collection Time: 09/19/19 11:26 AM   Result Value Ref Range    TSH 10.860 (H) 0.340 - 5.600 uIU/mL   Troponin I    Collection Time: 09/19/19 11:26 AM   Result Value Ref Range    Troponin I 0.048 (H) 0.020 - 0.040 ng/mL   T4, free    Collection Time: 09/19/19 11:26 AM   Result Value Ref Range    Free T4 0.91 0.71 - 1.51 ng/dL   Echo Color Flow Doppler? Yes; Bubble Contrast? No    Collection Time: 09/19/19  2:19 PM   Result Value Ref Range    BSA 2.18 m2   Basic Metabolic Panel (BMP)    Collection Time: 09/20/19  3:39 AM   Result Value Ref Range    Sodium 138 136 - 145 mmol/L    Potassium 4.8 3.5 - 5.1 mmol/L    Chloride 98 95 - 110 mmol/L    CO2 27 23 - 29 mmol/L    Glucose 123 (H) 70 - 110 mg/dL    BUN, Bld 41 (H) 6 - 20 mg/dL    Creatinine 6.4 (H) 0.5 - 1.4 mg/dL    Calcium 8.0 (L) 8.7 - 10.5 mg/dL    Anion Gap 13 8 - 16 mmol/L    eGFR if   10.3 (A) >60 mL/min/1.73 m^2    eGFR if non African American 8.9 (A) >60 mL/min/1.73 m^2   CBC Without Differential    Collection Time: 09/20/19  3:39 AM   Result Value Ref Range    WBC 6.09 3.90 - 12.70 K/uL    RBC 3.44 (L) 4.60 - 6.20 M/uL    Hemoglobin 10.4 (L) 14.0 - 18.0 g/dL    Hematocrit 33.7 (L) 40.0 - 54.0 %    Mean Corpuscular Volume 98 82 - 98 fL    Mean Corpuscular Hemoglobin 30.2 27.0 - 31.0 pg    Mean Corpuscular Hemoglobin Conc 30.9 (L) 32.0 - 36.0 g/dL    RDW 17.4 (H) 11.5 - 14.5 %    Platelets 122 (L) 150 - 350 K/uL    MPV 11.9 9.2 - 12.9 fL   Troponin I    Collection Time: 09/20/19  9:10 AM   Result Value Ref Range    Troponin I 0.044 (H) 0.020 - 0.040 ng/mL   Troponin I    Collection Time: 09/20/19  3:13 PM   Result Value Ref Range    Troponin I 0.038 0.020 - 0.040 ng/mL   Basic metabolic panel    Collection Time: 09/21/19  4:22 AM   Result Value Ref Range    Sodium 133 (L) 136 - 145 mmol/L    Potassium 4.4 3.5 - 5.1 mmol/L    Chloride 96 95 - 110 mmol/L    CO2 28 23 - 29 mmol/L    Glucose 109 70 - 110 mg/dL    BUN, Bld 33 (H) 6 - 20 mg/dL    Creatinine 5.3 (H) 0.5 - 1.4 mg/dL    Calcium 8.1 (L) 8.7 - 10.5 mg/dL    Anion Gap 9 8 - 16 mmol/L    eGFR if African American 12.9 (A) >60 mL/min/1.73 m^2    eGFR if non  11.2 (A) >60 mL/min/1.73 m^2   POCT glucose    Collection Time: 09/21/19  5:59 AM   Result Value Ref Range    POC Glucose 99 70 - 110   POCT glucose    Collection Time: 09/21/19  4:16 PM   Result Value Ref Range    POC Glucose 158 (H) 70 - 110   Basic metabolic panel    Collection Time: 09/22/19  4:31 AM   Result Value Ref Range    Sodium 135 (L) 136 - 145 mmol/L    Potassium 4.8 3.5 - 5.1 mmol/L    Chloride 96 95 - 110 mmol/L    CO2 28 23 - 29 mmol/L    Glucose 95 70 - 110 mg/dL    BUN, Bld 28 (H) 6 - 20 mg/dL    Creatinine 4.7 (H) 0.5 - 1.4 mg/dL    Calcium 8.2 (L) 8.7 - 10.5 mg/dL    Anion Gap 11 8 - 16 mmol/L    eGFR if African American 14.9 (A) >60  mL/min/1.73 m^2    eGFR if non African American 12.9 (A) >60 mL/min/1.73 m^2   Comprehensive metabolic panel    Collection Time: 09/24/19  5:05 AM   Result Value Ref Range    Sodium 133 (L) 136 - 145 mmol/L    Potassium 6.1 (H) 3.5 - 5.1 mmol/L    Chloride 96 95 - 110 mmol/L    CO2 25 23 - 29 mmol/L    Glucose 104 70 - 110 mg/dL    BUN, Bld 44 (H) 6 - 20 mg/dL    Creatinine 6.9 (H) 0.5 - 1.4 mg/dL    Calcium 7.8 (L) 8.7 - 10.5 mg/dL    Total Protein 6.5 6.0 - 8.4 g/dL    Albumin 3.0 (L) 3.5 - 5.2 g/dL    Total Bilirubin 1.6 (H) 0.1 - 1.0 mg/dL    Alkaline Phosphatase 118 55 - 135 U/L    AST 15 10 - 40 U/L    ALT 11 10 - 44 U/L    Anion Gap 12 8 - 16 mmol/L    eGFR if African American 9.4 (A) >60 mL/min/1.73 m^2    eGFR if non  8.1 (A) >60 mL/min/1.73 m^2   CBC auto differential    Collection Time: 09/24/19  5:05 AM   Result Value Ref Range    WBC 6.24 3.90 - 12.70 K/uL    RBC 3.92 (L) 4.60 - 6.20 M/uL    Hemoglobin 11.7 (L) 14.0 - 18.0 g/dL    Hematocrit 38.2 (L) 40.0 - 54.0 %    Mean Corpuscular Volume 97 82 - 98 fL    Mean Corpuscular Hemoglobin 29.8 27.0 - 31.0 pg    Mean Corpuscular Hemoglobin Conc 30.6 (L) 32.0 - 36.0 g/dL    RDW 17.0 (H) 11.5 - 14.5 %    Platelets 93 (L) 150 - 350 K/uL    MPV 12.9 9.2 - 12.9 fL    Immature Granulocytes 0.5 0.0 - 0.5 %    Gran # (ANC) 4.4 1.8 - 7.7 K/uL    Immature Grans (Abs) 0.03 0.00 - 0.04 K/uL    Lymph # 0.8 (L) 1.0 - 4.8 K/uL    Mono # 0.8 0.3 - 1.0 K/uL    Eos # 0.1 0.0 - 0.5 K/uL    Baso # 0.14 0.00 - 0.20 K/uL    nRBC 1 (A) 0 /100 WBC    Gran% 70.9 38.0 - 73.0 %    Lymph% 12.2 (L) 18.0 - 48.0 %    Mono% 12.3 4.0 - 15.0 %    Eosinophil% 1.9 0.0 - 8.0 %    Basophil% 2.2 (H) 0.0 - 1.9 %    Differential Method Automated    Potassium    Collection Time: 09/24/19  9:25 AM   Result Value Ref Range    Potassium 4.1 3.5 - 5.1 mmol/L            Assessment:  Recurrent ascites related to cirrhosis and likely cardiogenic ascites    Plan:  Recommend testing  peritoneal fluid for the following  Cell count and differential, cytology, Total protein, albumin   If total protein > 2.5 consistent with cardiac ascites  If SAAG count > 1.1 consistent with portal hypertension  Pt needs to follow up closely with his established GI physician (dewayne)  Recommend rifaximin 550 mg po bid     Unfortunately his fluid from 9/23 was discarded.  Please send the above when pt gets another paracentesis in the near future    If hypotension remains a problem I recommend checking AM cortisol    Call with questions

## 2019-09-25 NOTE — NURSING
Wound Care provided. Right lower leg wound has good granulated tissue. Improvement from yesterdays assessment. No slough present. Cleansed with surgical scrub/Normal saline. Medi honey applied. Non adherent dressing placed and wrapped with kerlex.     Left lower extremity wound cleansed. Good granulation. Medihoney applied non adherent dressing applied and wrapped with kerlex.

## 2019-09-25 NOTE — PT/OT/SLP RE-EVAL
"Physical Therapy Re-evaluation    Patient Name:  Tre Cali   MRN:  [id number]    Recommendations:     Discharge Recommendations:  home health OT, home health PT   Discharge Equipment Recommendations: none   Barriers to discharge: None    Assessment:     Tre Cali is a 54 y.o. male admitted with a medical diagnosis of Acute on chronic right-sided congestive heart failure.  He presents with the following impairments/functional limitations:  weakness, impaired functional mobilty, impaired endurance, gait instability, impaired balance, impaired self care skills, decreased coordination, decreased safety awareness, impaired coordination. Pt s/p transfer to ICU for severe HoTN after paracentesis and now on new medication for this. Pt states no mobilization since 9/22/19 after change in medical status and t/f to ICU. Pt with decline in function during re-evaluation compared to initial evaluation. Pt eager for participation and denies symptoms although BP is critically low. RN and MD clear pt for treatment and OOB. BP 75/37 in supine prior to treatment; 84/39 sitting up in w/c after working with PT.    Rehab Prognosis:  good; patient would benefit from acute skilled PT services to address these deficits and reach maximum level of function.      Recent Surgery: * No surgery found *      Plan:     During this hospitalization, patient to be seen 5 x/week to address the above listed problems via therapeutic activities, gait training, therapeutic exercises, wheelchair management/training  · Plan of Care Expires:  10/25/19   Plan of Care Reviewed with: patient    Subjective     Communicated with RN and Dr. Koroma prior to session.  Patient found supine with blood pressure cuff, pulse ox (continuous), telemetry, oxygen, bed alarm upon PT entry to room, agreeable to evaluation.      Chief Complaint: Pt motivated and agreeable for OOB to chair; "don't show me where the exit door is."   Patient comments/goals: eager for " discharge home  Pain/Comfort:  · Pain Rating 1: 0/10    Patients cultural, spiritual, [employer] conflicts given the current situation: no      Objective:     Patient found with: blood pressure cuff, pulse ox (continuous), telemetry, oxygen, bed alarm     General Precautions: Standard, fall   Orthopedic Precautions:N/A   Braces:       Exams:  · Cognitive Exam:  Patient is oriented to Person, Place, Time and Situation  · RLE ROM: WFL  · RLE Strength: WFL  · LLE ROM: WFL  · LLE Strength: WFL    Functional Mobility:  · Bed Mobility:     · Sit to Supine: minimum assistance  · Transfers:     · Bed to Chair: minimum assistance with  no AD  using  Squat Pivot  · Gait: NT; pt uses w/c at home at baseline; performs transfers only and does not ambulate  · Balance: good in sitting; fair during squat pivot transfer    AM-PAC 6 CLICK MOBILITY  Total Score:15       Therapeutic Activities and Exercises:   bed mobility, transfer training, PT educated pt on importance of out of bed to chair and functional mobility to negate negative effects of prolonged bed rest. Will progress activity as tolerated by patient; At this time patient only able to tolerate positional changes and transfers due to overall deconditioning.     Patient left up in chair with all lines intact, call button in reach and RN notified.    GOALS:   Multidisciplinary Problems     Physical Therapy Goals        Problem: Physical Therapy Goal    Goal Priority Disciplines Outcome Goal Variances Interventions   Physical Therapy Goal     PT, PT/OT Ongoing, Progressing     Description:  Goals to be met by: 10/25/2019     Re-eval performed on 19 s/p transfer to ICU; Reinstate initial Eval goals. See below    Patient will increase functional independence with mobility by performin. Supine to sit with Modified Towner  2. Sit to supine with Modified Towner  3. Bed to chair transfer with Supervision using Rolling Walker  4. Sit to stand transfer with  Supervision using Rolling Walker.                       History:     Past Medical History:   Diagnosis Date    Cirrhosis     CKD (chronic kidney disease) 2017    COPD (chronic obstructive pulmonary disease)     Diabetes mellitus     Dialysis patient 2017    Renal disorder        Past Surgical History:   Procedure Laterality Date    AV FISTULA PLACEMENT Right     CARDIAC DEFIBRILLATOR PLACEMENT Left        Time Tracking:     PT Received On: 09/25/19  PT Start Time: 0946     PT Stop Time: 1002  PT Total Time (min): 16 min     Billable Minutes: Re-eval 8 and Therapeutic Activity 8      Marlin Kramer, PT  09/25/2019

## 2019-09-25 NOTE — PROGRESS NOTES
Critical access hospital Medicine  Progress Note    Patient Name: Tre Cali  MRN: [id number]  Patient Class: IP- Inpatient   Admission Date: 9/19/2019  Length of Stay: 2 days  Attending Physician: Hong Mercer MD  Primary Care Provider: Primary Doctor No        Subjective:     Principal Problem:Acute on chronic right-sided congestive heart failure    Interval History:  Feeling well today, breathing comfortably on room air. Working with PT. Currently sitting up in the wheelchair without any acute respiratory distress. BP slightly improved on midodrine. Awaiting GI and cardiac evaluation. Step down to cardio B. Patient reports that he is mostly wheelchair bound. Able to transfer however does not ambulate.    Review of Systems   Constitutional: Negative for chills, fatigue, fever and unexpected weight change.   HENT: Negative for sore throat.    Eyes: Negative for visual disturbance.   Respiratory: Negative for cough, chest tightness and shortness of breath.    Cardiovascular: Negative for chest pain.   Gastrointestinal: Negative for abdominal pain, constipation, diarrhea, nausea and vomiting.   Endocrine: Negative for polyuria.   Genitourinary: Negative for dysuria and hematuria.   Neurological: Negative for headaches.     Objective:     Vital Signs (Most Recent):  Temp: 98.2 °F (36.8 °C) (09/25/19 0830)  Pulse: 69 (09/25/19 1400)  Resp: 16 (09/25/19 1400)  BP: (!) 85/48 (09/25/19 1400)  SpO2: (!) 94 % (09/25/19 1400) Vital Signs (24h Range):  Temp:  [97.8 °F (36.6 °C)-98.5 °F (36.9 °C)] 98.2 °F (36.8 °C)  Pulse:  [56-73] 69  Resp:  [16-57] 16  SpO2:  [85 %-100 %] 94 %  BP: ()/(33-79) 85/48     Weight: 98.3 kg (216 lb 11.4 oz)  Body mass index is 34.98 kg/m².    Intake/Output Summary (Last 24 hours) at 9/25/2019 0834  Last data filed at 9/24/2019 1800  Gross per 24 hour   Intake 490 ml   Output --   Net 490 ml      Physical Exam  Constitutional: He is oriented to person, place, and time. He  appears well-developed and well-nourished. No distress.   HENT:   Head: Normocephalic and atraumatic.   Right Ear: External ear normal.   Left Ear: External ear normal.   Eyes: Conjunctivae and EOM are normal. Right eye exhibits no discharge. Left eye exhibits no discharge. No scleral icterus.   Neck: Normal range of motion.   Cardiovascular: Normal rate and regular rhythm.   Pulmonary/Chest: Effort normal. No respiratory distress. He has no wheezes. He has rales (Mild rales in lower bases). He exhibits no tenderness.   Abdominal: Soft. He exhibits distension. There is no tenderness.   Musculoskeletal: He exhibits no edema or tenderness.   Neurological: He is alert and oriented to person, place, and time.   Skin: Skin is warm. He is not diaphoretic.   Psychiatric: He has a normal mood and affect. His behavior is normal. Judgment and thought content normal.   Nursing note and vitals reviewed.    Significant Labs:   CBC:   Recent Labs   Lab 09/24/19  0505   WBC 6.24   HGB 11.7*   HCT 38.2*   PLT 93*     CMP:   Recent Labs   Lab 09/24/19  0505 09/24/19  0925   *  --    K 6.1* 4.1   CL 96  --    CO2 25  --      --    BUN 44*  --    CREATININE 6.9*  --    CALCIUM 7.8*  --    PROT 6.5  --    ALBUMIN 3.0*  --    BILITOT 1.6*  --    ALKPHOS 118  --    AST 15  --    ALT 11  --    ANIONGAP 12  --    EGFRNONAA 8.1*  --        Assessment/Plan:      Active Hospital Problems    Diagnosis    *Acute on chronic right-sided congestive heart failure    Ischemic cardiomyopathy    ESRD (end stage renal disease)    AICD (automatic cardioverter/defibrillator) present    Other ascites      * Acute on chronic right-sided congestive heart failure  Improved after dialysis  Cardiology and Renal following      Ischemic cardiomyopathy  Ischemic cardiomyopathy with EF 20%.  Cardiology has been following patient.  Ace and beta-blocker have not been given in the past due to hypotension.  Now  on beta-blocker        AICD (automatic  cardioverter/defibrillator) present  Details unknown      ESRD (end stage renal disease)  Patient is followed by Dr. Howell  Dialysis normally on Tuesdays Thursdays and Saturdays  Renal following      Other ascites  S/p paracentesis on 9/23 that removed 6 L fluid      VTE Risk Mitigation (From admission, onward)         Ordered     enoxaparin injection 30 mg  Daily      09/19/19 0953     IP VTE HIGH RISK PATIENT  Once      09/19/19 0953                      Hong Mercer MD  Department of Hospital Medicine   [facility]  Date of service: 09/25/2019 2:42 PM

## 2019-09-25 NOTE — CARE UPDATE
09/24/19 2000   PRE-TX-O2   O2 Device (Oxygen Therapy) nasal cannula   $ Is the patient on Low Flow Oxygen? Yes   Flow (L/min) 2   SpO2 100 %   Pulse Oximetry Type Continuous   $ Pulse Oximetry - Multiple Charge Pulse Oximetry - Multiple   Pulse (!) 56   Resp (!) 24   BP (!) 64/40

## 2019-09-25 NOTE — PLAN OF CARE
09/25/19 1725   Discharge Assessment   Assessment Type Discharge Planning Reassessment   Patient has HD chair at [facility] on [facility], T/Th/SAT @ 900am and reports that he gets to dialysis by taxi.

## 2019-09-25 NOTE — PROGRESS NOTES
[facility]  Nephrology  Progress Note    Patient Name: Tre Cali  MRN: [id number]  Admission Date: 9/19/2019  Hospital Length of Stay: 2 days  Attending Provider: Hong Mercer MD   Primary Care Physician: Primary Doctor No  Principal Problem:Acute on chronic right-sided congestive heart failure      Subjective:     Interval History: endorses some fatigue, feels 'ok' this am; s/p HD on 9/24/19 (net UF: approx 2L); denies any specific complaints    Review of patient's allergies indicates:   Allergen Reactions    Sulfa (sulfonamide antibiotics) Rash     Current Facility-Administered Medications   Medication Frequency    0.9%  NaCl infusion PRN    acetaminophen tablet 650 mg Q6H PRN    acetaminophen tablet 650 mg Q4H PRN    aspirin EC tablet 81 mg Daily    calcium acetate capsule 2,668 mg TID WM    chlorhexidine 0.12 % solution 15 mL BID    diphenoxylate-atropine 2.5-0.025 mg per tablet 2 tablet QID PRN    enoxaparin injection 30 mg Daily    magnesium oxide tablet 800 mg PRN    magnesium oxide tablet 800 mg PRN    midodrine tablet 5 mg TID    mupirocin 2 % ointment BID    ondansetron injection 4 mg Q6H PRN    potassium chloride 10% oral solution 40 mEq PRN    potassium chloride 10% oral solution 40 mEq PRN    potassium chloride 10% oral solution 40 mEq PRN    potassium, sodium phosphates 280-160-250 mg packet 2 packet PRN    potassium, sodium phosphates 280-160-250 mg packet 2 packet PRN    potassium, sodium phosphates 280-160-250 mg packet 2 packet PRN    ramelteon tablet 8 mg Nightly PRN    sodium chloride 0.9% flush 10 mL PRN    traMADol tablet 50 mg Q6H PRN       Objective:     Vital Signs (Most Recent):  Temp: 98.5 °F (36.9 °C) (09/25/19 0200)  Pulse: 60 (09/25/19 0200)  Resp: 20 (09/25/19 0200)  BP: (!) 81/35 (09/25/19 0200)  SpO2: (!) 92 % (09/25/19 0200)  O2 Device (Oxygen Therapy): nasal cannula (09/24/19 2000) Vital Signs (24h Range):  Temp:  [97.7 °F (36.5  °C)-98.5 °F (36.9 °C)] 98.5 °F (36.9 °C)  Pulse:  [53-68] 60  Resp:  [16-37] 20  SpO2:  [90 %-100 %] 92 %  BP: ()/(34-58) 81/35     Weight: 98.3 kg (216 lb 11.4 oz) (09/24/19 1150)  Body mass index is 34.98 kg/m².  Body surface area is 2.14 meters squared.    I/O last 3 completed shifts:  In: 2410 [P.O.:1060; I.V.:650; Other:500; IV Piggyback:200]  Out: 2500 [Other:2500]    Physical Exam   Constitutional: He is oriented to person, place, and time. He appears well-developed and well-nourished. No distress.   HENT:   Head: Normocephalic and atraumatic.   Mouth/Throat: Oropharynx is clear and moist. No oropharyngeal exudate.   Eyes: Conjunctivae are normal. Right eye exhibits no discharge. Left eye exhibits no discharge. No scleral icterus.   Neck: Normal range of motion. Neck supple. No JVD present.   Cardiovascular: Normal rate, regular rhythm and intact distal pulses. Exam reveals no gallop and no friction rub.   Murmur heard.  Pulmonary/Chest: Effort normal and breath sounds normal. No respiratory distress. He has no rales.   Abdominal: Soft. He exhibits distension. He exhibits no mass. There is no tenderness. There is no guarding.   Genitourinary:   Genitourinary Comments: Deferred   Musculoskeletal: He exhibits edema. He exhibits no tenderness or deformity.   R UE AV Fistula w/ good thrill  Decreased RoM   Neurological: He is alert and oriented to person, place, and time. No cranial nerve deficit.   Skin: Skin is warm and dry. He is not diaphoretic. No erythema. No pallor.   Psychiatric: He has a normal mood and affect. His behavior is normal.   Nursing note and vitals reviewed.      Significant Labs:sure  CBC:   Recent Labs   Lab 09/24/19  0505   WBC 6.24   RBC 3.92*   HGB 11.7*   HCT 38.2*   PLT 93*   MCV 97   MCH 29.8   MCHC 30.6*     CMP:   Recent Labs   Lab 09/24/19  0505 09/24/19  0925     --    CALCIUM 7.8*  --    ALBUMIN 3.0*  --    PROT 6.5  --    *  --    K 6.1* 4.1   CO2 25  --    CL  96  --    BUN 44*  --    CREATININE 6.9*  --    ALKPHOS 118  --    ALT 11  --    AST 15  --    BILITOT 1.6*  --      LFTs:   Recent Labs   Lab 09/24/19  0505   ALT 11   AST 15   ALKPHOS 118   BILITOT 1.6*   PROT 6.5   ALBUMIN 3.0*     All labs within the past 24 hours have been reviewed.    Significant Imaging:  X-Ray: Reviewed    Assessment/Plan:     Active Diagnoses:    Diagnosis Date Noted POA    PRINCIPAL PROBLEM:  Acute on chronic right-sided congestive heart failure [I50.813] 09/19/2019 Yes    Ischemic cardiomyopathy [I25.5] 09/21/2019 Yes    ESRD (end stage renal disease) [N18.6] 08/13/2019 Yes     Chronic    AICD (automatic cardioverter/defibrillator) present [Z95.810] 08/13/2019 Yes     Chronic    Other ascites [R18.8] 08/13/2019 Yes      Problems Resolved During this Admission:     1. ESRD (HD-TTS via R UE AV Fistula)- symptomatically stable at present; significant abdominal distention on admission (s/p urgent HD on 9/19/19 w/ net UF: approx 2L); no overt volume overload, significant electrolyte perturbations nor acid-base disturbance    - maintenance HD (duration: 3h; UF Goal: 1-2L as tolerated; Standard 'Bath'; Access: AVF; Qb: 400 ml/min, Qd: 2x BFR; give 12.5-25 gm 25% Albumin w/ HD for BP support + 3Ca bath & 36.5-degree dialysate temperature) per outpatient schedule    -DAILY renal function panel to document sCr trend, optimize HD electrolyte 'bath' & assess response to therapy    -avoid nephrotoxic agents (NSAIDs, IV contrast dye)    -renally dose all appropriate medications, including antibiotics     2. HTN- clinically stable at present;  BP AT GOAL (81/35) now w/ relative hypotension likely 2/2 ESLD-induced splanchnic vasodilatation (needs candidacy assessment for chronic Midodrine/Octreotide therapy by GI Medicine service; no active issues    -HOLD anti-HTN medications & diuretics for now & reassess in am     3. ANEMIA- clinically stable at present; H/H AT GOAL (11.7/38.2 on 9/24/19); no  active issues    -trend daily CBC (H/H) to effect optimal blood-loss surveillance     4. SECONDARY HYPERPARATHYROIDISM (sHPT)- clinically stable at present; no active issues    -continue dietary binder/Vitamin D +/- HD-associated calcimimetic agent (Cinacalcet vs Etelcalcetide)     5. 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 I will follow-up with patient. Please contact us if you have any additional questions.    Armando Cruz III, MD  [facility]  [facility]  [phone] (C)

## 2019-09-25 NOTE — NURSING TRANSFER
Nursing Transfer Note      9/25/2019     Transfer To  2524     Transfer via Wheelchair.    Transfer with  to O2, cardiac monitoring    Transported by Cecilio [facility]    Medicines sent: none    Chart send with patient: Yes    Notified: sister    Patient reassessed at: arrival to the floor     Upon arrival to floor: cardiac monitor applied, patient oriented to room, call bell in reach and bed in lowest position

## 2019-09-26 LAB
ALBUMIN SERPL BCP-MCNC: 3 G/DL (ref 3.5–5.2)
ALP SERPL-CCNC: 117 U/L (ref 55–135)
ALT SERPL W/O P-5'-P-CCNC: 12 U/L (ref 10–44)
ANION GAP SERPL CALC-SCNC: 13 MMOL/L (ref 8–16)
AST SERPL-CCNC: 13 U/L (ref 10–40)
BASOPHILS # BLD AUTO: 0.1 K/UL (ref 0–0.2)
BASOPHILS NFR BLD: 1.5 % (ref 0–1.9)
BILIRUB SERPL-MCNC: 1.3 MG/DL (ref 0.1–1)
BUN SERPL-MCNC: 47 MG/DL (ref 6–20)
CALCIUM SERPL-MCNC: 7.9 MG/DL (ref 8.7–10.5)
CHLORIDE SERPL-SCNC: 97 MMOL/L (ref 95–110)
CO2 SERPL-SCNC: 22 MMOL/L (ref 23–29)
CREAT SERPL-MCNC: 7.1 MG/DL (ref 0.5–1.4)
DIFFERENTIAL METHOD: ABNORMAL
EOSINOPHIL # BLD AUTO: 0.1 K/UL (ref 0–0.5)
EOSINOPHIL NFR BLD: 1.6 % (ref 0–8)
ERYTHROCYTE [DISTWIDTH] IN BLOOD BY AUTOMATED COUNT: 17.1 % (ref 11.5–14.5)
EST. GFR  (AFRICAN AMERICAN): 9.1 ML/MIN/1.73 M^2
EST. GFR  (NON AFRICAN AMERICAN): 7.8 ML/MIN/1.73 M^2
GLUCOSE SERPL-MCNC: 108 MG/DL (ref 70–110)
HCT VFR BLD AUTO: 37.3 % (ref 40–54)
HGB BLD-MCNC: 11.8 G/DL (ref 14–18)
IMM GRANULOCYTES # BLD AUTO: 0.03 K/UL (ref 0–0.04)
IMM GRANULOCYTES NFR BLD AUTO: 0.4 % (ref 0–0.5)
LYMPHOCYTES # BLD AUTO: 0.7 K/UL (ref 1–4.8)
LYMPHOCYTES NFR BLD: 9.6 % (ref 18–48)
MCH RBC QN AUTO: 30.4 PG (ref 27–31)
MCHC RBC AUTO-ENTMCNC: 31.6 G/DL (ref 32–36)
MCV RBC AUTO: 96 FL (ref 82–98)
MONOCYTES # BLD AUTO: 0.8 K/UL (ref 0.3–1)
MONOCYTES NFR BLD: 12.1 % (ref 4–15)
NEUTROPHILS # BLD AUTO: 5.1 K/UL (ref 1.8–7.7)
NEUTROPHILS NFR BLD: 74.8 % (ref 38–73)
NRBC BLD-RTO: 0 /100 WBC
PLATELET # BLD AUTO: 105 K/UL (ref 150–350)
PMV BLD AUTO: 12.6 FL (ref 9.2–12.9)
POTASSIUM SERPL-SCNC: 6.1 MMOL/L (ref 3.5–5.1)
PROT SERPL-MCNC: 6.5 G/DL (ref 6–8.4)
RBC # BLD AUTO: 3.88 M/UL (ref 4.6–6.2)
SODIUM SERPL-SCNC: 132 MMOL/L (ref 136–145)
WBC # BLD AUTO: 6.76 K/UL (ref 3.9–12.7)

## 2019-09-26 PROCEDURE — 36415 COLL VENOUS BLD VENIPUNCTURE: CPT

## 2019-09-26 PROCEDURE — 25000003 PHARM REV CODE 250: Performed by: FAMILY MEDICINE

## 2019-09-26 PROCEDURE — 80053 COMPREHEN METABOLIC PANEL: CPT

## 2019-09-26 PROCEDURE — 25000003 PHARM REV CODE 250: Performed by: INTERNAL MEDICINE

## 2019-09-26 PROCEDURE — 85025 COMPLETE CBC W/AUTO DIFF WBC: CPT

## 2019-09-26 PROCEDURE — 97530 THERAPEUTIC ACTIVITIES: CPT

## 2019-09-26 PROCEDURE — 90935 HEMODIALYSIS ONE EVALUATION: CPT

## 2019-09-26 PROCEDURE — 21400001 HC TELEMETRY ROOM

## 2019-09-26 PROCEDURE — 25000003 PHARM REV CODE 250: Performed by: PHYSICIAN ASSISTANT

## 2019-09-26 PROCEDURE — 25000003 PHARM REV CODE 250

## 2019-09-26 PROCEDURE — 94761 N-INVAS EAR/PLS OXIMETRY MLT: CPT

## 2019-09-26 PROCEDURE — 63600175 PHARM REV CODE 636 W HCPCS: Performed by: HOSPITALIST

## 2019-09-26 PROCEDURE — 25000003 PHARM REV CODE 250: Performed by: HOSPITALIST

## 2019-09-26 RX ORDER — ALBUMIN HUMAN 250 G/1000ML
25 SOLUTION INTRAVENOUS
Status: COMPLETED | OUTPATIENT
Start: 2019-09-26 | End: 2019-10-01

## 2019-09-26 RX ORDER — MIDODRINE HYDROCHLORIDE 2.5 MG/1
10 TABLET ORAL 3 TIMES DAILY
Status: DISCONTINUED | OUTPATIENT
Start: 2019-09-26 | End: 2019-10-01 | Stop reason: HOSPADM

## 2019-09-26 RX ORDER — MIDODRINE HYDROCHLORIDE 10 MG/1
10 TABLET ORAL 3 TIMES DAILY
Qty: 90 TABLET | Refills: 0 | Status: SHIPPED | OUTPATIENT
Start: 2019-09-26 | End: 2019-10-26

## 2019-09-26 RX ADMIN — CHLORHEXIDINE GLUCONATE 15 ML: 1.2 RINSE ORAL at 08:09

## 2019-09-26 RX ADMIN — RIFAXIMIN 550 MG: 550 TABLET ORAL at 11:09

## 2019-09-26 RX ADMIN — MIDODRINE HYDROCHLORIDE 10 MG: 2.5 TABLET ORAL at 11:09

## 2019-09-26 RX ADMIN — CALCIUM ACETATE 2668 MG: 667 CAPSULE ORAL at 04:09

## 2019-09-26 RX ADMIN — CALCIUM ACETATE 2668 MG: 667 CAPSULE ORAL at 11:09

## 2019-09-26 RX ADMIN — RIFAXIMIN 550 MG: 550 TABLET ORAL at 08:09

## 2019-09-26 RX ADMIN — ASPIRIN 81 MG: 81 TABLET, DELAYED RELEASE ORAL at 08:09

## 2019-09-26 RX ADMIN — MIDODRINE HYDROCHLORIDE 10 MG: 2.5 TABLET ORAL at 04:09

## 2019-09-26 RX ADMIN — MIDODRINE HYDROCHLORIDE 5 MG: 2.5 TABLET ORAL at 08:09

## 2019-09-26 RX ADMIN — CALCIUM ACETATE 2668 MG: 667 CAPSULE ORAL at 08:09

## 2019-09-26 RX ADMIN — ENOXAPARIN SODIUM 30 MG: 100 INJECTION SUBCUTANEOUS at 04:09

## 2019-09-26 NOTE — PLAN OF CARE
09/26/19 1553   Discharge Reassessment   Assessment Type Discharge Planning Reassessment   Anticipated Discharge Disposition Home-Health   Discharge Plan A Home Health  (MARILOU with [facility])   DME Needed Upon Discharge  none   Patient choice form signed by patient/caregiver Yes  (Completed with patient johnny at 1548. He chooses to resume services with [facility].)   Post-Acute Status   Post-Acute Authorization Home Health/Hospice   Home Health/Hospice Status Referrals Sent  ([facility] via Epic.)

## 2019-09-26 NOTE — PT/OT/SLP PROGRESS
Physical Therapy Treatment    Patient Name:  Tre Cali   MRN:  [id number]    Recommendations:     Discharge Recommendations:  home health PT   Discharge Equipment Recommendations: none   Barriers to discharge: None    Assessment:     Tre Cali is a 54 y.o. male admitted with a medical diagnosis of Acute on chronic right-sided congestive heart failure.  He presents with the following impairments/functional limitations:  weakness, impaired functional mobilty, impaired endurance, gait instability, impaired balance, impaired self care skills, decreased coordination, decreased safety awareness, impaired coordination. Pt progressing with mobility and able to transfer oob to chair with less assistance today. No LOB or SOB noted. Pt reported that his stomach felt queasy initially following transfer and RN notified. Continue with PT and POC.    Rehab Prognosis: Good; patient would benefit from acute skilled PT services to address these deficits and reach maximum level of function.    Recent Surgery: * No surgery found *      Plan:     During this hospitalization, patient to be seen 5 x/week to address the identified rehab impairments via therapeutic activities, gait training, therapeutic exercises, wheelchair management/training and progress toward the following goals:    · Plan of Care Expires:  10/25/19    Subjective     Chief Complaint: Pain in BLEs  Patient/Family Comments/goals:   Pain/Comfort:  · Pain Rating 1: 5/10  · Location - Side 1: Bilateral  · Location 1: leg  · Pain Addressed 1: Distraction, Cessation of Activity, Reposition  · Pain Rating Post-Intervention 1: 5/10      Objective:     Communicated with RN prior to session.  Patient found HOB elevated with telemetry, bed alarm upon PT entry to room.     General Precautions: Standard, fall   Orthopedic Precautions:N/A   Braces:       Functional Mobility:  · Bed Mobility:     · Supine to Sit: minimum assistance  · Transfers:     · Sit to Stand:   contact guard assistance with rolling walker  · Bed to Chair: contact guard assistance with  rolling walker  using  Stand Pivot      AM-PAC 6 CLICK MOBILITY          Therapeutic Activities and Exercises:   bed mobility; sitting EOB for trunk control and midline orientation; sit <> stands; transfer training      Patient left up in chair with all lines intact, call button in reach and RN notified..    GOALS:   Multidisciplinary Problems     Physical Therapy Goals        Problem: Physical Therapy Goal    Goal Priority Disciplines Outcome Goal Variances Interventions   Physical Therapy Goal     PT, PT/OT Ongoing, Progressing     Description:  Goals to be met by: 10/25/2019     Re-eval performed on 19 s/p transfer to ICU; Reinstate initial Eval goals. See below    Patient will increase functional independence with mobility by performin. Supine to sit with Modified [place]  2. Sit to supine with Modified [place]  3. Bed to chair transfer with Supervision using Rolling Walker  4. Sit to stand transfer with Supervision using Rolling Walker.                       Time Tracking:     PT Received On: 19  PT Start Time: 908     PT Stop Time: 924  PT Total Time (min): 16 min     Billable Minutes: Therapeutic Activity 16    Treatment Type: Treatment  PT/PTA: PTA     PTA Visit Number: 1     Jaylene Flores, DANIS  2019

## 2019-09-26 NOTE — PLAN OF CARE
Problem: Physical Therapy Goal  Goal: Physical Therapy Goal  Description  Goals to be met by: 10/25/2019     Re-eval performed on 19 s/p transfer to ICU; Reinstate initial Eval goals. See below    Patient will increase functional independence with mobility by performin. Supine to sit with Modified [place]  2. Sit to supine with Modified [place]  3. Bed to chair transfer with Supervision using Rolling Walker  4. Sit to stand transfer with Supervision using Rolling Walker.      Outcome: Ongoing, Progressing     Pt continues to progress towards goals.

## 2019-09-26 NOTE — DISCHARGE SUMMARY
[facility]  Discharge Summary      Patient Name: Tre Cali  MRN: [id number]  Admission Date: 9/19/2019  Hospital Length of Stay: 3 days  Discharge Date and Time:  09/26/2019 1:42 PM  Attending Physician: Hong Mercer MD   Discharging Provider: Hong Mercer MD  Primary Care Provider: Primary Doctor No      HPI:   Patient is a 54-year-old male with a history of end-stage renal disease on dialysis.  He receives dialysis on Tuesdays Thursdays and Saturdays.  Patient presents to our emergency department with complaints of shortness of breath.  He was scheduled  scheduled for dialysis this morning however was unable to wait until 8:00 a.m. secondary to dyspnea .  In the emergency department patient was seated upright in significant respiratory distress improved with non-rebreather .  Patient  denies any chest pain , but reports increasing bilateral lower extremity swelling and erythema increasing abdominal girth.  Patient states he is having difficulty sleeping because he cannot lay flat.  He denies fever chills nausea vomiting.  He does have loose stool 2 to 3 times a day.  Patient denies abdominal pain    * No surgery found *      Hospital Course:   Patient was admitted diagnosed with acute on chronic right-sided congestive heart failure found to have an EF of 20% with volume overload and abdominal distention from ascites causing respiratory distress. Patient had dialysis and had fluid removed and improved patient's symptoms. Patient had issues with hypotension and had to decrease and eventually discontinue multiple blood pressure medications. Patient had a paracentesis to pulled 6 L of fluid. Patient then developed symptomatic hypotension and was placed in ICU.  patient's symptoms resolved while in the ICU but continued to have hypotension that was asymptomatic. Patient was started on medications to improve his blood pressure. Patient was breathing comfortably on room  air prior to discharge. Patient is not ambulatory therefore ambulatory O2 assessment was unable to obtain. Patient was stable at discharge to home with home health and instructions to follow up with GI, nephrology, cardiology.     Constitutional: He is oriented to person, place, and time. He appears well-developed and well-nourished. No distress.   HENT:   Head: Normocephalic and atraumatic.   Right Ear: External ear normal.   Left Ear: External ear normal.   Eyes: Conjunctivae and EOM are normal. Right eye exhibits no discharge. Left eye exhibits no discharge. No scleral icterus.   Neck: Normal range of motion.   Cardiovascular: Normal rate and regular rhythm.   Pulmonary/Chest: Effort normal. No respiratory distress. He has no wheezes. He exhibits no tenderness.   Abdominal: Soft. He exhibits distension. There is no tenderness.   Musculoskeletal: He exhibits no edema or tenderness.   Neurological: He is alert and oriented to person, place, and time.   Skin: Skin is warm. He is not diaphoretic.   Psychiatric: He has a normal mood and affect. His behavior is normal. Judgment and thought content normal.      Consults:   Consults (From admission, onward)        Status Ordering Provider     Inpatient consult to Cardiology  Once     Provider:  Tyler Ramos MD    Completed EZEQUIEL KOHLI     Inpatient consult to Cardiology  Once     Provider:  Axel White MD    Acknowledged EZEQUIEL KOHLI     Inpatient consult to Gastroenterology  Once     Provider:  Kalyan Olvera III, MD    Completed DAYANA WONG     Inpatient consult to Hospitalist  Once     Provider:  Ezequiel Kohli DO    Acknowledged EZEQUIEL KOHLI     Inpatient consult to Nephrology  Once     Provider:  Mikel Howell MD    Acknowledged EZEQUIEL KOHLI     Inpatient consult to Nephrology  Once     Provider:  Mikel Howell MD    Completed EZEQUIEL KOHLI     IP consult to case management  Once     Provider:  (Not yet assigned)     EZEQUIEL Padilla          No new Assessment & Plan notes have been filed under this hospital service since the last note was generated.  Service: Hospital Medicine    Final Active Diagnoses:    Diagnosis Date Noted POA    PRINCIPAL PROBLEM:  Acute on chronic right-sided congestive heart failure [I50.813] 09/19/2019 Yes    Ischemic cardiomyopathy [I25.5] 09/21/2019 Yes    ESRD (end stage renal disease) [N18.6] 08/13/2019 Yes     Chronic    AICD (automatic cardioverter/defibrillator) present [Z95.810] 08/13/2019 Yes     Chronic    Other ascites [R18.8] 08/13/2019 Yes      Problems Resolved During this Admission:       Discharged Condition: stable    Disposition: Home-Health Care Svc    Follow Up:  Follow-up Information     Axel White MD In 2 weeks.    Specialty:  Cardiology  Why:  For check up s/p hospital discharge  Contact information:  [address] [facility]  [address]  [place] [id number] [phone]             Mikel Howell MD In 1 week.    Specialty:  Nephrology  Why:  For check up s/p hospital discharge  Contact information:  [address] [facility] [id number] [phone]             Thong Peña MD In 2 weeks.    Specialty:  Gastroenterology  Why:  For check up s/p hospital discharge  Contact information:  [address]   [place] [address] [phone]                 Patient Instructions:      Referral to Home health   Referral Priority: Routine Referral Type: Home Health   Referral Reason: Specialty Services Required   Requested Specialty: Home Health Services   Number of Visits Requested: 1     Diet renal     Notify your health care provider if you experience any of the following:  temperature >100.4     Notify your health care provider if you experience any of the following:  persistent nausea and vomiting or diarrhea     Notify your health care provider if you experience any of the following:  severe uncontrolled pain     Notify your health care provider if you experience any  of the following:  redness, tenderness, or signs of infection (pain, swelling, redness, odor or green/yellow discharge around incision site)     Notify your health care provider if you experience any of the following:  difficulty breathing or increased cough     Notify your health care provider if you experience any of the following:  severe persistent headache     Notify your health care provider if you experience any of the following:  worsening rash     Notify your health care provider if you experience any of the following:  persistent dizziness, light-headedness, or visual disturbances     Notify your health care provider if you experience any of the following:  increased confusion or weakness     Activity as tolerated       Significant Diagnostic Studies: Labs:   CMP   Recent Labs   Lab 09/26/19  0821   *   K 6.1*   CL 97   CO2 22*      BUN 47*   CREATININE 7.1*   CALCIUM 7.9*   PROT 6.5   ALBUMIN 3.0*   BILITOT 1.3*   ALKPHOS 117   AST 13   ALT 12   ANIONGAP 13   ESTGFRAFRICA 9.1*   EGFRNONAA 7.8*    and CBC   Recent Labs   Lab 09/26/19 0821   WBC 6.76   HGB 11.8*   HCT 37.3*   *       Pending Diagnostic Studies:     Procedure Component Value Units Date/Time    Echo Color Flow Doppler? Yes; Bubble Contrast? No [[id number]] Resulted:  09/26/19 1258    Order Status:  Sent Lab Status:  In process Updated:  09/26/19 1259     BSA 2.18 m2     Troponin I [[id number]]     Order Status:  Sent Lab Status:  No result     Specimen:  Blood          Medications:  Reconciled Home Medications:      Medication List      START taking these medications    midodrine 10 MG tablet  Commonly known as:  PROAMATINE  Take 1 tablet (10 mg total) by mouth 3 (three) times daily.     rifAXIMin 550 mg Tab  Commonly known as:  XIFAXAN  Take 1 tablet (550 mg total) by mouth 2 (two) times daily.        CONTINUE taking these medications    aspirin 81 MG EC tablet  Commonly known as:  ECOTRIN  Take 1 tablet by mouth once  daily.     calcium acetate 667 mg capsule  Commonly known as:  PHOSLO  Take 2,668 mg by mouth 3 (three) times daily with meals.     loperamide 2 mg capsule  Commonly known as:  IMODIUM  Take 2 mg by mouth 4 (four) times daily as needed for Diarrhea.        STOP taking these medications    amiodarone 200 MG Tab  Commonly known as:  PACERONE     bumetanide 2 MG tablet  Commonly known as:  BUMEX     white petrolatum 41 % Oint            Indwelling Lines/Drains at time of discharge:   Lines/Drains/Airways     Drain                 Hemodialysis AV Fistula Right upper arm -- days                Time spent on the discharge of patient: 40 minutes  Patient was seen and examined on the date of discharge and determined to be suitable for discharge.         Hong Mercer MD  Department of Hospital Medicine  [facility]  Date of service: 09/26/2019 1:42 PM

## 2019-09-26 NOTE — PROGRESS NOTES
[facility]  Nephrology  Progress Note    Patient Name: Tre Cali  MRN: [id number]  Admission Date: 9/19/2019  Hospital Length of Stay: 3 days  Attending Provider: Hong Mercer MD   Primary Care Physician: Primary Doctor No  Principal Problem:Acute on chronic right-sided congestive heart failure      Subjective:     Interval History: endorses some fatigue, feels 'ok' this am; denies any specific complaints    Review of patient's allergies indicates:   Allergen Reactions    Sulfa (sulfonamide antibiotics) Rash     Current Facility-Administered Medications   Medication Frequency    0.9%  NaCl infusion PRN    acetaminophen tablet 650 mg Q6H PRN    acetaminophen tablet 650 mg Q4H PRN    aspirin EC tablet 81 mg Daily    calcium acetate capsule 2,668 mg TID WM    chlorhexidine 0.12 % solution 15 mL BID    diphenoxylate-atropine 2.5-0.025 mg per tablet 2 tablet QID PRN    enoxaparin injection 30 mg Daily    magnesium oxide tablet 800 mg PRN    magnesium oxide tablet 800 mg PRN    midodrine tablet 5 mg TID    ondansetron injection 4 mg Q6H PRN    potassium chloride 10% oral solution 40 mEq PRN    potassium chloride 10% oral solution 40 mEq PRN    potassium chloride 10% oral solution 40 mEq PRN    potassium, sodium phosphates 280-160-250 mg packet 2 packet PRN    potassium, sodium phosphates 280-160-250 mg packet 2 packet PRN    potassium, sodium phosphates 280-160-250 mg packet 2 packet PRN    ramelteon tablet 8 mg Nightly PRN    sodium chloride 0.9% flush 10 mL PRN    traMADol tablet 50 mg Q6H PRN       Objective:     Vital Signs (Most Recent):  Temp: 97.9 °F (36.6 °C) (09/26/19 0341)  Pulse: 66 (09/26/19 0341)  Resp: (!) 22 (09/25/19 1939)  BP: (!) 109/40 (09/26/19 0341)  SpO2: (!) 94 % (09/26/19 0341)  O2 Device (Oxygen Therapy): room air (09/26/19 0500) Vital Signs (24h Range):  Temp:  [97.1 °F (36.2 °C)-98.2 °F (36.8 °C)] 97.9 °F (36.6 °C)  Pulse:  [61-73] 66  Resp:   [16-57] 22  SpO2:  [85 %-100 %] 94 %  BP: ()/(33-79) 109/40     Weight: 92 kg (202 lb 13.2 oz) (09/26/19 0500)  Body mass index is 32.74 kg/m².  Body surface area is 2.07 meters squared.    I/O last 3 completed shifts:  In: 240 [P.O.:240]  Out: 1 [Stool:1]    Physical Exam   Constitutional: He is oriented to person, place, and time. He appears well-developed and well-nourished. No distress.   HENT:   Head: Normocephalic and atraumatic.   Mouth/Throat: Oropharynx is clear and moist. No oropharyngeal exudate.   Eyes: Conjunctivae are normal. Right eye exhibits no discharge. Left eye exhibits no discharge. No scleral icterus.   Neck: Normal range of motion. Neck supple. No JVD present.   Cardiovascular: Normal rate, regular rhythm and intact distal pulses. Exam reveals no gallop and no friction rub.   Murmur heard.  Pulmonary/Chest: Effort normal and breath sounds normal. No respiratory distress. He has no rales.   Abdominal: Soft. He exhibits distension. He exhibits no mass. There is no tenderness. There is no guarding.   Genitourinary:   Genitourinary Comments: Deferred   Musculoskeletal: He exhibits edema. He exhibits no tenderness or deformity.   R UE AV Fistula w/ good thrill  Decreased RoM   Neurological: He is alert and oriented to person, place, and time. No cranial nerve deficit.   Skin: Skin is warm and dry. He is not diaphoretic. No erythema. No pallor.   Psychiatric: He has a normal mood and affect. His behavior is normal.   Nursing note and vitals reviewed.      Significant Labs:sure  CBC:   Recent Labs   Lab 09/24/19  0505   WBC 6.24   RBC 3.92*   HGB 11.7*   HCT 38.2*   PLT 93*   MCV 97   MCH 29.8   MCHC 30.6*     CMP:   Recent Labs   Lab 09/24/19  0505 09/24/19  0925     --    CALCIUM 7.8*  --    ALBUMIN 3.0*  --    PROT 6.5  --    *  --    K 6.1* 4.1   CO2 25  --    CL 96  --    BUN 44*  --    CREATININE 6.9*  --    ALKPHOS 118  --    ALT 11  --    AST 15  --    BILITOT 1.6*  --       LFTs:   Recent Labs   Lab 09/24/19  0505   ALT 11   AST 15   ALKPHOS 118   BILITOT 1.6*   PROT 6.5   ALBUMIN 3.0*     All labs within the past 24 hours have been reviewed.    Significant Imaging:  X-Ray: Reviewed    Assessment/Plan:     Active Diagnoses:    Diagnosis Date Noted POA    PRINCIPAL PROBLEM:  Acute on chronic right-sided congestive heart failure [I50.813] 09/19/2019 Yes    Ischemic cardiomyopathy [I25.5] 09/21/2019 Yes    ESRD (end stage renal disease) [N18.6] 08/13/2019 Yes     Chronic    AICD (automatic cardioverter/defibrillator) present [Z95.810] 08/13/2019 Yes     Chronic    Other ascites [R18.8] 08/13/2019 Yes      Problems Resolved During this Admission:     1. ESRD (HD-TTS via R UE AV Fistula)- symptomatically stable at present; significant abdominal distention on admission (s/p urgent HD on 9/19/19 w/ net UF: approx 2L); no overt volume overload, significant electrolyte perturbations nor acid-base disturbance    - maintenance HD (duration: 3h; UF Goal: 1-2L as tolerated; Standard 'Bath'; Access: AVF; Qb: 400 ml/min, Qd: 2x BFR; give 12.5-25 gm 25% Albumin w/ HD for BP support + 3Ca bath & 36.5-degree dialysate temperature) per outpatient schedule    -DAILY renal function panel to document sCr trend, optimize HD electrolyte 'bath' & assess response to therapy    -avoid nephrotoxic agents (NSAIDs, IV contrast dye)    -renally dose all appropriate medications, including antibiotics     2. HTN- clinically stable at present;  BP AT GOAL (109/40) now w/ relative hypotension likely 2/2 ESLD-induced splanchnic vasodilatation (needs candidacy assessment for chronic Midodrine/Octreotide therapy by GI Medicine service; no active issues    -HOLD anti-HTN medications & diuretics for now & reassess in am     3. ANEMIA- clinically stable at present; H/H AT GOAL (11.7/38.2 on 9/24/19); no active issues    -trend daily CBC (H/H) to effect optimal blood-loss surveillance     4. SECONDARY  HYPERPARATHYROIDISM (sHPT)- clinically stable at present; no active issues    -continue dietary binder/Vitamin D +/- HD-associated calcimimetic agent (Cinacalcet vs Etelcalcetide)     5. 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 I will follow-up with patient. Please contact us if you have any additional questions.    Armando Cruz III, MD  [facility]  Replaced by [facility]  [phone] (C)

## 2019-09-26 NOTE — CARE UPDATE
"Readmission prevention /SHIVANI    DX CHF-SHIVANI dx.    PC requested a PCP appt for pt through Laura @ [facility] network, pt does not want Dr. Martinez appt that was previously made for him. Stated "that was the only dr at the time".  Pt is a true readmit from 8/13-8/30/2019 for ESRD.  Pt was admitted for a on c RS HF this time on 9/19/2019.   Pt reports no difficulties with obtaining medications at this time.  PC also explained and provided a [facility] physician's list,  2019 cheat sheet,  pt D plan info.  Pt was discussed discharged planning needs, he needs to follow up on physician's appts including specialties, CHF/weighing himself along with reporting that weight.  Pt agreed/acknowledged education provided.    Latha RADFORD, FIORELLA    Transitions of Care Program  11:22 am, 9/26/2019    PC contact called pt with a PCP appt previously requested from [facility], received today due to dc on 10/1/2019, unknowingly not realizing pt went to NH.  Pt 's sister will contact SHIVANI/PC if/when pt is in the community and needing a PCP following discharge from a NH facility.    Latha RADFORD, FIORELLA    Transitions of Care Program  10/2/2019, 1:46 pm  "

## 2019-09-26 NOTE — CARE UPDATE
09/26/19 1043   Room Air SpO2 At Rest   Room Air SpO2 At Rest 95 %   Pt HR/SAT is 68/95% refuses to walk, states he is wheelchair bound and does not walk around at home. Nurse notified.

## 2019-09-26 NOTE — PROGRESS NOTES
HD:consent and hepatitis status confirmed prior to HD, pt stable, tx completed, lines flushed, needles removed, pressure and bandage applied. NET UF: 2L. Report called to Nick

## 2019-09-26 NOTE — PLAN OF CARE
09/25/19 2021   Patient Assessment/Suction   Level of Consciousness (AVPU) alert   Respiratory Effort Unlabored   PRE-TX-O2   O2 Device (Oxygen Therapy) room air   SpO2 96 %   Pulse Oximetry Type Intermittent   $ Pulse Oximetry - Multiple Charge Pulse Oximetry - Multiple

## 2019-09-26 NOTE — CARE UPDATE
Nursing called and notified that when she attempted to discharge the patient home, nursing called sister who patient lives with. Sister refused to  the phone and had the daughter of the sister  the phone instead and said that the sister would not  the patient. Social work was contacted. Plan for protective services. I attempted to contact the sister, Lis, both on her home ([phone]) and mobile ([phone]) however both lines were disconnected. Nursing was able to call one of those numbers previously. Discharge orders discontinued.

## 2019-09-26 NOTE — PROGRESS NOTES
[facility]    Cardiology Progress Note    Subjective: Seen and examined this AM. Sitting up in the chair, denies any active cardiac complaints. Blood pressure remains consistently low despite initiation of low-dose Midodrine. Otherwise vitals stable. Feeling better status post paracentesis.    Objective:  Vital Signs (Most Recent)  Temp: 98.2 °F (36.8 °C) (09/26/19 0822)  Pulse: 65 (09/26/19 0822)  Resp: 20 (09/26/19 0822)  BP: (!) 100/46 (09/26/19 0822)  SpO2: 97 % (09/26/19 0822)    Vital Signs Range (Last 24H):  Temp:  [97.1 °F (36.2 °C)-98.2 °F (36.8 °C)]   Pulse:  [61-73]   Resp:  [16-25]   BP: ()/(33-79)   SpO2:  [85 %-100 %]     I & O (Last 24H):    Intake/Output Summary (Last 24 hours) at 9/26/2019 1007  Last data filed at 9/26/2019 0445  Gross per 24 hour   Intake 240 ml   Output 1 ml   Net 239 ml       Current Diet:     Current Diet Order   Procedures    Diet renal    Diet renal        Allergies:  Review of patient's allergies indicates:   Allergen Reactions    Sulfa (sulfonamide antibiotics) Rash       Meds:  Scheduled Meds:   aspirin  81 mg Oral Daily    calcium acetate  2,668 mg Oral TID WM    chlorhexidine  15 mL Mouth/Throat BID    enoxaparin  30 mg Subcutaneous Daily    midodrine  10 mg Oral TID    rifAXImin  550 mg Oral BID     Continuous Infusions:  PRN Meds:sodium chloride 0.9%, acetaminophen, acetaminophen, diphenoxylate-atropine 2.5-0.025 mg, magnesium oxide, magnesium oxide, ondansetron, potassium chloride 10%, potassium chloride 10%, potassium chloride 10%, potassium, sodium phosphates, potassium, sodium phosphates, potassium, sodium phosphates, ramelteon, sodium chloride 0.9%, traMADol    Lab Results :  Recent Results (from the past 24 hour(s))   CBC auto differential    Collection Time: 09/26/19  8:21 AM   Result Value Ref Range    WBC 6.76 3.90 - 12.70 K/uL    RBC 3.88 (L) 4.60 - 6.20 M/uL    Hemoglobin 11.8 (L) 14.0 - 18.0 g/dL    Hematocrit 37.3 (L) 40.0 - 54.0  %    Mean Corpuscular Volume 96 82 - 98 fL    Mean Corpuscular Hemoglobin 30.4 27.0 - 31.0 pg    Mean Corpuscular Hemoglobin Conc 31.6 (L) 32.0 - 36.0 g/dL    RDW 17.1 (H) 11.5 - 14.5 %    Platelets 105 (L) 150 - 350 K/uL    MPV 12.6 9.2 - 12.9 fL    Immature Granulocytes 0.4 0.0 - 0.5 %    Gran # (ANC) 5.1 1.8 - 7.7 K/uL    Immature Grans (Abs) 0.03 0.00 - 0.04 K/uL    Lymph # 0.7 (L) 1.0 - 4.8 K/uL    Mono # 0.8 0.3 - 1.0 K/uL    Eos # 0.1 0.0 - 0.5 K/uL    Baso # 0.10 0.00 - 0.20 K/uL    nRBC 0 0 /100 WBC    Gran% 74.8 (H) 38.0 - 73.0 %    Lymph% 9.6 (L) 18.0 - 48.0 %    Mono% 12.1 4.0 - 15.0 %    Eosinophil% 1.6 0.0 - 8.0 %    Basophil% 1.5 0.0 - 1.9 %    Differential Method Automated    Comprehensive metabolic panel    Collection Time: 09/26/19  8:21 AM   Result Value Ref Range    Sodium 132 (L) 136 - 145 mmol/L    Potassium 6.1 (H) 3.5 - 5.1 mmol/L    Chloride 97 95 - 110 mmol/L    CO2 22 (L) 23 - 29 mmol/L    Glucose 108 70 - 110 mg/dL    BUN, Bld 47 (H) 6 - 20 mg/dL    Creatinine 7.1 (H) 0.5 - 1.4 mg/dL    Calcium 7.9 (L) 8.7 - 10.5 mg/dL    Total Protein 6.5 6.0 - 8.4 g/dL    Albumin 3.0 (L) 3.5 - 5.2 g/dL    Total Bilirubin 1.3 (H) 0.1 - 1.0 mg/dL    Alkaline Phosphatase 117 55 - 135 U/L    AST 13 10 - 40 U/L    ALT 12 10 - 44 U/L    Anion Gap 13 8 - 16 mmol/L    eGFR if African American 9.1 (A) >60 mL/min/1.73 m^2    eGFR if non  7.8 (A) >60 mL/min/1.73 m^2       Diagnostic Results:  Imaging Results          X-Ray Chest AP Portable (Final result)  Result time 09/19/19 08:40:40    Final result by Genevieve Moralez MD (09/19/19 08:40:40)                 Impression:      Cardiomegaly with no confluent infiltrates      Electronically signed by: Genevieve Moralez MD  Date:    09/19/2019  Time:    08:40             Narrative:    EXAMINATION:  XR CHEST AP PORTABLE    CLINICAL HISTORY:  CHF;    FINDINGS:  Portable chest at 07:22 is compared to 08/13/2019 shows normal cardiomediastinal  "silhouette.    Lungs are clear. Pulmonary vasculature is normal. No acute osseous abnormality.                                 Physical Exam:  Objective:  General Appearance:  Comfortable and well-appearing.    Vital signs: (most recent): Blood pressure (!) 100/46, pulse 65, temperature 98.2 °F (36.8 °C), temperature source Oral, resp. rate 20, height 5' 6" (1.676 m), weight 92 kg (202 lb 13.2 oz), SpO2 97 %.  Vital signs are normal.  (BP low).    Lungs:  Normal effort and normal respiratory rate.  Breath sounds clear to auscultation.    Heart: Normal rate.  Regular rhythm.  Positive for murmur.    Extremities: There is dependent edema.    Neurological: Patient is alert and oriented to person, place and time.    Skin:  Warm and dry.  There is a rash.        Current Consults:  IP CONSULT TO HOSPITAL MEDICINE  IP CONSULT TO NEPHROLOGY  IP CONSULT TO NEPHROLOGY  IP CONSULT TO CARDIOLOGY  IP CONSULT TO SOCIAL WORK/CASE MANAGEMENT  WOUND CARE CONSULT  IP CONSULT TO CARDIOLOGY  IP CONSULT TO GASTROENTEROLOGY    Assessment/Plan:  Assessment:   Hypotension   Ischemic cardiomyopathy. BNP 3144. EF 20% on last echo in  2015. Volume overload on exam.   Chest pain. Resolved. Troponin elevated to 0.58.   ESRD on HD. Followed by Dr. Howell  Hypothyroidism. Subclinical.   Mitral Regurgitation. Moderate.   Tricuspid regurgitation. Mod-severe.   Pulmonary HTN.   DM  History of CVA  CAD s/p stents. Details not available.   ICD in situ. Details not available.   Dyslipidemia   Former smoker. States he quit 1 month ago.   Chronic venous insufficiency.   Cirrhosis of liver. With ascites.   Noncompliance  Diarrhea/vomiting resolved    Plan:  Increase Midodrine to 10 mg dosing.   OK for DC from cardiac standpoint. F/U Dr. White two weeks following discharge.     Emre Head PA-C  09/26/19  "

## 2019-09-26 NOTE — HOSPITAL COURSE
Patient was admitted diagnosed with acute on chronic right-sided congestive heart failure found to have an EF of 20% with volume overload and abdominal distention from ascites causing respiratory distress. Patient had dialysis and had fluid removed and improved patient's symptoms. Patient had issues with hypotension and had to decrease and eventually discontinue multiple blood pressure medications. Patient had a paracentesis to pulled 6 L of fluid. Patient then developed symptomatic hypotension and was placed in ICU.  patient's symptoms resolved while in the ICU but continued to have hypotension that was asymptomatic. Patient was started on medications to improve his blood pressure. Discussed with the patient in length regarding patient's current medical conditions. Patient is requesting to be DO NOT RESUSCITATE and DO NOT INTUBATE. Patient was discharged to the nursing home with hospice. Patient is to follow-up with GI, nephrology, cardiology.    General: Patient resting comfortably in no acute distress. Appears as stated age. Calm  Eyes: EOM intact. No conjunctivae injection. No scleral icterus.  ENT: Hearing grossly intact. No discharge from ears. No nasal discharge.   CVS: RRR. No LE edema BL.  Lungs: CTA BL, no wheezing or crackles. Good breath sounds. No accessory muscle use. No acute respiratory distress  Neuro: AOx3. GCS 15. Cranial nerves grossly intact. Moves all extremities equally. Follows commands. Responds appropriately

## 2019-09-27 PROBLEM — Z99.3 WHEELCHAIR DEPENDENCE: Chronic | Status: ACTIVE | Noted: 2019-09-27

## 2019-09-27 LAB
ANION GAP SERPL CALC-SCNC: 10 MMOL/L (ref 8–16)
BASOPHILS # BLD AUTO: 0.11 K/UL (ref 0–0.2)
BASOPHILS NFR BLD: 1.6 % (ref 0–1.9)
BUN SERPL-MCNC: 38 MG/DL (ref 6–20)
CALCIUM SERPL-MCNC: 8.2 MG/DL (ref 8.7–10.5)
CHLORIDE SERPL-SCNC: 100 MMOL/L (ref 95–110)
CO2 SERPL-SCNC: 24 MMOL/L (ref 23–29)
CREAT SERPL-MCNC: 6.1 MG/DL (ref 0.5–1.4)
DIFFERENTIAL METHOD: ABNORMAL
EOSINOPHIL # BLD AUTO: 0.1 K/UL (ref 0–0.5)
EOSINOPHIL NFR BLD: 1.8 % (ref 0–8)
ERYTHROCYTE [DISTWIDTH] IN BLOOD BY AUTOMATED COUNT: 17.4 % (ref 11.5–14.5)
EST. GFR  (AFRICAN AMERICAN): 10.9 ML/MIN/1.73 M^2
EST. GFR  (NON AFRICAN AMERICAN): 9.4 ML/MIN/1.73 M^2
GLUCOSE SERPL-MCNC: 132 MG/DL (ref 70–110)
HCT VFR BLD AUTO: 36.6 % (ref 40–54)
HGB BLD-MCNC: 11.5 G/DL (ref 14–18)
IMM GRANULOCYTES # BLD AUTO: 0.02 K/UL (ref 0–0.04)
IMM GRANULOCYTES NFR BLD AUTO: 0.3 % (ref 0–0.5)
LYMPHOCYTES # BLD AUTO: 0.7 K/UL (ref 1–4.8)
LYMPHOCYTES NFR BLD: 9.8 % (ref 18–48)
MCH RBC QN AUTO: 30.3 PG (ref 27–31)
MCHC RBC AUTO-ENTMCNC: 31.4 G/DL (ref 32–36)
MCV RBC AUTO: 97 FL (ref 82–98)
MONOCYTES # BLD AUTO: 0.9 K/UL (ref 0.3–1)
MONOCYTES NFR BLD: 12.6 % (ref 4–15)
NEUTROPHILS # BLD AUTO: 5 K/UL (ref 1.8–7.7)
NEUTROPHILS NFR BLD: 73.9 % (ref 38–73)
NRBC BLD-RTO: 0 /100 WBC
PLATELET # BLD AUTO: 105 K/UL (ref 150–350)
PMV BLD AUTO: 12.4 FL (ref 9.2–12.9)
POTASSIUM SERPL-SCNC: 5.1 MMOL/L (ref 3.5–5.1)
RBC # BLD AUTO: 3.79 M/UL (ref 4.6–6.2)
SODIUM SERPL-SCNC: 134 MMOL/L (ref 136–145)
WBC # BLD AUTO: 6.74 K/UL (ref 3.9–12.7)

## 2019-09-27 PROCEDURE — 36415 COLL VENOUS BLD VENIPUNCTURE: CPT

## 2019-09-27 PROCEDURE — 94761 N-INVAS EAR/PLS OXIMETRY MLT: CPT

## 2019-09-27 PROCEDURE — 85025 COMPLETE CBC W/AUTO DIFF WBC: CPT

## 2019-09-27 PROCEDURE — 30200315 PPD INTRADERMAL TEST REV CODE 302: Performed by: FAMILY MEDICINE

## 2019-09-27 PROCEDURE — 86580 TB INTRADERMAL TEST: CPT | Performed by: FAMILY MEDICINE

## 2019-09-27 PROCEDURE — 21400001 HC TELEMETRY ROOM

## 2019-09-27 PROCEDURE — 63600175 PHARM REV CODE 636 W HCPCS: Performed by: HOSPITALIST

## 2019-09-27 PROCEDURE — 25000003 PHARM REV CODE 250: Performed by: FAMILY MEDICINE

## 2019-09-27 PROCEDURE — 25000003 PHARM REV CODE 250: Performed by: HOSPITALIST

## 2019-09-27 PROCEDURE — 25000003 PHARM REV CODE 250: Performed by: PHYSICIAN ASSISTANT

## 2019-09-27 PROCEDURE — 25000003 PHARM REV CODE 250

## 2019-09-27 PROCEDURE — 27000221 HC OXYGEN, UP TO 24 HOURS

## 2019-09-27 PROCEDURE — 80048 BASIC METABOLIC PNL TOTAL CA: CPT

## 2019-09-27 RX ADMIN — CHLORHEXIDINE GLUCONATE 15 ML: 1.2 RINSE ORAL at 08:09

## 2019-09-27 RX ADMIN — CALCIUM ACETATE 2668 MG: 667 CAPSULE ORAL at 05:09

## 2019-09-27 RX ADMIN — MIDODRINE HYDROCHLORIDE 10 MG: 2.5 TABLET ORAL at 11:09

## 2019-09-27 RX ADMIN — CALCIUM ACETATE 2668 MG: 667 CAPSULE ORAL at 11:09

## 2019-09-27 RX ADMIN — MIDODRINE HYDROCHLORIDE 10 MG: 2.5 TABLET ORAL at 05:09

## 2019-09-27 RX ADMIN — TUBERCULIN PURIFIED PROTEIN DERIVATIVE 5 UNITS: 5 INJECTION, SOLUTION INTRADERMAL at 05:09

## 2019-09-27 RX ADMIN — ENOXAPARIN SODIUM 30 MG: 100 INJECTION SUBCUTANEOUS at 05:09

## 2019-09-27 RX ADMIN — RIFAXIMIN 550 MG: 550 TABLET ORAL at 08:09

## 2019-09-27 RX ADMIN — MIDODRINE HYDROCHLORIDE 10 MG: 2.5 TABLET ORAL at 08:09

## 2019-09-27 RX ADMIN — ASPIRIN 81 MG: 81 TABLET, DELAYED RELEASE ORAL at 08:09

## 2019-09-27 RX ADMIN — RIFAXIMIN 550 MG: 550 TABLET ORAL at 10:09

## 2019-09-27 RX ADMIN — CHLORHEXIDINE GLUCONATE 15 ML: 1.2 RINSE ORAL at 09:09

## 2019-09-27 NOTE — PROGRESS NOTES
[facility]  Nephrology  Progress Note    Patient Name: Tre Cali  MRN: [id number]  Admission Date: 9/19/2019  Hospital Length of Stay: 4 days  Attending Provider: Hong Mercer MD   Primary Care Physician: Primary Doctor No  Principal Problem:Acute on chronic right-sided congestive heart failure      Subjective:     Interval History: endorses some fatigue, feels 'ok' this am; denies any specific complaints; s/p HD on 9/26/16 (net UF: approx 2L).    Review of patient's allergies indicates:   Allergen Reactions    Sulfa (sulfonamide antibiotics) Rash     Current Facility-Administered Medications   Medication Frequency    0.9%  NaCl infusion PRN    acetaminophen tablet 650 mg Q6H PRN    acetaminophen tablet 650 mg Q4H PRN    albumin human 25% bottle 25 g PRN    aspirin EC tablet 81 mg Daily    calcium acetate capsule 2,668 mg TID WM    chlorhexidine 0.12 % solution 15 mL BID    diphenoxylate-atropine 2.5-0.025 mg per tablet 2 tablet QID PRN    enoxaparin injection 30 mg Daily    magnesium oxide tablet 800 mg PRN    magnesium oxide tablet 800 mg PRN    midodrine tablet 10 mg TID    ondansetron injection 4 mg Q6H PRN    potassium chloride 10% oral solution 40 mEq PRN    potassium chloride 10% oral solution 40 mEq PRN    potassium chloride 10% oral solution 40 mEq PRN    potassium, sodium phosphates 280-160-250 mg packet 2 packet PRN    potassium, sodium phosphates 280-160-250 mg packet 2 packet PRN    potassium, sodium phosphates 280-160-250 mg packet 2 packet PRN    ramelteon tablet 8 mg Nightly PRN    rifAXIMin tablet 550 mg BID    sodium chloride 0.9% flush 10 mL PRN    traMADol tablet 50 mg Q6H PRN       Objective:     Vital Signs (Most Recent):  Temp: 98 °F (36.7 °C) (09/27/19 0749)  Pulse: 63 (09/27/19 0749)  Resp: 20 (09/27/19 0749)  BP: (!) 107/50 (09/27/19 0749)  SpO2: 97 % (09/27/19 0749)  O2 Device (Oxygen Therapy): room air (09/27/19 0749) Vital Signs (24h  Pt c/o left chest pressure x2 days w/ pain in left shoulder/arm and neck - went to  and had EKG w/ blood work - had elevated D-Dimer     Reports had just flew to [place] and back 2 weeks ago     Denies cardiac hx, denies hx of blood clots Range):  Temp:  [96.9 °F (36.1 °C)-98.9 °F (37.2 °C)] 98 °F (36.7 °C)  Pulse:  [48-71] 63  Resp:  [18-20] 20  SpO2:  [94 %-97 %] 97 %  BP: ()/(27-54) 107/50     Weight: 90.2 kg (198 lb 13.7 oz) (09/27/19 0341)  Body mass index is 32.1 kg/m².  Body surface area is 2.05 meters squared.    I/O last 3 completed shifts:  In: 910 [P.O.:410; Other:500]  Out: 2503 [Other:2500; Stool:3]    Physical Exam   Constitutional: He is oriented to person, place, and time. He appears well-developed and well-nourished. No distress.   HENT:   Head: Normocephalic and atraumatic.   Mouth/Throat: Oropharynx is clear and moist. No oropharyngeal exudate.   Eyes: Conjunctivae are normal. Right eye exhibits no discharge. Left eye exhibits no discharge. No scleral icterus.   Neck: Normal range of motion. Neck supple. No JVD present.   Cardiovascular: Normal rate, regular rhythm and intact distal pulses. Exam reveals no gallop and no friction rub.   Murmur heard.  Pulmonary/Chest: Effort normal and breath sounds normal. No respiratory distress. He has no rales.   Abdominal: Soft. He exhibits distension. He exhibits no mass. There is no tenderness. There is no guarding.   Genitourinary:   Genitourinary Comments: Deferred   Musculoskeletal: He exhibits edema. He exhibits no tenderness or deformity.   R UE AV Fistula w/ good thrill  Decreased RoM   Neurological: He is alert and oriented to person, place, and time. No cranial nerve deficit.   Skin: Skin is warm and dry. He is not diaphoretic. No erythema. No pallor.   Psychiatric: He has a normal mood and affect. His behavior is normal.   Nursing note and vitals reviewed.      Significant Labs:sure  CBC:   Recent Labs   Lab 09/26/19  0821   WBC 6.76   RBC 3.88*   HGB 11.8*   HCT 37.3*   *   MCV 96   MCH 30.4   MCHC 31.6*     CMP:   Recent Labs   Lab 09/26/19  0821      CALCIUM 7.9*   ALBUMIN 3.0*   PROT 6.5   *   K 6.1*   CO2 22*   CL 97   BUN 47*   CREATININE 7.1*    ALKPHOS 117   ALT 12   AST 13   BILITOT 1.3*     LFTs:   Recent Labs   Lab 09/26/19  0821   ALT 12   AST 13   ALKPHOS 117   BILITOT 1.3*   PROT 6.5   ALBUMIN 3.0*     All labs within the past 24 hours have been reviewed.    Significant Imaging:  X-Ray: Reviewed    Assessment/Plan:     Active Diagnoses:    Diagnosis Date Noted POA    PRINCIPAL PROBLEM:  Acute on chronic right-sided congestive heart failure [I50.813] 09/19/2019 Yes    Ischemic cardiomyopathy [I25.5] 09/21/2019 Yes    ESRD (end stage renal disease) [N18.6] 08/13/2019 Yes     Chronic    AICD (automatic cardioverter/defibrillator) present [Z95.810] 08/13/2019 Yes     Chronic    Other ascites [R18.8] 08/13/2019 Yes      Problems Resolved During this Admission:     1. ESRD (HD-TTS via R UE AV Fistula)- symptomatically stable at present; significant abdominal distention on admission (s/p urgent HD on 9/26/19 w/ net UF: approx 2L); no overt volume overload, significant electrolyte perturbations nor acid-base disturbance    -maintenance HD (duration: 3h; UF Goal: 1-2L as tolerated;  Standard 'Bath'; Access: AVF; Qb: 400 ml/min, Qd: 2x BFR; give 12.5-25 gm 25% Albumin w/ HD for BP support + 3Ca bath & 36.5-degree dialysate temperature) per outpatient schedule    -DAILY renal function panel to document sCr trend, optimize HD electrolyte 'bath' & assess response to therapy    -avoid nephrotoxic agents (NSAIDs, IV contrast dye)    -renally dose all appropriate medications, including antibiotics     2. HTN- clinically stable at present;  BP AT GOAL (107/40) now w/ relative hypotension likely 2/2 ESLD-induced splanchnic vasodilatation (needs candidacy assessment for chronic Midodrine/Octreotide therapy by GI Medicine service; no active issues    -HOLD anti-HTN medications & diuretics for now & reassess in am     3. ANEMIA- clinically stable at present; H/H AT GOAL (11.8/37.3); no active issues    -trend daily CBC (H/H) to effect optimal blood-loss  surveillance     4. SECONDARY HYPERPARATHYROIDISM (sHPT)- clinically stable at present; no active issues    -continue dietary binder/Vitamin D +/- HD-associated calcimimetic agent (Cinacalcet vs Etelcalcetide)     5. 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 I will follow-up with patient. Please contact us if you have any additional questions.    Armando Cruz III, MD  [facility]  [facility]  [phone] (C)

## 2019-09-27 NOTE — PLAN OF CARE
09/27/19 1151   Patient Assessment/Suction   Level of Consciousness (AVPU) alert   Respiratory Effort Normal;Unlabored   All Lung Fields Breath Sounds clear   PRE-TX-O2   O2 Device (Oxygen Therapy) room air   $ Is the patient on Low Flow Oxygen? Yes  (NC ON STANDBY)   SpO2 97 %   Pulse Oximetry Type Intermittent   $ Pulse Oximetry - Multiple Charge Pulse Oximetry - Multiple   Pulse 65   Resp 18

## 2019-09-27 NOTE — PT/OT/SLP DISCHARGE
Physical Therapy Discharge Summary    Name: Tre Cali  MRN: [id number]   Principal Problem: Acute on chronic right-sided congestive heart failure     Patient Discharged from acute Physical Therapy on 2019.  Please refer to prior PT noted date on 2019 for functional status.     Assessment:     pt transferring to Hospice in NH.     Objective:     GOALS:   Multidisciplinary Problems     Physical Therapy Goals     Not on file          Multidisciplinary Problems (Resolved)        Problem: Physical Therapy Goal    Goal Priority Disciplines Outcome Goal Variances Interventions   Physical Therapy Goal   (Resolved)     PT, PT/OT Met     Description:  Goals to be met by: 10/25/2019     Re-eval performed on 19 s/p transfer to ICU; Reinstate initial Eval goals. See below    Patient will increase functional independence with mobility by performin. Supine to sit with Modified Ward  2. Sit to supine with Modified Ward  3. Bed to chair transfer with Supervision using Rolling Walker  4. Sit to stand transfer with Supervision using Rolling Walker.                       Reasons for Discontinuation of Therapy Services  Transfer to alternate level of care. and Discussed with RN pt's status for transfers only min assist.       Plan:     Patient Discharged to: Palliative Care/Hospice and nursing home. .    Blessing Funez, PT  2019

## 2019-09-27 NOTE — PLAN OF CARE
09/27/19 1457   Discharge Reassessment   Assessment Type Discharge Planning Reassessment   Anticipated Discharge Disposition skilled nursing Nu   Discharge Plan A New Nursing Home placement - senior living care facility;Other  (Poosibly with hospice.)   Discharge Plan B New Nursing Home placement - senior living care facility   DME Needed Upon Discharge  none   Patient choice form signed by patient/caregiver Yes  (Completed with the patient today at 1218)   Post-Acute Status   Post-Acute Authorization Placement   Post-Acute Placement Status Referrals Sent   Patient is for NH placement possibly with Hospice. He will take the first accepting facility. He is an established HD patient at [facility] on  [facility] TTS 0900. Referrals sent to 5/6 facilities in the area. One was not sent to [place] because they are not accepting new patient who need dialysis at this time. Referral was also sent to [facility] to get an information session. Hannah called to verify recpt and to inform me that Dani will be here today to see the patient.    [id number]--Dani with [facility] present on floor to meet with the patient.

## 2019-09-27 NOTE — SUBJECTIVE & OBJECTIVE
Interval History:  Sitting up in bed. Currently feeling well. Denies any chest pain, shortness of breath. Blood pressure improved today on midodrine. Sister who patient lipids prior to come to the hospital reports that she is to take care of her alone anymore. Discussed with patient at length regarding patient's medical conditions. Patient is agreeable to discuss about hospice. Does not want extreme measures for resuscitation. Reports that he wants to have a good quality of life. Will be DO NOT RESUSCITATE now. SW consulted for NH with hospice placement.    Review of Systems   Constitutional: Negative for chills, fatigue, fever and unexpected weight change.   HENT: Negative for sore throat.    Eyes: Negative for visual disturbance.   Respiratory: Negative for cough, chest tightness and shortness of breath.    Cardiovascular: Negative for chest pain.   Gastrointestinal: Negative for abdominal pain, constipation, diarrhea, nausea and vomiting.   Endocrine: Negative for polyuria.   Genitourinary: Negative for dysuria and hematuria.   Neurological: Negative for headaches.     Objective:     Vital Signs (Most Recent):  Temp: 97.6 °F (36.4 °C) (09/27/19 1223)  Pulse: 65 (09/27/19 1223)  Resp: 20 (09/27/19 1223)  BP: (!) 119/55 (09/27/19 1223)  SpO2: 98 % (09/27/19 1223) Vital Signs (24h Range):  Temp:  [96.9 °F (36.1 °C)-98.9 °F (37.2 °C)] 97.6 °F (36.4 °C)  Pulse:  [48-71] 65  Resp:  [18-20] 20  SpO2:  [94 %-98 %] 98 %  BP: ()/(35-55) 119/55     Weight: 90.2 kg (198 lb 13.7 oz)  Body mass index is 32.1 kg/m².    Intake/Output Summary (Last 24 hours) at 9/27/2019 1352  Last data filed at 9/27/2019 1339  Gross per 24 hour   Intake 550 ml   Output 2503 ml   Net -1953 ml      Physical Exam  Constitutional: He is oriented to person, place, and time. He appears well-developed and well-nourished. No distress.   HENT:   Head: Normocephalic and atraumatic.   Right Ear: External ear normal.   Left Ear: External ear normal.    Eyes: Conjunctivae and EOM are normal. Right eye exhibits no discharge. Left eye exhibits no discharge. No scleral icterus.   Neck: Normal range of motion.   Cardiovascular: Normal rate and regular rhythm.   Pulmonary/Chest: Effort normal. No respiratory distress. He has no wheezes. He has rales (Mild rales in lower bases). He exhibits no tenderness.   Abdominal: Soft. He exhibits distension. There is no tenderness.   Musculoskeletal: He exhibits no edema or tenderness.   Neurological: He is alert and oriented to person, place, and time.   Skin: Skin is warm. He is not diaphoretic.   Psychiatric: He has a normal mood and affect. His behavior is normal. Judgment and thought content normal.   Nursing note and vitals reviewed.    Significant Labs:   CBC:   Recent Labs   Lab 09/26/19 0821 09/27/19 0957   WBC 6.76 6.74   HGB 11.8* 11.5*   HCT 37.3* 36.6*   * 105*     CMP:   Recent Labs   Lab 09/26/19 0821 09/27/19 0957   * 134*   K 6.1* 5.1   CL 97 100   CO2 22* 24    132*   BUN 47* 38*   CREATININE 7.1* 6.1*   CALCIUM 7.9* 8.2*   PROT 6.5  --    ALBUMIN 3.0*  --    BILITOT 1.3*  --    ALKPHOS 117  --    AST 13  --    ALT 12  --    ANIONGAP 13 10   EGFRNONAA 7.8* 9.4*

## 2019-09-27 NOTE — PROGRESS NOTES
[facility] Medicine  Progress Note    Patient Name: Tre Cali  MRN: [id number]  Patient Class: IP- Inpatient   Admission Date: 9/19/2019  Length of Stay: 4 days  Attending Physician: Hong Mercer MD  Primary Care Provider: Primary Doctor No        Subjective:     Principal Problem:Acute on chronic right-sided congestive heart failure    Interval History:  Sitting up in bed. Currently feeling well. Denies any chest pain, shortness of breath. Blood pressure improved today on midodrine. Sister who patient lipids prior to come to the hospital reports that she is to take care of her alone anymore. Discussed with patient at length regarding patient's medical conditions. Patient is agreeable to discuss about hospice. Does not want extreme measures for resuscitation. Reports that he wants to have a good quality of life. Will be DO NOT RESUSCITATE now. SW consulted for NH with hospice placement.    Review of Systems   Constitutional: Negative for chills, fatigue, fever and unexpected weight change.   HENT: Negative for sore throat.    Eyes: Negative for visual disturbance.   Respiratory: Negative for cough, chest tightness and shortness of breath.    Cardiovascular: Negative for chest pain.   Gastrointestinal: Negative for abdominal pain, constipation, diarrhea, nausea and vomiting.   Endocrine: Negative for polyuria.   Genitourinary: Negative for dysuria and hematuria.   Neurological: Negative for headaches.     Objective:     Vital Signs (Most Recent):  Temp: 97.6 °F (36.4 °C) (09/27/19 1223)  Pulse: 65 (09/27/19 1223)  Resp: 20 (09/27/19 1223)  BP: (!) 119/55 (09/27/19 1223)  SpO2: 98 % (09/27/19 1223) Vital Signs (24h Range):  Temp:  [96.9 °F (36.1 °C)-98.9 °F (37.2 °C)] 97.6 °F (36.4 °C)  Pulse:  [48-71] 65  Resp:  [18-20] 20  SpO2:  [94 %-98 %] 98 %  BP: ()/(35-55) 119/55     Weight: 90.2 kg (198 lb 13.7 oz)  Body mass index is 32.1 kg/m².    Intake/Output Summary (Last 24  hours) at 9/27/2019 1352  Last data filed at 9/27/2019 1339  Gross per 24 hour   Intake 550 ml   Output 2503 ml   Net -1953 ml      Physical Exam  Constitutional: He is oriented to person, place, and time. He appears well-developed and well-nourished. No distress.   HENT:   Head: Normocephalic and atraumatic.   Right Ear: External ear normal.   Left Ear: External ear normal.   Eyes: Conjunctivae and EOM are normal. Right eye exhibits no discharge. Left eye exhibits no discharge. No scleral icterus.   Neck: Normal range of motion.   Cardiovascular: Normal rate and regular rhythm.   Pulmonary/Chest: Effort normal. No respiratory distress. He has no wheezes. He has rales (Mild rales in lower bases). He exhibits no tenderness.   Abdominal: Soft. He exhibits distension. There is no tenderness.   Musculoskeletal: He exhibits no edema or tenderness.   Neurological: He is alert and oriented to person, place, and time.   Skin: Skin is warm. He is not diaphoretic.   Psychiatric: He has a normal mood and affect. His behavior is normal. Judgment and thought content normal.   Nursing note and vitals reviewed.    Significant Labs:   CBC:   Recent Labs   Lab 09/26/19  0821 09/27/19  0957   WBC 6.76 6.74   HGB 11.8* 11.5*   HCT 37.3* 36.6*   * 105*     CMP:   Recent Labs   Lab 09/26/19  0821 09/27/19  0957   * 134*   K 6.1* 5.1   CL 97 100   CO2 22* 24    132*   BUN 47* 38*   CREATININE 7.1* 6.1*   CALCIUM 7.9* 8.2*   PROT 6.5  --    ALBUMIN 3.0*  --    BILITOT 1.3*  --    ALKPHOS 117  --    AST 13  --    ALT 12  --    ANIONGAP 13 10   EGFRNONAA 7.8* 9.4*       Assessment/Plan:      Active Hospital Problems    Diagnosis    *Acute on chronic right-sided congestive heart failure    Wheelchair dependence    Ischemic cardiomyopathy    ESRD (end stage renal disease)    AICD (automatic cardioverter/defibrillator) present    Other ascites      * Acute on chronic right-sided congestive heart failure  Improved  after dialysis  Cardiology and Renal following      Ischemic cardiomyopathy  Ischemic cardiomyopathy with EF 20%.  Cardiology has been following patient.  Ace and beta-blocker have not been given in the past due to hypotension.  Now  on beta-blocker        AICD (automatic cardioverter/defibrillator) present  Details unknown      ESRD (end stage renal disease)  Patient is followed by Dr. Howell  Dialysis normally on Tuesdays Thursdays and Saturdays  Renal following      Other ascites  S/p paracentesis on 9/23 that removed 6 L fluid      VTE Risk Mitigation (From admission, onward)         Ordered     enoxaparin injection 30 mg  Daily      09/19/19 0953     IP VTE HIGH RISK PATIENT  Once      09/19/19 0953                      Hong Mercer MD  Department of Hospital Medicine   [facility]  Date of service: 09/27/2019 1:56 PM

## 2019-09-28 LAB
ALBUMIN SERPL BCP-MCNC: 3.1 G/DL (ref 3.5–5.2)
ALP SERPL-CCNC: 138 U/L (ref 55–135)
ALT SERPL W/O P-5'-P-CCNC: 12 U/L (ref 10–44)
ANION GAP SERPL CALC-SCNC: 10 MMOL/L (ref 8–16)
AST SERPL-CCNC: 14 U/L (ref 10–40)
BASOPHILS # BLD AUTO: 0.1 K/UL (ref 0–0.2)
BASOPHILS NFR BLD: 1.4 % (ref 0–1.9)
BILIRUB SERPL-MCNC: 1.2 MG/DL (ref 0.1–1)
BUN SERPL-MCNC: 22 MG/DL (ref 6–20)
CALCIUM SERPL-MCNC: 8.5 MG/DL (ref 8.7–10.5)
CHLORIDE SERPL-SCNC: 98 MMOL/L (ref 95–110)
CO2 SERPL-SCNC: 25 MMOL/L (ref 23–29)
CREAT SERPL-MCNC: 3.4 MG/DL (ref 0.5–1.4)
DIFFERENTIAL METHOD: ABNORMAL
EOSINOPHIL # BLD AUTO: 0.2 K/UL (ref 0–0.5)
EOSINOPHIL NFR BLD: 2.2 % (ref 0–8)
ERYTHROCYTE [DISTWIDTH] IN BLOOD BY AUTOMATED COUNT: 17.3 % (ref 11.5–14.5)
EST. GFR  (AFRICAN AMERICAN): 22.1 ML/MIN/1.73 M^2
EST. GFR  (NON AFRICAN AMERICAN): 19.1 ML/MIN/1.73 M^2
GLUCOSE SERPL-MCNC: 92 MG/DL (ref 70–110)
HCT VFR BLD AUTO: 35.6 % (ref 40–54)
HGB BLD-MCNC: 11.5 G/DL (ref 14–18)
IMM GRANULOCYTES # BLD AUTO: 0.03 K/UL (ref 0–0.04)
IMM GRANULOCYTES NFR BLD AUTO: 0.4 % (ref 0–0.5)
LYMPHOCYTES # BLD AUTO: 0.6 K/UL (ref 1–4.8)
LYMPHOCYTES NFR BLD: 7.9 % (ref 18–48)
MCH RBC QN AUTO: 30.3 PG (ref 27–31)
MCHC RBC AUTO-ENTMCNC: 32.3 G/DL (ref 32–36)
MCV RBC AUTO: 94 FL (ref 82–98)
MONOCYTES # BLD AUTO: 0.9 K/UL (ref 0.3–1)
MONOCYTES NFR BLD: 12.8 % (ref 4–15)
NEUTROPHILS # BLD AUTO: 5.6 K/UL (ref 1.8–7.7)
NEUTROPHILS NFR BLD: 75.3 % (ref 38–73)
NRBC BLD-RTO: 0 /100 WBC
PLATELET # BLD AUTO: 104 K/UL (ref 150–350)
PMV BLD AUTO: 12.1 FL (ref 9.2–12.9)
POTASSIUM SERPL-SCNC: 3.9 MMOL/L (ref 3.5–5.1)
PROT SERPL-MCNC: 7.1 G/DL (ref 6–8.4)
RBC # BLD AUTO: 3.79 M/UL (ref 4.6–6.2)
SODIUM SERPL-SCNC: 133 MMOL/L (ref 136–145)
WBC # BLD AUTO: 7.36 K/UL (ref 3.9–12.7)

## 2019-09-28 PROCEDURE — 25000003 PHARM REV CODE 250: Performed by: FAMILY MEDICINE

## 2019-09-28 PROCEDURE — 25000003 PHARM REV CODE 250

## 2019-09-28 PROCEDURE — 80053 COMPREHEN METABOLIC PANEL: CPT

## 2019-09-28 PROCEDURE — 25000003 PHARM REV CODE 250: Performed by: HOSPITALIST

## 2019-09-28 PROCEDURE — 90935 HEMODIALYSIS ONE EVALUATION: CPT

## 2019-09-28 PROCEDURE — 21400001 HC TELEMETRY ROOM

## 2019-09-28 PROCEDURE — 63600175 PHARM REV CODE 636 W HCPCS: Performed by: HOSPITALIST

## 2019-09-28 PROCEDURE — 94761 N-INVAS EAR/PLS OXIMETRY MLT: CPT

## 2019-09-28 PROCEDURE — 36415 COLL VENOUS BLD VENIPUNCTURE: CPT

## 2019-09-28 PROCEDURE — 25000003 PHARM REV CODE 250: Performed by: PHYSICIAN ASSISTANT

## 2019-09-28 PROCEDURE — 85025 COMPLETE CBC W/AUTO DIFF WBC: CPT

## 2019-09-28 RX ADMIN — MIDODRINE HYDROCHLORIDE 10 MG: 2.5 TABLET ORAL at 12:09

## 2019-09-28 RX ADMIN — MIDODRINE HYDROCHLORIDE 10 MG: 2.5 TABLET ORAL at 06:09

## 2019-09-28 RX ADMIN — ASPIRIN 81 MG: 81 TABLET, DELAYED RELEASE ORAL at 10:09

## 2019-09-28 RX ADMIN — CALCIUM ACETATE 2668 MG: 667 CAPSULE ORAL at 06:09

## 2019-09-28 RX ADMIN — CHLORHEXIDINE GLUCONATE 15 ML: 1.2 RINSE ORAL at 10:09

## 2019-09-28 RX ADMIN — MIDODRINE HYDROCHLORIDE 10 MG: 2.5 TABLET ORAL at 10:09

## 2019-09-28 RX ADMIN — CHLORHEXIDINE GLUCONATE 15 ML: 1.2 RINSE ORAL at 08:09

## 2019-09-28 RX ADMIN — RIFAXIMIN 550 MG: 550 TABLET ORAL at 10:09

## 2019-09-28 RX ADMIN — CALCIUM ACETATE 2668 MG: 667 CAPSULE ORAL at 10:09

## 2019-09-28 RX ADMIN — RIFAXIMIN 550 MG: 550 TABLET ORAL at 08:09

## 2019-09-28 RX ADMIN — ENOXAPARIN SODIUM 30 MG: 100 INJECTION SUBCUTANEOUS at 06:09

## 2019-09-28 RX ADMIN — RAMELTEON 8 MG: 8 TABLET ORAL at 08:09

## 2019-09-28 NOTE — PROGRESS NOTES
Total UF: 2500 mL  Net UF: 2000 mL       09/28/19 1726   Vital Signs   Temp 98 °F (36.7 °C)   Temp src Tympanic   Pulse 62   Heart Rate Source Monitor   Resp 18   O2 Device (Oxygen Therapy) room air   BP (!) 90/58   BP Location Left arm   BP Method Automatic   Patient Position Lying   Post-Hemodialysis Assessment   Rinseback Volume (mL) 250 mL   Blood Volume Processed (Liters) 65 L   Dialyzer Clearance Lightly streaked   Duration of Treatment (minutes) 180 minutes   Hemodialysis Intake (mL) 500 mL   Total UF (mL) 2500 mL   Net Fluid Removal 2000   Patient Response to Treatment Tolerated well   Post-Treatment Weight 90.5 kg (199 lb 8.3 oz)   Treatment Weight Change -2.5   Arterial bleeding stop time (min) 2 min   Venous bleeding stop time (min) 2 min   Post-Hemodialysis Comments Tx complete, with no complications throughout. Pt AAO X4 and stable.

## 2019-09-28 NOTE — PROGRESS NOTES
[facility] Medicine  Progress Note    Patient Name: Tre Cali  MRN: [id number]  Patient Class: IP- Inpatient   Admission Date: 9/19/2019  Length of Stay: 5 days  Attending Physician: Hong Mercer MD  Primary Care Provider: Primary Doctor No        Subjective:     Principal Problem:Acute on chronic right-sided congestive heart failure    Interval History:  Reports feeling well. Denies any chest pain, shortness of breath. Hospice people came by yesterday evening. Patient reports that he understands and is agreeable with the plan.  currently working on a home with hospice.    Review of Systems   Constitutional: Negative for chills, fatigue, fever and unexpected weight change.   HENT: Negative for sore throat.    Eyes: Negative for visual disturbance.   Respiratory: Negative for cough, chest tightness and shortness of breath.    Cardiovascular: Negative for chest pain.   Gastrointestinal: Negative for abdominal pain, constipation, diarrhea, nausea and vomiting.   Endocrine: Negative for polyuria.   Genitourinary: Negative for dysuria and hematuria.   Neurological: Negative for headaches.     Objective:     Vital Signs (Most Recent):  Temp: 97.6 °F (36.4 °C) (09/28/19 0705)  Pulse: 78 (09/28/19 0851)  Resp: 18 (09/28/19 0851)  BP: 94/61 (09/28/19 0705)  SpO2: 98 % (09/28/19 0851) Vital Signs (24h Range):  Temp:  [97.6 °F (36.4 °C)-98 °F (36.7 °C)] 97.6 °F (36.4 °C)  Pulse:  [60-78] 78  Resp:  [18-20] 18  SpO2:  [94 %-100 %] 98 %  BP: ()/(51-61) 94/61     Weight: 90.4 kg (199 lb 4.7 oz)  Body mass index is 32.17 kg/m².    Intake/Output Summary (Last 24 hours) at 9/28/2019 1122  Last data filed at 9/28/2019 0049  Gross per 24 hour   Intake --   Output 2 ml   Net -2 ml      Physical Exam  Constitutional: He is oriented to person, place, and time. He appears well-developed and well-nourished. No distress.   HENT:   Head: Normocephalic and atraumatic.   Right  Ear: External ear normal.   Left Ear: External ear normal.   Eyes: Conjunctivae and EOM are normal. Right eye exhibits no discharge. Left eye exhibits no discharge. No scleral icterus.   Neck: Normal range of motion.   Cardiovascular: Normal rate and regular rhythm.   Pulmonary/Chest: Effort normal. No respiratory distress. He has no wheezes. He has rales (Mild rales in lower bases). He exhibits no tenderness.   Abdominal: Soft. He exhibits distension. There is no tenderness.   Musculoskeletal: He exhibits no edema or tenderness.   Neurological: He is alert and oriented to person, place, and time.   Skin: Skin is warm. He is not diaphoretic.   Psychiatric: He has a normal mood and affect. His behavior is normal. Judgment and thought content normal.   Nursing note and vitals reviewed.      Assessment/Plan:     Active Hospital Problems    Diagnosis    *Acute on chronic right-sided congestive heart failure    Wheelchair dependence    Ischemic cardiomyopathy    ESRD (end stage renal disease)    AICD (automatic cardioverter/defibrillator) present    Other ascites       * Acute on chronic right-sided congestive heart failure  Improved after dialysis  Cardiology and Renal following      Ischemic cardiomyopathy  Ischemic cardiomyopathy with EF 20%.  Cardiology has been following patient.  Ace and beta-blocker have not been given in the past due to hypotension.  Now  on beta-blocker        AICD (automatic cardioverter/defibrillator) present  Details unknown      ESRD (end stage renal disease)  Patient is followed by Dr. Howell  Dialysis normally on Tuesdays Thursdays and Saturdays  Renal following      Other ascites  S/p paracentesis on 9/23 that removed 6 L fluid      VTE Risk Mitigation (From admission, onward)         Ordered     enoxaparin injection 30 mg  Daily      09/19/19 0953     IP VTE HIGH RISK PATIENT  Once      09/19/19 0953                      Hong Mercer MD  Department of Hospital Medicine    [facility]  Date of service: 09/28/2019 11:23 AM

## 2019-09-28 NOTE — PLAN OF CARE
09/27/19 2023   PRE-TX-O2   O2 Device (Oxygen Therapy) room air   SpO2 98 %   Pulse Oximetry Type Intermittent   Pulse 65   Resp 18

## 2019-09-28 NOTE — PLAN OF CARE
Cardiac, vital signs, and lab monitoring. Increase activity as tolerated. Bed alarm on. Watch for falls. Strict I&O. Daily weights. Dialysis Tues., Thurs., Sat. Waiting on Hospice placement.

## 2019-09-29 PROCEDURE — 63600175 PHARM REV CODE 636 W HCPCS: Performed by: HOSPITALIST

## 2019-09-29 PROCEDURE — 25000003 PHARM REV CODE 250: Performed by: HOSPITALIST

## 2019-09-29 PROCEDURE — 21400001 HC TELEMETRY ROOM

## 2019-09-29 PROCEDURE — 25000003 PHARM REV CODE 250: Performed by: PHYSICIAN ASSISTANT

## 2019-09-29 PROCEDURE — 25000003 PHARM REV CODE 250: Performed by: FAMILY MEDICINE

## 2019-09-29 RX ADMIN — ENOXAPARIN SODIUM 30 MG: 100 INJECTION SUBCUTANEOUS at 04:09

## 2019-09-29 RX ADMIN — ASPIRIN 81 MG: 81 TABLET, DELAYED RELEASE ORAL at 08:09

## 2019-09-29 RX ADMIN — MIDODRINE HYDROCHLORIDE 10 MG: 2.5 TABLET ORAL at 07:09

## 2019-09-29 RX ADMIN — CALCIUM ACETATE 2668 MG: 667 CAPSULE ORAL at 07:09

## 2019-09-29 RX ADMIN — MIDODRINE HYDROCHLORIDE 10 MG: 2.5 TABLET ORAL at 04:09

## 2019-09-29 RX ADMIN — TRAMADOL HYDROCHLORIDE 50 MG: 50 TABLET, COATED ORAL at 07:09

## 2019-09-29 RX ADMIN — RAMELTEON 8 MG: 8 TABLET ORAL at 10:09

## 2019-09-29 RX ADMIN — CALCIUM ACETATE 2668 MG: 667 CAPSULE ORAL at 04:09

## 2019-09-29 RX ADMIN — RIFAXIMIN 550 MG: 550 TABLET ORAL at 08:09

## 2019-09-29 RX ADMIN — MIDODRINE HYDROCHLORIDE 10 MG: 2.5 TABLET ORAL at 11:09

## 2019-09-29 RX ADMIN — CALCIUM ACETATE 2668 MG: 667 CAPSULE ORAL at 11:09

## 2019-09-29 RX ADMIN — RIFAXIMIN 550 MG: 550 TABLET ORAL at 10:09

## 2019-09-29 NOTE — SUBJECTIVE & OBJECTIVE
Interval History:  Feeling well. Had dialysis today. Awaiting for nursing home placement.    Review of Systems   Constitutional: Negative for chills, fatigue, fever and unexpected weight change.   HENT: Negative for sore throat.    Eyes: Negative for visual disturbance.   Respiratory: Negative for cough, chest tightness and shortness of breath.    Cardiovascular: Negative for chest pain.   Gastrointestinal: Negative for abdominal pain, constipation, diarrhea, nausea and vomiting.   Endocrine: Negative for polyuria.   Genitourinary: Negative for dysuria and hematuria.   Neurological: Negative for headaches.     Objective:     Vital Signs (Most Recent):  Temp: 98.2 °F (36.8 °C) (09/29/19 1100)  Pulse: 73 (09/29/19 1100)  Resp: (!) 22 (09/29/19 1100)  BP: 117/60 (09/29/19 1100)  SpO2: 97 % (09/29/19 1100) Vital Signs (24h Range):  Temp:  [97.5 °F (36.4 °C)-98.3 °F (36.8 °C)] 98.2 °F (36.8 °C)  Pulse:  [56-73] 73  Resp:  [18-22] 22  SpO2:  [90 %-99 %] 97 %  BP: ()/(57-72) 117/60     Weight: 90.4 kg (199 lb 4.7 oz)  Body mass index is 32.17 kg/m².    Intake/Output Summary (Last 24 hours) at 9/29/2019 1322  Last data filed at 9/29/2019 0400  Gross per 24 hour   Intake 620 ml   Output 2501 ml   Net -1881 ml      Physical Exam  Constitutional: He is oriented to person, place, and time. He appears well-developed and well-nourished. No distress.   HENT:   Head: Normocephalic and atraumatic.   Right Ear: External ear normal.   Left Ear: External ear normal.   Eyes: Conjunctivae and EOM are normal. Right eye exhibits no discharge. Left eye exhibits no discharge. No scleral icterus.   Neck: Normal range of motion.   Cardiovascular: Normal rate and regular rhythm.   Pulmonary/Chest: Effort normal. No respiratory distress. He has no wheezes. He has rales (Mild rales in lower bases). He exhibits no tenderness.   Abdominal: Soft. He exhibits distension. There is no tenderness.   Musculoskeletal: He exhibits no edema or  tenderness.   Neurological: He is alert and oriented to person, place, and time.   Skin: Skin is warm. He is not diaphoretic.   Psychiatric: He has a normal mood and affect. His behavior is normal. Judgment and thought content normal.   Nursing note and vitals reviewed.

## 2019-09-29 NOTE — PROGRESS NOTES
"No acute overnight issues  However, notes " a lot of " loose BM      AA  NAD  abd more ditsended, but NT  RRR, no rub  CTA B        Vital Signs    Report   View Graph    09/28 0700  09/29 0659 09/29 0700  09/29 1608  Most Recent     Temp (°F) 97.5-98.3 97.5-98.2  97.5 (36.4)  09/29 1500   Pulse 56 -78 62-73  62  09/29 1500   Resp 18-20 19-22   19  09/29 1500   BP 90/58 -105/59  98//60  102/67  09/29 1500   SpO2 (%) 90 -100 95-98  98  09/29 1500   Intake/Output    Report   View Table   [id number]  1  2,500  500  120  360  -1,881  +360  620  (6.9)  360  (4)  2,501  --  3 x  P.O.  Other  In  Out  Stool  Other  Net  Stool Occurrence  Medications   Report   Scheduled     Medication Last Action   aspirin EC tablet 81 mg Given   81 mg, Daily, Oral    09/29 0858   calcium acetate capsule 2,668 mg Given   2,668 mg, TID WM, Oral    09/29 1150   enoxaparin injection 30 mg Given   30 mg, Q24H, SubQ    09/28 1800   midodrine tablet 10 mg Given   10 mg, TID, Oral    09/29 1150   rifAXIMin tablet 550 mg Given   550 mg, BID, Oral    09/29 0859      PRN     Medication Last Action   0.9%  NaCl infusion Ordered   No Dose/Rate, PRN, IV       acetaminophen tablet 650 mg Ordered   650 mg, Q4H PRN, Oral       acetaminophen tablet 650 mg Ordered   650 mg, Q6H PRN, Oral       albumin human 25% bottle 25 g Ordered   25 g, PRN, IV       diphenoxylate-atropine 2.5-0.025 mg per tablet 2 tablet Given   2 tablet, QID PRN, Oral    09/22 0834   magnesium oxide tablet 800 mg Ordered   800 mg, PRN, Oral       magnesium oxide tablet 800 mg Ordered   800 mg, PRN, Oral       ondansetron injection 4 mg Given   4 mg, Q6H PRN, IV    09/23 0846   ramelteon tablet 8 mg Given   8 mg, Nightly PRN, Oral    09/28 2033   sodium chloride 0.9% flush 10 mL Ordered   10 mL, PRN, IV       traMADol tablet 50 mg Given   50 mg, Q6H PRN, Oral                A/P:    ESRD (HD-TTS via R UE AV Fistula)-   Stable on current schedule     2. HTN-  BP well " controlled  Tolerating UF        3. ANEMIA-   Stable  Hold RANGEL for now     4. SECONDARY HYPERPARATHYROIDISM (sHPT)- clinically stable at present; no active issues    -continue dietary binder/Vitamin D +/- HD-associated calcimimetic agent (Cinacalcet vs Etelcalcetide)     5. 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

## 2019-09-29 NOTE — PROGRESS NOTES
No acute overnight issues    AA  NAD  On HD  Supine no SOB  No JVD  RRR, no rub  CTA B ant; rales post  + BLL edema  Dressing c/d/i    Vital Signs    Report   View Graph    09/27 0700 09/28 0659 09/28 0700 09/28 2241  Most Recent     Temp (°F) 97.6-98 97.5-98.1  97.5 (36.4)  09/28 1901   Pulse 60-65 56 -78  63  09/28 1930   Resp 18-20 18-20  18  09/28 1930   BP 99/53 -119/55  90/58 -105/59   99/58   09/28 1901   SpO2 (%) 94 -98   99  09/28 1930   Weight (kg) 90.4         Intake/Output    Report   View Table   Other  2,500mL  9/28 0700 - 9/29 0659  [id number]  2  1  2,500  500  -2  -2,001  500  (5.5)  2  2,501  1 x  --  Other  In  Out  Stool  Other  Net  Report   Scheduled     Medication Last Action   aspirin EC tablet 81 mg Given   81 mg, Daily, Oral    09/28 1007   calcium acetate capsule 2,668 mg Given   2,668 mg, TID WM, Oral    09/28 1800   chlorhexidine 0.12 % solution 15 mL Given   15 mL, BID, MT    09/28 2033   enoxaparin injection 30 mg Given   30 mg, Q24H, SubQ    09/28 1800   midodrine tablet 10 mg Given   10 mg, TID, Oral    09/28 1800   rifAXIMin tablet 550 mg Given   550 mg, BID, Oral    09/28 2033      PRN     Medication Last Action   0.9%  NaCl infusion Ordered   No Dose/Rate, PRN, IV       acetaminophen tablet 650 mg Ordered   650 mg, Q4H PRN, Oral       acetaminophen tablet 650 mg Ordered   650 mg, Q6H PRN, Oral       albumin human 25% bottle 25 g Ordered   25 g, PRN, IV       diphenoxylate-atropine 2.5-0.025 mg per tablet 2 tablet Given   2 tablet, QID PRN, Oral    09/22 0834   magnesium oxide tablet 800 mg Ordered   800 mg, PRN, Oral       magnesium oxide tablet 800 mg Ordered   800 mg, PRN, Oral       ondansetron injection 4 mg Given   4 mg, Q6H PRN, IV    09/23 0846   ramelteon tablet 8 mg Given        Selected Labs    Report   (Up to last 2 results from the past 72 hours)   Switch View    09/27 0957 09/28 1730   Sodium 134Low      133Low        Potassium 5.1     3.9       Chloride  100     98       CO2 24     25       BUN, Bld 38High      22High        Creatinine 6.1High      3.4High        Glucose 132High      92       WBC 6.74     7.36       Hemoglobin 11.5Low      11.5Low        Hematocrit 36.6Low      35.6Low        Platelets 105Low      104Low         1. ESRD (HD-TTS via R UE AV Fistula)-   Stable on current schedule     2. HTN-  BP well controlled  Tolerating UF      3. ANEMIA-   Stable  Hold RANGEL for now     4. SECONDARY HYPERPARATHYROIDISM (sHPT)- clinically stable at present; no active issues    -continue dietary binder/Vitamin D +/- HD-associated calcimimetic agent (Cinacalcet vs Etelcalcetide)     5. 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

## 2019-09-29 NOTE — ASSESSMENT & PLAN NOTE
Improved after dialysis  Cardiology and Renal following  Stable  Awaiting placement for NH hospice

## 2019-09-29 NOTE — PLAN OF CARE
Plan of care reviewed with patient. Cardiac, vital signs, and lab monitoring. Increase activity as tolerated. Strict I&O. Daily weights. Awaiting hospice placement.

## 2019-09-29 NOTE — PROGRESS NOTES
[facility] Medicine  Progress Note    Patient Name: Tre Cali  MRN: [id number]  Patient Class: IP- Inpatient   Admission Date: 9/19/2019  Length of Stay: 6 days  Attending Physician: Hong Mercer MD  Primary Care Provider: Primary Doctor No        Subjective:     Principal Problem:Acute on chronic right-sided congestive heart failure    Overview/Hospital Course:  Patient was admitted diagnosed with acute on chronic right-sided congestive heart failure found to have an EF of 20% with volume overload and abdominal distention from ascites causing respiratory distress. Patient had dialysis and had fluid removed and improved patient's symptoms. Patient had issues with hypotension and had to decrease and eventually discontinue multiple blood pressure medications. Patient had a paracentesis to pulled 6 L of fluid. Patient then developed symptomatic hypotension and was placed in ICU.  patient's symptoms resolved while in the ICU but continued to have hypotension that was asymptomatic. Patient was started on medications to improve his blood pressure. Discussed with the patient in length regarding patient's current medical conditions. Patient is requesting to be DO NOT RESUSCITATE and DO NOT INTUBATE. Hospice consulted for further evaluation. Awaiting nursing home placement.    Interval History:  Feeling well. Had dialysis today. Awaiting for nursing home placement.    Review of Systems   Constitutional: Negative for chills, fatigue, fever and unexpected weight change.   HENT: Negative for sore throat.    Eyes: Negative for visual disturbance.   Respiratory: Negative for cough, chest tightness and shortness of breath.    Cardiovascular: Negative for chest pain.   Gastrointestinal: Negative for abdominal pain, constipation, diarrhea, nausea and vomiting.   Endocrine: Negative for polyuria.   Genitourinary: Negative for dysuria and hematuria.   Neurological: Negative for headaches.      Objective:     Vital Signs (Most Recent):  Temp: 98.2 °F (36.8 °C) (09/29/19 1100)  Pulse: 73 (09/29/19 1100)  Resp: (!) 22 (09/29/19 1100)  BP: 117/60 (09/29/19 1100)  SpO2: 97 % (09/29/19 1100) Vital Signs (24h Range):  Temp:  [97.5 °F (36.4 °C)-98.3 °F (36.8 °C)] 98.2 °F (36.8 °C)  Pulse:  [56-73] 73  Resp:  [18-22] 22  SpO2:  [90 %-99 %] 97 %  BP: ()/(57-72) 117/60     Weight: 90.4 kg (199 lb 4.7 oz)  Body mass index is 32.17 kg/m².    Intake/Output Summary (Last 24 hours) at 9/29/2019 1322  Last data filed at 9/29/2019 0400  Gross per 24 hour   Intake 620 ml   Output 2501 ml   Net -1881 ml      Physical Exam  Constitutional: He is oriented to person, place, and time. He appears well-developed and well-nourished. No distress.   HENT:   Head: Normocephalic and atraumatic.   Right Ear: External ear normal.   Left Ear: External ear normal.   Eyes: Conjunctivae and EOM are normal. Right eye exhibits no discharge. Left eye exhibits no discharge. No scleral icterus.   Neck: Normal range of motion.   Cardiovascular: Normal rate and regular rhythm.   Pulmonary/Chest: Effort normal. No respiratory distress. He has no wheezes. He has rales (Mild rales in lower bases). He exhibits no tenderness.   Abdominal: Soft. He exhibits distension. There is no tenderness.   Musculoskeletal: He exhibits no edema or tenderness.   Neurological: He is alert and oriented to person, place, and time.   Skin: Skin is warm. He is not diaphoretic.   Psychiatric: He has a normal mood and affect. His behavior is normal. Judgment and thought content normal.   Nursing note and vitals reviewed.        Assessment/Plan:      Active Hospital Problems    Diagnosis    *Acute on chronic right-sided congestive heart failure    Wheelchair dependence    Ischemic cardiomyopathy    ESRD (end stage renal disease)    AICD (automatic cardioverter/defibrillator) present    Other ascites      * Acute on chronic right-sided congestive heart  failure  Improved after dialysis  Cardiology and Renal following  Stable  Awaiting placement for NH hospice    Ischemic cardiomyopathy  Ischemic cardiomyopathy with EF 20%.  Cardiology has been following patient.  Ace and beta-blocker have not been given in the past due to hypotension.  Now  on beta-blocker        AICD (automatic cardioverter/defibrillator) present  Details unknown      ESRD (end stage renal disease)  Patient is followed by Dr. Howell  Dialysis normally on Tuesdays Thursdays and Saturdays  Renal following      Other ascites  S/p paracentesis on 9/23 that removed 6 L fluid      VTE Risk Mitigation (From admission, onward)         Ordered     enoxaparin injection 30 mg  Daily      09/19/19 0953     IP VTE HIGH RISK PATIENT  Once      09/19/19 0953                      Hong Mercer MD  Department of Hospital Medicine   [facility]  Date of service: 09/29/2019 1:24 PM

## 2019-09-30 PROCEDURE — 63600175 PHARM REV CODE 636 W HCPCS: Performed by: HOSPITALIST

## 2019-09-30 PROCEDURE — 21400001 HC TELEMETRY ROOM

## 2019-09-30 PROCEDURE — 25000003 PHARM REV CODE 250: Performed by: FAMILY MEDICINE

## 2019-09-30 PROCEDURE — 25000003 PHARM REV CODE 250: Performed by: PHYSICIAN ASSISTANT

## 2019-09-30 PROCEDURE — 25000003 PHARM REV CODE 250: Performed by: HOSPITALIST

## 2019-09-30 RX ADMIN — MIDODRINE HYDROCHLORIDE 10 MG: 2.5 TABLET ORAL at 08:09

## 2019-09-30 RX ADMIN — ONDANSETRON 4 MG: 2 INJECTION INTRAMUSCULAR; INTRAVENOUS at 09:09

## 2019-09-30 RX ADMIN — CALCIUM ACETATE 2668 MG: 667 CAPSULE ORAL at 12:09

## 2019-09-30 RX ADMIN — RIFAXIMIN 550 MG: 550 TABLET ORAL at 09:09

## 2019-09-30 RX ADMIN — ENOXAPARIN SODIUM 30 MG: 100 INJECTION SUBCUTANEOUS at 04:09

## 2019-09-30 RX ADMIN — CALCIUM ACETATE 2668 MG: 667 CAPSULE ORAL at 08:09

## 2019-09-30 RX ADMIN — MIDODRINE HYDROCHLORIDE 10 MG: 2.5 TABLET ORAL at 04:09

## 2019-09-30 RX ADMIN — ASPIRIN 81 MG: 81 TABLET, DELAYED RELEASE ORAL at 08:09

## 2019-09-30 RX ADMIN — RIFAXIMIN 550 MG: 550 TABLET ORAL at 08:09

## 2019-09-30 RX ADMIN — MIDODRINE HYDROCHLORIDE 10 MG: 2.5 TABLET ORAL at 12:09

## 2019-09-30 RX ADMIN — CALCIUM ACETATE 2668 MG: 667 CAPSULE ORAL at 04:09

## 2019-09-30 NOTE — SUBJECTIVE & OBJECTIVE
Interval History:  Sitting up at bedside and breathing comfortably on room air. Afebrile past 24 hours. Reports he is ready to go to the facility. Working with  for placement.    Review of Systems   Constitutional: Negative for chills, fatigue, fever and unexpected weight change.   HENT: Negative for sore throat.    Eyes: Negative for visual disturbance.   Respiratory: Negative for cough, chest tightness and shortness of breath.    Cardiovascular: Negative for chest pain.   Gastrointestinal: Negative for abdominal pain, constipation, diarrhea, nausea and vomiting.   Endocrine: Negative for polyuria.   Genitourinary: Negative for dysuria and hematuria.   Neurological: Negative for headaches.     Objective:     Vital Signs (Most Recent):  Temp: 97.6 °F (36.4 °C) (09/30/19 1103)  Pulse: 61 (09/30/19 1103)  Resp: 16 (09/30/19 1103)  BP: 105/65 (09/30/19 1103)  SpO2: 97 % (09/30/19 1103) Vital Signs (24h Range):  Temp:  [97.5 °F (36.4 °C)-97.7 °F (36.5 °C)] 97.6 °F (36.4 °C)  Pulse:  [60-65] 61  Resp:  [16-19] 16  SpO2:  [93 %-98 %] 97 %  BP: (102-111)/(59-73) 105/65     Weight: 90.4 kg (199 lb 4.7 oz)  Body mass index is 32.17 kg/m².    Intake/Output Summary (Last 24 hours) at 9/30/2019 1223  Last data filed at 9/30/2019 0848  Gross per 24 hour   Intake 360 ml   Output 1 ml   Net 359 ml      Physical Exam  Constitutional: He is oriented to person, place, and time. He appears well-developed and well-nourished. No distress.   HENT:   Head: Normocephalic and atraumatic.   Right Ear: External ear normal.   Left Ear: External ear normal.   Eyes: Conjunctivae and EOM are normal. Right eye exhibits no discharge. Left eye exhibits no discharge. No scleral icterus.   Neck: Normal range of motion.   Cardiovascular: Normal rate and regular rhythm.   Pulmonary/Chest: Effort normal. No respiratory distress. He has no wheezes. He has rales (Mild rales in lower bases). He exhibits no tenderness.   Abdominal: Soft. He  exhibits distension. There is no tenderness.   Musculoskeletal: He exhibits no edema or tenderness.   Neurological: He is alert and oriented to person, place, and time.   Skin: Skin is warm. He is not diaphoretic.   Psychiatric: He has a normal mood and affect. His behavior is normal. Judgment and thought content normal.   Nursing note and vitals reviewed.

## 2019-09-30 NOTE — PROGRESS NOTES
[facility]  Progress Note    Patient Name: Tre Cali  MRN: [id number]  Patient Class: IP- Inpatient   Admission Date: 9/19/2019  Length of Stay: 7 days  Attending Physician: Hong Mercer MD  Primary Care Provider: Primary Doctor No        Subjective:     Principal Problem:Acute on chronic right-sided congestive heart failure    HPI:  Patient is a 54-year-old male with a history of end-stage renal disease on dialysis.  He receives dialysis on Tuesdays Thursdays and Saturdays.  Patient presents to our emergency department with complaints of shortness of breath.  He was scheduled  scheduled for dialysis this morning however was unable to wait until 8:00 a.m. secondary to dyspnea .  In the emergency department patient was seated upright in significant respiratory distress improved with non-rebreather .  Patient  denies any chest pain , but reports increasing bilateral lower extremity swelling and erythema increasing abdominal girth.  Patient states he is having difficulty sleeping because he cannot lay flat.  He denies fever chills nausea vomiting.  He does have loose stool 2 to 3 times a day.  Patient denies abdominal pain    Overview/Hospital Course:  Patient was admitted diagnosed with acute on chronic right-sided congestive heart failure found to have an EF of 20% with volume overload and abdominal distention from ascites causing respiratory distress. Patient had dialysis and had fluid removed and improved patient's symptoms. Patient had issues with hypotension and had to decrease and eventually discontinue multiple blood pressure medications. Patient had a paracentesis to pulled 6 L of fluid. Patient then developed symptomatic hypotension and was placed in ICU.  patient's symptoms resolved while in the ICU but continued to have hypotension that was asymptomatic. Patient was started on medications to improve his blood pressure. Discussed with the patient in length  regarding patient's current medical conditions. Patient is requesting to be DO NOT RESUSCITATE and DO NOT INTUBATE. Hospice consulted for further evaluation. Awaiting nursing home with hospice placement.    Interval History:  Sitting up at bedside and breathing comfortably on room air. Afebrile past 24 hours. Reports he is ready to go to the facility. Working with  for placement.    Review of Systems   Constitutional: Negative for chills, fatigue, fever and unexpected weight change.   HENT: Negative for sore throat.    Eyes: Negative for visual disturbance.   Respiratory: Negative for cough, chest tightness and shortness of breath.    Cardiovascular: Negative for chest pain.   Gastrointestinal: Negative for abdominal pain, constipation, diarrhea, nausea and vomiting.   Endocrine: Negative for polyuria.   Genitourinary: Negative for dysuria and hematuria.   Neurological: Negative for headaches.     Objective:     Vital Signs (Most Recent):  Temp: 97.6 °F (36.4 °C) (09/30/19 1103)  Pulse: 61 (09/30/19 1103)  Resp: 16 (09/30/19 1103)  BP: 105/65 (09/30/19 1103)  SpO2: 97 % (09/30/19 1103) Vital Signs (24h Range):  Temp:  [97.5 °F (36.4 °C)-97.7 °F (36.5 °C)] 97.6 °F (36.4 °C)  Pulse:  [60-65] 61  Resp:  [16-19] 16  SpO2:  [93 %-98 %] 97 %  BP: (102-111)/(59-73) 105/65     Weight: 90.4 kg (199 lb 4.7 oz)  Body mass index is 32.17 kg/m².    Intake/Output Summary (Last 24 hours) at 9/30/2019 1223  Last data filed at 9/30/2019 0848  Gross per 24 hour   Intake 360 ml   Output 1 ml   Net 359 ml      Physical Exam  Constitutional: He is oriented to person, place, and time. He appears well-developed and well-nourished. No distress.   HENT:   Head: Normocephalic and atraumatic.   Right Ear: External ear normal.   Left Ear: External ear normal.   Eyes: Conjunctivae and EOM are normal. Right eye exhibits no discharge. Left eye exhibits no discharge. No scleral icterus.   Neck: Normal range of motion.    Cardiovascular: Normal rate and regular rhythm.   Pulmonary/Chest: Effort normal. No respiratory distress. He has no wheezes. He has rales (Mild rales in lower bases). He exhibits no tenderness.   Abdominal: Soft. He exhibits distension. There is no tenderness.   Musculoskeletal: He exhibits no edema or tenderness.   Neurological: He is alert and oriented to person, place, and time.   Skin: Skin is warm. He is not diaphoretic.   Psychiatric: He has a normal mood and affect. His behavior is normal. Judgment and thought content normal.   Nursing note and vitals reviewed.        Assessment/Plan:      Active Hospital Problems    Diagnosis    *Acute on chronic right-sided congestive heart failure    Wheelchair dependence    Ischemic cardiomyopathy    ESRD (end stage renal disease)    AICD (automatic cardioverter/defibrillator) present    Other ascites      * Acute on chronic right-sided congestive heart failure  Improved after dialysis  Cardiology and Renal following  Stable  Awaiting placement for NH hospice    Ischemic cardiomyopathy  Ischemic cardiomyopathy with EF 20%.  Cardiology has been following patient.  Ace and beta-blocker have not been given in the past due to hypotension.  Now  on beta-blocker        AICD (automatic cardioverter/defibrillator) present  Details unknown      ESRD (end stage renal disease)  Patient is followed by Dr. Howell  Dialysis normally on Tuesdays Thursdays and Saturdays  Renal following      Other ascites  S/p paracentesis on 9/23 that removed 6 L fluid        VTE Risk Mitigation (From admission, onward)         Ordered     enoxaparin injection 30 mg  Daily      09/19/19 0953     IP VTE HIGH RISK PATIENT  Once      09/19/19 0953                      Hong Mercer MD  Department of Hospital Medicine   [facility]  Date of service: 09/30/2019 12:34 PM

## 2019-09-30 NOTE — PLAN OF CARE
09/30/19 1350   Discharge Reassessment   Assessment Type Discharge Planning Reassessment   Anticipated Discharge Disposition [facility]   [facility] with [facility] hospice.    1000--Amy with [facility] called to inform me that they are accepting the patient. She wanted to know if he will continue HD. I called [facility] hospice to find out their admitting dx--CHF. I called Amy back [phone], informed her that CHF is the admit dx for hospice, so he will continue HD if he chooses to do so.     1046-Caitlin with [facility] called to advise of denial.    1053--Roselyn with [facility] called to verify recpt of the referral.    1100-- Radha with [facility] present to assess the patient.    1123--[facility] called in .    1152--PASRR completed and sent via fax to the state [phone].    1336--142 rec'd.    1410--Amy with [facility] called to inform me that they can take the patient tomorrow.    1432-- Message rec'd from [facility] advising of denial 2/2 not being able to meet his needs.    150--Certificate of Terminal Illness rec'd, completed and faxed back to [facility] along with a hospice referral sent to me for a signature.

## 2019-09-30 NOTE — PROGRESS NOTES
[facility]  Nephrology  Progress Note    Patient Name: Tre Cali  MRN: [id number]  Admission Date: 9/19/2019  Hospital Length of Stay: 7 days  Attending Provider: Hong Mercer MD   Primary Care Physician: Primary Doctor No  Principal Problem:Acute on chronic right-sided congestive heart failure      Subjective:     Interval History:  feels 'ok' this am; denies any specific complaints.    Review of patient's allergies indicates:   Allergen Reactions    Sulfa (sulfonamide antibiotics) Rash     Current Facility-Administered Medications   Medication Frequency    0.9%  NaCl infusion PRN    acetaminophen tablet 650 mg Q6H PRN    acetaminophen tablet 650 mg Q4H PRN    albumin human 25% bottle 25 g PRN    aspirin EC tablet 81 mg Daily    calcium acetate capsule 2,668 mg TID WM    diphenoxylate-atropine 2.5-0.025 mg per tablet 2 tablet QID PRN    enoxaparin injection 30 mg Daily    magnesium oxide tablet 800 mg PRN    magnesium oxide tablet 800 mg PRN    midodrine tablet 10 mg TID    ondansetron injection 4 mg Q6H PRN    ramelteon tablet 8 mg Nightly PRN    rifAXIMin tablet 550 mg BID    sodium chloride 0.9% flush 10 mL PRN    traMADol tablet 50 mg Q6H PRN       Objective:     Vital Signs (Most Recent):  Temp: 97.7 °F (36.5 °C) (09/30/19 0300)  Pulse: 65 (09/30/19 0300)  Resp: 19 (09/30/19 0300)  BP: 107/66 (09/30/19 0300)  SpO2: (!) 93 % (09/30/19 0300)  O2 Device (Oxygen Therapy): room air (09/30/19 0456) Vital Signs (24h Range):  Temp:  [97.5 °F (36.4 °C)-98.2 °F (36.8 °C)] 97.7 °F (36.5 °C)  Pulse:  [60-73] 65  Resp:  [19-22] 19  SpO2:  [93 %-98 %] 93 %  BP: ()/(59-68) 107/66     Weight: 90.4 kg (199 lb 4.7 oz) (09/28/19 0513)  Body mass index is 32.17 kg/m².  Body surface area is 2.05 meters squared.    I/O last 3 completed shifts:  In: 980 [P.O.:480; Other:500]  Out: 2501 [Other:2500; Stool:1]    Physical Exam   Constitutional: He is oriented to person, place, and time.  He appears well-developed and well-nourished. No distress.   HENT:   Head: Normocephalic and atraumatic.   Mouth/Throat: Oropharynx is clear and moist. No oropharyngeal exudate.   Eyes: Conjunctivae are normal. Right eye exhibits no discharge. Left eye exhibits no discharge. No scleral icterus.   Neck: Normal range of motion. Neck supple. No JVD present.   Cardiovascular: Normal rate, regular rhythm and intact distal pulses. Exam reveals no gallop and no friction rub.   Murmur heard.  Pulmonary/Chest: Effort normal and breath sounds normal. No respiratory distress. He has no rales.   Abdominal: Soft. He exhibits distension. He exhibits no mass. There is no tenderness. There is no guarding.   Genitourinary:   Genitourinary Comments: Deferred   Musculoskeletal: He exhibits edema. He exhibits no tenderness or deformity.   R UE AV Fistula w/ good thrill  Decreased RoM   Neurological: He is alert and oriented to person, place, and time. No cranial nerve deficit.   Skin: Skin is warm and dry. He is not diaphoretic. No erythema. No pallor.   Psychiatric: He has a normal mood and affect. His behavior is normal.   Nursing note and vitals reviewed.      Significant Labs:sure  CBC:   Recent Labs   Lab 09/28/19  1730   WBC 7.36   RBC 3.79*   HGB 11.5*   HCT 35.6*   *   MCV 94   MCH 30.3   MCHC 32.3     CMP:   Recent Labs   Lab 09/28/19  1730   GLU 92   CALCIUM 8.5*   ALBUMIN 3.1*   PROT 7.1   *   K 3.9   CO2 25   CL 98   BUN 22*   CREATININE 3.4*   ALKPHOS 138*   ALT 12   AST 14   BILITOT 1.2*     LFTs:   Recent Labs   Lab 09/28/19  1730   ALT 12   AST 14   ALKPHOS 138*   BILITOT 1.2*   PROT 7.1   ALBUMIN 3.1*     All labs within the past 24 hours have been reviewed.    Significant Imaging:  X-Ray: Reviewed    Assessment/Plan:     Active Diagnoses:    Diagnosis Date Noted POA    PRINCIPAL PROBLEM:  Acute on chronic right-sided congestive heart failure [I50.813] 09/19/2019 Yes    Wheelchair dependence [Z99.3]  09/27/2019 Not Applicable     Chronic    Ischemic cardiomyopathy [I25.5] 09/21/2019 Yes    ESRD (end stage renal disease) [N18.6] 08/13/2019 Yes     Chronic    AICD (automatic cardioverter/defibrillator) present [Z95.810] 08/13/2019 Yes     Chronic    Other ascites [R18.8] 08/13/2019 Yes      Problems Resolved During this Admission:     1. ESRD (HD-TTS via R UE AV Fistula)- symptomatically stable at present; no overt volume overload, significant electrolyte perturbations nor acid-base disturbance    -maintenance HD (duration: 3h; UF Goal: 1-2L as tolerated;  Standard 'Bath'; Access: AVF; Qb: 400 ml/min, Qd: 2x BFR; give 12.5-25 gm 25% Albumin w/ HD for BP support + 3Ca bath & 36.5-degree dialysate temperature) per outpatient schedule    -DAILY renal function panel to document sCr trend, optimize HD electrolyte 'bath' & assess response to therapy    -avoid nephrotoxic agents (NSAIDs, IV contrast dye)    -renally dose all appropriate medications, including antibiotics     2. HTN- clinically stable at present;  BP AT GOAL (107/66) now w/ relative hypotension likely 2/2 ESLD-induced splanchnic vasodilatation (needs candidacy assessment for chronic Midodrine/Octreotide therapy by GI Medicine service; no active issues    -HOLD anti-HTN medications & diuretics for now & reassess in am     3. ANEMIA- clinically stable at present; H/H AT GOAL (11.5/35.6); no active issues    -trend daily CBC (H/H) to effect optimal blood-loss surveillance     4. SECONDARY HYPERPARATHYROIDISM (sHPT)- clinically stable at present; no active issues    -continue dietary binder/Vitamin D +/- HD-associated calcimimetic agent (Cinacalcet vs Etelcalcetide)     5. 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 I will follow-up with patient. Please contact us if you have any additional questions.    Armando Cruz III, MD  [facility]  [facility]  [phone] (C)

## 2019-10-01 VITALS
HEIGHT: 66 IN | OXYGEN SATURATION: 94 % | HEART RATE: 68 BPM | SYSTOLIC BLOOD PRESSURE: 103 MMHG | DIASTOLIC BLOOD PRESSURE: 61 MMHG | RESPIRATION RATE: 20 BRPM | WEIGHT: 209.19 LBS | BODY MASS INDEX: 33.62 KG/M2 | TEMPERATURE: 98 F

## 2019-10-01 LAB
ALBUMIN SERPL BCP-MCNC: 3.3 G/DL (ref 3.5–5.2)
ANION GAP SERPL CALC-SCNC: 14 MMOL/L (ref 8–16)
BUN SERPL-MCNC: 54 MG/DL (ref 6–20)
CALCIUM SERPL-MCNC: 8.9 MG/DL (ref 8.7–10.5)
CHLORIDE SERPL-SCNC: 97 MMOL/L (ref 95–110)
CO2 SERPL-SCNC: 19 MMOL/L (ref 23–29)
CREAT SERPL-MCNC: 7.5 MG/DL (ref 0.5–1.4)
EST. GFR  (AFRICAN AMERICAN): 8.5 ML/MIN/1.73 M^2
EST. GFR  (NON AFRICAN AMERICAN): 7.3 ML/MIN/1.73 M^2
GLUCOSE SERPL-MCNC: 117 MG/DL (ref 70–110)
PHOSPHATE SERPL-MCNC: 6.2 MG/DL (ref 2.7–4.5)
POTASSIUM SERPL-SCNC: 4.6 MMOL/L (ref 3.5–5.1)
POTASSIUM SERPL-SCNC: 7.4 MMOL/L (ref 3.5–5.1)
SODIUM SERPL-SCNC: 130 MMOL/L (ref 136–145)

## 2019-10-01 PROCEDURE — 25000003 PHARM REV CODE 250: Performed by: PHYSICIAN ASSISTANT

## 2019-10-01 PROCEDURE — 90935 HEMODIALYSIS ONE EVALUATION: CPT

## 2019-10-01 PROCEDURE — 25000003 PHARM REV CODE 250: Performed by: FAMILY MEDICINE

## 2019-10-01 PROCEDURE — 80069 RENAL FUNCTION PANEL: CPT

## 2019-10-01 PROCEDURE — 84132 ASSAY OF SERUM POTASSIUM: CPT

## 2019-10-01 PROCEDURE — 63600175 PHARM REV CODE 636 W HCPCS: Mod: JG | Performed by: INTERNAL MEDICINE

## 2019-10-01 PROCEDURE — 25000003 PHARM REV CODE 250: Performed by: HOSPITALIST

## 2019-10-01 PROCEDURE — 36415 COLL VENOUS BLD VENIPUNCTURE: CPT

## 2019-10-01 PROCEDURE — P9047 ALBUMIN (HUMAN), 25%, 50ML: HCPCS | Mod: JG | Performed by: INTERNAL MEDICINE

## 2019-10-01 RX ADMIN — MIDODRINE HYDROCHLORIDE 10 MG: 2.5 TABLET ORAL at 08:10

## 2019-10-01 RX ADMIN — ASPIRIN 81 MG: 81 TABLET, DELAYED RELEASE ORAL at 08:10

## 2019-10-01 RX ADMIN — MIDODRINE HYDROCHLORIDE 10 MG: 2.5 TABLET ORAL at 12:10

## 2019-10-01 RX ADMIN — CALCIUM ACETATE 2668 MG: 667 CAPSULE ORAL at 12:10

## 2019-10-01 RX ADMIN — MIDODRINE HYDROCHLORIDE 10 MG: 2.5 TABLET ORAL at 03:10

## 2019-10-01 RX ADMIN — ALBUMIN (HUMAN) 25 G: 12.5 SOLUTION INTRAVENOUS at 10:10

## 2019-10-01 RX ADMIN — CALCIUM ACETATE 2668 MG: 667 CAPSULE ORAL at 08:10

## 2019-10-01 RX ADMIN — RIFAXIMIN 550 MG: 550 TABLET ORAL at 08:10

## 2019-10-01 NOTE — NURSING
Pt left at this time via transport provided by facility. Pt aaox4. resp even and unlabored on room air. Iv removed with catheter intact. In no acute distress. Left with all personal belonging.

## 2019-10-01 NOTE — PLAN OF CARE
10/01/19 1447   Discharge Reassessment   Assessment Type Discharge Planning Reassessment   Anticipated Discharge Disposition Long Term   DC orders sent to [facility] via manual fax today. Fax succeeded at 1258. I called [facility] [phone], left a message for Amy informing her that the dc orders have been sent.    1430--Report rec'd from Amy and given to nurse, Keren to call report--Ester at [facility] [phone]. Transport will be provided by the facility.    1448-I called Hannah with [facility] [phone], informed her of the dc today.

## 2019-10-01 NOTE — PROGRESS NOTES
[facility]  Nephrology  Progress Note    Patient Name: Tre Cali  MRN: [id number]  Admission Date: 9/19/2019  Hospital Length of Stay: 8 days  Attending Provider: Hong Mercer MD   Primary Care Physician: Primary Doctor No  Principal Problem:Acute on chronic right-sided congestive heart failure      Subjective:     Interval History:  feels 'ok' this am; denies any specific complaints; now 'DNR' designee; awaiting NH placement    Review of patient's allergies indicates:   Allergen Reactions    Sulfa (sulfonamide antibiotics) Rash     Current Facility-Administered Medications   Medication Frequency    0.9%  NaCl infusion PRN    acetaminophen tablet 650 mg Q6H PRN    acetaminophen tablet 650 mg Q4H PRN    albumin human 25% bottle 25 g PRN    aspirin EC tablet 81 mg Daily    calcium acetate capsule 2,668 mg TID WM    diphenoxylate-atropine 2.5-0.025 mg per tablet 2 tablet QID PRN    enoxaparin injection 30 mg Daily    magnesium oxide tablet 800 mg PRN    magnesium oxide tablet 800 mg PRN    midodrine tablet 10 mg TID    ondansetron injection 4 mg Q6H PRN    ramelteon tablet 8 mg Nightly PRN    rifAXIMin tablet 550 mg BID    sodium chloride 0.9% flush 10 mL PRN    traMADol tablet 50 mg Q6H PRN       Objective:     Vital Signs (Most Recent):  Temp: 97.4 °F (36.3 °C) (10/01/19 0300)  Pulse: 62 (10/01/19 0300)  Resp: 18 (10/01/19 0300)  BP: (!) 93/53 (10/01/19 0300)  SpO2: 98 % (10/01/19 0300)  O2 Device (Oxygen Therapy): room air (10/01/19 0300) Vital Signs (24h Range):  Temp:  [97.4 °F (36.3 °C)-97.7 °F (36.5 °C)] 97.4 °F (36.3 °C)  Pulse:  [56-63] 62  Resp:  [16-18] 18  SpO2:  [95 %-98 %] 98 %  BP: ()/(53-73) 93/53     Weight: 94.9 kg (209 lb 3.5 oz) (10/01/19 0500)  Body mass index is 33.77 kg/m².  Body surface area is 2.1 meters squared.    I/O last 3 completed shifts:  In: 840 [P.O.:840]  Out: 1 [Stool:1]    Physical Exam   Constitutional: He is oriented to person,  place, and time. He appears well-developed and well-nourished. No distress.   HENT:   Head: Normocephalic and atraumatic.   Mouth/Throat: Oropharynx is clear and moist. No oropharyngeal exudate.   Eyes: Conjunctivae are normal. Right eye exhibits no discharge. Left eye exhibits no discharge. No scleral icterus.   Neck: Normal range of motion. Neck supple. No JVD present.   Cardiovascular: Normal rate, regular rhythm and intact distal pulses. Exam reveals no gallop and no friction rub.   Murmur heard.  Pulmonary/Chest: Effort normal and breath sounds normal. No respiratory distress. He has no rales.   Abdominal: Soft. He exhibits distension. He exhibits no mass. There is no tenderness. There is no guarding.   Genitourinary:   Genitourinary Comments: Deferred   Musculoskeletal: He exhibits edema. He exhibits no tenderness or deformity.   R UE AV Fistula w/ good thrill  Decreased RoM   Neurological: He is alert and oriented to person, place, and time. No cranial nerve deficit.   Skin: Skin is warm and dry. He is not diaphoretic. No erythema. No pallor.   Psychiatric: He has a normal mood and affect. His behavior is normal.   Nursing note and vitals reviewed.      Significant Labs:sure  CBC:   Recent Labs   Lab 09/28/19  1730   WBC 7.36   RBC 3.79*   HGB 11.5*   HCT 35.6*   *   MCV 94   MCH 30.3   MCHC 32.3     CMP:   Recent Labs   Lab 09/28/19  1730   GLU 92   CALCIUM 8.5*   ALBUMIN 3.1*   PROT 7.1   *   K 3.9   CO2 25   CL 98   BUN 22*   CREATININE 3.4*   ALKPHOS 138*   ALT 12   AST 14   BILITOT 1.2*     LFTs:   Recent Labs   Lab 09/28/19  1730   ALT 12   AST 14   ALKPHOS 138*   BILITOT 1.2*   PROT 7.1   ALBUMIN 3.1*     All labs within the past 24 hours have been reviewed.    Significant Imaging:  X-Ray: Reviewed    Assessment/Plan:     Active Diagnoses:    Diagnosis Date Noted POA    PRINCIPAL PROBLEM:  Acute on chronic right-sided congestive heart failure [I50.813] 09/19/2019 Yes    Wheelchair  dependence [Z99.3] 09/27/2019 Not Applicable     Chronic    Ischemic cardiomyopathy [I25.5] 09/21/2019 Yes    ESRD (end stage renal disease) [N18.6] 08/13/2019 Yes     Chronic    AICD (automatic cardioverter/defibrillator) present [Z95.810] 08/13/2019 Yes     Chronic    Other ascites [R18.8] 08/13/2019 Yes      Problems Resolved During this Admission:     1. ESRD (HD-TTS via R UE AV Fistula)- symptomatically stable at present; no overt volume overload, significant electrolyte perturbations nor acid-base disturbance    -maintenance HD (duration: 3h; UF Goal: 1-2L as tolerated;  Standard 'Bath'; Access: AVF; Qb: 400 ml/min, Qd: 2x BFR; give 12.5-25 gm 25% Albumin w/ HD for BP support + 3Ca bath & 36.5-degree dialysate temperature) per outpatient schedule    -DAILY renal function panel to document sCr trend, optimize HD electrolyte 'bath' & assess response to therapy    -avoid nephrotoxic agents (NSAIDs, IV contrast dye)    -renally dose all appropriate medications, including antibiotics     2. HTN- clinically stable at present;  BP AT GOAL (93/53) now w/ relative hypotension likely 2/2 ESLD-induced splanchnic vasodilatation (needs candidacy assessment for chronic Midodrine/Octreotide therapy by GI Medicine service; no active issues    -HOLD anti-HTN medications & diuretics for now & reassess in am     3. ANEMIA- clinically stable at present; H/H AT GOAL (11.5/35.6 on 9/28/19); no active issues    -trend daily CBC (H/H) to effect optimal blood-loss surveillance     4. SECONDARY HYPERPARATHYROIDISM (sHPT)- clinically stable at present; no active issues    -continue dietary binder/Vitamin D +/- HD-associated calcimimetic agent (Cinacalcet vs Etelcalcetide)     5. 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 I will follow-up with patient. Please contact us if you have any additional questions.    Armando Cruz III, MD  [facility]  [facility]  [phone] (C)

## 2019-10-01 NOTE — DISCHARGE SUMMARY
[facility]  Discharge Summary      Patient Name: Tre Cali  MRN: [id number]  Admission Date: 9/19/2019  Hospital Length of Stay: 8 days  Discharge Date and Time:  10/01/2019 11:21 AM  Attending Physician: Hong Mercer MD   Discharging Provider: Hong Mercer MD  Primary Care Provider: Primary Doctor No      HPI:   Patient is a 54-year-old male with a history of end-stage renal disease on dialysis.  He receives dialysis on Tuesdays Thursdays and Saturdays.  Patient presents to our emergency department with complaints of shortness of breath.  He was scheduled  scheduled for dialysis this morning however was unable to wait until 8:00 a.m. secondary to dyspnea .  In the emergency department patient was seated upright in significant respiratory distress improved with non-rebreather .  Patient  denies any chest pain , but reports increasing bilateral lower extremity swelling and erythema increasing abdominal girth.  Patient states he is having difficulty sleeping because he cannot lay flat.  He denies fever chills nausea vomiting.  He does have loose stool 2 to 3 times a day.  Patient denies abdominal pain    * No surgery found *      Hospital Course:   Patient was admitted diagnosed with acute on chronic right-sided congestive heart failure found to have an EF of 20% with volume overload and abdominal distention from ascites causing respiratory distress. Patient had dialysis and had fluid removed and improved patient's symptoms. Patient had issues with hypotension and had to decrease and eventually discontinue multiple blood pressure medications. Patient had a paracentesis to pulled 6 L of fluid. Patient then developed symptomatic hypotension and was placed in ICU.  patient's symptoms resolved while in the ICU but continued to have hypotension that was asymptomatic. Patient was started on medications to improve his blood pressure. Discussed with the patient in length  regarding patient's current medical conditions. Patient is requesting to be DO NOT RESUSCITATE and DO NOT INTUBATE. Patient was discharged to the nursing home with hospice. Patient is to follow-up with GI, nephrology, cardiology.    General: Patient resting comfortably in no acute distress. Appears as stated age. Calm  Eyes: EOM intact. No conjunctivae injection. No scleral icterus.  ENT: Hearing grossly intact. No discharge from ears. No nasal discharge.   CVS: RRR. No LE edema BL.  Lungs: CTA BL, no wheezing or crackles. Good breath sounds. No accessory muscle use. No acute respiratory distress  Neuro: AOx3. GCS 15. Cranial nerves grossly intact. Moves all extremities equally. Follows commands. Responds appropriately      Consults:   Consults (From admission, onward)        Status Ordering Provider     Inpatient consult to Cardiology  Once     Provider:  Tyler Ramos MD    Completed EZEQUIEL KOHLI     Inpatient consult to Cardiology  Once     Provider:  Axel White MD    Acknowledged EZEQUIEL KOHLI     Inpatient consult to Gastroenterology  Once     Provider:  Kalyan Olvera III, MD    Completed DAYANA WONG     Inpatient consult to Hospitalist  Once     Provider:  Ezequiel Kohli DO    Acknowledged EZEQUIEL KOHLI     Inpatient consult to Nephrology  Once     Provider:  Mikel Howell MD    Acknowledged EZEQUIEL KOHLI     Inpatient consult to Nephrology  Once     Provider:  Mikel Howell MD    Completed EZEQUIEL KOHLI     Inpatient consult to   Once     Provider:  (Not yet assigned)    Completed DAYANA WONG     Inpatient consult to   Once     Provider:  (Not yet assigned)    Completed DAYANA WONG     IP consult to case management  Once     Provider:  (Not yet assigned)    EZEQUIEL Padilla          No new Assessment & Plan notes have been filed under this hospital service since the last note was generated.  Service: [facility]  Medicine    Final Active Diagnoses:    Diagnosis Date Noted POA    PRINCIPAL PROBLEM:  Acute on chronic right-sided congestive heart failure [I50.813] 09/19/2019 Yes    Wheelchair dependence [Z99.3] 09/27/2019 Not Applicable     Chronic    Ischemic cardiomyopathy [I25.5] 09/21/2019 Yes    ESRD (end stage renal disease) [N18.6] 08/13/2019 Yes     Chronic    AICD (automatic cardioverter/defibrillator) present [Z95.810] 08/13/2019 Yes     Chronic    Other ascites [R18.8] 08/13/2019 Yes      Problems Resolved During this Admission:       Discharged Condition: stable    Disposition: Hospice/Medical Facility    Follow Up:  Follow-up Information     Axel White MD In 2 weeks.    Specialty:  Cardiology  Why:  For check up s/p hospital discharge  Contact information:  [address]  [address]  [place] [address] [phone]             Mikel Howell MD In 1 week.    Specialty:  Nephrology  Why:  For check up s/p hospital discharge  Contact information:  [address] [facility] [id number] [phone]             Thong Peña MD In 2 weeks.    Specialty:  Gastroenterology  Why:  For check up s/p hospital discharge  Contact information:  [address]   [place] [address] [phone]             [facility] .    Specialty:  Home Health Services  Contact information:  [address]  [place] [id number] [phone]                 Patient Instructions:      Referral to Home health   Referral Priority: Routine Referral Type: Home Health   Referral Reason: Specialty Services Required   Referred to Provider: [facility] Requested Specialty: Home Health Services   Number of Visits Requested: 1     Diet renal     Notify your health care provider if you experience any of the following:  temperature >100.4     Notify your health care provider if you experience any of the following:  persistent nausea and vomiting or diarrhea     Notify your health care provider if you experience  any of the following:  severe uncontrolled pain     Notify your health care provider if you experience any of the following:  redness, tenderness, or signs of infection (pain, swelling, redness, odor or green/yellow discharge around incision site)     Notify your health care provider if you experience any of the following:  difficulty breathing or increased cough     Notify your health care provider if you experience any of the following:  severe persistent headache     Notify your health care provider if you experience any of the following:  worsening rash     Notify your health care provider if you experience any of the following:  persistent dizziness, light-headedness, or visual disturbances     Notify your health care provider if you experience any of the following:  increased confusion or weakness     Activity as tolerated       Pending Diagnostic Studies:     Procedure Component Value Units Date/Time    Echo Color Flow Doppler? Yes; Bubble Contrast? No [[id number]]  (Abnormal) Resulted:  09/26/19 1446    Order Status:  Sent Lab Status:  In process Updated:  09/26/19 1446     BSA 2.18 m2      TDI SEPTAL 0.06 m/s      LV LATERAL E/E' RATIO 11.13 m/s      LV SEPTAL E/E' RATIO 14.83 m/s      AORTIC VALVE CUSP SEPERATION 1.56 cm      TDI LATERAL 0.08 m/s      LVIDD 7.11 cm      IVS 1.02 cm      PW 1.02 cm      Ao root annulus 3.16 cm      LVIDS 5.56 cm      FS 22 %      LV mass 338.92 g      LA size 4.40 cm      Left Ventricle Relative Wall Thickness 0.29 cm      AV mean gradient 6 mmHg      AV valve area 1.53 cm2      AV Velocity Ratio 53.29     AV index (prosthetic) 0.48     E/A ratio 3.18     Mean e' 0.07 m/s      E wave decelartion time 116.03 msec      LVOT diameter 2.01 cm      LVOT area 3.2 cm2      LVOT peak greg 85.26 m/s      LVOT peak VTI 14.22 cm      Ao peak greg 1.60 m/s      Ao VTI 29.41 cm      LVOT stroke volume 45.10 cm3      AV peak gradient 10 mmHg      E/E' ratio 12.71 m/s      MV Peak E Greg 0.89  m/s      TR Max Greg 3.10 m/s      MV Peak A Greg 0.28 m/s      LV Systolic Volume 162.00 mL      LV Systolic Volume Index 80.5 mL/m2      LV Diastolic Volume 223.83 mL      LV Diastolic Volume Index 111.24 mL/m2      LV Mass Index 168 g/m2      Triscuspid Valve Regurgitation Peak Gradient 38 mmHg     Narrative:       · Eccentric left ventricular hypertrophy.       Impression:                Medications:  Reconciled Home Medications:      Medication List      START taking these medications    midodrine 10 MG tablet  Commonly known as:  PROAMATINE  Take 1 tablet (10 mg total) by mouth 3 (three) times daily.     rifAXIMin 550 mg Tab  Commonly known as:  XIFAXAN  Take 1 tablet (550 mg total) by mouth 2 (two) times daily.        CONTINUE taking these medications    aspirin 81 MG EC tablet  Commonly known as:  ECOTRIN  Take 1 tablet by mouth once daily.     calcium acetate 667 mg capsule  Commonly known as:  PHOSLO  Take 2,668 mg by mouth 3 (three) times daily with meals.     loperamide 2 mg capsule  Commonly known as:  IMODIUM  Take 2 mg by mouth 4 (four) times daily as needed for Diarrhea.        STOP taking these medications    amiodarone 200 MG Tab  Commonly known as:  PACERONE     bumetanide 2 MG tablet  Commonly known as:  BUMEX     white petrolatum 41 % Oint            Indwelling Lines/Drains at time of discharge:   Lines/Drains/Airways     Drain                 Hemodialysis AV Fistula Right upper arm -- days                Time spent on the discharge of patient: 45 minutes  Patient was seen and examined on the date of discharge and determined to be suitable for discharge.         Hong Mercer MD  Department of Hospital Medicine  [facility]  Date of service: 10/01/2019 11:21 AM

## 2019-10-01 NOTE — NURSING TRANSFER
Nursing Transfer Note      10/1/2019     Transfer To: Dialysis    Transfer via bed    Transfer with cardiac monitoring    Transported by [facility]    Medicines sent: no    Chart send with patient: Yes

## 2019-10-01 NOTE — PROGRESS NOTES
HD: tx complete, pt stable. Lines re-infused, needles removed. Hemostasis achieved. Positive for bruit and thrill post tx. Pt tolerated well.     Net uf: 1000 ml

## 2019-10-02 ENCOUNTER — DOCUMENTATION ONLY (OUTPATIENT)
Dept: FAMILY MEDICINE | Facility: CLINIC | Age: 54
End: 2019-10-02

## 2019-10-02 NOTE — PROGRESS NOTES
Health Maintenance Due   Topic Date Due    Hepatitis C Screening  1965    Lipid Panel  1965    TETANUS VACCINE  05/26/1981    Pneumococcal Vaccine (Medium Risk) (1 of 1 - PPSV23) 05/26/1982    Colonoscopy  05/26/2013

## 2019-10-04 LAB
AORTIC ROOT ANNULUS: 3.16 CM
AORTIC VALVE CUSP SEPERATION: 1.56 CM
AV INDEX (PROSTH): 0.48
AV MEAN GRADIENT: 6 MMHG
AV PEAK GRADIENT: 10 MMHG
AV VALVE AREA: 1.53 CM2
AV VELOCITY RATIO: 53.29
BSA FOR ECHO PROCEDURE: 2.18 M2
CV ECHO LV RWT: 0.29 CM
DOP CALC AO PEAK VEL: 1.6 M/S
DOP CALC AO VTI: 29.41 CM
DOP CALC LVOT AREA: 3.2 CM2
DOP CALC LVOT DIAMETER: 2.01 CM
DOP CALC LVOT PEAK VEL: 85.26 M/S
DOP CALC LVOT STROKE VOLUME: 45.1 CM3
DOP CALCLVOT PEAK VEL VTI: 14.22 CM
E WAVE DECELERATION TIME: 116.03 MSEC
E/A RATIO: 3.18
E/E' RATIO: 12.71 M/S
ECHO LV POSTERIOR WALL: 1.02 CM (ref 0.6–1.1)
FRACTIONAL SHORTENING: 22 % (ref 28–44)
INTERVENTRICULAR SEPTUM: 1.02 CM (ref 0.6–1.1)
LEFT ATRIUM SIZE: 4.4 CM
LEFT INTERNAL DIMENSION IN SYSTOLE: 5.56 CM (ref 2.1–4)
LEFT VENTRICLE DIASTOLIC VOLUME INDEX: 111.24 ML/M2
LEFT VENTRICLE DIASTOLIC VOLUME: 223.83 ML
LEFT VENTRICLE MASS INDEX: 168 G/M2
LEFT VENTRICLE SYSTOLIC VOLUME INDEX: 80.5 ML/M2
LEFT VENTRICLE SYSTOLIC VOLUME: 162 ML
LEFT VENTRICULAR INTERNAL DIMENSION IN DIASTOLE: 7.11 CM (ref 3.5–6)
LEFT VENTRICULAR MASS: 338.92 G
LV LATERAL E/E' RATIO: 11.13 M/S
LV SEPTAL E/E' RATIO: 14.83 M/S
MV PEAK A VEL: 0.28 M/S
MV PEAK E VEL: 0.89 M/S
PISA TR MAX VEL: 3.1 M/S
RA PRESSURE: 15 MMHG
TDI LATERAL: 0.08 M/S
TDI SEPTAL: 0.06 M/S
TDI: 0.07 M/S
TR MAX PG: 38 MMHG
TV REST PULMONARY ARTERY PRESSURE: 53 MMHG

## 2019-11-20 ENCOUNTER — TELEPHONE (OUTPATIENT)
Dept: HOME HEALTH SERVICES | Facility: HOSPITAL | Age: 54
End: 2019-11-20
